# Patient Record
Sex: FEMALE | Race: WHITE | NOT HISPANIC OR LATINO | Employment: OTHER | URBAN - METROPOLITAN AREA
[De-identification: names, ages, dates, MRNs, and addresses within clinical notes are randomized per-mention and may not be internally consistent; named-entity substitution may affect disease eponyms.]

---

## 2017-01-06 ENCOUNTER — DOCUMENTATION ONLY (OUTPATIENT)
Dept: FAMILY MEDICINE | Facility: CLINIC | Age: 67
End: 2017-01-06

## 2017-01-06 NOTE — PROGRESS NOTES
Pre-Visit Chart Review  For Appointment Scheduled on 1/9/17    Health Maintenance Due   Topic Date Due    Eye Exam  03/30/2016    Foot Exam  03/03/2017

## 2017-01-09 ENCOUNTER — TELEPHONE (OUTPATIENT)
Dept: FAMILY MEDICINE | Facility: CLINIC | Age: 67
End: 2017-01-09

## 2017-01-09 ENCOUNTER — LAB VISIT (OUTPATIENT)
Dept: LAB | Facility: HOSPITAL | Age: 67
End: 2017-01-09
Attending: FAMILY MEDICINE
Payer: MEDICARE

## 2017-01-09 ENCOUNTER — OFFICE VISIT (OUTPATIENT)
Dept: FAMILY MEDICINE | Facility: CLINIC | Age: 67
End: 2017-01-09
Payer: MEDICARE

## 2017-01-09 VITALS
TEMPERATURE: 98 F | HEART RATE: 69 BPM | BODY MASS INDEX: 27.17 KG/M2 | HEIGHT: 66 IN | SYSTOLIC BLOOD PRESSURE: 118 MMHG | OXYGEN SATURATION: 99 % | WEIGHT: 169.06 LBS | DIASTOLIC BLOOD PRESSURE: 66 MMHG

## 2017-01-09 DIAGNOSIS — J44.1 COPD EXACERBATION: ICD-10-CM

## 2017-01-09 DIAGNOSIS — E86.0 DEHYDRATION, MILD: ICD-10-CM

## 2017-01-09 DIAGNOSIS — E11.43 DIABETIC GASTROPARESIS: ICD-10-CM

## 2017-01-09 DIAGNOSIS — K31.84 DIABETIC GASTROPARESIS: ICD-10-CM

## 2017-01-09 DIAGNOSIS — R11.0 CHRONIC NAUSEA: ICD-10-CM

## 2017-01-09 DIAGNOSIS — E55.9 VITAMIN D DEFICIENCY: ICD-10-CM

## 2017-01-09 DIAGNOSIS — E87.6 HYPOKALEMIA: ICD-10-CM

## 2017-01-09 DIAGNOSIS — D86.0 SARCOIDOSIS OF LUNG: ICD-10-CM

## 2017-01-09 DIAGNOSIS — E11.8 CONTROLLED TYPE 2 DIABETES MELLITUS WITH COMPLICATION, WITHOUT LONG-TERM CURRENT USE OF INSULIN: ICD-10-CM

## 2017-01-09 DIAGNOSIS — N32.89 BLADDER SPASMS: ICD-10-CM

## 2017-01-09 DIAGNOSIS — R50.9 FEVER, UNSPECIFIED FEVER CAUSE: ICD-10-CM

## 2017-01-09 DIAGNOSIS — Z09 HOSPITAL DISCHARGE FOLLOW-UP: Primary | ICD-10-CM

## 2017-01-09 DIAGNOSIS — G43.109 COMPLICATED MIGRAINE: ICD-10-CM

## 2017-01-09 LAB
ALBUMIN SERPL BCP-MCNC: 3.5 G/DL
ALP SERPL-CCNC: 77 U/L
ALT SERPL W/O P-5'-P-CCNC: 10 U/L
ANION GAP SERPL CALC-SCNC: 10 MMOL/L
AST SERPL-CCNC: 12 U/L
BASOPHILS # BLD AUTO: 0.02 K/UL
BASOPHILS NFR BLD: 0.3 %
BILIRUB SERPL-MCNC: 0.3 MG/DL
BUN SERPL-MCNC: 11 MG/DL
CALCIUM SERPL-MCNC: 9.7 MG/DL
CHLORIDE SERPL-SCNC: 102 MMOL/L
CO2 SERPL-SCNC: 26 MMOL/L
CREAT SERPL-MCNC: 0.8 MG/DL
DIFFERENTIAL METHOD: ABNORMAL
EOSINOPHIL # BLD AUTO: 0.2 K/UL
EOSINOPHIL NFR BLD: 2.6 %
ERYTHROCYTE [DISTWIDTH] IN BLOOD BY AUTOMATED COUNT: 14.9 %
EST. GFR  (AFRICAN AMERICAN): >60 ML/MIN/1.73 M^2
EST. GFR  (NON AFRICAN AMERICAN): >60 ML/MIN/1.73 M^2
GLUCOSE SERPL-MCNC: 128 MG/DL
HCT VFR BLD AUTO: 38.3 %
HGB BLD-MCNC: 12.3 G/DL
LYMPHOCYTES # BLD AUTO: 1.6 K/UL
LYMPHOCYTES NFR BLD: 21.2 %
MCH RBC QN AUTO: 28 PG
MCHC RBC AUTO-ENTMCNC: 32.1 %
MCV RBC AUTO: 87 FL
MONOCYTES # BLD AUTO: 0.8 K/UL
MONOCYTES NFR BLD: 10.6 %
NEUTROPHILS # BLD AUTO: 4.8 K/UL
NEUTROPHILS NFR BLD: 65 %
PLATELET # BLD AUTO: 438 K/UL
PMV BLD AUTO: 9.6 FL
POTASSIUM SERPL-SCNC: 3.8 MMOL/L
PROT SERPL-MCNC: 7.5 G/DL
RBC # BLD AUTO: 4.39 M/UL
SODIUM SERPL-SCNC: 138 MMOL/L
WBC # BLD AUTO: 7.44 K/UL

## 2017-01-09 PROCEDURE — 99999 PR PBB SHADOW E&M-EST. PATIENT-LVL III: CPT | Mod: PBBFAC,,, | Performed by: PHYSICIAN ASSISTANT

## 2017-01-09 PROCEDURE — 85025 COMPLETE CBC W/AUTO DIFF WBC: CPT

## 2017-01-09 PROCEDURE — 36415 COLL VENOUS BLD VENIPUNCTURE: CPT | Mod: PO

## 2017-01-09 PROCEDURE — 80053 COMPREHEN METABOLIC PANEL: CPT

## 2017-01-09 PROCEDURE — 99499 UNLISTED E&M SERVICE: CPT | Mod: S$GLB,,, | Performed by: PHYSICIAN ASSISTANT

## 2017-01-09 RX ORDER — ERGOCALCIFEROL 1.25 MG/1
50000 CAPSULE ORAL
Qty: 8 CAPSULE | Refills: 0 | Status: SHIPPED | OUTPATIENT
Start: 2017-01-09 | End: 2017-05-22 | Stop reason: SDUPTHER

## 2017-01-09 RX ORDER — OXYBUTYNIN CHLORIDE 5 MG/1
5 TABLET ORAL 2 TIMES DAILY
Qty: 180 TABLET | Refills: 1 | Status: SHIPPED | OUTPATIENT
Start: 2017-01-09 | End: 2017-02-07

## 2017-01-09 RX ORDER — POTASSIUM CHLORIDE 750 MG/1
10 CAPSULE, EXTENDED RELEASE ORAL 3 TIMES DAILY
Qty: 270 CAPSULE | Refills: 1 | Status: SHIPPED | OUTPATIENT
Start: 2017-01-09 | End: 2017-07-06 | Stop reason: SDUPTHER

## 2017-01-09 RX ORDER — CLOPIDOGREL BISULFATE 75 MG/1
75 TABLET ORAL DAILY
Qty: 90 TABLET | Refills: 1 | Status: SHIPPED | OUTPATIENT
Start: 2017-01-09 | End: 2017-05-30 | Stop reason: SDUPTHER

## 2017-01-09 RX ORDER — CLARITHROMYCIN 500 MG/1
500 TABLET, FILM COATED ORAL 2 TIMES DAILY
Qty: 20 TABLET | Refills: 0 | Status: SHIPPED | OUTPATIENT
Start: 2017-01-09 | End: 2017-01-10 | Stop reason: SDUPTHER

## 2017-01-09 NOTE — PROGRESS NOTES
"Subjective:       Patient ID: Nathalie Santiago is a 66 y.o. female.    Chief Complaint: Hospital Follow Up (dehydration, vomiting, cough, left ear pain, head and chest congestion)    HPI   Patient is a 66 year old  female presenting to the clinic for hospital follow-up 12/19/16-12/23/16 for n/v, dehydration. Patient reports history of diabetic gastroparesis. She was admitted for IV fluids & antimetitcs. She underwent upper GI with Dr. Aly which showed gastritis. CT abdomen negative except "Prior cholecystectomy, prior hysterectomy.  Diffuse fatty infiltration of the liver.  No acute findings." She did have low potassium noted in the hospital for which she was discarged on potassium. She was on IV steroids in the hosptial for sarcoidosis. Her n/v has improved since the hospital, but has not completely resolved. She feels as though eating smaller portions & reglan has helped. She does complain of chest congestion & cough with green purulent mucous production with wheezing, shortness of breath, fever with Tmax 100.1. She also is having right ear pain, drainage,sinus pain. She has been doing duo-neb treatments 3x daily & has been taking her QVar as directed.     Transitional Care Note  Admit date: 12/19/16  Discharge date: 12/23/16  Date of interactive contact (2 business days post D/C): 12/27/16  Hospitalized at: [facility]  Discharge diagnoses: n/v dehydration    Family and/or Caretaker present at visit?  No.  Diagnostic tests reviewed/disposition: I have reviewed all completed as well as pending diagnostic tests at the time of discharge.  Disease/illness education:   Medication changes: started on vitamin D 50,000 units weekly  Home health/community services discussion/referrals: Patient does not have home health established from hospital visit.  They do not need home health.  If needed, we will set up home health for the patient.   Establishment or re-establishment of referral orders for " community resources: No other necessary community resources.   Discussion with other health care providers: No discussion with other health care providers necessary.         tReview of Systems   Constitutional: Positive for fatigue and fever. Negative for activity change, appetite change, chills and diaphoresis.   HENT: Positive for congestion. Negative for nosebleeds, postnasal drip, rhinorrhea, sore throat and trouble swallowing.    Respiratory: Positive for cough, shortness of breath and wheezing. Negative for stridor.    Cardiovascular: Negative.  Negative for chest pain and palpitations.   Gastrointestinal: Positive for nausea and vomiting. Negative for abdominal pain, blood in stool, constipation and diarrhea.   Genitourinary: Negative for dysuria, frequency, hematuria and urgency.   Musculoskeletal: Negative.    Skin: Negative.  Negative for color change and rash.   Neurological: Negative for dizziness and syncope.   Psychiatric/Behavioral: Negative for agitation, behavioral problems and confusion.       Objective:      Physical Exam   Constitutional: Vital signs are normal. She appears well-developed and well-nourished. No distress.   HENT:   Head: Normocephalic and atraumatic.   Right Ear: Hearing, external ear and ear canal normal. Tympanic membrane is erythematous and bulging. Tympanic membrane is not retracted. Tympanic membrane mobility is abnormal.   Left Ear: Hearing, tympanic membrane, external ear and ear canal normal. Tympanic membrane is not erythematous, not retracted and not bulging. Tympanic membrane mobility is normal.   Nose: Rhinorrhea present. Right sinus exhibits maxillary sinus tenderness and frontal sinus tenderness. Left sinus exhibits maxillary sinus tenderness and frontal sinus tenderness.   Mouth/Throat: Uvula is midline and oropharynx is clear and moist.   Cardiovascular: Normal rate, regular rhythm, S1 normal, S2 normal and normal heart sounds.  Exam reveals no gallop.    No murmur  heard.  Pulses:       Radial pulses are 2+ on the right side, and 2+ on the left side.   <2sec cap refill fingers bilat     Pulmonary/Chest: Effort normal. No respiratory distress. She has no decreased breath sounds. She has wheezes in the right upper field and the left upper field. She has rhonchi in the right upper field and the left upper field.   Skin: Skin is warm and dry. She is not diaphoretic.   Appropriate skin turgor   Psychiatric: She has a normal mood and affect. Her speech is normal and behavior is normal. Judgment and thought content normal. Cognition and memory are normal.       Assessment:       1. Hospital discharge follow-up    2. Chronic nausea    3. Dehydration, mild    4. Hypokalemia    5. Fever, unspecified fever cause    6. Diabetic gastroparesis    7. Sarcoidosis of lung    8. Complicated migraine    9. Bladder spasms    10. Vitamin D deficiency    11. Controlled type 2 diabetes mellitus with complication, without long-term current use of insulin    12. COPD exacerbation        Plan:   Nathalie was seen today for hospital follow up.    Diagnoses and all orders for this visit:    Hospital discharge follow-up    Chronic nausea  -     CBC auto differential; Future  -     Comprehensive metabolic panel; Future    Dehydration, mild  -     CBC auto differential; Future  -     Comprehensive metabolic panel; Future    Hypokalemia  -     potassium chloride (MICRO-K) 10 MEQ CpSR; Take 1 capsule (10 mEq total) by mouth 3 (three) times daily.    Fever, unspecified fever cause  -     POCT Influenza A/B  -     X-Ray Chest PA And Lateral; Future    Diabetic gastroparesis  -     CBC auto differential; Future  -     Comprehensive metabolic panel; Future    Sarcoidosis of lung  -     X-Ray Chest PA And Lateral; Future    Complicated migraine  -     clopidogrel (PLAVIX) 75 mg tablet; Take 1 tablet (75 mg total) by mouth once daily.    Bladder spasms  -     oxybutynin (DITROPAN) 5 MG Tab; Take 1 tablet (5 mg total)  by mouth 2 (two) times daily.    Vitamin D deficiency  -     Vitamin D; Future  -     ergocalciferol (ERGOCALCIFEROL) 50,000 unit Cap; Take 1 capsule (50,000 Units total) by mouth every 7 days.    Controlled type 2 diabetes mellitus with complication, without long-term current use of insulin    COPD exacerbation  -     clarithromycin (BIAXIN) 500 MG tablet; Take 1 tablet (500 mg total) by mouth 2 (two) times daily.  Recommended starting otc mucinex BID with increased water intake.  Take antibiotics with food.  Increase fluid intake.  Call the clinic if symptoms worsen, new symptoms develop or if you are not any better after completion of your antibiotics.        Other orders      Patient readiness: acceptance and barriers:none    During the course of the visit the patient was educated and counseled about the following:     Diabetes:  Discussed general issues about diabetes pathophysiology and management.  Hypertension:   Medication: no change.    Goals: Diabetes: Maintain Hemoglobin A1C below 7 and Hypertension: Reduce Blood Pressure    Did patient meet goals/outcomes: No    The following self management tools provided: declined    Patient Instructions (the written plan) was given to the patient/family.     Time spent with patient: 30 minutes

## 2017-01-09 NOTE — PATIENT INSTRUCTIONS
Eating Out When You Have Diabetes  Eating right is an important part of keeping your blood sugar in your target range. You just need to make healthy choices.    Tips for restaurant meals  When you eat away from home try these tips:  · Try to schedule your dining-out meal at your normal meal time. Make a reservation if possible, so you don't have to wait to eat. If you can't make a reservation, try to arrive at the restaurant at a less-busy time to cut down your wait time. Eat a small fruit or starch snack at your regular mealtime if your restaurant meal is going to be later than usual.   · Call ahead to see if the restaurant can meet your dietary needs if you've never been there before. Or you can go online to see the menu ahead of time.  · Carry some crackers with you in case the restaurant needs you to wait until you can be served.  · Ask how foods are prepared before you order.  · Instead of fried, sautéed, or breaded foods, choose ones that are broiled, steamed, grilled, or baked.  · Ask for sauces, gravies, and dressings on the side.  · Only eat an amount that fits your meal plan. Remember: You can take home the leftovers.  · Save dessert for special occasions. Then choose a small dessert or share one with a friend or family member.  Make healthy choices  Fast food  · Garden salad with light dressing on the side  · Baked potato with vegetables or herbs  · Broiled, roasted, or grilled chicken sandwich  · Sliced turkey or lean roast beef sandwich  Mexican  · Chicken enchilada, without cheese or sour cream   · Small burrito with whole beans and chicken  · Whole beans (not refried) and rice  · Chicken or fish fajitas  Steakhouse  · Grilled or broiled lean cuts of beef  · Baked potato with vegetables or herbs  · Broiled or baked chicken. Dont eat the skin.  · Steamed vegetables  Asian  · Steamed dumplings or potstickers  · Broiled, boiled, or steamed meats or fish  · Sushi or sashimi  · Steamed rice or boiled  noodles. One serving is equal to 1/3 cup.  © 1099-8017 [employer]. [address] [place] [address], [place] [address]. All rights reserved. This information is not intended as a substitute for professional medical care. Always follow your healthcare professional's instructions.        Established High Blood Pressure    High blood pressure (hypertension) is a chronic disease. Often health care providers dont know what causes it. But it can be caused by certain health conditions and medicines.  If you have high blood pressure, you may not have any symptoms. If you do have symptoms, they may include headache, dizziness, changes in your vision, chest pain, and shortness of breath. But even without symptoms, high blood pressure thats not treated raises your risk for heart attack and stroke. High blood pressure is a serious health risk and shouldnt be ignored.  A blood pressure reading is made up of two numbers: a higher number over a lower number. The top number is the systolic pressure. The bottom number is the diastolic pressure. A normal blood pressure is less than 120 over less than 80.  High blood pressure is when either the top number is 140 or higher, or the bottom number is 90 or higher. This must be the result when taking your blood pressure a number of times. The blood pressures between normal and high are called prehypertension.  Home care  If you have high blood pressure, you should do what is listed below to lower your blood pressure. If you are taking medicines for high blood pressure, these methods may reduce or end your need for medicines in the future.  · Begin a weight-loss program if you are overweight.  · Cut back on how much salt you get in your diet. Heres how to do this:  ¨ Dont eat foods that have a lot of salt. These include olives, pickles, smoked meats, and salted potato chips.  ¨ Dont add salt to your food at the table.  ¨ Use only small amounts of salt when cooking.  · Begin an  exercise program. Talk with your health care provider about the type of exercise program that would be best for you. It doesn't have to be hard. Even brisk walking for 20 minutes 3 times a week is a good form of exercise.  · Dont take medicines that have heart stimulants. This includes many cold and sinus decongestant pills and sprays, as well as diet pills. Check the warnings about hypertension on the label. Stimulants such as amphetamine or cocaine could be lethal for someone with high blood pressure. Never take these.  · Limit how much caffeine you get in your diet. Switch to caffeine-free products.  · Stop smoking. If you are a long-time smoker, this can be hard. Enroll in a stop-smoking program to make it more likely that you will quit for good.  · Learn how to handle stress. This is an important part of any program to lower blood pressure. Learn about relaxation methods like meditation, yoga, or biofeedback.  · If your provider prescribed medicines, take them exactly as directed. Missing doses may cause your blood pressure get out of control.  · Consider buying an automatic blood pressure machine. You can get one of these at most pharmacies. Use this to watch your blood pressure at home. Give the results to your provider.  Follow-up care  You will need to make regular visits to your health care provider. This is to check your blood pressure and to make changes to your medicines. Make a follow-up appointment as directed.  When to seek medical advice  Call your health care provider right away if any of these occur:  · Chest pain or shortness of breath  · Severe headache  · Throbbing or rushing sound in the ears  · Nosebleed  · Sudden severe pain in your belly (abdomen)  · Extreme drowsiness, confusion, or fainting  · Dizziness or dizziness with a spinning sensation (vertigo)  · Weakness of an arm or leg or one side of the face  · You have problems speaking or seeing   © 5410-5674 [employer] [address]  [place] [address]. All rights reserved. This information is not intended as a substitute for professional medical care. Always follow your healthcare professional's instructions.

## 2017-01-09 NOTE — MR AVS SNAPSHOT
[facility]  [address]  [place] [address]  Phone: [phone]  Fax: [phone]                  Nathalie Santiago   2017 10:40 AM   Office Visit    Description:  Female : 1950   Provider:  NATAN Alvarez   Department:  [facility]           Reason for Visit     Hospital Follow Up           Diagnoses this Visit        Comments    Hospital discharge follow-up    -  Primary     Chronic nausea         Dehydration, mild         Hypokalemia         Fever, unspecified fever cause         Diabetic gastroparesis         Sarcoidosis of lung         Complicated migraine         Bladder spasms                To Do List           Future Appointments        Provider Department Dept Phone    2017 2:00 PM LAB, GENNARO SAT [facility] [phone]    2017 12:00 PM Sanya Aly MD [facility] Gastroenterology [phone]    2017 1:00 PM Elizabeth Smith MD [facility] - Ophthalmology [phone]    2/15/2017 11:00 AM Maylin Doran PA-C [facility] [phone]    2017 11:00 AM Jessica Plunkett MD [facility] [phone]      Goals (5 Years of Data)     None       These Medications        Disp Refills Start End    clopidogrel (PLAVIX) 75 mg tablet 90 tablet 1 2017     Take 1 tablet (75 mg total) by mouth once daily. - Oral    [facility] [address] [facility] Ph #: [phone]       oxybutynin (DITROPAN) 5 MG Tab 180 tablet 1 2017     Take 1 tablet (5 mg total) by mouth 2 (two) times daily. - Oral    [facility] - [facility] [address] [facility] Ph #: [phone]       potassium chloride (MICRO-K) 10 MEQ CpSR 270 capsule 1 2017     Take 1 capsule (10 mEq total) by mouth 3 (three) times daily. - Oral    [facility]: [facility] - [place] [id number] [facility] Ph #: [phone]          [facility]     [facility] On [facility] Nurse Care Line - 24/7 Assistance  Registered nurses in the [facility] provide clinical advisement, health education, appointment booking, and other advisory services.  Call for this free service at [phone].             Medications           Message regarding Medications     Verify the changes and/or additions to your medication regime listed below are the same as discussed with your clinician today.  If any of these changes or additions are incorrect, please notify your healthcare provider.             Verify that the below list of medications is an accurate representation of the medications you are currently taking.  If none reported, the list may be blank. If incorrect, please contact your healthcare provider. Carry this list with you in case of emergency.           Current Medications     albuterol-ipratropium 2.5mg-0.5mg/3mL (DUO-NEB) 0.5 mg-3 mg(2.5 mg base)/3 mL nebulizer solution INHALE THE CONTENTS OF 1 VIAL VIA NEBULIZER EVERY 6 HOURS AS NEEDED FOR SHORTNESS OF BREATH  OR  WHEEZING    alprazolam (XANAX) 0.5 MG tablet Take 1 tablet (0.5 mg total) by mouth 2 (two) times daily as needed for Anxiety.    amlodipine (NORVASC) 10 MG tablet TAKE 1 TABLET ONE TIME DAILY    atorvastatin (LIPITOR) 40 MG tablet TAKE 1 TABLET EVERY DAY    beclomethasone (QVAR) 80 mcg/actuation Aero Inhale 2 puffs into the lungs 2 (two) times daily.    blood glucose control, normal (ACCU-CHEK SMARTVIEW CONTRL SOL) Soln As directed    blood sugar diagnostic Strp Check blood glucose 3x/day.    clopidogrel (PLAVIX) 75 mg tablet Take 1 tablet (75 mg total) by mouth once daily.    fenofibrate 160 MG Tab Take 1 tablet (160 mg total) by mouth once daily.    fluticasone (FLONASE) 50 mcg/actuation nasal spray 2 sprays by Each Nare route once daily.    GLUC.METER,DIS.P-LOADED STRIPS (GLUCOSE METER, DISP & STRIPS MISC)     hydrochlorothiazide (HYDRODIURIL) 25 MG tablet TAKE 1 TABLET EVERY  "DAY    insulin aspart (NOVOLOG FLEXPEN) 100 unit/mL InPn pen Use per sliding scale    insulin NPH (NOVOLIN N) 100 unit/mL injection Inject 15 Units into the skin 2 (two) times daily before meals.    lancets Misc 1 lancet by Misc.(Non-Drug; Combo Route) route 3 (three) times daily.    levothyroxine (SYNTHROID) 25 MCG tablet TAKE 1 TABLET BEFORE BREAKFAST    metformin (GLUCOPHAGE) 1000 MG tablet Take 1 tablet (1,000 mg total) by mouth 2 (two) times daily with meals.    multivitamin capsule Take 1 capsule by mouth once daily.    ondansetron (ZOFRAN-ODT) 8 MG TbDL DISSOLVE 1 TABLET ON THE TONGUE EVERY 8 HOURS AS NEEDED    oxybutynin (DITROPAN) 5 MG Tab Take 1 tablet (5 mg total) by mouth 2 (two) times daily.    oxycodone-acetaminophen (PERCOCET)  mg per tablet Take 1 tablet by mouth every 8 (eight) hours as needed for Pain.    potassium chloride (MICRO-K) 10 MEQ CpSR Take 1 capsule (10 mEq total) by mouth 3 (three) times daily.    propranolol (INDERAL) 20 MG tablet Take 1 tablet (20 mg total) by mouth 4 (four) times daily.    sertraline (ZOLOFT) 100 MG tablet Take one tablet PO daily    sumatriptan (IMITREX) 50 MG tablet Take 50 mg by mouth daily as needed for Migraine.    VOLTAREN 1 % Gel            Clinical Reference Information           Vital Signs - Last Recorded  Most recent update: 1/9/2017 10:44 AM by Perla Mcintosh    BP Pulse Temp Ht Wt LMP    118/66 (BP Location: Right arm, Patient Position: Sitting, BP Method: Automatic) 69 97.9 °F (36.6 °C) (Oral) 5' 6" (1.676 m) 76.7 kg (169 lb 1.5 oz) (LMP Unknown)    SpO2 BMI             99% 27.29 kg/m2         Blood Pressure          Most Recent Value    BP  118/66      Allergies as of 1/9/2017     Keflex [Cephalexin]    Penicillins    Vasotec [Enalapril Maleate]    Wellbutrin [Bupropion Hcl]    Zithromax [Azithromycin]    Codeine      Immunizations Administered on Date of Encounter - 1/9/2017     None      Orders Placed During Today's Visit      Normal Orders This " Visit    POCT Influenza A/B     Future Labs/Procedures Expected by Expires    CBC auto differential  1/9/2017 3/10/2018    Comprehensive metabolic panel  1/9/2017 3/10/2018    X-Ray Chest PA And Lateral  1/9/2017 1/9/2018      Instructions      Eating Out When You Have Diabetes  Eating right is an important part of keeping your blood sugar in your target range. You just need to make healthy choices.    Tips for restaurant meals  When you eat away from home try these tips:  · Try to schedule your dining-out meal at your normal meal time. Make a reservation if possible, so you don't have to wait to eat. If you can't make a reservation, try to arrive at the restaurant at a less-busy time to cut down your wait time. Eat a small fruit or starch snack at your regular mealtime if your restaurant meal is going to be later than usual.   · Call ahead to see if the restaurant can meet your dietary needs if you've never been there before. Or you can go online to see the menu ahead of time.  · Carry some crackers with you in case the restaurant needs you to wait until you can be served.  · Ask how foods are prepared before you order.  · Instead of fried, sautéed, or breaded foods, choose ones that are broiled, steamed, grilled, or baked.  · Ask for sauces, gravies, and dressings on the side.  · Only eat an amount that fits your meal plan. Remember: You can take home the leftovers.  · Save dessert for special occasions. Then choose a small dessert or share one with a friend or family member.  Make healthy choices  Fast food  · Garden salad with light dressing on the side  · Baked potato with vegetables or herbs  · Broiled, roasted, or grilled chicken sandwich  · Sliced turkey or lean roast beef sandwich  Mexican  · Chicken enchilada, without cheese or sour cream   · Small burrito with whole beans and chicken  · Whole beans (not refried) and rice  · Chicken or fish fajitas  Steakhouse  · Grilled or broiled lean cuts of  beef  · Baked potato with vegetables or herbs  · Broiled or baked chicken. Dont eat the skin.  · Steamed vegetables  Asian  · Steamed dumplings or potstickers  · Broiled, boiled, or steamed meats or fish  · Sushi or sashimi  · Steamed rice or boiled noodles. One serving is equal to 1/3 cup.  © 8155-0986 [employer]. [address] [place] [address], [place] [address]. All rights reserved. This information is not intended as a substitute for professional medical care. Always follow your healthcare professional's instructions.        Established High Blood Pressure    High blood pressure (hypertension) is a chronic disease. Often health care providers dont know what causes it. But it can be caused by certain health conditions and medicines.  If you have high blood pressure, you may not have any symptoms. If you do have symptoms, they may include headache, dizziness, changes in your vision, chest pain, and shortness of breath. But even without symptoms, high blood pressure thats not treated raises your risk for heart attack and stroke. High blood pressure is a serious health risk and shouldnt be ignored.  A blood pressure reading is made up of two numbers: a higher number over a lower number. The top number is the systolic pressure. The bottom number is the diastolic pressure. A normal blood pressure is less than 120 over less than 80.  High blood pressure is when either the top number is 140 or higher, or the bottom number is 90 or higher. This must be the result when taking your blood pressure a number of times. The blood pressures between normal and high are called prehypertension.  Home care  If you have high blood pressure, you should do what is listed below to lower your blood pressure. If you are taking medicines for high blood pressure, these methods may reduce or end your need for medicines in the future.  · Begin a weight-loss program if you are overweight.  · Cut back on how much salt you get in your  diet. Heres how to do this:  ¨ Dont eat foods that have a lot of salt. These include olives, pickles, smoked meats, and salted potato chips.  ¨ Dont add salt to your food at the table.  ¨ Use only small amounts of salt when cooking.  · Begin an exercise program. Talk with your health care provider about the type of exercise program that would be best for you. It doesn't have to be hard. Even brisk walking for 20 minutes 3 times a week is a good form of exercise.  · Dont take medicines that have heart stimulants. This includes many cold and sinus decongestant pills and sprays, as well as diet pills. Check the warnings about hypertension on the label. Stimulants such as amphetamine or cocaine could be lethal for someone with high blood pressure. Never take these.  · Limit how much caffeine you get in your diet. Switch to caffeine-free products.  · Stop smoking. If you are a long-time smoker, this can be hard. Enroll in a stop-smoking program to make it more likely that you will quit for good.  · Learn how to handle stress. This is an important part of any program to lower blood pressure. Learn about relaxation methods like meditation, yoga, or biofeedback.  · If your provider prescribed medicines, take them exactly as directed. Missing doses may cause your blood pressure get out of control.  · Consider buying an automatic blood pressure machine. You can get one of these at most pharmacies. Use this to watch your blood pressure at home. Give the results to your provider.  Follow-up care  You will need to make regular visits to your health care provider. This is to check your blood pressure and to make changes to your medicines. Make a follow-up appointment as directed.  When to seek medical advice  Call your health care provider right away if any of these occur:  · Chest pain or shortness of breath  · Severe headache  · Throbbing or rushing sound in the ears  · Nosebleed  · Sudden severe pain in your belly  (abdomen)  · Extreme drowsiness, confusion, or fainting  · Dizziness or dizziness with a spinning sensation (vertigo)  · Weakness of an arm or leg or one side of the face  · You have problems speaking or seeing   © 5788-2269 [employer]. [address] [place] [address]. All rights reserved. This information is not intended as a substitute for professional medical care. Always follow your healthcare professional's instructions.

## 2017-01-09 NOTE — TELEPHONE ENCOUNTER
Please call and tell patient to HOLD her cholesterol medication- atorvastatin while taking clarithromycin

## 2017-01-10 ENCOUNTER — TELEPHONE (OUTPATIENT)
Dept: FAMILY MEDICINE | Facility: CLINIC | Age: 67
End: 2017-01-10

## 2017-01-10 RX ORDER — CLARITHROMYCIN 500 MG/1
500 TABLET, FILM COATED ORAL 2 TIMES DAILY
Qty: 20 TABLET | Refills: 0 | Status: SHIPPED | OUTPATIENT
Start: 2017-01-10 | End: 2017-01-20

## 2017-01-10 RX ORDER — BENZONATATE 200 MG/1
200 CAPSULE ORAL 3 TIMES DAILY PRN
Qty: 60 CAPSULE | Refills: 0 | Status: SHIPPED | OUTPATIENT
Start: 2017-01-10 | End: 2017-01-20

## 2017-01-10 NOTE — TELEPHONE ENCOUNTER
----- Message from Maryann Brown sent at 1/10/2017 11:48 AM CST -----  Contact: Nathalie  Patient calling to ask for Rx Pulmicort to be sent to     [facility] - [place], [place] - [address] [facility]  [address] [facility] [address]  Phone: [phone] Fax: [phone]     along with other Rx. Also Rx antibiotic has not been received at    [facility] - [place] - [address]  [address] [facility] [place] [address]  Phone: [phone] Fax: [phone]    Please call [phone]. Thanks!

## 2017-01-10 NOTE — TELEPHONE ENCOUNTER
----- Message from Maryann Brown sent at 1/10/2017 11:48 AM CST -----  Contact: Nathalie  Patient calling to ask for Rx Pulmicort to be sent to     [facility] - [place], [place] - [address] [facility]  [address] [facility] [address]  Phone: [phone] Fax: [phone]     along with other Rx. Also Rx antibiotic has not been received at    [facility] - [place] - [address]  [address] [facility] [place] [address]  Phone: [phone] Fax: [phone]    Please call [phone]. Thanks!

## 2017-01-10 NOTE — TELEPHONE ENCOUNTER
----- Message from Wilian Stewart sent at 1/10/2017  9:18 AM CST -----  Contact: self   [phone]  Patient called stating [facility] in [place] did not receive new rx. Patient is also  asking for a new rx for tessalon pearls.Thanks!

## 2017-01-10 NOTE — TELEPHONE ENCOUNTER
Please cancel at mail order. They should both be at [facility] now. For some reason, it defaulted back before it was sent.

## 2017-01-12 RX ORDER — IPRATROPIUM BROMIDE AND ALBUTEROL SULFATE 2.5; .5 MG/3ML; MG/3ML
SOLUTION RESPIRATORY (INHALATION)
Qty: 270 ML | Refills: 1 | Status: SHIPPED | OUTPATIENT
Start: 2017-01-12 | End: 2018-10-18 | Stop reason: SDUPTHER

## 2017-01-12 NOTE — TELEPHONE ENCOUNTER
Notified patient of results and recommendations and states understanding patient is requesting nebulizer treatment sent to [facility]

## 2017-01-21 PROCEDURE — 99495 TRANSJ CARE MGMT MOD F2F 14D: CPT | Mod: S$GLB,,, | Performed by: PHYSICIAN ASSISTANT

## 2017-01-23 ENCOUNTER — OFFICE VISIT (OUTPATIENT)
Dept: OPHTHALMOLOGY | Facility: CLINIC | Age: 67
End: 2017-01-23
Payer: MEDICARE

## 2017-01-23 DIAGNOSIS — H25.13 NUCLEAR SCLEROSIS, BILATERAL: ICD-10-CM

## 2017-01-23 DIAGNOSIS — H35.371 ERM OD (EPIRETINAL MEMBRANE, RIGHT EYE): ICD-10-CM

## 2017-01-23 DIAGNOSIS — H52.7 REFRACTIVE ERROR: ICD-10-CM

## 2017-01-23 DIAGNOSIS — H26.9 CORTICAL CATARACT OF BOTH EYES: ICD-10-CM

## 2017-01-23 DIAGNOSIS — D86.9 SARCOIDOSIS: Primary | ICD-10-CM

## 2017-01-23 DIAGNOSIS — H21.1X2: ICD-10-CM

## 2017-01-23 DIAGNOSIS — H40.039 ANATOMICAL NARROW ANGLE: ICD-10-CM

## 2017-01-23 PROCEDURE — 92015 DETERMINE REFRACTIVE STATE: CPT | Mod: S$GLB,,, | Performed by: OPHTHALMOLOGY

## 2017-01-23 PROCEDURE — 99499 UNLISTED E&M SERVICE: CPT | Mod: S$GLB,,, | Performed by: OPHTHALMOLOGY

## 2017-01-23 PROCEDURE — 92020 GONIOSCOPY: CPT | Mod: S$GLB,,, | Performed by: OPHTHALMOLOGY

## 2017-01-23 PROCEDURE — 92014 COMPRE OPH EXAM EST PT 1/>: CPT | Mod: S$GLB,,, | Performed by: OPHTHALMOLOGY

## 2017-01-23 PROCEDURE — 92134 CPTRZ OPH DX IMG PST SGM RTA: CPT | Mod: RT,S$GLB,, | Performed by: OPHTHALMOLOGY

## 2017-01-23 PROCEDURE — 99999 PR PBB SHADOW E&M-EST. PATIENT-LVL II: CPT | Mod: PBBFAC,,, | Performed by: OPHTHALMOLOGY

## 2017-01-23 NOTE — LETTER
January 23, 2017      Ladan Almodovar MD  [address]  [facility] [address]           [address]  [facility] [address]  Phone: [phone]          Patient: Nathalie [place]   MR Number: [id number]   YOB: 1950   Date of Visit: 1/23/2017       Dear Dr. Ladan Almodovar:    Thank you for referring Nathalie Santiago to me for evaluation. Attached you will find relevant portions of my assessment and plan of care.    If you have questions, please do not hesitate to call me. I look forward to following Nathalie Santiago along with you.    Sincerely,    Elizabethanna Smith MD    Enclosure  CC:  No Recipients    If you would like to receive this communication electronically, please contact [email or web address] or [phone] to request more information on [facility] [facility] Link access.    For providers and/or their staff who would like to refer a patient to [facility], please contact us through our one-stop-shop provider referral line, [facility], at [phone].    If you feel you have received this communication in error or would no longer like to receive these types of communications, please e-mail [email or web address]

## 2017-01-23 NOTE — PROGRESS NOTES
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 Also has balance issues due to stroke, falls to right. No pain   in eyes but feels discomfort OD > OS. No tear, itch or burn but eyes get   dry feeling after reading. Use AT's PRN OU. Does not dirve at night due to   poor night vision and glare from headlights.     LBSL: 117 this am  Hemoglobin A1C       Date                     Value               Ref Range             Status                11/17/2016               6.9 (H)             4.5 - 6.2 %           Final              Comment:    According to ADA guidelines, hemoglobin A1C <7.0% represents  optimal   control in non-pregnant diabetic patients.  Different  metrics may apply   to specific populations.   Standards of Medical Care in Diabetes -   2016.  For the purpose of screening for the presence of diabetes:  <5.7%       Consistent with the absence of diabetes  5.7-6.4%  Consistent with   increasing risk for diabetes   (prediabetes)  >or=6.5%  Consistent with   diabetes  Currently no consensus exists for use of hemoglobin A1C  for   diagnosis of diabetes for children.         09/16/2016               7.5 (H)             4.5 - 6.2 %           Final              Comment:    According to ADA guidelines, hemoglobin A1C <7.0% represents  optimal   control in non-pregnant diabetic patients.  Different  metrics may apply   to specific populations.   Standards of Medical Care in Diabetes -   2016.  For the purpose of screening for the presence of diabetes:  <5.7%       Consistent with the absence of diabetes  5.7-6.4%  Consistent with   increasing risk for diabetes   (prediabetes)  >or=6.5%  Consistent with   diabetes  Currently no consensus exists for use of hemoglobin A1C  for   diagnosis of diabetes for children.         05/26/2016               7.8 (H)             4.5  - 6.2 %           Final                 08/27/2013               7.0 (H)             4.8 - 5.6 %           Final              Comment:             Increased risk for diabetes: 5.7 - 6.4           Diabetes:   >6.4           Glycemic control for adults with diabetes: <7.0  **In order   to standardize %HbA1c results worldwide, as of October 11, 2010,  the   %HbA1c is being calculated using the master equation recommended in   the  consensus statement adopted by the ADA (American Diabetes Assoc),   EASD  ( Assoc for the Study of Diabetes), IFCC (International   Federation  of Clinical Chemistry and Laboratory Medicine) and IDF   (International  Diabetes Federation). Result units: %HgbA1c   (DCCT/NGSP).  In common with other methods, Hb A1C values may not   accurately reflect mean  blood glucose in patients with hemoglobin   variants (HgbF, HgbS and HgbC).  Any cause of shortened erythrocyte   survival will reduce exposure of  erythrocytes to glucose with a   consequent decrease in HbA1c (%) values, even  though the time-averaged   blood glucose level may be elevated. Causes of  shortened erythrocyte   lifetime might be hemolytic anemia or other hemolytic  diseases,   homozygous sickle cell trait, pregnancy, recent significant or  chronic   blood loss, etc. Caution should be used when interpreting the   HbA1c  results from patients with these conditions.  ----------  Agree with above. Feels vision has gotten worse. Feels she can't see as   well without her glasses, wears 2 pair to read. Falls to the right a lot.          Last edited by Elizabeth Smith MD on 1/23/2017  1:48 PM.   (History)        ROS     Positive for: Constitutional (sardoidosis - on long-term steroids;   hospitalized twice), Endocrine (DM diagnosed 2009 after steroids for   sarcoid; hypothyroid), Cardiovascular (HTN - controlled with meds per pt),   Respiratory (pulmonary sarcoid), Heme/Lymph (ASA)    Negative for: Eyes (denies surgery  or trauma)    Other comments for: Neurological (denies neuropathy), Genitourinary   (denies nephropathy)    Last edited by Elizabeth Smith MD on 1/23/2017  1:48 PM.   (History)        Assessment /Plan     For exam results, see Encounter Report.    Sarcoidosis    Anatomical narrow angle    New vessels in iris, left    ERM OD (epiretinal membrane, right eye)  -     Posterior Segment OCT Retina-Both eyes    Nuclear sclerosis, bilateral    Cortical cataract of both eyes    Refractive error            1. Diabetes mellitus type II, controlled  No CSME, no NPDR   2. PVD OU RD precautions.   3. ERM OD Nasal to fovea. First time noted on clinical exam. Observe. Baseline OCT done last visit on old machine, was not scanned to system, so will repeat today.   4. Nuclear sclerosis, bilateral  Avoid nighttime driving for now. Pt on long-term steroids for sarcoidosis   5. Cortical cataract   Becoming symptomatic. Approaching visual significance OU. Re-check 1 year, sooner if MRx does not help with ADL's.   6. Sarcoidosis  quiet   7. Hyperopia with presbyopia  MRx given due to difference between 2 eyes.   Abnormal iris vessel - left - not documented on prior exams. Carotids WNL, no NVA. I think it is just a normal variant - observe.

## 2017-01-23 NOTE — MR AVS SNAPSHOT
[facility] - Ophthalmology  [address] [place] [address] Drive [address]  [place] [address]  Phone: [phone]                  Nathalie Santiago   2017 1:00 PM   Office Visit    Description:  Female : 1950   Provider:  Elizabeth Smith MD   Department:  [facility] [facility] - Ophthalmology           Reason for Visit     Diabetic Eye Exam           Diagnoses this Visit        Comments    Sarcoidosis    -  Primary     Anatomical narrow angle         New vessels in iris, left         ERM OD (epiretinal membrane, right eye)         Nuclear sclerosis, bilateral         Cortical cataract of both eyes         Refractive error                To Do List           Future Appointments        Provider Department Dept Phone    2017 2:00 PM [facility] [phone]    2/15/2017 11:00 AM JOCELYNE Gonzalesll - Pain Management [phone]    2017 11:00 AM MD Lillian Willamsll - Rheumatology [phone]      Goals (5 Years of Data)     None      Follow-Up and Disposition     Return in about 1 year (around 2018) for diabetic eye exam, sarcoidosis, NSC/CS, ERM.      [facility] Call     [facility] On Call Nurse Care Line -  Assistance  Registered nurses in the [facility] provide clinical advisement, health education, appointment booking, and other advisory services.  Call for this free service at [phone].             Medications           Message regarding Medications     Verify the changes and/or additions to your medication regime listed below are the same as discussed with your clinician today.  If any of these changes or additions are incorrect, please notify your healthcare provider.             Verify that the below list of medications is an accurate representation of the medications you are currently taking.  If none reported, the list may be blank. If incorrect, please contact your healthcare provider. Carry this list  with you in case of emergency.           Current Medications     albuterol-ipratropium 2.5mg-0.5mg/3mL (DUO-NEB) 0.5 mg-3 mg(2.5 mg base)/3 mL nebulizer solution INHALE THE CONTENTS OF 1 VIAL VIA NEBULIZER EVERY 6 HOURS AS NEEDED FOR SHORTNESS OF BREATH  OR  WHEEZING    alprazolam (XANAX) 0.5 MG tablet Take 1 tablet (0.5 mg total) by mouth 2 (two) times daily as needed for Anxiety.    amlodipine (NORVASC) 10 MG tablet TAKE 1 TABLET ONE TIME DAILY    atorvastatin (LIPITOR) 40 MG tablet TAKE 1 TABLET EVERY DAY    beclomethasone (QVAR) 80 mcg/actuation Aero Inhale 2 puffs into the lungs 2 (two) times daily.    blood glucose control, normal (ACCU-CHEK SMARTVIEW CONTRL SOL) Soln As directed    blood sugar diagnostic Strp Check blood glucose 3x/day.    clopidogrel (PLAVIX) 75 mg tablet Take 1 tablet (75 mg total) by mouth once daily.    DEXTRAN 70/HYPROMELLOSE (ARTIFICIAL TEARS, PF, OPHT) Apply 1 drop to eye as needed.    ergocalciferol (ERGOCALCIFEROL) 50,000 unit Cap Take 1 capsule (50,000 Units total) by mouth every 7 days.    fenofibrate 160 MG Tab Take 1 tablet (160 mg total) by mouth once daily.    fluticasone (FLONASE) 50 mcg/actuation nasal spray 2 sprays by Each Nare route once daily.    GLUC.METER,DIS.P-LOADED STRIPS (GLUCOSE METER, DISP & STRIPS MISC)     hydrochlorothiazide (HYDRODIURIL) 25 MG tablet TAKE 1 TABLET EVERY DAY    insulin aspart (NOVOLOG FLEXPEN) 100 unit/mL InPn pen Use per sliding scale    insulin NPH (NOVOLIN N) 100 unit/mL injection Inject 15 Units into the skin 2 (two) times daily before meals.    lancets Misc 1 lancet by Misc.(Non-Drug; Combo Route) route 3 (three) times daily.    levothyroxine (SYNTHROID) 25 MCG tablet TAKE 1 TABLET BEFORE BREAKFAST    metformin (GLUCOPHAGE) 1000 MG tablet Take 1 tablet (1,000 mg total) by mouth 2 (two) times daily with meals.    multivitamin capsule Take 1 capsule by mouth once daily.    ondansetron (ZOFRAN-ODT) 8 MG TbDL DISSOLVE 1 TABLET ON THE TONGUE  EVERY 8 HOURS AS NEEDED    oxybutynin (DITROPAN) 5 MG Tab Take 1 tablet (5 mg total) by mouth 2 (two) times daily.    potassium chloride (MICRO-K) 10 MEQ CpSR Take 1 capsule (10 mEq total) by mouth 3 (three) times daily.    propranolol (INDERAL) 20 MG tablet Take 1 tablet (20 mg total) by mouth 4 (four) times daily.    sertraline (ZOLOFT) 100 MG tablet Take one tablet PO daily    sumatriptan (IMITREX) 50 MG tablet Take 50 mg by mouth daily as needed for Migraine.    VOLTAREN 1 % Gel            Clinical Reference Information           Vital Signs - Last Recorded     LMP                   (LMP Unknown)           Allergies as of 1/23/2017     Keflex [Cephalexin]    Penicillins    Vasotec [Enalapril Maleate]    Wellbutrin [Bupropion Hcl]    Zithromax [Azithromycin]    Codeine      Immunizations Administered on Date of Encounter - 1/23/2017     None      Orders Placed During Today's Visit      Normal Orders This Visit    Posterior Segment OCT Retina-Both eyes

## 2017-01-31 ENCOUNTER — TELEPHONE (OUTPATIENT)
Dept: PSYCHIATRY | Facility: CLINIC | Age: 67
End: 2017-01-31

## 2017-01-31 NOTE — TELEPHONE ENCOUNTER
PATIENT CALLED AT 9:39 AM AN CANCELLED APPOINTMENT DUE TO FEELING VERY BAD WITH A COLD RESCHEDULED FOR Friday 02/03/17

## 2017-02-02 ENCOUNTER — OFFICE VISIT (OUTPATIENT)
Dept: GASTROENTEROLOGY | Facility: CLINIC | Age: 67
End: 2017-02-02
Payer: MEDICARE

## 2017-02-02 ENCOUNTER — HOSPITAL ENCOUNTER (EMERGENCY)
Facility: HOSPITAL | Age: 67
Discharge: HOME | End: 2017-02-02
Attending: EMERGENCY MEDICINE
Payer: MEDICARE

## 2017-02-02 VITALS
BODY MASS INDEX: 28.28 KG/M2 | SYSTOLIC BLOOD PRESSURE: 131 MMHG | WEIGHT: 176 LBS | HEIGHT: 66 IN | TEMPERATURE: 99 F | RESPIRATION RATE: 12 BRPM | HEART RATE: 83 BPM | OXYGEN SATURATION: 97 % | DIASTOLIC BLOOD PRESSURE: 67 MMHG

## 2017-02-02 VITALS
HEART RATE: 86 BPM | WEIGHT: 164.44 LBS | RESPIRATION RATE: 20 BRPM | DIASTOLIC BLOOD PRESSURE: 71 MMHG | BODY MASS INDEX: 26.43 KG/M2 | HEIGHT: 66 IN | SYSTOLIC BLOOD PRESSURE: 117 MMHG

## 2017-02-02 DIAGNOSIS — R53.1 WEAKNESS: ICD-10-CM

## 2017-02-02 DIAGNOSIS — R73.9 HYPERGLYCEMIA: Primary | ICD-10-CM

## 2017-02-02 DIAGNOSIS — K31.84 GASTROPARESIS: Primary | ICD-10-CM

## 2017-02-02 LAB
ALBUMIN SERPL BCP-MCNC: 2.7 G/DL
ALP SERPL-CCNC: 146 U/L
ALT SERPL W/O P-5'-P-CCNC: 50 U/L
ANION GAP SERPL CALC-SCNC: 14 MMOL/L
AST SERPL-CCNC: 63 U/L
BACTERIA #/AREA URNS HPF: ABNORMAL /HPF
BASOPHILS # BLD AUTO: 0 K/UL
BASOPHILS NFR BLD: 0.3 %
BILIRUB SERPL-MCNC: 0.6 MG/DL
BILIRUB UR QL STRIP: ABNORMAL
BUN SERPL-MCNC: 16 MG/DL
CALCIUM SERPL-MCNC: 9.9 MG/DL
CHLORIDE SERPL-SCNC: 94 MMOL/L
CK SERPL-CCNC: 127 U/L
CLARITY UR: ABNORMAL
CO2 SERPL-SCNC: 25 MMOL/L
COLOR UR: YELLOW
CREAT SERPL-MCNC: 0.8 MG/DL
DIFFERENTIAL METHOD: ABNORMAL
EOSINOPHIL # BLD AUTO: 0 K/UL
EOSINOPHIL NFR BLD: 0.5 %
ERYTHROCYTE [DISTWIDTH] IN BLOOD BY AUTOMATED COUNT: 14.6 %
EST. GFR  (AFRICAN AMERICAN): >60 ML/MIN/1.73 M^2
EST. GFR  (NON AFRICAN AMERICAN): >60 ML/MIN/1.73 M^2
GLUCOSE SERPL-MCNC: 238 MG/DL
GLUCOSE UR QL STRIP: ABNORMAL
HCT VFR BLD AUTO: 33.6 %
HGB BLD-MCNC: 11 G/DL
HGB UR QL STRIP: ABNORMAL
HYALINE CASTS #/AREA URNS LPF: 0 /LPF
KETONES UR QL STRIP: NEGATIVE
LEUKOCYTE ESTERASE UR QL STRIP: ABNORMAL
LIPASE SERPL-CCNC: 13 U/L
LYMPHOCYTES # BLD AUTO: 0.8 K/UL
LYMPHOCYTES NFR BLD: 9.8 %
MAGNESIUM SERPL-MCNC: 1.5 MG/DL
MCH RBC QN AUTO: 27.3 PG
MCHC RBC AUTO-ENTMCNC: 32.8 %
MCV RBC AUTO: 83 FL
MICROSCOPIC COMMENT: ABNORMAL
MONOCYTES # BLD AUTO: 0.9 K/UL
MONOCYTES NFR BLD: 12 %
NEUTROPHILS # BLD AUTO: 6.1 K/UL
NEUTROPHILS NFR BLD: 77.4 %
NITRITE UR QL STRIP: POSITIVE
PH UR STRIP: 6 [PH] (ref 5–8)
PHOSPHATE SERPL-MCNC: 2 MG/DL
PLATELET # BLD AUTO: 238 K/UL
PMV BLD AUTO: 7.7 FL
POCT GLUCOSE: 244 MG/DL (ref 70–110)
POTASSIUM SERPL-SCNC: 3.5 MMOL/L
PROT SERPL-MCNC: 7.1 G/DL
PROT UR QL STRIP: ABNORMAL
RBC # BLD AUTO: 4.03 M/UL
RBC #/AREA URNS HPF: 3 /HPF (ref 0–4)
SODIUM SERPL-SCNC: 133 MMOL/L
SP GR UR STRIP: 1.02 (ref 1–1.03)
SQUAMOUS #/AREA URNS HPF: 5 /HPF
T4 FREE SERPL-MCNC: 1.26 NG/DL
TROPONIN I SERPL DL<=0.01 NG/ML-MCNC: 0.01 NG/ML
TSH SERPL DL<=0.005 MIU/L-ACNC: 4.05 UIU/ML
URN SPEC COLLECT METH UR: ABNORMAL
UROBILINOGEN UR STRIP-ACNC: >=8 EU/DL
WBC # BLD AUTO: 7.9 K/UL
WBC #/AREA URNS HPF: 5 /HPF (ref 0–5)

## 2017-02-02 PROCEDURE — 80053 COMPREHEN METABOLIC PANEL: CPT

## 2017-02-02 PROCEDURE — 1160F RVW MEDS BY RX/DR IN RCRD: CPT | Mod: S$GLB,,, | Performed by: INTERNAL MEDICINE

## 2017-02-02 PROCEDURE — 84439 ASSAY OF FREE THYROXINE: CPT

## 2017-02-02 PROCEDURE — 82550 ASSAY OF CK (CPK): CPT

## 2017-02-02 PROCEDURE — 96360 HYDRATION IV INFUSION INIT: CPT

## 2017-02-02 PROCEDURE — 96361 HYDRATE IV INFUSION ADD-ON: CPT

## 2017-02-02 PROCEDURE — 99999 PR PBB SHADOW E&M-EST. PATIENT-LVL III: CPT | Mod: PBBFAC,,, | Performed by: INTERNAL MEDICINE

## 2017-02-02 PROCEDURE — 3074F SYST BP LT 130 MM HG: CPT | Mod: S$GLB,,, | Performed by: INTERNAL MEDICINE

## 2017-02-02 PROCEDURE — 36415 COLL VENOUS BLD VENIPUNCTURE: CPT

## 2017-02-02 PROCEDURE — 85025 COMPLETE CBC W/AUTO DIFF WBC: CPT

## 2017-02-02 PROCEDURE — 99214 OFFICE O/P EST MOD 30 MIN: CPT | Mod: S$GLB,,, | Performed by: INTERNAL MEDICINE

## 2017-02-02 PROCEDURE — 81000 URINALYSIS NONAUTO W/SCOPE: CPT

## 2017-02-02 PROCEDURE — 93005 ELECTROCARDIOGRAM TRACING: CPT

## 2017-02-02 PROCEDURE — 82962 GLUCOSE BLOOD TEST: CPT

## 2017-02-02 PROCEDURE — 83735 ASSAY OF MAGNESIUM: CPT

## 2017-02-02 PROCEDURE — 84484 ASSAY OF TROPONIN QUANT: CPT

## 2017-02-02 PROCEDURE — 83690 ASSAY OF LIPASE: CPT

## 2017-02-02 PROCEDURE — 84100 ASSAY OF PHOSPHORUS: CPT

## 2017-02-02 PROCEDURE — 1125F AMNT PAIN NOTED PAIN PRSNT: CPT | Mod: S$GLB,,, | Performed by: INTERNAL MEDICINE

## 2017-02-02 PROCEDURE — 25000003 PHARM REV CODE 250: Performed by: EMERGENCY MEDICINE

## 2017-02-02 PROCEDURE — 1157F ADVNC CARE PLAN IN RCRD: CPT | Mod: S$GLB,,, | Performed by: INTERNAL MEDICINE

## 2017-02-02 PROCEDURE — 99284 EMERGENCY DEPT VISIT MOD MDM: CPT | Mod: 25

## 2017-02-02 PROCEDURE — 3078F DIAST BP <80 MM HG: CPT | Mod: S$GLB,,, | Performed by: INTERNAL MEDICINE

## 2017-02-02 PROCEDURE — 84443 ASSAY THYROID STIM HORMONE: CPT

## 2017-02-02 PROCEDURE — 99499 UNLISTED E&M SERVICE: CPT | Mod: S$PBB,,, | Performed by: INTERNAL MEDICINE

## 2017-02-02 PROCEDURE — 1159F MED LIST DOCD IN RCRD: CPT | Mod: S$GLB,,, | Performed by: INTERNAL MEDICINE

## 2017-02-02 RX ORDER — PROMETHAZINE HYDROCHLORIDE 25 MG/1
25 TABLET ORAL EVERY 6 HOURS PRN
Qty: 30 TABLET | Refills: 0 | Status: SHIPPED | OUTPATIENT
Start: 2017-02-02 | End: 2017-02-02 | Stop reason: HOSPADM

## 2017-02-02 RX ORDER — METOCLOPRAMIDE 10 MG/1
10 TABLET ORAL EVERY 8 HOURS
Qty: 90 TABLET | Refills: 1 | Status: SHIPPED | OUTPATIENT
Start: 2017-02-02 | End: 2017-03-13 | Stop reason: SDUPTHER

## 2017-02-02 RX ORDER — OXYCODONE AND ACETAMINOPHEN 5; 325 MG/1; MG/1
1 TABLET ORAL
Status: COMPLETED | OUTPATIENT
Start: 2017-02-02 | End: 2017-02-02

## 2017-02-02 RX ORDER — LANOLIN ALCOHOL/MO/W.PET/CERES
400 CREAM (GRAM) TOPICAL DAILY
Refills: 0 | COMMUNITY
Start: 2017-02-02 | End: 2017-02-22

## 2017-02-02 RX ORDER — AMLODIPINE BESYLATE 5 MG/1
5 TABLET ORAL DAILY
Status: ON HOLD | COMMUNITY
End: 2017-03-29

## 2017-02-02 RX ADMIN — SODIUM CHLORIDE 1000 ML: 0.9 INJECTION, SOLUTION INTRAVENOUS at 03:02

## 2017-02-02 RX ADMIN — OXYCODONE AND ACETAMINOPHEN 1 TABLET: 5; 325 TABLET ORAL at 05:02

## 2017-02-02 NOTE — ED PROVIDER NOTES
Encounter Date: 2/2/2017    SCRIBE #1 NOTE: I, Saravanan Sparks, am scribing for, and in the presence of,  Dr. Covington. I have scribed the entire note.       History     Chief Complaint   Patient presents with    Headache     since Friday and nausea x 30 minutes.     Review of patient's allergies indicates:   Allergen Reactions    Keflex [cephalexin] Anaphylaxis    Penicillins Anaphylaxis    Vasotec [enalapril maleate] Other (See Comments)     Causes her to pass out    Wellbutrin [bupropion hcl] Other (See Comments)     Loss of memory      Zithromax [azithromycin]      Patient states medication does not work on her-not effective    Codeine Itching and Nausea Only     HPI Comments: 02/02/2017  3:08 PM     Chief Complaint: generalized malaise       The patient is a 66 y.o. female who is presenting with acute onset of gerenalized malaise, worsening back pain, and urinary incontinence x7 days. Pt states this all began when she drank some Gatorade and spiked her blood sugar level. She is a known diabetic but has never had these type of sx's before. Pt reports she has urinary incontinence and back pain at baseline, however both of those sx's have become progressively worse over the past few days. She reports she is too weak to get out of bed, needs assistance walking, and does not have an appetite. She does not report any alleviating factors. There are no other associated sx's at this time. She has a past medical history of Abnormal Pap smear (1972); Anxiety; Arthritis; Asthma; Ataxia; Breast cancer; Cancer; Cataract; Cervical back pain with evidence of disc disease; Cervical cancer; Chronic bilateral low back pain without sciatica (9/6/2016); Chronic bronchitis; Cortical cataract (2/18/2013); CVA (cerebral vascular accident) (9/6/2016); CVA (cerebral vascular accident)- Confirmed by neurology (9/6/2016); Degenerative joint disease of cervical and lumbar spine (8/1/2014); Depression; Diabetes mellitus; Diabetes  mellitus, type II; History of malignant phylloides tumor of breast (2/15/2013); Hyperlipidemia; Hyperopia with presbyopia (2/18/2013); Hypertension; Hypertension; Hypothyroidism; Immunosuppressed status (9/3/2015); Migraine; Mitral valve regurgitation; Nuclear sclerosis (2/18/2013); Obesity; JOSE LUIS; Ovarian cancer; Pneumonia; Polyneuropathy; PVD (posterior vitreous detachment) (2/18/2013); Restless leg syndrome; Ruptured disk; 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  has a past surgical history that includes Cystocele repair; rectocele; Cholecystectomy; Pleura biopsy; Rectal surgery; Oophorectomy; Mastectomy (Bilateral); Fracture surgery (Right); Tonsillectomy; Appendectomy; Hysterectomy; and Breast surgery.      Past Medical History   Diagnosis Date    Abnormal Pap smear 1972     cervical cancer    Anxiety     Arthritis     Asthma     Ataxia     Breast cancer     Cancer      bilateral mastectomy, uterine cancer, ovarian cancer    Cataract     Cervical back pain with evidence of disc disease     Cervical cancer     Chronic bilateral low back pain without sciatica 9/6/2016    Chronic bronchitis     Cortical cataract 2/18/2013    CVA (cerebral vascular accident) 9/6/2016     brain stem stroke    CVA (cerebral vascular accident)- Confirmed by neurology 9/6/2016    Degenerative joint disease of cervical and lumbar spine 8/1/2014    Depression     Diabetes mellitus     Diabetes mellitus, type II     History of malignant phylloides tumor of breast 2/15/2013    Hyperlipidemia     Hyperopia with presbyopia 2/18/2013    Hypertension     Hypertension     Hypothyroidism     Immunosuppressed status 9/3/2015    Migraine     Mitral valve regurgitation     Nuclear sclerosis 2/18/2013    Obesity     JOSE LUIS (obstructive sleep apnea)     Ovarian cancer     Pneumonia     Polyneuropathy      PVD (posterior vitreous detachment) 2/18/2013    Restless leg syndrome     Ruptured disk     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    Sarcoidosis 2006    Sarcoidosis of lung     Squamous cell carcinoma     Trouble in sleeping     Uterine cancer     Venous insufficiency      Past Medical History Pertinent Negatives   Diagnosis Date Noted    Acute leukemia 9/3/2015    Bladder cancer 9/3/2015    Bone cancer 9/3/2015    Brain cancer 9/3/2015    Cancer of lymphatic and hematopoietic tissue 9/3/2015    Colon cancer 9/3/2015    Endometrial cancer 9/3/2015    Esophageal cancer 9/3/2015    Fallopian tube cancer, carcinoma 9/3/2015    Leukemia 9/3/2015    Liver cancer 9/3/2015    Lung cancer 9/3/2015    Melanoma 9/3/2015    Pancreatic cancer 9/3/2015    Rectal cancer 9/3/2015    Renal cancer 9/3/2015    Small intestine cancer 9/3/2015    Stomach cancer 9/3/2015    Testicular cancer 9/3/2015    Thyroid cancer 9/3/2015    Type II or unspecified type diabetes mellitus with neurological manifestations, not stated as uncontrolled 9/3/2015    Type II or unspecified type diabetes mellitus with renal manifestations, not stated as uncontrolled 9/3/2015    Vaginal cancer 9/3/2015    Vulvar cancer 9/3/2015     Past Surgical History   Procedure Laterality Date    Cystocele repair      Rectocele      Cholecystectomy      Pleura biopsy      Rectal surgery      Oophorectomy      Mastectomy Bilateral      b/l mastectomy    Fracture surgery Right      foot    Tonsillectomy      Appendectomy      Hysterectomy       SAADIA/BSO    Breast surgery       Family History   Problem Relation Age of Onset    Heart disease Mother     Stroke Mother     Hyperlipidemia Mother     Cancer Father      small cell lung cancer    Hypertension Sister     Cataracts Sister     Thyroid disease Sister     Hyperlipidemia Sister     Heart disease Sister      CAD, stents in place    Hypertension Brother     Cataracts Brother      Hyperlipidemia Brother     Heart disease Brother      CAD    Hypertension Daughter     Hyperlipidemia Daughter     Anuerysm Daughter      splenic    Hyperlipidemia Sister     Hypertension Sister     Diabetes Sister     Cancer Sister      thyroid cancer    Hypertension Brother     Hyperlipidemia Brother     Hypertension Brother     Hyperlipidemia Brother     Amblyopia Neg Hx     Blindness Neg Hx     Glaucoma Neg Hx     Macular degeneration Neg Hx     Retinal detachment Neg Hx     Strabismus Neg Hx     Breast cancer Neg Hx     Colon cancer Neg Hx     Ovarian cancer Neg Hx      Social History   Substance Use Topics    Smoking status: Never Smoker    Smokeless tobacco: Never Used    Alcohol use No     Review of Systems   Constitutional: Negative for fever.   HENT: Negative for sore throat.    Eyes: Negative for visual disturbance.   Respiratory: Negative for shortness of breath.    Cardiovascular: Negative for chest pain.   Gastrointestinal: Negative for nausea.   Genitourinary: Negative for dysuria.        Urinary incontinence.    Musculoskeletal: Positive for back pain.   Skin: Negative for rash.   Neurological: Positive for weakness.   Hematological: Does not bruise/bleed easily.   Psychiatric/Behavioral: Negative for confusion.       Physical Exam   Initial Vitals   BP Pulse Resp Temp SpO2   02/02/17 1439 02/02/17 1439 02/02/17 1439 02/02/17 1439 02/02/17 1439   126/57 90 12 99 °F (37.2 °C) 95 %     Physical Exam    Nursing note and vitals reviewed.  Constitutional: She appears well-developed and well-nourished. No distress.   HENT:   Head: Normocephalic and atraumatic.   Eyes: Conjunctivae and EOM are normal. Pupils are equal, round, and reactive to light.   Neck: Normal range of motion.   Cardiovascular: Normal rate, regular rhythm, normal heart sounds and intact distal pulses. Exam reveals no gallop and no friction rub.    No murmur heard.  Pulmonary/Chest: Breath sounds normal. No  respiratory distress. She has no wheezes. She has no rhonchi. She has no rales. She exhibits no tenderness.   Abdominal: Soft. Bowel sounds are normal. She exhibits no distension and no mass. There is no tenderness. There is no rebound and no guarding.   Musculoskeletal: Normal range of motion. She exhibits no tenderness.   Neurological: She is alert and oriented to person, place, and time. She has normal strength. No sensory deficit.   Skin: Skin is warm.   Psychiatric: She has a normal mood and affect.         ED Course   Procedures  Labs Reviewed   CBC W/ AUTO DIFFERENTIAL - Abnormal; Notable for the following:        Result Value    Hemoglobin 11.0 (*)     Hematocrit 33.6 (*)     RDW 14.6 (*)     MPV 7.7 (*)     Lymph # 0.8 (*)     Gran% 77.4 (*)     Lymph% 9.8 (*)     All other components within normal limits   COMPREHENSIVE METABOLIC PANEL - Abnormal; Notable for the following:     Sodium 133 (*)     Chloride 94 (*)     Glucose 238 (*)     Albumin 2.7 (*)     Alkaline Phosphatase 146 (*)     AST 63 (*)     ALT 50 (*)     All other components within normal limits   URINALYSIS - Abnormal; Notable for the following:     Appearance, UA Cloudy (*)     Protein, UA 3+ (*)     Glucose, UA 1+ (*)     Bilirubin (UA) 1+ (*)     Occult Blood UA 1+ (*)     Nitrite, UA Positive (*)     Urobilinogen, UA >=8.0 (*)     Leukocytes, UA 2+ (*)     All other components within normal limits   TSH - Abnormal; Notable for the following:     TSH 4.054 (*)     All other components within normal limits   MAGNESIUM - Abnormal; Notable for the following:     Magnesium 1.5 (*)     All other components within normal limits   PHOSPHORUS - Abnormal; Notable for the following:     Phosphorus 2.0 (*)     All other components within normal limits   URINALYSIS MICROSCOPIC - Abnormal; Notable for the following:     Bacteria, UA Many (*)     All other components within normal limits   POCT GLUCOSE - Abnormal; Notable for the following:     POCT  Glucose 244 (*)     All other components within normal limits   LIPASE   TROPONIN I   T4, FREE   CK     EKG Readings: (Independently Interpreted)   Initial Reading: No STEMI. Rhythm: Normal Sinus Rhythm. Heart Rate: 85.   No t wave inversion          Medical Decision Making:   History:   Old Medical Records: I decided to obtain old medical records.  Initial Assessment:   Pt with a constellation of sx's of unclear etiology. Will check labs and look for infectious etiology. If all negative may admit for further management of weakness. My suspicion for cauda equina or epidural abscess is low but not zero.   Differential Diagnosis:   DKA, hyperglycemia, UTI, electrolyte abnormality, cauda equina syndrome  Independently Interpreted Test(s):   I have ordered and independently interpreted X-rays - see summary below.       <> Summary of X-Ray Reading(s): Chest x-ray: No acute process  Clinical Tests:   Lab Tests: Ordered and Reviewed  Radiological Study: Ordered  Medical Tests: Ordered  ED Management:  Patient's laboratories are relatively unremarkable.  Her glucose is slightly elevated and her magnesium and phosphorus are slightly decreased.  Patient continues to state that she feels too weak to go home.  Discussed with internal medicine and they are well aware of the patient and evaluated the patient in the emergency department.  They state that all her symptoms are chronic in nature including her urinary incontinence and lower extremity weakness.  They do not feel that she warrants admission at this time.  The patient was able to ambulate out of the emergency department without difficulties.  Other:   I have discussed this case with another health care provider.       <> Summary of the Discussion: Internal medicine            Scribe Attestation:   Scribe #1: I performed the above scribed service and the documentation accurately describes the services I performed. I attest to the accuracy of the note.    Attending  Attestation:           Physician Attestation for Scribe:  Physician Attestation Statement for Scribe #1: I, Dr. Covington, reviewed documentation, as scribed by Saravanan Sparks in my presence, and it is both accurate and complete.                 ED Course     Clinical Impression:   The primary encounter diagnosis was Hyperglycemia. A diagnosis of Weakness was also pertinent to this visit.          Joseph Covington III, MD  02/02/17 [id number]

## 2017-02-02 NOTE — ED NOTES
Presents to ED with friend. Pt has been ill since Friday. Has not taken any of her meds since Friday nor has she checked her glucose since Friday. C/O not eating, weak, unable to get to bathroom in time and urinating everywhere, headache, fatigue. Lives alone. Friend states patient has spent most of her time in bed for about a month now. C/O chronic back pain. AAO x3. Skin warm and dry.

## 2017-02-02 NOTE — H&P
[facility]    CC: nausea    HPI 66 y.o. female is here for hospital follow up of moderate persistent nausea associated with very poor oral intake and gastroparesis.  She ran out of reglan which she was taking after discharge for gastroparesis - which did improve her overall symptoms.  She denies any hematemesis.  She has alternating constipation and diarrhea.  Her last colonoscopy was 1 year ago in [place] which she reports as normal.      The patient reports that she has been having episodes of urinary incontinence over the last 24 hours and also is having balance problems at this time.  She admits to falling x 1 last night while ambulating to the bathroom.      Past Medical History   Diagnosis Date    Abnormal Pap smear 1972     cervical cancer    Anxiety     Arthritis     Asthma     Ataxia     Breast cancer     Cancer      bilateral mastectomy, uterine cancer, ovarian cancer    Cataract     Cervical back pain with evidence of disc disease     Cervical cancer     Chronic bilateral low back pain without sciatica 9/6/2016    Chronic bronchitis     Cortical cataract 2/18/2013    CVA (cerebral vascular accident) 9/6/2016     brain stem stroke    CVA (cerebral vascular accident)- Confirmed by neurology 9/6/2016    Degenerative joint disease of cervical and lumbar spine 8/1/2014    Depression     Diabetes mellitus     Diabetes mellitus, type II     History of malignant phylloides tumor of breast 2/15/2013    Hyperlipidemia     Hyperopia with presbyopia 2/18/2013    Hypertension     Hypertension     Hypothyroidism     Immunosuppressed status 9/3/2015    Migraine     Mitral valve regurgitation     Nuclear sclerosis 2/18/2013    Obesity     JOSE LUIS (obstructive sleep apnea)     Ovarian cancer     Pneumonia     Polyneuropathy     PVD (posterior vitreous detachment) 2/18/2013    Restless leg syndrome     Ruptured disk     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    Sarcoidosis 2006  "   Sarcoidosis of lung     Squamous cell carcinoma     Trouble in sleeping     Uterine cancer     Venous insufficiency          Review of Systems  General ROS: negative for - chills, fever or weight loss, +fatigue, +balance issues  Cardiovascular ROS: no chest pain or dyspnea on exertion  Gastrointestinal ROS: +nausea, abdominal pain, diarrhea    Physical Examination  Visit Vitals    /71 (BP Location: Right arm, Patient Position: Sitting, BP Method: Automatic)    Pulse 86    Resp 20    Ht 5' 6" (1.676 m)    Wt 74.6 kg (164 lb 7.4 oz)    LMP  (LMP Unknown)    BMI 26.55 kg/m2     General appearance: alert, mild distress, sitting in chair slumped forward.  HENT: Normocephalic, atraumatic, neck symmetrical, no nasal discharge  Abdomen: soft, NT ND BS present no organomegaly    Labs:  H/H 12/38    Assessment:   67 yo female with diabetic gastroparesis that responded well to reglan.  Her symptoms have recurred since running out of medication.      She is not doing well today - c/o urinary incontinence, balance problems, and a fall overnight.  Will send to ED for further evaluation of multiple medical complaints.     Plan:  Send to ED for further evaluation.  Reglan 10 mg TID   Explained risks of tardive dyskinesia with patient in depth - instructed to watch for symptoms and to instruct friends and family to also watch for symptoms.  Alternatives to reglan such as lifestyle modification alone were also discussed with the patient who elects to continue reglan.    RTC in 6 weeks - if reglan is working I will consider transitioning her to Domperidone as the next step.    Sanya Aly MD  [facility]  [address], [address]  [place] [address]  Office: [phone]  Fax: [phone]    "

## 2017-02-02 NOTE — DISCHARGE INSTRUCTIONS
High Blood Sugar (Hyperglycemia)  Too much glucose (sugar) in your blood is called hyperglycemia or high blood sugar. High blood sugar can lead to a dangerous condition called ketoacidosis. In severe cases, it can lead to dehydration and coma.    Possible causes of hyperglycemia  · Inadequate treatment plan for diabetes   · Being sick  · Being under stress  · Taking certain medicines, such as steroids  · Eating too much food, especially carbohydrates  · Being less active than usual  · Not taking enough diabetes medicine  Symptoms of hyperglycemia  Hyperglycemia may not cause symptoms. If you do have symptoms, they may include:  · Thirst  · Frequent need to urinate  · Feeling tired  · Nausea and vomiting  · Itchy, dry skin  · Blurry vision  · Fast breathing and breath that smells fruity   · Weakness  · Dizziness  · Wounds or skin infections that dont heal  · Unexplained weight loss if hyperglycemia lasts for more than a few days   What you should do  Make sure you do the following:  · Check your blood sugar.  · Drink plenty of sugar-free, caffeine-free liquids such as water. Dont drink fruit juice.  · Check your blood sugar again every 4 hours. If you take insulin or diabetes medicines, follow your sick-day plan for taking medicine. Call your healthcare provider if you are not able to eat.  · Check your blood or urine for ketones as directed.  · Call your healthcare provider if your blood sugar and ketones do not return to your target range.  Preventing high blood sugar  To help keep your blood sugar from getting too high:  · Control stress.  · When you're ill, follow your sick-day plan.   · Follow your meal plan. Eat only the amount of food on your meal plan  · Follow your exercise plan.  · Take your insulin or diabetes medicines as directed by your healthcare team. Also test your blood sugar as directed. If the plan is not working for you, discuss it with your healthcare provider.  Other things to do  · Carry a  medical ID card, a compact USB drive, or wear a medical alert bracelet or necklace. It should say that you have diabetes. It should also say what to do in case you pass out or go into a coma.  · Make sure family, friends, and coworkers know the signs of high blood sugar. Tell them what to do if your blood sugar gets very high and you cant help yourself.  · Talk to your healthcare team about other things you can do to prevent high blood sugar.   Special note: Drink plenty of sugar-free and caffeine-free liquids when you feel symptoms of hyperglycemia. Call your healthcare provider if you keep having episodes of hyperglycemia.   © 5415-5463 [employer]. [address] [place] [address], [place] [address]. All rights reserved. This information is not intended as a substitute for professional medical care. Always follow your healthcare professional's instructions.          Weakness (Uncertain Cause)  Based on your exam today, the exact cause of your weakness is not certain. However, your weakness does not seem to be a sign of a serious illness at this time. Keep an eye on your symptoms and get medical advice as instructed below.  Home care  · Rest at home today. Do not over-exert yourself.  · Take any medicine as prescribed.  · For the next few days, drink extra fluids (unless your healthcare provider wants you to restrict fluids for other reasons). Do not skip meals.  Follow-up care  Follow up with your healthcare provider or as advised.  When to seek medical advice  Call your healthcare provider for any of the following  · Worsening of your symptoms  · Symptoms don't start getting better within 2 days  · Fever of 100.4º F (38º C) or higher, or as directed by your healthcare provider·    Call 911  Get emergency medical care for any of these:  · Chest, arm, neck, jaw or upper back pain  · Trouble breathing  · Numbness or weakness of the face, one arm or one leg  · Slurred speech, confusion, trouble speaking,  walking or seeing  · Blood in vomit or stool (black or red color)  · Loss of consciousness  © 8061-2752 [employer]. [address] [place] [address], [place] [address]. All rights reserved. This information is not intended as a substitute for professional medical care. Always follow your healthcare professional's instructions.

## 2017-02-02 NOTE — PROGRESS NOTES
Pt being discharged from ED.  Received request to assist with setting up Home Health for patient.  According to notes from previous admission, patient was seen by [facility].  Verified with patient that she would like to resume service with that agency.  Spoke with Ofelia, the on-call nurse for [facility].  Faxed copy of referral, ED note, and face sheet to [phone] per Ofelia's request.  Electronic confirmation received.  Per Ofelia, [facility] staff will contact patient on Friday or Saturday.    Advised patient of same.  Verbalized understanding.  Pt needs follow-up appointment with Dr. Camacho.

## 2017-02-02 NOTE — ED AVS SNAPSHOT
[facility] [address]  [place] [address]               Nathalie Santiago   2017  2:41 PM   ED    Description:  Female : 1950   Department:  [facility]           Your Care was Coordinated By:     Provider Role From To    Joseph Covington III, MD Attending Provider 17 1504 --      Reason for Visit     Headache           Diagnoses this Visit        Comments    Hyperglycemia    -  Primary     Weakness           ED Disposition     ED Disposition Condition Comment    Observation             To Do List           Follow-up Information     Follow up with Ladan Almodovar MD In 1 week.    Specialty:  Family Medicine    Why:  hospital follow up    Contact information:    [address] [place] [address]  [phone]        Referral Details     Referred By    Referred To    Joanna Morales NP   [address] [place] [address] [address] [place] [address]   Phone:  [phone]   Fax:  [phone]    Diagnoses:  Weakness   Order:  Referral To Home Health   Reason:  Specialty Services Required    [facility] [address]   [place] [address]   Phone:  [phone]   Fax:  [phone]       Comment:  General Outpatient  [facility]            HOME HEALTH ORDERS    FACE TO FACE ENCOUNTER        Patient Name: Nathalie Santiago    YOB: 1950        PCP: Ladan Almodovar MD     PCP Address: [address] / [place] [address]    PCP Phone Number: [phone]    PCP Fax: [phone]        Encounter Date: 17        Admit to Home Health        Diagnoses:     Weakness     SNOMED CT(R): ASTHENIA            Allergies:Review of patient's allergies indicates:     -- Keflex [cephalexin] -- Anaphylaxis     -- Penicillins -- Anaphylaxis     -- Vasotec [enalapril maleate] -- Other (See Comments)      --  Causes her to pass out     -- Wellbutrin [bupropion hcl] -- Other (See Comments)      --  Loss of  memory     -- Zithromax [azithromycin]       --  Patient states medication does not work on her-not               effective     -- Codeine -- Itching and Nausea Only        Medications: Review discharge medications with patient and family and provide education.          No current facility-administered medications for this encounter.     Current Outpatient Prescriptions:    albuterol-ipratropium 2.5mg-0.5mg/3mL (DUO-NEB) 0.5 mg-3 mg(2.5 mg base)/3 mL nebulizer solution, INHALE THE CONTENTS OF 1 VIAL VIA NEBULIZER EVERY 6 HOURS AS NEEDED FOR SHORTNESS OF BREATH  OR  WHEEZING, Disp: 270 mL, Rfl: 1    alprazolam (XANAX) 0.5 MG tablet, Take 1 tablet (0.5 mg total) by mouth 2 (two) times daily as needed for Anxiety., Disp: 180 tablet, Rfl: 0    amlodipine (NORVASC) 5 MG tablet, Take 5 mg by mouth once daily., Disp: , Rfl:     atorvastatin (LIPITOR) 40 MG tablet, TAKE 1 TABLET EVERY DAY, Disp: 90 tablet, Rfl: 0    beclomethasone (QVAR) 80 mcg/actuation Aero, Inhale 2 puffs into the lungs 2 (two) times daily., Disp: 3 each, Rfl: 2    clopidogrel (PLAVIX) 75 mg tablet, Take 1 tablet (75 mg total) by mouth once daily., Disp: 90 tablet, Rfl: 1    DEXTRAN 70/HYPROMELLOSE (ARTIFICIAL TEARS, PF, OPHT), Apply 1 drop to eye as needed., Disp: , Rfl:     ergocalciferol (ERGOCALCIFEROL) 50,000 unit Cap, Take 1 capsule (50,000 Units total) by mouth every 7 days. (Patient taking differently: Take 50,000 Units by mouth every 7 days. Sunday), Disp: 8 capsule, Rfl: 0    fenofibrate 160 MG Tab, Take 1 tablet (160 mg total) by mouth once daily., Disp: 90 tablet, Rfl: 3    fluticasone (FLONASE) 50 mcg/actuation nasal spray, 2 sprays by Each Nare route once daily. (Patient taking differently: 2 sprays by Each Nare route daily as needed. ), Disp: 1 Bottle, Rfl: 0    hydrochlorothiazide (HYDRODIURIL) 25 MG tablet, TAKE 1 TABLET EVERY DAY, Disp: 90 tablet, Rfl: 0    insulin aspart (NOVOLOG FLEXPEN) 100 unit/mL InPn pen, Use per sliding scale,  Disp: 1 Box, Rfl: 0    insulin NPH (NOVOLIN N) 100 unit/mL injection, Inject 15 Units into the skin 2 (two) times daily before meals., Disp: 3 vial, Rfl: 1    levothyroxine (SYNTHROID) 25 MCG tablet, TAKE 1 TABLET BEFORE BREAKFAST, Disp: 90 tablet, Rfl: 3    metformin (GLUCOPHAGE) 1000 MG tablet, Take 1 tablet (1,000 mg total) by mouth 2 (two) times daily with meals., Disp: , Rfl:     metoclopramide HCl (REGLAN) 10 MG tablet, Take 1 tablet (10 mg total) by mouth every 8 (eight) hours., Disp: 90 tablet, Rfl: 1    multivitamin capsule, Take 1 capsule by mouth once daily., Disp: , Rfl:     ondansetron (ZOFRAN-ODT) 8 MG TbDL, DISSOLVE 1 TABLET ON THE TONGUE EVERY 8 HOURS AS NEEDED, Disp: 90 tablet, Rfl: 0    oxybutynin (DITROPAN) 5 MG Tab, Take 1 tablet (5 mg total) by mouth 2 (two) times daily., Disp: 180 tablet, Rfl: 1    potassium chloride (MICRO-K) 10 MEQ CpSR, Take 1 capsule (10 mEq total) by mouth 3 (three) times daily., Disp: 270 capsule, Rfl: 1    sertraline (ZOLOFT) 100 MG tablet, Take one tablet PO daily, Disp: 90 tablet, Rfl: 0    sumatriptan (IMITREX) 50 MG tablet, Take 50 mg by mouth daily as needed for Migraine., Disp: , Rfl:     VOLTAREN 1 % Gel, , Disp: , Rfl:             Current Discharge Medication List        CONTINUE these medications which have NOT CHANGED        albuterol-ipratropium 2.5mg-0.5mg/3mL (DUO-NEB) 0.5 mg-3 mg(2.5 mg base)/3 mL nebulizer solution    INHALE THE CONTENTS OF 1 VIAL VIA NEBULIZER EVERY 6 HOURS AS NEEDED FOR SHORTNESS OF BREATH  OR  WHEEZING    Qty: 270 mL Refills: 1        alprazolam (XANAX) 0.5 MG tablet    Take 1 tablet (0.5 mg total) by mouth 2 (two) times daily as needed for Anxiety.    Qty: 180 tablet Refills: 0        amlodipine (NORVASC) 5 MG tablet    Take 5 mg by mouth once daily.        atorvastatin (LIPITOR) 40 MG tablet    TAKE 1 TABLET EVERY DAY    Qty: 90 tablet Refills: 0        beclomethasone (QVAR) 80 mcg/actuation Aero    Inhale 2 puffs into the lungs 2  (two) times daily.    Qty: 3 each Refills: 2        clopidogrel (PLAVIX) 75 mg tablet    Take 1 tablet (75 mg total) by mouth once daily.    Qty: 90 tablet Refills: 1    Associated Diagnoses:Complicated migraine        DEXTRAN 70/HYPROMELLOSE (ARTIFICIAL TEARS, PF, OPHT)    Apply 1 drop to eye as needed.        ergocalciferol (ERGOCALCIFEROL) 50,000 unit Cap    Take 1 capsule (50,000 Units total) by mouth every 7 days.    Qty: 8 capsule Refills: 0    Associated Diagnoses:Vitamin D deficiency        fenofibrate 160 MG Tab    Take 1 tablet (160 mg total) by mouth once daily.    Qty: 90 tablet Refills: 3        fluticasone (FLONASE) 50 mcg/actuation nasal spray    2 sprays by Each Nare route once daily.    Qty: 1 Bottle Refills: 0    Associated Diagnoses:Rhinitis        hydrochlorothiazide (HYDRODIURIL) 25 MG tablet    TAKE 1 TABLET EVERY DAY    Qty: 90 tablet Refills: 0        insulin aspart (NOVOLOG FLEXPEN) 100 unit/mL InPn pen    Use per sliding scale    Qty: 1 Box Refills: 0        insulin NPH (NOVOLIN N) 100 unit/mL injection    Inject 15 Units into the skin 2 (two) times daily before meals.    Qty: 3 vial Refills: 1    Associated Diagnoses:Diabetes type 2, uncontrolled        levothyroxine (SYNTHROID) 25 MCG tablet    TAKE 1 TABLET BEFORE BREAKFAST    Qty: 90 tablet Refills: 3        metformin (GLUCOPHAGE) 1000 MG tablet    Take 1 tablet (1,000 mg total) by mouth 2 (two) times daily with meals.        metoclopramide HCl (REGLAN) 10 MG tablet    Take 1 tablet (10 mg total) by mouth every 8 (eight) hours.    Qty: 90 tablet Refills: 1    Associated Diagnoses:Gastroparesis        multivitamin capsule    Take 1 capsule by mouth once daily.        ondansetron (ZOFRAN-ODT) 8 MG TbDL    DISSOLVE 1 TABLET ON THE TONGUE EVERY 8 HOURS AS NEEDED    Qty: 90 tablet Refills: 0    Associated Diagnoses:Nausea        oxybutynin (DITROPAN) 5 MG Tab    Take 1 tablet (5 mg total) by mouth 2 (two) times daily.    Qty: 180 tablet  Refills: 1    Associated Diagnoses:Bladder spasms        potassium chloride (MICRO-K) 10 MEQ CpSR    Take 1 capsule (10 mEq total) by mouth 3 (three) times daily.    Qty: 270 capsule Refills: 1    Associated Diagnoses:Hypokalemia        sertraline (ZOLOFT) 100 MG tablet    Take one tablet PO daily    Qty: 90 tablet Refills: 0        sumatriptan (IMITREX) 50 MG tablet    Take 50 mg by mouth daily as needed for Migraine.        VOLTAREN 1 % Gel                            Follow Up Appointments:    2/15/2017  11:00 AM   Maylin Doran PA-C   2C PAINMGT   [place]     2/20/2017  11:00 AM   Jessica Plunkett MD          [facility]            I have seen and examined this patient within the last 30 days. My clinical findings that support the need for the home health skilled services and home bound status are the following:    Weakness/numbness causing balance and gait disturbance due to Weakness/Debility making it taxing to leave home.         Nursing:     SN to complete comprehensive assessment including routine vital signs. Instruct on disease process and s/s of complications to report to MD. Review/verify medication list sent home with the patient at time of discharge  and instruct patient/caregiver as needed. Frequency may be adjusted depending on start of care date. Notify MD if SBP > 160 or < 90; DBP > 90 or < 50; HR > 120 or < 50; Temp > 101; O2 < 88%; Other:           N/A        Diet:     diabetic diet 1800 calorie        Activities:     activity as tolerated        Labs:    SN to perform labs:  NA        Referrals/ Consults    Physical Therapy to evaluate and treat. Evaluate for home safety and equipment needs; Establish/upgrade home exercise program. Perform / instruct on therapeutic exercises, gait training, transfer training, and Range of Motion.    Aide to provide assistance with personal care, ADLs, and vital signs.        Home Health Aide:    Nursing Three times weekly and Physical  Therapy Three times weekly        I certify that this patient is confined to her home and needs intermittent skilled nursing care and physical therapy.      PURCHASE these Medications (No prescription required)        Start End    magnesium oxide (MAG-OX) 400 mg tablet 2/2/2017     Sig - Route: Take 1 tablet (400 mg total) by mouth once daily. - Oral    Class: OTC      [facility]     [facility] On Call Nurse Care Line - 24/7 Assistance  Registered nurses in the [facility] On Call Center provide clinical advisement, health education, appointment booking, and other advisory services.  Call for this free service at [phone].             Medications           Message regarding Medications     Verify the changes and/or additions to your medication regime listed below are the same as discussed with your clinician today.  If any of these changes or additions are incorrect, please notify your healthcare provider.        START taking these NEW medications        Refills    magnesium oxide (MAG-OX) 400 mg tablet 0    Sig: Take 1 tablet (400 mg total) by mouth once daily.    Class: OTC    Route: Oral      These medications were administered today        Dose Freq    sodium chloride 0.9% bolus 1,000 mL 1,000 mL ED 1 Time    Sig: Inject 1,000 mLs into the vein ED 1 Time.    Class: Normal    Route: Intravenous    oxycodone-acetaminophen 5-325 mg per tablet 1 tablet 1 tablet ED 1 Time    Sig: Take 1 tablet by mouth ED 1 Time.    Class: Normal    Route: Oral      STOP taking these medications     GLUC.METER,DIS.P-LOADED STRIPS (GLUCOSE METER, DISP & STRIPS MISC)     lancets Misc 1 lancet by Misc.(Non-Drug; Combo Route) route 3 (three) times daily.    blood glucose control, normal (ACCU-CHEK SMARTVIEW CONTRL SOL) Soln As directed    blood sugar diagnostic Strp Check blood glucose 3x/day.    propranolol (INDERAL) 20 MG tablet Take 1 tablet (20 mg total) by mouth 4 (four) times daily.           Verify that the below list of  medications is an accurate representation of the medications you are currently taking.  If none reported, the list may be blank. If incorrect, please contact your healthcare provider. Carry this list with you in case of emergency.           Current Medications     albuterol-ipratropium 2.5mg-0.5mg/3mL (DUO-NEB) 0.5 mg-3 mg(2.5 mg base)/3 mL nebulizer solution INHALE THE CONTENTS OF 1 VIAL VIA NEBULIZER EVERY 6 HOURS AS NEEDED FOR SHORTNESS OF BREATH  OR  WHEEZING    alprazolam (XANAX) 0.5 MG tablet Take 1 tablet (0.5 mg total) by mouth 2 (two) times daily as needed for Anxiety.    amlodipine (NORVASC) 5 MG tablet Take 5 mg by mouth once daily.    atorvastatin (LIPITOR) 40 MG tablet TAKE 1 TABLET EVERY DAY    beclomethasone (QVAR) 80 mcg/actuation Aero Inhale 2 puffs into the lungs 2 (two) times daily.    clopidogrel (PLAVIX) 75 mg tablet Take 1 tablet (75 mg total) by mouth once daily.    DEXTRAN 70/HYPROMELLOSE (ARTIFICIAL TEARS, PF, OPHT) Apply 1 drop to eye as needed.    ergocalciferol (ERGOCALCIFEROL) 50,000 unit Cap Take 1 capsule (50,000 Units total) by mouth every 7 days.    fenofibrate 160 MG Tab Take 1 tablet (160 mg total) by mouth once daily.    fluticasone (FLONASE) 50 mcg/actuation nasal spray 2 sprays by Each Nare route once daily.    hydrochlorothiazide (HYDRODIURIL) 25 MG tablet TAKE 1 TABLET EVERY DAY    insulin aspart (NOVOLOG FLEXPEN) 100 unit/mL InPn pen Use per sliding scale    insulin NPH (NOVOLIN N) 100 unit/mL injection Inject 15 Units into the skin 2 (two) times daily before meals.    levothyroxine (SYNTHROID) 25 MCG tablet TAKE 1 TABLET BEFORE BREAKFAST    metformin (GLUCOPHAGE) 1000 MG tablet Take 1 tablet (1,000 mg total) by mouth 2 (two) times daily with meals.    metoclopramide HCl (REGLAN) 10 MG tablet Take 1 tablet (10 mg total) by mouth every 8 (eight) hours.    multivitamin capsule Take 1 capsule by mouth once daily.    ondansetron (ZOFRAN-ODT) 8 MG TbDL DISSOLVE 1 TABLET ON THE  "TONGUE EVERY 8 HOURS AS NEEDED    oxybutynin (DITROPAN) 5 MG Tab Take 1 tablet (5 mg total) by mouth 2 (two) times daily.    potassium chloride (MICRO-K) 10 MEQ CpSR Take 1 capsule (10 mEq total) by mouth 3 (three) times daily.    sertraline (ZOLOFT) 100 MG tablet Take one tablet PO daily    sumatriptan (IMITREX) 50 MG tablet Take 50 mg by mouth daily as needed for Migraine.    VOLTAREN 1 % Gel     magnesium oxide (MAG-OX) 400 mg tablet Take 1 tablet (400 mg total) by mouth once daily.    oxycodone-acetaminophen 5-325 mg per tablet 1 tablet Take 1 tablet by mouth ED 1 Time.           Clinical Reference Information           Your Vitals Were     BP Pulse Temp Resp Height Weight    131/67 83 99 °F (37.2 °C) (Oral) 12 5' 6" (1.676 m) 79.8 kg (176 lb)    Last Period SpO2 BMI          (LMP Unknown) 97% 28.41 kg/m2        Allergies as of 2/2/2017        Reactions    Keflex [Cephalexin] Anaphylaxis    Penicillins Anaphylaxis    Vasotec [Enalapril Maleate] Other (See Comments)    Causes her to pass out    Wellbutrin [Bupropion Hcl] Other (See Comments)    Loss of memory    Zithromax [Azithromycin]     Patient states medication does not work on her-not effective    Codeine Itching, Nausea Only      Immunizations Administered on Date of Encounter - 2/2/2017     None      ED Micro, Lab, POCT     Start Ordered       Status Ordering Provider    02/02/17 1641 02/02/17 1640  CK  Add-on      Acknowledged     02/02/17 1519 02/02/17 1518  TSH  STAT      Final result     02/02/17 1519 02/02/17 1518  Magnesium  STAT      Final result     02/02/17 1519 02/02/17 1518  Phosphorus  STAT      Final result     02/02/17 1518 02/02/17 1518  Lipase  STAT      Final result     02/02/17 1518 02/02/17 1518  Troponin I  STAT      Final result     02/02/17 1518 02/02/17 1518  Urinalysis  Once      Final result     02/02/17 1518 02/02/17 1518  Urinalysis Microscopic  Once      Final result     02/02/17 1518 02/02/17 1518  T4, free  Once      Final " result     02/02/17 1517 02/02/17 1518  Complete Blood Count (CBC)  STAT      Final result     02/02/17 1517 02/02/17 1518  Comprehensive Metabolic Panel (CMP)  STAT      Final result     02/02/17 1454 02/02/17 1454  POCT glucose  Once      Final result       ED Imaging Orders     Start Ordered       Status Ordering Provider    02/02/17 1518 02/02/17 1518  X-Ray Chest PA And Lateral  1 time imaging      Final result         Discharge Instructions         High Blood Sugar (Hyperglycemia)  Too much glucose (sugar) in your blood is called hyperglycemia or high blood sugar. High blood sugar can lead to a dangerous condition called ketoacidosis. In severe cases, it can lead to dehydration and coma.    Possible causes of hyperglycemia  · Inadequate treatment plan for diabetes   · Being sick  · Being under stress  · Taking certain medicines, such as steroids  · Eating too much food, especially carbohydrates  · Being less active than usual  · Not taking enough diabetes medicine  Symptoms of hyperglycemia  Hyperglycemia may not cause symptoms. If you do have symptoms, they may include:  · Thirst  · Frequent need to urinate  · Feeling tired  · Nausea and vomiting  · Itchy, dry skin  · Blurry vision  · Fast breathing and breath that smells fruity   · Weakness  · Dizziness  · Wounds or skin infections that dont heal  · Unexplained weight loss if hyperglycemia lasts for more than a few days   What you should do  Make sure you do the following:  · Check your blood sugar.  · Drink plenty of sugar-free, caffeine-free liquids such as water. Dont drink fruit juice.  · Check your blood sugar again every 4 hours. If you take insulin or diabetes medicines, follow your sick-day plan for taking medicine. Call your healthcare provider if you are not able to eat.  · Check your blood or urine for ketones as directed.  · Call your healthcare provider if your blood sugar and ketones do not return to your target range.  Preventing high blood  sugar  To help keep your blood sugar from getting too high:  · Control stress.  · When you're ill, follow your sick-day plan.   · Follow your meal plan. Eat only the amount of food on your meal plan  · Follow your exercise plan.  · Take your insulin or diabetes medicines as directed by your healthcare team. Also test your blood sugar as directed. If the plan is not working for you, discuss it with your healthcare provider.  Other things to do  · Carry a medical ID card, a compact USB drive, or wear a medical alert bracelet or necklace. It should say that you have diabetes. It should also say what to do in case you pass out or go into a coma.  · Make sure family, friends, and coworkers know the signs of high blood sugar. Tell them what to do if your blood sugar gets very high and you cant help yourself.  · Talk to your healthcare team about other things you can do to prevent high blood sugar.   Special note: Drink plenty of sugar-free and caffeine-free liquids when you feel symptoms of hyperglycemia. Call your healthcare provider if you keep having episodes of hyperglycemia.   © 3302-8975 [employer]. [address] [place] [address]. All rights reserved. This information is not intended as a substitute for professional medical care. Always follow your healthcare professional's instructions.          Weakness (Uncertain Cause)  Based on your exam today, the exact cause of your weakness is not certain. However, your weakness does not seem to be a sign of a serious illness at this time. Keep an eye on your symptoms and get medical advice as instructed below.  Home care  · Rest at home today. Do not over-exert yourself.  · Take any medicine as prescribed.  · For the next few days, drink extra fluids (unless your healthcare provider wants you to restrict fluids for other reasons). Do not skip meals.  Follow-up care  Follow up with your healthcare provider or as advised.  When to seek medical  advice  Call your healthcare provider for any of the following  · Worsening of your symptoms  · Symptoms don't start getting better within 2 days  · Fever of 100.4º F (38º C) or higher, or as directed by your healthcare provider·    Call 911  Get emergency medical care for any of these:  · Chest, arm, neck, jaw or upper back pain  · Trouble breathing  · Numbness or weakness of the face, one arm or one leg  · Slurred speech, confusion, trouble speaking, walking or seeing  · Blood in vomit or stool (black or red color)  · Loss of consciousness  © 8839-7966 [employer]. [address] [place] [address]. All rights reserved. This information is not intended as a substitute for professional medical care. Always follow your healthcare professional's instructions.          Your Scheduled Appointments     Feb 15, 2017 11:00 AM CST   Established Patient Visit with JOCELYNE Gonzales - Pain Management ([facility] [address]  [facility] [address]   [phone]            Feb 20, 2017 11:00 AM CST   Consult with MD Vasile Willams - Rheumatology ([facility])    [address]  [facility] [address]   [phone]               [facility] complies with applicable Federal civil rights laws and does not discriminate on the basis of race, color, national origin, age, disability, or sex.        Language Assistance Services     ATTENTION: Language assistance services are available, free of charge. Please call [phone].      ATENCIÓN: Si habla español, tiene a floyd disposición servicios gratuitos de asistencia lingüística. Llame al [phone].     CHÚ Ý: N?u b?n nói Ti?ng Vi?t, có các d?ch v? h? tr? ngôn ng? mi?n phí dành cho b?n. G?i s? [phone].

## 2017-02-03 ENCOUNTER — TELEPHONE (OUTPATIENT)
Dept: PSYCHIATRY | Facility: CLINIC | Age: 67
End: 2017-02-03

## 2017-02-03 ENCOUNTER — TELEPHONE (OUTPATIENT)
Dept: FAMILY MEDICINE | Facility: CLINIC | Age: 67
End: 2017-02-03

## 2017-02-03 DIAGNOSIS — R53.1 WEAKNESS: Primary | ICD-10-CM

## 2017-02-03 RX ORDER — BLOOD SUGAR DIAGNOSTIC
STRIP MISCELLANEOUS
Qty: 300 STRIP | Refills: 3 | Status: SHIPPED | OUTPATIENT
Start: 2017-02-03 | End: 2017-03-27

## 2017-02-03 RX ORDER — SERTRALINE HYDROCHLORIDE 100 MG/1
TABLET, FILM COATED ORAL
Qty: 90 TABLET | Refills: 0 | Status: SHIPPED | OUTPATIENT
Start: 2017-02-03 | End: 2017-02-22 | Stop reason: SDUPTHER

## 2017-02-03 NOTE — TELEPHONE ENCOUNTER
----- Message from Joanna Corona sent at 2/3/2017  8:51 AM CST -----  Contact: self  Please make arrangements to see if patient can be evaluated for ALS.  Please call back at .

## 2017-02-03 NOTE — TELEPHONE ENCOUNTER
Weakness,stiffness,incont urine, decrease mobility, muscle spasms in her leg and arms. Ok with seeing neurologist

## 2017-02-03 NOTE — TELEPHONE ENCOUNTER
Patient called an left message on voice mail this am at 08:45 am that she needed a call back because she was having some physical distress.    Called patient and she states that she went to the ER last night at [facility] because she has been having a lot of muscle an joint pains and been having a lot of urinary incontinence and not able to speak well. Patient states that she has been urination on herself a lot an for awhile her brothers house smells of urine form this. She states the ER sent her home an told her to see several doctors as follow up. Patient does not sound like she is forming sentences an speaking clearly. I explained need to go back to ER patient stated she needed to talk to Dr Pat because she was not going back to ER. Patient is not taking any of her medications because just not feeling well enough to do so. She states she went to the bathroom from her bed which is about 40 ft an she urinated the whole time on herself all over her an the floors the whole way to the bathroom an can not control it. Not sure what she wants Dr Pat to do other than call her I again stated she needed to go to ER if her symptoms were that bad and she stated she will await a call from DR Pat

## 2017-02-03 NOTE — TELEPHONE ENCOUNTER
Pt has multiple complaints - seen in ER yesterday for incontinence, generalized weakness, falls,  Changes in speech (slowed).  No focal neurological deficits per patient.  Pt reports acute onset of symptoms in past week.   Per ER note:     Her glucose is slightly elevated and her magnesium and phosphorus are slightly decreased. Patient continues to state that she feels too weak to go home. Discussed with internal medicine and they are well aware of the patient and evaluated the patient in the emergency department. They state that all her symptoms are chronic in nature including her urinary incontinence and lower extremity weakness. They do not feel that she warrants admission at this time      Pt called PCP today and asking for referral to Neuro (she expressed concern that she has ALS b/c of muscle spasms also).     Pt has not taken Xanax x 2-3 weeks - I advised her to continue to AVOID, medication not refilled at this time.   She is taking Sertraline 100 mg daily - this was refilled.     Pt cancelled 2 appts this week with me due to feeling unwell.  She has neighbor who is staying with her and was seen by home health today at her home.      Pt advised to return to ER with any changes or worsening of symptoms.  I will cc her PCP in this message as well.

## 2017-02-06 NOTE — TELEPHONE ENCOUNTER
Spoke with patient. She is a patient of Dr. Harris. Follow up appointment scheduled. Pt verbalized an understanding.

## 2017-02-07 ENCOUNTER — DOCUMENTATION ONLY (OUTPATIENT)
Dept: FAMILY MEDICINE | Facility: CLINIC | Age: 67
End: 2017-02-07

## 2017-02-15 ENCOUNTER — TELEPHONE (OUTPATIENT)
Dept: GASTROENTEROLOGY | Facility: CLINIC | Age: 67
End: 2017-02-15

## 2017-02-15 ENCOUNTER — OFFICE VISIT (OUTPATIENT)
Dept: PAIN MEDICINE | Facility: CLINIC | Age: 67
End: 2017-02-15
Payer: MEDICARE

## 2017-02-15 VITALS
SYSTOLIC BLOOD PRESSURE: 129 MMHG | WEIGHT: 175.94 LBS | HEIGHT: 66 IN | BODY MASS INDEX: 28.27 KG/M2 | HEART RATE: 71 BPM | DIASTOLIC BLOOD PRESSURE: 67 MMHG

## 2017-02-15 DIAGNOSIS — M43.17 SPONDYLOLISTHESIS OF LUMBOSACRAL REGION: ICD-10-CM

## 2017-02-15 DIAGNOSIS — G89.29 CHRONIC PAIN OF RIGHT KNEE: ICD-10-CM

## 2017-02-15 DIAGNOSIS — M70.60 GREATER TROCHANTERIC BURSITIS, UNSPECIFIED LATERALITY: Primary | ICD-10-CM

## 2017-02-15 DIAGNOSIS — M25.561 CHRONIC PAIN OF RIGHT KNEE: ICD-10-CM

## 2017-02-15 PROCEDURE — 3074F SYST BP LT 130 MM HG: CPT | Mod: S$GLB,,, | Performed by: PHYSICIAN ASSISTANT

## 2017-02-15 PROCEDURE — 99999 PR PBB SHADOW E&M-EST. PATIENT-LVL IV: CPT | Mod: PBBFAC,,, | Performed by: PHYSICIAN ASSISTANT

## 2017-02-15 PROCEDURE — 3078F DIAST BP <80 MM HG: CPT | Mod: S$GLB,,, | Performed by: PHYSICIAN ASSISTANT

## 2017-02-15 PROCEDURE — 1160F RVW MEDS BY RX/DR IN RCRD: CPT | Mod: S$GLB,,, | Performed by: PHYSICIAN ASSISTANT

## 2017-02-15 PROCEDURE — 1125F AMNT PAIN NOTED PAIN PRSNT: CPT | Mod: S$GLB,,, | Performed by: PHYSICIAN ASSISTANT

## 2017-02-15 PROCEDURE — 1159F MED LIST DOCD IN RCRD: CPT | Mod: S$GLB,,, | Performed by: PHYSICIAN ASSISTANT

## 2017-02-15 PROCEDURE — 20611 DRAIN/INJ JOINT/BURSA W/US: CPT | Mod: S$GLB,,, | Performed by: ANESTHESIOLOGY

## 2017-02-15 PROCEDURE — 99214 OFFICE O/P EST MOD 30 MIN: CPT | Mod: S$GLB,,, | Performed by: PHYSICIAN ASSISTANT

## 2017-02-15 PROCEDURE — 1157F ADVNC CARE PLAN IN RCRD: CPT | Mod: S$GLB,,, | Performed by: PHYSICIAN ASSISTANT

## 2017-02-15 RX ORDER — CHLORZOXAZONE 500 MG/1
500 TABLET ORAL 3 TIMES DAILY PRN
Qty: 270 TABLET | Refills: 1 | Status: SHIPPED | OUTPATIENT
Start: 2017-02-15 | End: 2017-05-19 | Stop reason: SDUPTHER

## 2017-02-15 RX ORDER — OXYCODONE AND ACETAMINOPHEN 10; 325 MG/1; MG/1
1 TABLET ORAL EVERY 8 HOURS PRN
Qty: 90 TABLET | Refills: 0 | Status: SHIPPED | OUTPATIENT
Start: 2017-03-22 | End: 2017-02-22 | Stop reason: SDUPTHER

## 2017-02-15 RX ORDER — OXYCODONE AND ACETAMINOPHEN 10; 325 MG/1; MG/1
1 TABLET ORAL EVERY 8 HOURS PRN
Refills: 0 | COMMUNITY
Start: 2017-01-23 | End: 2017-02-15 | Stop reason: SDUPTHER

## 2017-02-15 RX ORDER — PROMETHAZINE HYDROCHLORIDE 25 MG/1
TABLET ORAL
COMMUNITY
Start: 2017-02-03 | End: 2017-02-22

## 2017-02-15 RX ORDER — METHYLPREDNISOLONE ACETATE 40 MG/ML
40 INJECTION, SUSPENSION INTRA-ARTICULAR; INTRALESIONAL; INTRAMUSCULAR; SOFT TISSUE
Status: DISCONTINUED | OUTPATIENT
Start: 2017-02-15 | End: 2017-02-15 | Stop reason: HOSPADM

## 2017-02-15 RX ORDER — OXYCODONE AND ACETAMINOPHEN 10; 325 MG/1; MG/1
1 TABLET ORAL EVERY 8 HOURS PRN
Qty: 90 TABLET | Refills: 0 | Status: SHIPPED | OUTPATIENT
Start: 2017-04-20 | End: 2017-02-22 | Stop reason: SDUPTHER

## 2017-02-15 RX ORDER — OXYCODONE AND ACETAMINOPHEN 10; 325 MG/1; MG/1
1 TABLET ORAL EVERY 8 HOURS PRN
Qty: 90 TABLET | Refills: 0 | Status: SHIPPED | OUTPATIENT
Start: 2017-02-21 | End: 2017-03-22

## 2017-02-15 RX ADMIN — METHYLPREDNISOLONE ACETATE 40 MG: 40 INJECTION, SUSPENSION INTRA-ARTICULAR; INTRALESIONAL; INTRAMUSCULAR; SOFT TISSUE at 12:02

## 2017-02-15 NOTE — TELEPHONE ENCOUNTER
Talked with pt.and will let doctor know about the medications and will get back with her if any changes.

## 2017-02-15 NOTE — MR AVS SNAPSHOT
[facility]  [address] [place] [address]  [address]  [facility] [place] [address]  Phone: [phone]                  Nathalie Santiago   2/15/2017 11:00 AM   Office Visit    Description:  Female : 1950   Provider:  Maylin Doran PA-C   Department:  [facility]           Reason for Visit     Follow-up                To Do List           Future Appointments        Provider Department Dept Phone    2017 2:15 PM MD [facility] - Urology [phone]    2017 11:00 AM MD [facility] - Rheumatology [phone]    3/14/2017 11:00 AM [facility]UC[facility] [phone]    3/28/2017 11:00 AM Jimmie Harris Jr., MD [place] - Neurology [phone]    3/28/2017 1:00 PM [facility] - Family Medicine [phone]      Goals (5 Years of Data)     None       These Medications        Disp Refills Start End    oxycodone-acetaminophen (PERCOCET)  mg per tablet 90 tablet 0 2017 3/22/2017    Take 1 tablet by mouth every 8 (eight) hours as needed. - Oral    Pharmacy: [facility] - [place] [id number] Ph #: [phone]       oxycodone-acetaminophen (PERCOCET)  mg per tablet 90 tablet 0 3/22/2017 2017    Take 1 tablet by mouth every 8 (eight) hours as needed. - Oral    Pharmacy: [facility] - [facility] [address] [facility] Ph #: [phone]       oxycodone-acetaminophen (PERCOCET)  mg per tablet 90 tablet 0 2017    Take 1 tablet by mouth every 8 (eight) hours as needed. - Oral    [facility] - [facility] [address] [facility] Ph #: [phone]       chlorzoxazone (PARAFON FORTE DSC) 500 mg Tab 270 tablet 1 2/15/2017 2017    Take 1 tablet (500 mg total) by mouth 3 (three) times daily as needed. - Oral    Pharmacy: [facility] - [place], [place]  - [address] [facility] #: [phone]         [facility] On Call     [facility] On Call Nurse Care Line - 24/7 Assistance  Registered nurses in the [facility] On Call Center provide clinical advisement, health education, appointment booking, and other advisory services.  Call for this free service at [phone].             Medications           Message regarding Medications     Verify the changes and/or additions to your medication regime listed below are the same as discussed with your clinician today.  If any of these changes or additions are incorrect, please notify your healthcare provider.        START taking these NEW medications        Refills    oxycodone-acetaminophen (PERCOCET)  mg per tablet 0    Starting on: 2/21/2017    Sig: Take 1 tablet by mouth every 8 (eight) hours as needed.    Class: Print    Route: Oral    oxycodone-acetaminophen (PERCOCET)  mg per tablet 0    Starting on: 3/22/2017    Sig: Take 1 tablet by mouth every 8 (eight) hours as needed.    Class: Print    Route: Oral    oxycodone-acetaminophen (PERCOCET)  mg per tablet 0    Starting on: 4/20/2017    Sig: Take 1 tablet by mouth every 8 (eight) hours as needed.    Class: Print    Route: Oral    chlorzoxazone (PARAFON FORTE DSC) 500 mg Tab 1    Sig: Take 1 tablet (500 mg total) by mouth 3 (three) times daily as needed.    Class: Normal    Route: Oral           Verify that the below list of medications is an accurate representation of the medications you are currently taking.  If none reported, the list may be blank. If incorrect, please contact your healthcare provider. Carry this list with you in case of emergency.           Current Medications     ACCU-CHEK SMARTVIEW TEST STRIP Strp CHECK BLOOD GLUCOSE THREE TIMES DAILY    albuterol-ipratropium 2.5mg-0.5mg/3mL (DUO-NEB) 0.5 mg-3 mg(2.5 mg base)/3 mL nebulizer solution INHALE THE CONTENTS OF 1 VIAL VIA NEBULIZER EVERY 6 HOURS AS NEEDED FOR SHORTNESS OF BREATH  OR  WHEEZING     amlodipine (NORVASC) 5 MG tablet Take 5 mg by mouth once daily.    atorvastatin (LIPITOR) 40 MG tablet TAKE 1 TABLET EVERY DAY    beclomethasone (QVAR) 80 mcg/actuation Aero Inhale 2 puffs into the lungs 2 (two) times daily.    clopidogrel (PLAVIX) 75 mg tablet Take 1 tablet (75 mg total) by mouth once daily.    DEXTRAN 70/HYPROMELLOSE (ARTIFICIAL TEARS, PF, OPHT) Apply 1 drop to eye as needed.    ergocalciferol (ERGOCALCIFEROL) 50,000 unit Cap Take 1 capsule (50,000 Units total) by mouth every 7 days.    fenofibrate 160 MG Tab Take 1 tablet (160 mg total) by mouth once daily.    fluticasone (FLONASE) 50 mcg/actuation nasal spray 2 sprays by Each Nare route once daily.    hydrochlorothiazide (HYDRODIURIL) 25 MG tablet TAKE 1 TABLET EVERY DAY    insulin aspart (NOVOLOG FLEXPEN) 100 unit/mL InPn pen Use per sliding scale    insulin NPH (NOVOLIN N) 100 unit/mL injection Inject 15 Units into the skin 2 (two) times daily before meals.    levothyroxine (SYNTHROID) 25 MCG tablet TAKE 1 TABLET BEFORE BREAKFAST    magnesium oxide (MAG-OX) 400 mg tablet Take 1 tablet (400 mg total) by mouth once daily.    metformin (GLUCOPHAGE) 1000 MG tablet Take 1 tablet (1,000 mg total) by mouth 2 (two) times daily with meals.    metoclopramide HCl (REGLAN) 10 MG tablet Take 1 tablet (10 mg total) by mouth every 8 (eight) hours.    multivitamin capsule Take 1 capsule by mouth once daily.    ondansetron (ZOFRAN-ODT) 8 MG TbDL DISSOLVE 1 TABLET ON THE TONGUE EVERY 8 HOURS AS NEEDED    oxycodone-acetaminophen (PERCOCET)  mg per tablet Starting on Feb 21, 2017. Take 1 tablet by mouth every 8 (eight) hours as needed.    oxycodone-acetaminophen (PERCOCET)  mg per tablet Starting on Mar 22, 2017. Take 1 tablet by mouth every 8 (eight) hours as needed.    oxycodone-acetaminophen (PERCOCET)  mg per tablet Starting on Apr 20, 2017. Take 1 tablet by mouth every 8 (eight) hours as needed.    potassium chloride (MICRO-K) 10 MEQ CpSR  "Take 1 capsule (10 mEq total) by mouth 3 (three) times daily.    promethazine (PHENERGAN) 25 MG tablet     sertraline (ZOLOFT) 100 MG tablet Take one tablet PO daily    sumatriptan (IMITREX) 50 MG tablet Take 50 mg by mouth daily as needed for Migraine.    VOLTAREN 1 % Gel     chlorzoxazone (PARAFON FORTE DSC) 500 mg Tab Take 1 tablet (500 mg total) by mouth 3 (three) times daily as needed.           Clinical Reference Information           Your Vitals Were     BP Pulse Height Weight Last Period BMI    129/67 (BP Location: Left arm, Patient Position: Sitting, BP Method: Automatic) 71 5' 6" (1.676 m) 79.8 kg (175 lb 14.8 oz) (LMP Unknown) 28.4 kg/m2      Blood Pressure          Most Recent Value    BP  129/67      Allergies as of 2/15/2017     Keflex [Cephalexin]    Penicillins    Vasotec [Enalapril Maleate]    Wellbutrin [Bupropion Hcl]    Zithromax [Azithromycin]    Codeine      Immunizations Administered on Date of Encounter - 2/15/2017     None      Language Assistance Services     ATTENTION: Language assistance services are available, free of charge. Please call [phone].      ATENCIÓN: Si habla marly, tiene a floyd disposición servicios gratuitos de asistencia lingüística. Llame al [phone].     CHÚ Ý: N?u b?n nói Ti?ng Vi?t, có các d?ch v? h? tr? ngôn ng? mi?n phí dành cho b?n. G?i s? [phone].         [facility] complies with applicable Federal civil rights laws and does not discriminate on the basis of race, color, national origin, age, disability, or sex.        "

## 2017-02-15 NOTE — PROGRESS NOTES
CC: bilateral hip pain, back pain    Interval History: Ms. Santiago is a 66 y.o. female with chronic low back and hip pain who returns for her follow up appointment.  CC today is left lateral hip. She has benefited from GTB injections in the past and requests a repeat injection.  She continues takes percocet 10mg q8hrs as needed for pain with moderate benefits.  No side effects reported.  Able to perform daily activities with the medications.  She also continues to takes prednisone 5mg daily.   Pain today is rated 7/10.     Prior HPI: The patient is a 64-year-old woman with a history of sarcoidosis, breast CA, diabetes, anxiety and depression who presents in referral from Dr. Nguyen to Dr. Perez for multiple pain complaints including back pain, bilateral hip pain and right shoulder pain.  She is following up with me since I am much closer to her.   She presents today for multiple pain complaints.  She recently was hospitalized for pneumonia, UTI and sarcoidosis exacerbation per patient.  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  She has had past GTB injections with moderate benefit and had an exacerbation of her pain recently.  She was able to receive left GTB with Dr. Perez on 10/3/2014 that provided >50% relief of her left hip and leg pain.     She continues to take Prednisone 5mg daily. She continues Percoet 7.5mg about 3x/day as needed with moderate benefit.  She presents today with worsening right knee pain.  She states having history of right knee osteoarthritis, but has not had any treatment for her knee pain.  Continues to have improvement in her knee pain following completion of visco supplementation injection in her right knee.  Her back and hip pain remain tolerable with her medications.  She continues to take Percoct 7.5mg q8hrs as needed with moderate benefit.  No reported side effects.       Pain intervention history: She takes hydrocodone 7-325  "2-4 times a day.  Also has undergone epidural steroid injection at L1/2 without relief.  She has been doing cervical traction with good relief of her neck pain.  Previous trochanteric bursa injections with good relief.    ROS:She reports fatigability, itching, headaches, swollen glands, chest pain and shortness of breath, nausea vomiting, easy bruising, back pain, joint stiffness, dizziness, difficulty sleeping, anxiety, depression and loss of balance.  Balance of review of systems is negative.    Medical, surgical, family and social history reviewed elsewhere in record.    Medications/Allergies: See med card    Vitals:    02/15/17 1118   BP: 129/67   Pulse: 71   Weight: 79.8 kg (175 lb 14.8 oz)   Height: 5' 6" (1.676 m)   PainSc:   7       Physical exam:  Gen: A and O x3, pleasant, well-groomed  Skin: No rashes or obvious lesions  HEENT: PERRLA, no obvious deformities on ears or in canals. No thyroid masses, trachea midline, no palpable lymph nodes in neck, axilla.  CVS: RRR  Resp: non labored breathing  Abdomen: Soft, NT/ND, normal bowel sounds present.    Neuro:  Lower extremities:   +right knee crepitus. LE erythema and swelling  Reflexes: Patellar 2+, Achilles 2+ bilaterally.  Sensory:  Intact   Lumbar spine:  Lumbar spine: ROM is moderately reduced with flexion extension and oblique extension with increased pain in all directions.    Orlando's test causes pain on both sides.    Supine straight leg raise is negative bilaterally.    Internal and external rotation of the hip causes increased pain in left groin.  Myofascial exam: Tenderness to palpation over both GTB.      Imaging:  Cervical spine MRI report 10/5/12: There is dextroscoliosis in the lower cervical/upper thoracic region.  There is very mild foraminal stenosis on the left at C6/7.    MRI thoracic spine 11/21/2008: No significant degenerative disc disease or central stenosis.    MRI lumbar spine 8/14/08: L1/2 small broad-based central protrusion with " mild effacement of ventral thecal sac but not distorting exiting roots.  L2/3 and L3/4 unremarkable.  At L4/5 there is mild central stenosis with bulging annulus anteriorly and facet hypertrophic degenerative change posteriorly.  No focal disc herniation and neural foramina are patent.  At L5/S1 there is minimal bulging of the annulus with no thecal sac compression.  The small annular fissure within the posterior annulus.  Foramina are patent bilaterally, no stenosis mild, degenerative changes in the facets.    Xray Right Knee 10/13/14  Mild tricompartmental degenerative changes present. No joint effusion. The soft tissues are unremarkable.      Assessment:  Ms. Santiago is a 65-year-old woman with a history of sarcoidosis, breast CA, diabetes, anxiety and depression who presents in referral from Dr. Nguyen for multiple pain complaints including back pain, bilateral hip pain and knee pain   1. Greater trochanteric bursitis, unspecified laterality    2. Spondylolisthesis of lumbosacral region    3. Chronic pain of right knee          PLAN:  1. I have stressed the importance of physical activity and exercise to improve overall health.    2.  Percocet 10mg q8hrs as needed. Script given for three months.  reviewed.    3.  Left GTB injection performed today by Dr. Temple  4.  Parafon Forte 500 mg q 8 h prn 90 day supply  5. Follow up in three months   All medication management was performed by Dr. iLncoln Temple

## 2017-02-15 NOTE — PROCEDURES
Large Joint Aspiration/Injection  Date/Time: 2/15/2017 12:38 PM  Performed by: QUANG LOPES  Authorized by: QUANG LOPES     Consent Done?:  Yes (Written)  Indications:  Pain  Procedure site marked: Yes    Timeout: Prior to procedure the correct patient, procedure, and site was verified      Location:  Hip  Site:  L greater trochanteric bursa  Prep: Patient was prepped and draped in usual sterile fashion    Ultrasonic Guidance for needle placement: Yes  Images are saved and documented.  Needle size:  27 G  Approach:  Lateral  Medications:  40 mg methylPREDNISolone acetate 40 mg/mL  Patient tolerance:  Patient tolerated the procedure well with no immediate complications

## 2017-02-15 NOTE — TELEPHONE ENCOUNTER
----- Message from Jaydon Hoover sent at 2/13/2017 12:02 PM CST -----  Contact: carlyn calero/ [facility]  2 of the medications that pt is on has a possible drug interaction  Call Back#[phone]  Thanks

## 2017-02-16 ENCOUNTER — TELEPHONE (OUTPATIENT)
Dept: GASTROENTEROLOGY | Facility: CLINIC | Age: 67
End: 2017-02-16

## 2017-02-16 NOTE — TELEPHONE ENCOUNTER
----- Message from Sanya Aly MD sent at 2/13/2017  3:26 PM CST -----  Contact: carlyn calero/ [facility]  I called just now no answer; please call this number back and find out what the actual question is and I will try again tomorrow.      ----- Message -----     From: Mya Bear LPN     Sent: 2/13/2017   3:17 PM       To: Sanya Aly MD        ----- Message -----     From: Jaydon Hoover     Sent: 2/13/2017  12:02 PM       To: [facility] CONTRERAS Staff    2 of the medications that pt is on has a possible drug interaction  Call Back#[phone]  Thanks

## 2017-02-20 ENCOUNTER — HOSPITAL ENCOUNTER (OUTPATIENT)
Dept: RADIOLOGY | Facility: HOSPITAL | Age: 67
Discharge: HOME | End: 2017-02-20
Attending: INTERNAL MEDICINE
Payer: MEDICARE

## 2017-02-20 ENCOUNTER — INITIAL CONSULT (OUTPATIENT)
Dept: RHEUMATOLOGY | Facility: CLINIC | Age: 67
End: 2017-02-20
Payer: MEDICARE

## 2017-02-20 VITALS
BODY MASS INDEX: 27.17 KG/M2 | WEIGHT: 169.06 LBS | HEIGHT: 66 IN | SYSTOLIC BLOOD PRESSURE: 130 MMHG | DIASTOLIC BLOOD PRESSURE: 76 MMHG | HEART RATE: 69 BPM

## 2017-02-20 DIAGNOSIS — D86.87 SARCOID MYOPATHY: ICD-10-CM

## 2017-02-20 DIAGNOSIS — M15.9 PRIMARY OSTEOARTHRITIS INVOLVING MULTIPLE JOINTS: ICD-10-CM

## 2017-02-20 DIAGNOSIS — R53.1 WEAKNESS: ICD-10-CM

## 2017-02-20 DIAGNOSIS — R53.83 FATIGUE, UNSPECIFIED TYPE: ICD-10-CM

## 2017-02-20 DIAGNOSIS — M25.50 POLYARTHRALGIA: Primary | ICD-10-CM

## 2017-02-20 DIAGNOSIS — M50.30 DDD (DEGENERATIVE DISC DISEASE), CERVICAL: ICD-10-CM

## 2017-02-20 DIAGNOSIS — M25.50 POLYARTHRALGIA: ICD-10-CM

## 2017-02-20 PROCEDURE — 1157F ADVNC CARE PLAN IN RCRD: CPT | Mod: S$GLB,,, | Performed by: INTERNAL MEDICINE

## 2017-02-20 PROCEDURE — 1160F RVW MEDS BY RX/DR IN RCRD: CPT | Mod: S$GLB,,, | Performed by: INTERNAL MEDICINE

## 2017-02-20 PROCEDURE — 77077 JOINT SURVEY SINGLE VIEW: CPT | Mod: 26,,, | Performed by: RADIOLOGY

## 2017-02-20 PROCEDURE — 1159F MED LIST DOCD IN RCRD: CPT | Mod: S$GLB,,, | Performed by: INTERNAL MEDICINE

## 2017-02-20 PROCEDURE — 99499 UNLISTED E&M SERVICE: CPT | Mod: S$PBB,,, | Performed by: INTERNAL MEDICINE

## 2017-02-20 PROCEDURE — 99999 PR PBB SHADOW E&M-EST. PATIENT-LVL III: CPT | Mod: PBBFAC,,, | Performed by: INTERNAL MEDICINE

## 2017-02-20 PROCEDURE — 3078F DIAST BP <80 MM HG: CPT | Mod: S$GLB,,, | Performed by: INTERNAL MEDICINE

## 2017-02-20 PROCEDURE — 99205 OFFICE O/P NEW HI 60 MIN: CPT | Mod: S$GLB,,, | Performed by: INTERNAL MEDICINE

## 2017-02-20 PROCEDURE — 3075F SYST BP GE 130 - 139MM HG: CPT | Mod: S$GLB,,, | Performed by: INTERNAL MEDICINE

## 2017-02-20 PROCEDURE — 77077 JOINT SURVEY SINGLE VIEW: CPT | Mod: TC

## 2017-02-20 RX ORDER — GABAPENTIN 100 MG/1
300 CAPSULE ORAL NIGHTLY
Qty: 270 CAPSULE | Refills: 0 | Status: SHIPPED | OUTPATIENT
Start: 2017-02-20 | End: 2017-04-19 | Stop reason: SDUPTHER

## 2017-02-20 NOTE — PROGRESS NOTES
"Subjective:       Patient ID: Nathalie Santiago is a 66 y.o. female.    Chief Complaint:  Sarcoidosis  HPIDiagnosed with Pulmonary Sarcoidosis in 2006 in [place]-by lung and LN biopsy. Following with Dr Calle since 2013. Currently on prednisone 5mg a day.  She has chronic leg weakness. Concern of sarcoid myopathy in 2013.  At that time, she was evaluated by neurology.  I reviewed notes from Dr. Harris from 2014 per his note  "suspect this is multifactorial in nature with her deconditioning, sarcoidosis, JOSE LUIS, and so forth contributing. Given the normal CK, I do not think that she had any degree of myopathy/myositis"   Patient reports that muscle weakness is stable with prednisone 5 mg a day. No h/o  parotid swelling, uveitis,  cutaneous involvement.   She does c/o polyarthralgia and myalgia for the last 6 years. Pain is aching type, constant, worse in the evening, worse with activity, better with rest. No joint effusion.    She follows with Dr Temple for DDD. Percocet helps the pain.   She denies fever, weight loss, dry eyes or mouth, photosensitivity, rash, ulcer, raynaud's phenomenon, alopecia, dysphagia, diarrhea or blood in the stools    Review of Systems   Constitutional: Positive for fatigue. Negative for fever.   HENT: Negative for ear discharge and ear pain.    Eyes: Negative for pain and redness.   Respiratory: Negative for cough and shortness of breath.    Cardiovascular: Negative for chest pain and palpitations.   Gastrointestinal: Negative for abdominal distention and abdominal pain.   Genitourinary: Negative for genital sores and hematuria.   Musculoskeletal: Positive for myalgias.   Neurological: Negative for tremors and seizures.   Psychiatric/Behavioral: Negative for agitation and hallucinations.         Objective:     Visit Vitals    /76 (BP Location: Right arm, Patient Position: Sitting, BP Method: Automatic)    Pulse 69    Ht 5' 6" (1.676 m)    Wt 76.7 kg (169 lb 1.5 oz)    LMP  (LMP Unknown)    " BMI 27.29 kg/m2        Physical Exam   Constitutional: She is oriented to person, place, and time and well-developed, well-nourished, and in no distress.   HENT:   Head: Normocephalic and atraumatic.   No parotid swelling  Normal salivary pooling   Eyes: Conjunctivae and EOM are normal. Pupils are equal, round, and reactive to light.   Neck: Neck supple. No tracheal deviation present. No thyromegaly present.   Cardiovascular: Normal rate and regular rhythm.    Pulmonary/Chest: Effort normal and breath sounds normal.   Abdominal: Soft. Bowel sounds are normal. There is no hepatomegaly.       Right Side Rheumatological Exam     Muscle Strength (0-5 scale):  Neck Flexion:  5  Neck Extension: 5  Deltoid:  5  Biceps: 5/5   Triceps:  4.8  : 5/5   Iliopsoas: 4  Quadriceps:  4   Distal Lower Extremity: 4.8    Left Side Rheumatological Exam     Muscle Strength (0-5 scale):  Neck Flexion:  5  Neck Extension: 5  Deltoid:  5  Biceps: 5/5   Triceps:  4.8  :  5/5   Iliopsoas: 4  Quadriceps:  4   Distal Lower Extremity: 4.8      Neurological: She is alert and oriented to person, place, and time. She has normal sensation.   Skin: Skin is warm and dry. No rash noted.     Psychiatric: Memory and affect normal.   Musculoskeletal: She exhibits no edema.   Neck with decreased range of motion in all directions   Bilateral shoulders with intact ROM   Elbows without any swelling or tenderness  Bilateral first CMC tenderness  Diffused Josiah's and Heberden's nodes  Bilateral hips with external rotation 25° and  internal rotation 15°   Bilateral knee crepitus  Both ankles and feet nontender, without synovitis             Lab Results   Component Value Date    WBC 7.90 02/02/2017    HGB 11.0 (L) 02/02/2017    HCT 33.6 (L) 02/02/2017    MCV 83 02/02/2017     02/02/2017     CMP  Sodium   Date Value Ref Range Status   02/02/2017 133 (L) 136 - 145 mmol/L Final     Potassium   Date Value Ref Range Status   02/02/2017 3.5 3.5 - 5.1  mmol/L Final     Chloride   Date Value Ref Range Status   02/02/2017 94 (L) 95 - 110 mmol/L Final     CO2   Date Value Ref Range Status   02/02/2017 25 23 - 29 mmol/L Final     Glucose   Date Value Ref Range Status   02/02/2017 238 (H) 70 - 110 mg/dL Final     BUN, Bld   Date Value Ref Range Status   02/02/2017 16 8 - 23 mg/dL Final     Creatinine   Date Value Ref Range Status   02/02/2017 0.8 0.5 - 1.4 mg/dL Final   08/29/2013 0.8 0.5 - 1.4 mg/dL Final     Calcium   Date Value Ref Range Status   02/02/2017 9.9 8.7 - 10.5 mg/dL Final   08/29/2013 9.8 8.7 - 10.5 mg/dL Final     Total Protein   Date Value Ref Range Status   02/02/2017 7.1 6.0 - 8.4 g/dL Final     Albumin   Date Value Ref Range Status   02/02/2017 2.7 (L) 3.5 - 5.2 g/dL Final     ALBUMIN   Date Value Ref Range Status   08/27/2013 3.5 3.5 - 5.2 g/dL Final     Total Bilirubin   Date Value Ref Range Status   02/02/2017 0.6 0.1 - 1.0 mg/dL Final     Comment:     For infants and newborns, interpretation of results should be based  on gestational age, weight and in agreement with clinical  observations.  Premature Infant recommended reference ranges:  Up to 24 hours.............<8.0 mg/dL  Up to 48 hours............<12.0 mg/dL  3-5 days..................<15.0 mg/dL  6-29 days.................<15.0 mg/dL       Alkaline Phosphatase   Date Value Ref Range Status   02/02/2017 146 (H) 55 - 135 U/L Final   08/27/2013 77 55 - 135 U/L Final     AST   Date Value Ref Range Status   02/02/2017 63 (H) 10 - 40 U/L Final   08/27/2013 32 10 - 40 U/L Final     ALT   Date Value Ref Range Status   02/02/2017 50 (H) 10 - 44 U/L Final     Anion Gap   Date Value Ref Range Status   02/02/2017 14 8 - 16 mmol/L Final   08/29/2013 12 5 - 15 meq/L Final     eGFR if    Date Value Ref Range Status   02/02/2017 >60 >60 mL/min/1.73 m^2 Final     eGFR if non    Date Value Ref Range Status   02/02/2017 >60 >60 mL/min/1.73 m^2 Final     Comment:      Calculation used to obtain the estimated glomerular filtration  rate (eGFR) is the CKD-EPI equation. Since race is unknown   in our information system, the eGFR values for   -American and Non--American patients are given   for each creatinine result.       Lab Results   Component Value Date    SEDRATE 42 (H) 12/20/2016     Lab Results   Component Value Date    .1 (H) 12/20/2016   No results found for: URICACID    Results for TRAMAINE WALLAEC (MRN [id number]) as of 2/22/2017 08:49   Ref. Range 9/5/2013 18:34 7/31/2014 18:24 2/20/2017 11:28   JENNIFER Latest Ref Range: Negative <1:160  Negative <1:160     Anti-SSA Antibody Latest Ref Range: 0.00 - 19.99 EU   4.86   Anti-SSA Interpretation Latest Ref Range: Negative    Negative   Acetylchol Modul Ab Latest Units: %  0    AChR Binding Ab, Serum Latest Ref Range: <=0.02 nmol/L  0.00    CCP Antibodies Latest Ref Range: <5.0 U/mL   <0.5   Rheumatoid Factor Latest Ref Range: 0.0 - 15.0 IU/mL   <10.0   Striated Muscle Ab Latest Ref Range: <1:120 titer  Positive 1:120 (H)    Results for TRAMAINE WALLACE (MRN [id number]) as of 2/22/2017 08:49   Ref. Range 9/5/2013 20:20 7/31/2014 18:25 5/28/2016 11:56 12/20/2016 13:02 2/2/2017 15:24 2/20/2017 11:28   CPK Latest Ref Range: 20 - 180 U/L 49 24 191 (H) 29 127 32     Radiology: I personally reviewed imaging  X-ray foot in 2015: Periarticular erosive changes adjacent to the medial aspect of the left first metatarsal head suggesting possible gouty arthritis.  Assessment:   Chronic weakness-evaluated by neurology in 2013/2014.  Sarcoid myopathy was ruled out.  We will recheck CPK and aldolase, check EMG  Pulmonary sarcoid  Polyarthralgia- Rule out inflammatory arthritis  Fatigue-check JENNIFER  DDD of C-spine  Generalized osteoarthritis       Plan:   Check JENNIFER, SSA, RF, CCP, ESR, CRP, Arthritis survey  Check CPK, aldolase  Check EMG   Continue prednisone 5 mg a day for now  Continue physical therapy  Start gabapentin 100 mg  by mouth daily at bedtime  Slowly escalate to dose to 300 mg by mouth daily at bedtime as tolerated  Discussed side effects of gabapentin  Continue Percocet for chronic pain    Return to clinic in 3 months

## 2017-02-20 NOTE — MR AVS SNAPSHOT
[facility]  [address]  [place] [address]  Phone: [phone]  Fax: [phone]                  Nathalie Santiago   2017 11:00 AM   Initial consult    Description:  Female : 1950   Provider:  Jessica Plunkett MD   Department:  [facility]           Reason for Visit     Consult           Diagnoses this Visit        Comments    Polyarthralgia    -  Primary            To Do List           Future Appointments        Provider Department Dept Phone    2017 12:15 PM [id number] [phone]    2017 12:30 PM [facility] [phone]    3/7/2017 11:30 AM MD [place] [facility] [phone]    3/14/2017 11:00 AM [place], ENDOCRINE EDUCATOR [place] [facility] [phone]    3/27/2017 11:30 AM [place] [facility] - Podiatry [phone]      Goals (5 Years of Data)     None       These Medications        Disp Refills Start End    gabapentin (NEURONTIN) 100 MG capsule 270 capsule 0 2017    Take 3 capsules (300 mg total) by mouth every evening. - Oral    Pharmacy: [facility] - [address] Ph #: [phone]         [facility] On Call     [facility] On Call Nurse Care Line -  Assistance  Registered nurses in the [facility] Center provide clinical advisement, health education, appointment booking, and other advisory services.  Call for this free service at [phone].             Medications           Message regarding Medications     Verify the changes and/or additions to your medication regime listed below are the same as discussed with your clinician today.  If any of these changes or additions are incorrect, please notify your healthcare provider.        START taking these NEW medications        Refills    gabapentin (NEURONTIN) 100 MG capsule 0    Sig: Take 3 capsules (300  mg total) by mouth every evening.    Class: Normal    Route: Oral           Verify that the below list of medications is an accurate representation of the medications you are currently taking.  If none reported, the list may be blank. If incorrect, please contact your healthcare provider. Carry this list with you in case of emergency.           Current Medications     ACCU-CHEK SMARTVIEW TEST STRIP Strp CHECK BLOOD GLUCOSE THREE TIMES DAILY    albuterol-ipratropium 2.5mg-0.5mg/3mL (DUO-NEB) 0.5 mg-3 mg(2.5 mg base)/3 mL nebulizer solution INHALE THE CONTENTS OF 1 VIAL VIA NEBULIZER EVERY 6 HOURS AS NEEDED FOR SHORTNESS OF BREATH  OR  WHEEZING    amlodipine (NORVASC) 5 MG tablet Take 5 mg by mouth once daily.    atorvastatin (LIPITOR) 40 MG tablet TAKE 1 TABLET EVERY DAY    beclomethasone (QVAR) 80 mcg/actuation Aero Inhale 2 puffs into the lungs 2 (two) times daily.    chlorzoxazone (PARAFON FORTE DSC) 500 mg Tab Take 1 tablet (500 mg total) by mouth 3 (three) times daily as needed.    clopidogrel (PLAVIX) 75 mg tablet Take 1 tablet (75 mg total) by mouth once daily.    DEXTRAN 70/HYPROMELLOSE (ARTIFICIAL TEARS, PF, OPHT) Apply 1 drop to eye as needed.    ergocalciferol (ERGOCALCIFEROL) 50,000 unit Cap Take 1 capsule (50,000 Units total) by mouth every 7 days.    fenofibrate 160 MG Tab Take 1 tablet (160 mg total) by mouth once daily.    fluticasone (FLONASE) 50 mcg/actuation nasal spray 2 sprays by Each Nare route once daily.    hydrochlorothiazide (HYDRODIURIL) 25 MG tablet TAKE 1 TABLET EVERY DAY    insulin aspart (NOVOLOG FLEXPEN) 100 unit/mL InPn pen Use per sliding scale    insulin NPH (NOVOLIN N) 100 unit/mL injection Inject 15 Units into the skin 2 (two) times daily before meals.    levothyroxine (SYNTHROID) 25 MCG tablet TAKE 1 TABLET BEFORE BREAKFAST    metformin (GLUCOPHAGE) 1000 MG tablet Take 1 tablet (1,000 mg total) by mouth 2 (two) times daily with meals.    metoclopramide HCl (REGLAN) 10 MG tablet  "Take 1 tablet (10 mg total) by mouth every 8 (eight) hours.    multivitamin capsule Take 1 capsule by mouth once daily.    ondansetron (ZOFRAN-ODT) 8 MG TbDL DISSOLVE 1 TABLET ON THE TONGUE EVERY 8 HOURS AS NEEDED    oxycodone-acetaminophen (PERCOCET)  mg per tablet Starting on Feb 21, 2017. Take 1 tablet by mouth every 8 (eight) hours as needed.    oxycodone-acetaminophen (PERCOCET)  mg per tablet Starting on Mar 22, 2017. Take 1 tablet by mouth every 8 (eight) hours as needed.    oxycodone-acetaminophen (PERCOCET)  mg per tablet Starting on Apr 20, 2017. Take 1 tablet by mouth every 8 (eight) hours as needed.    potassium chloride (MICRO-K) 10 MEQ CpSR Take 1 capsule (10 mEq total) by mouth 3 (three) times daily.    promethazine (PHENERGAN) 25 MG tablet     sertraline (ZOLOFT) 100 MG tablet Take one tablet PO daily    sumatriptan (IMITREX) 50 MG tablet Take 50 mg by mouth daily as needed for Migraine.    VOLTAREN 1 % Gel     gabapentin (NEURONTIN) 100 MG capsule Take 3 capsules (300 mg total) by mouth every evening.    magnesium oxide (MAG-OX) 400 mg tablet Take 1 tablet (400 mg total) by mouth once daily.           Clinical Reference Information           Your Vitals Were     BP Pulse Height Weight Last Period BMI    130/76 (BP Location: Right arm, Patient Position: Sitting, BP Method: Automatic) 69 5' 6" (1.676 m) 76.7 kg (169 lb 1.5 oz) (LMP Unknown) 27.29 kg/m2      Blood Pressure          Most Recent Value    BP  130/76      Allergies as of 2/20/2017     Keflex [Cephalexin]    Penicillins    Vasotec [Enalapril Maleate]    Wellbutrin [Bupropion Hcl]    Zithromax [Azithromycin]    Codeine      Immunizations Administered on Date of Encounter - 2/20/2017     None      Orders Placed During Today's Visit     Future Labs/Procedures Expected by Expires    Aldolase  2/20/2017 4/21/2018    JENNIFER  2/20/2017 4/21/2018    CK  2/20/2017 4/21/2018    Cyclic citrul peptide antibody, IgG  2/20/2017 4/21/2018 "    Rheumatoid factor  2/20/2017 4/21/2018    Sjogrens syndrome-A extractable nuclear antibody  2/20/2017 4/21/2018    XR Arthritis Survey  2/20/2017 2/20/2018      Language Assistance Services     ATTENTION: Language assistance services are available, free of charge. Please call [phone].      ATENCIÓN: Si habla marly, tiene a floyd disposición servicios gratuitos de asistencia lingüística. Llame al [phone].     CHÚ Ý: N?u b?n nói Ti?ng Vi?t, có các d?ch v? h? tr? ngôn ng? mi?n phí dành cho b?n. G?i s? [phone].         [facility] complies with applicable Federal civil rights laws and does not discriminate on the basis of race, color, national origin, age, disability, or sex.

## 2017-02-22 ENCOUNTER — TELEPHONE (OUTPATIENT)
Dept: RHEUMATOLOGY | Facility: CLINIC | Age: 67
End: 2017-02-22

## 2017-02-22 ENCOUNTER — LAB VISIT (OUTPATIENT)
Dept: LAB | Facility: HOSPITAL | Age: 67
End: 2017-02-22
Attending: FAMILY MEDICINE
Payer: MEDICARE

## 2017-02-22 ENCOUNTER — TELEPHONE (OUTPATIENT)
Dept: FAMILY MEDICINE | Facility: CLINIC | Age: 67
End: 2017-02-22

## 2017-02-22 ENCOUNTER — OFFICE VISIT (OUTPATIENT)
Dept: PSYCHIATRY | Facility: CLINIC | Age: 67
End: 2017-02-22
Payer: MEDICARE

## 2017-02-22 VITALS
WEIGHT: 165.13 LBS | SYSTOLIC BLOOD PRESSURE: 126 MMHG | HEIGHT: 66 IN | DIASTOLIC BLOOD PRESSURE: 69 MMHG | BODY MASS INDEX: 26.54 KG/M2 | HEART RATE: 88 BPM

## 2017-02-22 DIAGNOSIS — R30.0 DYSURIA: Primary | ICD-10-CM

## 2017-02-22 DIAGNOSIS — G47.00 INSOMNIA, UNSPECIFIED TYPE: ICD-10-CM

## 2017-02-22 DIAGNOSIS — F41.1 GAD (GENERALIZED ANXIETY DISORDER): ICD-10-CM

## 2017-02-22 DIAGNOSIS — R30.0 DYSURIA: ICD-10-CM

## 2017-02-22 DIAGNOSIS — F33.1 MODERATE EPISODE OF RECURRENT MAJOR DEPRESSIVE DISORDER: ICD-10-CM

## 2017-02-22 LAB
BACTERIA #/AREA URNS AUTO: ABNORMAL /HPF
BILIRUB UR QL STRIP: NEGATIVE
CLARITY UR REFRACT.AUTO: ABNORMAL
COLOR UR AUTO: YELLOW
GLUCOSE UR QL STRIP: NEGATIVE
HGB UR QL STRIP: NEGATIVE
KETONES UR QL STRIP: NEGATIVE
LEUKOCYTE ESTERASE UR QL STRIP: ABNORMAL
MICROSCOPIC COMMENT: ABNORMAL
NITRITE UR QL STRIP: NEGATIVE
NON-SQ EPI CELLS #/AREA URNS AUTO: <1 /HPF
PH UR STRIP: 6 [PH] (ref 5–8)
PROT UR QL STRIP: NEGATIVE
RBC #/AREA URNS AUTO: 1 /HPF (ref 0–4)
SP GR UR STRIP: 1.01 (ref 1–1.03)
SQUAMOUS #/AREA URNS AUTO: 1 /HPF
URN SPEC COLLECT METH UR: ABNORMAL
UROBILINOGEN UR STRIP-ACNC: NEGATIVE EU/DL
WBC #/AREA URNS AUTO: 47 /HPF (ref 0–5)
WBC CLUMPS UR QL AUTO: ABNORMAL

## 2017-02-22 PROCEDURE — 99499 UNLISTED E&M SERVICE: CPT | Mod: S$PBB,,, | Performed by: PSYCHIATRY & NEUROLOGY

## 2017-02-22 PROCEDURE — 3074F SYST BP LT 130 MM HG: CPT | Mod: S$GLB,,, | Performed by: PSYCHIATRY & NEUROLOGY

## 2017-02-22 PROCEDURE — 3078F DIAST BP <80 MM HG: CPT | Mod: S$GLB,,, | Performed by: PSYCHIATRY & NEUROLOGY

## 2017-02-22 PROCEDURE — 87186 SC STD MICRODIL/AGAR DIL: CPT

## 2017-02-22 PROCEDURE — 99999 PR PBB SHADOW E&M-EST. PATIENT-LVL II: CPT | Mod: PBBFAC,,, | Performed by: PSYCHIATRY & NEUROLOGY

## 2017-02-22 PROCEDURE — 1160F RVW MEDS BY RX/DR IN RCRD: CPT | Mod: S$GLB,,, | Performed by: PSYCHIATRY & NEUROLOGY

## 2017-02-22 PROCEDURE — 87086 URINE CULTURE/COLONY COUNT: CPT

## 2017-02-22 PROCEDURE — 1159F MED LIST DOCD IN RCRD: CPT | Mod: S$GLB,,, | Performed by: PSYCHIATRY & NEUROLOGY

## 2017-02-22 PROCEDURE — 87077 CULTURE AEROBIC IDENTIFY: CPT

## 2017-02-22 PROCEDURE — 81001 URINALYSIS AUTO W/SCOPE: CPT

## 2017-02-22 PROCEDURE — 99213 OFFICE O/P EST LOW 20 MIN: CPT | Mod: S$GLB,,, | Performed by: PSYCHIATRY & NEUROLOGY

## 2017-02-22 PROCEDURE — 1157F ADVNC CARE PLAN IN RCRD: CPT | Mod: S$GLB,,, | Performed by: PSYCHIATRY & NEUROLOGY

## 2017-02-22 PROCEDURE — 87088 URINE BACTERIA CULTURE: CPT

## 2017-02-22 RX ORDER — SERTRALINE HYDROCHLORIDE 100 MG/1
TABLET, FILM COATED ORAL
Qty: 180 TABLET | Refills: 0 | Status: SHIPPED | OUTPATIENT
Start: 2017-02-22 | End: 2017-04-20 | Stop reason: SDUPTHER

## 2017-02-22 RX ORDER — DOXEPIN HYDROCHLORIDE 10 MG/1
10 CAPSULE ORAL NIGHTLY PRN
Qty: 90 CAPSULE | Refills: 0 | Status: ON HOLD | OUTPATIENT
Start: 2017-02-22 | End: 2017-03-28 | Stop reason: CLARIF

## 2017-02-22 NOTE — TELEPHONE ENCOUNTER
Pt seeing Dr. Pat today-asking for orders for a UA and Culture. Pt telling Dr. Pat pt having UTI symptoms. Please advise. Thanks, Alvina

## 2017-02-22 NOTE — PROGRESS NOTES
Outpatient Psychiatry Follow-Up Visit (MD/NP)    2/22/2017    Clinical Status of Patient:  Outpatient (Ambulatory)    Chief Complaint:  Nathalie Santiago is a 66 y.o. female who presents today for follow-up of depression, anxiety, adjustment.  Met with patient.      Interval History and Content of Current Session:   Interim Events/Subjective Report/Content of Current Session:  follow up appointment.   Last seen 10/16    Pt reports multiple medical issues in interim.  She was hospitalized in Dec 2016 for nausea, vomiting, weakness, abdominal pain;  Dx with gastritis.  She was hospitalized November 2016 for vomiting and right middle ear infection.   She presented to ER early February for weakness, but was not admitted to the hospital. Per ER note: Discussed with internal medicine and they are well aware of the patient and evaluated the patient in the emergency department. They state that all her symptoms are chronic in nature including her urinary incontinence and lower extremity weakness. They do not feel that she warrants admission at this time. The patient was able to ambulate out of the emergency department without difficulties.    Pt recently seen by Rheumatology - labs ordered and referred for EMG.    Pt angry about this - she feels she has a UTI which is not being addressed.    Her urine is rust colored, she has had 2 falls,she is incontinent of urine - ruined 5K mattress because she was too weak to get out of bed x 2 days - brother had to lift her and assist her; currently she reports that her urine smells awful, is cloudy, with continued incontinence, no dysuria - wearing undergarment currently.     Phone call with patient 2/3/17:   Pt has multiple complaints - seen in ER yesterday for incontinence, generalized weakness, falls, Changes in speech (slowed). No focal neurological deficits per patient. Pt reports acute onset of symptoms in past week. Per ER note:   Pt called PCP today and asking for referral to  "Neuro (she expressed concern that she has ALS b/c of muscle spasms also).   Pt has not taken Xanax x 2-3 weeks - I advised her to continue to AVOID, medication not refilled at this time.   She is taking Sertraline 100 mg daily - this was refilled.   Pt cancelled 2 appts this week with me due to feeling unwell. She has neighbor who is staying with her and was seen by home health today at her home.   Pt advised to return to ER with any changes or worsening of symptoms. I will cc her PCP in this message as well.     Psych ROS:     No appetite, trying to lose weight, trying to not eat after dark - insulin needs have decreased; semi diabetic diet   She has hx of gastroparesis     She is living with brother Nj, his significant other, and his daughter - Nj is supportive of her - she feels the other two don't care about her;  Sister Denise has mental illness and "back stabs" her    Pt is tearful.  Everything is getting so old.   She caught herself financially short because she helped her grandson -grandson showed no appreciation - no verbal recognition - this hurt her     Daughter wants to "control her" to move into a 20x20 room - this is a strained relationship   "She would make sure I eat three times a day - she would make me wait on pain medication, or If I need something for sleep."    Daughter upset that she cut back on her own insulin (her morning glucose readings are 80s-90s and 160s at night)     She is depressed most days, cries easily, avoding emotional sitations   Hopeful still   Poor sleep onset  No energy, no motivation, + trouble reading, poor focus   Watches her brother's granddaughter several days a week still   She snapped verbally at her granddaughter- she is more irritable than normal     Denies suicidal/homicidal ideations.  No self harm, no violence     Combination of anxiety, depression - wants to wait until people are gone until she comes out of her room - isolating   She feels it is hard to " "breathe. + SOB out of nowhere - woke up this am had to make self breathe before she could get going - not using CPAP    On edge, but not worrying, there is nothing she can control     She has had a couple of highs - wants to make [employer] - bought stuff when she could not afford to - "I didn't give a care."   No clear arnel.   No psychosis     Denies alcohol/drug use.  She denies problematic gambling   She takes percocet 2-4 times a day (not problematic)    no hx suicide attempts, no self harm.  Guns in home are locked up.    Sleep issues have been chronic.  She used to take Dalmane and Temazepam.      Prior meds: Trazodone (no benefit), mirtazapine, citalopram, duloxetine, temazepam, dalmane, melatonin (3 mg ineffective, higher dose HA), ativan, wellbutrin XL, clonazepam, amitriptyline     Review of Systems   PSYCHIATRIC: see above  CONSTITUTIONAL: weight loss  MUSCULOSKELETAL:  Back pain 5-6/10   RESPIRATORY: occasional exertional SOB   : rust colored, foul smelling urine, incontinence     Past Medical, Family and Social History: The patient's past medical, family and social history have been reviewed and updated as appropriate within the electronic medical record - see encounter notes.    Medications:  Sertraline 100 mg daily     Percoet, Imitrex, Plavix    Compliance: yes     Side effects:  headaches when Ambien taken too often; dry mouth on amitriptyline, elevated BP on Effexor XR     Risk Parameters:  Patient reports no suicidal ideation  Patient reports no homicidal ideation  Patient reports no self-injurious behavior  Patient reports no violent behavior    Exam (detailed: at least 9 elements; comprehensive: all 15 elements)   Constitutional  Vitals:  Most recent vital signs, dated more than 90 days prior to this appointment, were reviewed.         General:  age appropriate, casually dressed, thinner, calm        Musculoskeletal  Muscle Strength/Tone:  no tremor   Gait & Station:  No ataxia  " "    Psychiatric  Speech:  no latency; no press, spontaneous   Mood & Affect:  "not feeling well"   Dysphoric    Thought Process:  Linear    Associations:  intact   Thought Content:  normal, no suicidality, no homicidality, delusions, or paranoia, hallucinations: (auditory: no, visual: no)   Insight:  has awareness of illness   Judgement: behavior is adequate to circumstances   Orientation:  grossly intact; alert and oriented x 3    Memory: intact for content of interview   Language: grossly intact no fluency issues    Attention Span & Concentration:  able to focus grossly intact   Fund of Knowledge:  intact and appropriate to age and level of education     Assessment and Diagnosis   Status/Progress: Based on the examination today, the patient's problem(s) is/are under inadequate control.   New problems have not been presented today.   Comorbidities are complicating management of the primary condition.  (multiple medical problems)     Impression: 65 yo female with multiple medical problems and hx of depression, insomnia, and anxiety presents for follow up. Significant family stressors and health issues. Pt has required multiple sleep aides in past. She has required chronic benzodiazepine for stabilization, has failed multiple sleep aides;  Sedative-hyponotics were discontinued in interim due to health issues, reports of falls.  She requested to restart today, but given health issues and symptoms, I declined in favor of safer options     MDD, single episode, moderate   GARFIELD  Insomnia disorder   R/O Conversion Disorder   sarcoidosis, hx phyloides tumor of breast, .DM2, RLS, arthritis, chronic neck, hip, and back pain, hx avulsion fracture, JOSE LUIS, hx CVA     GAF: 54     Strengths and Liabilities: Strength: Patient accepts guidance/feedback, Strength: Patient is expressive/articulate., Strength: Patient has reasonable judgment.   Treatment Goals: Specify outcomes written in observable, behavioral terms:   Anxiety: acquiring " relapse prevention skills and reducing physical symptoms of anxiety   Depression: acquiring relapse prevention skills, increasing energy, increasing motivation, reducing excessive guilt and reducing negative automatic thoughts     Treatment Plan/Recommendations:   Medication Management: The risks and benefits of medication were discussed with the patient.    1. titrate Sertraline to 150 mg daily x 2 weeks, then increase to 200 mg daily.  Typical ADEs reviewed with patient.  Advised to avoid Imitrex due to risks of serotonin syndrome on multiple serotonergic meds.  Stressed importance of compliance.     2. Continue to hold sedative hypnotics      3. Psychotherapy is encouraged. Pt has declined due to transportation issues.     4. Call to report any worsening of symptoms or problems with the medication     5. Start Doxepin 10 mg nightly PRN insomnia; typical TAN reviewed; addition of gabapentin may also help with insomnia     6. I coordinated with Dr Almodovar today to have orders placed for UA, urine culture     Return to Clinic:  2 months

## 2017-02-22 NOTE — TELEPHONE ENCOUNTER
Pt having rust colored urine with a foul smell. Also incontinence and no control of stream. States has had for a month.

## 2017-02-22 NOTE — TELEPHONE ENCOUNTER
----- Message from Jessica Plunkett MD sent at 2/22/2017  8:55 AM CST -----  Ordered EMG.  Please schedule  Thanks SS

## 2017-02-22 NOTE — MR AVS SNAPSHOT
[facility]  [address]  [place] [address]  Phone: [phone]                  Nathalie Santiago   2017 10:30 AM   Office Visit    Description:  Female : 1950   Provider:  Fauzia Pat MD   Department:  [place] [facility]           Diagnoses this Visit        Comments    GARFIELD (generalized anxiety disorder)         Insomnia, unspecified type         Moderate episode of recurrent major depressive disorder                To Do List           Future Appointments        Provider Department Dept Phone    2017 1:15 PM [place] [facility] [phone]    3/7/2017 11:30 AM [place] [facility] [place] [phone]    3/14/2017 11:00 AM [place] ENDOCRINE EDUCATOR [place] [facility] [phone]    3/27/2017 11:30 AM [facility] - Podiatry [phone]    3/28/2017 11:00 AM Jimmie Harris Jr., MD [place] - Neurology [phone]      Goals (5 Years of Data)     None      Follow-Up and Disposition     Return for 2 months .       These Medications        Disp Refills Start End    sertraline (ZOLOFT) 100 MG tablet 180 tablet 0 2017     Take one and one-half tablets PO daily x 2 weeks, then increase to two tablets PO daily    Pharmacy: [facility] - [facility] [address] [facility] Ph #: [phone]       doxepin (SINEQUAN) 10 MG capsule 90 capsule 0 2017    Take 1 capsule (10 mg total) by mouth nightly as needed (insomnia). - Oral    Pharmacy: [facility] - [facility] [address] [facility] Ph #: [phone]         [facility] On Call     [facility] On Call Nurse Care Line -  Assistance  Registered nurses in the [facility] On Call Center provide clinical advisement, health education, appointment booking, and other advisory services.  Call for this free service at [phone].             Medications           Message regarding Medications      Verify the changes and/or additions to your medication regime listed below are the same as discussed with your clinician today.  If any of these changes or additions are incorrect, please notify your healthcare provider.        START taking these NEW medications        Refills    doxepin (SINEQUAN) 10 MG capsule 0    Sig: Take 1 capsule (10 mg total) by mouth nightly as needed (insomnia).    Class: Normal    Route: Oral      CHANGE how you are taking these medications     Start Taking Instead of    sertraline (ZOLOFT) 100 MG tablet sertraline (ZOLOFT) 100 MG tablet    Dosage:  Take one and one-half tablets PO daily x 2 weeks, then increase to two tablets PO daily Dosage:  Take one tablet PO daily    Reason for Change:  Reorder       STOP taking these medications     magnesium oxide (MAG-OX) 400 mg tablet Take 1 tablet (400 mg total) by mouth once daily.    promethazine (PHENERGAN) 25 MG tablet            Verify that the below list of medications is an accurate representation of the medications you are currently taking.  If none reported, the list may be blank. If incorrect, please contact your healthcare provider. Carry this list with you in case of emergency.           Current Medications     ACCU-CHEK SMARTVIEW TEST STRIP Strp CHECK BLOOD GLUCOSE THREE TIMES DAILY    albuterol-ipratropium 2.5mg-0.5mg/3mL (DUO-NEB) 0.5 mg-3 mg(2.5 mg base)/3 mL nebulizer solution INHALE THE CONTENTS OF 1 VIAL VIA NEBULIZER EVERY 6 HOURS AS NEEDED FOR SHORTNESS OF BREATH  OR  WHEEZING    amlodipine (NORVASC) 5 MG tablet Take 5 mg by mouth once daily.    atorvastatin (LIPITOR) 40 MG tablet TAKE 1 TABLET EVERY DAY    beclomethasone (QVAR) 80 mcg/actuation Aero Inhale 2 puffs into the lungs 2 (two) times daily.    chlorzoxazone (PARAFON FORTE DSC) 500 mg Tab Take 1 tablet (500 mg total) by mouth 3 (three) times daily as needed.    clopidogrel (PLAVIX) 75 mg tablet Take 1 tablet (75 mg total) by mouth once daily.    DEXTRAN  "70/HYPROMELLOSE (ARTIFICIAL TEARS, PF, OPHT) Apply 1 drop to eye as needed.    ergocalciferol (ERGOCALCIFEROL) 50,000 unit Cap Take 1 capsule (50,000 Units total) by mouth every 7 days.    fenofibrate 160 MG Tab Take 1 tablet (160 mg total) by mouth once daily.    fluticasone (FLONASE) 50 mcg/actuation nasal spray 2 sprays by Each Nare route once daily.    hydrochlorothiazide (HYDRODIURIL) 25 MG tablet TAKE 1 TABLET EVERY DAY    insulin aspart (NOVOLOG FLEXPEN) 100 unit/mL InPn pen Use per sliding scale    insulin NPH (NOVOLIN N) 100 unit/mL injection Inject 15 Units into the skin 2 (two) times daily before meals.    levothyroxine (SYNTHROID) 25 MCG tablet TAKE 1 TABLET BEFORE BREAKFAST    metformin (GLUCOPHAGE) 1000 MG tablet Take 1 tablet (1,000 mg total) by mouth 2 (two) times daily with meals.    metoclopramide HCl (REGLAN) 10 MG tablet Take 1 tablet (10 mg total) by mouth every 8 (eight) hours.    multivitamin capsule Take 1 capsule by mouth once daily.    ondansetron (ZOFRAN-ODT) 8 MG TbDL DISSOLVE 1 TABLET ON THE TONGUE EVERY 8 HOURS AS NEEDED    oxycodone-acetaminophen (PERCOCET)  mg per tablet Take 1 tablet by mouth every 8 (eight) hours as needed.    potassium chloride (MICRO-K) 10 MEQ CpSR Take 1 capsule (10 mEq total) by mouth 3 (three) times daily.    sertraline (ZOLOFT) 100 MG tablet Take one and one-half tablets PO daily x 2 weeks, then increase to two tablets PO daily    sumatriptan (IMITREX) 50 MG tablet Take 50 mg by mouth daily as needed for Migraine.    VOLTAREN 1 % Gel     doxepin (SINEQUAN) 10 MG capsule Take 1 capsule (10 mg total) by mouth nightly as needed (insomnia).    gabapentin (NEURONTIN) 100 MG capsule Take 3 capsules (300 mg total) by mouth every evening.           Clinical Reference Information           Your Vitals Were     BP Pulse Height Weight Last Period BMI    126/69 88 5' 6" (1.676 m) 74.9 kg (165 lb 2 oz) (LMP Unknown) 26.65 kg/m2      Blood Pressure          Most " Recent Value    BP  126/69      Allergies as of 2/22/2017     Keflex [Cephalexin]    Penicillins    Vasotec [Enalapril Maleate]    Wellbutrin [Bupropion Hcl]    Zithromax [Azithromycin]    Codeine      Immunizations Administered on Date of Encounter - 2/22/2017     None      Language Assistance Services     ATTENTION: Language assistance services are available, free of charge. Please call [phone].      ATENCIÓN: Si habla español, tiene a floyd disposición servicios gratuitos de asistencia lingüística. Llame al [phone].     CHÚ Ý: N?u b?n nói Ti?ng Vi?t, có các d?ch v? h? tr? ngôn ng? mi?n phí dành cho b?n. G?i s? [phone].         [facility] complies with applicable Federal civil rights laws and does not discriminate on the basis of race, color, national origin, age, disability, or sex.

## 2017-02-23 ENCOUNTER — TELEPHONE (OUTPATIENT)
Dept: FAMILY MEDICINE | Facility: CLINIC | Age: 67
End: 2017-02-23

## 2017-02-23 RX ORDER — CIPROFLOXACIN 250 MG/1
250 TABLET, FILM COATED ORAL EVERY 12 HOURS
Qty: 6 TABLET | Refills: 0 | Status: SHIPPED | OUTPATIENT
Start: 2017-02-23 | End: 2017-02-26

## 2017-02-23 NOTE — TELEPHONE ENCOUNTER
Urinalysis concerning for infection, culture is pending. Sent prescription for cipro to pharmacy. Needs to start taking it now and will notify her of the culture results once available

## 2017-02-23 NOTE — TELEPHONE ENCOUNTER
Patient has been called and scheduled for an EMG. Appointment letter has been placed in the mail to the patient.

## 2017-02-25 LAB — BACTERIA UR CULT: NORMAL

## 2017-02-27 ENCOUNTER — TELEPHONE (OUTPATIENT)
Dept: FAMILY MEDICINE | Facility: CLINIC | Age: 67
End: 2017-02-27

## 2017-03-02 ENCOUNTER — TELEPHONE (OUTPATIENT)
Dept: PHYSICAL MEDICINE AND REHAB | Facility: CLINIC | Age: 67
End: 2017-03-02

## 2017-03-02 NOTE — TELEPHONE ENCOUNTER
----- Message from Charu Nelson sent at 3/1/2017  4:37 PM CST -----  Contact:  call  //[phone]    Calling to  Cancel  Emg// please call

## 2017-03-03 ENCOUNTER — TELEPHONE (OUTPATIENT)
Dept: FAMILY MEDICINE | Facility: CLINIC | Age: 67
End: 2017-03-03

## 2017-03-03 RX ORDER — NITROFURANTOIN 25; 75 MG/1; MG/1
100 CAPSULE ORAL EVERY 12 HOURS
Qty: 14 CAPSULE | Refills: 0 | Status: SHIPPED | OUTPATIENT
Start: 2017-03-03 | End: 2017-03-10

## 2017-03-03 NOTE — TELEPHONE ENCOUNTER
----- Message from Nildagabi Baum sent at 3/3/2017 11:47 AM CST -----  Symptoms of UTI returning and left ear pain.  Any orders send to [facility].  Please call patient with any questions/[phone].

## 2017-03-03 NOTE — TELEPHONE ENCOUNTER
Spoke to patient. She states that she was treated for a UTI a week ago. She took all of the Cipro that was prescribed.  Today she is having low back pain that is different than the normal pain she has and he foul smell is returning to her urine. Patient denies urgency, frequency, burning, abd pain and fever.   Patient states also that she had an ear ache this AM but it is better this afternoon. Advise that ear pain will need to be addressed in the office if it returns. Patient verbalizes understanding.  Phoned order for urinalysis and urine culture orders phoned to [facility]. Spoke to Shelbie.  Do you want to start patient on any medication now or do you want to wait for results?

## 2017-03-03 NOTE — TELEPHONE ENCOUNTER
Needs to start macrobid while awaiting UA and culture results as I will not be in the clinic over the weekend to review them.     Needs to go to urgent care clinic or ER with any worsening symptoms.

## 2017-03-06 ENCOUNTER — TELEPHONE (OUTPATIENT)
Dept: PSYCHIATRY | Facility: CLINIC | Age: 67
End: 2017-03-06

## 2017-03-06 ENCOUNTER — TELEPHONE (OUTPATIENT)
Dept: FAMILY MEDICINE | Facility: CLINIC | Age: 67
End: 2017-03-06

## 2017-03-06 RX ORDER — CLONAZEPAM 0.5 MG/1
TABLET ORAL
Qty: 45 TABLET | Refills: 1 | Status: SHIPPED | OUTPATIENT
Start: 2017-03-06 | End: 2017-04-20 | Stop reason: SDUPTHER

## 2017-03-06 NOTE — TELEPHONE ENCOUNTER
Patient called an left message on voice mail wants a call back she cannot deal with things lately an needs to see if can get her Klonopin increased.  Please call patient sending message to Ciara BERMUDEZ

## 2017-03-06 NOTE — TELEPHONE ENCOUNTER
Spoke with MsRola Chavez who wanted me to let the doctor know  That she is on a 'self destructive path'. She stated shes been crying, not suicidal or inflicting pain just mccann-antonette crying. She stated she has been eating and eating and thinks 'I just dont care.' Has not been taking medications, checking blood sugars, and making poor food choices. Yesterday she ate so much that she was sick all evening. She is requesting klonopin to help relax her. She stated that there is a lot going on and needs it. Advised her I would notify the doctor. Please advise and she also stated to use [facility] in [place] for pharmacy.    Next apt is 4/20/2017

## 2017-03-06 NOTE — TELEPHONE ENCOUNTER
Received urine culture results which demonstrate evidence of e. Coli UTI which is sensitive to macrobid. Needs to complete entire course.

## 2017-03-07 NOTE — TELEPHONE ENCOUNTER
Spoke to the patient on the phone - she denies suicidal ideations or homicidal ideations;  She is feeling stressed, overwhelmed.  Getting over UTI that she has had x 1 month. Not following diabetic diet, eating to discomfort/emesis last night.  She has not been checking her blood sugar like she should.  She takes morning medications later in the day (She denies skipping medications).  She is titrating Sertraline now at 150 mg daily - will titrate to 200 mg daily 3/11/17.  Doxepin helping with sleep, but last night had issues with RLS (has had in past too).    She takes percocet only once daily for pain - reduced due to opioid induced constipation.     Pt articulate on phone, tearful, feels she should be taking better care of herself; pt requesting to restart Clonazepam.       Agreed to restart low dose clonazepam 0.25-0.5 mg BID prn anxiety - # 45 tabs, 30 day supply.  Follow up appt 4/20/17     Encouraged pt to restart medications on daily basis as prescribed - non compliance may be contributing to her poor energy, motivation.      - Call to report any worsening of symptoms or problems with the medication      Discussed risk of interaction with Percocet - risk of respiratory depression - clonazepam should not be taken with this medication.

## 2017-03-13 DIAGNOSIS — K31.84 GASTROPARESIS: ICD-10-CM

## 2017-03-14 ENCOUNTER — TELEPHONE (OUTPATIENT)
Dept: FAMILY MEDICINE | Facility: CLINIC | Age: 67
End: 2017-03-14

## 2017-03-14 RX ORDER — METOCLOPRAMIDE 10 MG/1
TABLET ORAL
Qty: 180 TABLET | Refills: 1 | Status: SHIPPED | OUTPATIENT
Start: 2017-03-14 | End: 2017-05-15 | Stop reason: SDUPTHER

## 2017-03-14 NOTE — TELEPHONE ENCOUNTER
----- Message from Ciara Leo sent at 3/14/2017 12:50 PM CDT -----  Contact: self  Patient wants to speak with a nurse regarding home health giving her a IV of fluids. Please call back at [phone] (home)

## 2017-03-14 NOTE — TELEPHONE ENCOUNTER
Notified patient that Dr Almodovar will not order IV fluids and she needs to go to Er for nausea and vomiting and if she feels dehydrated

## 2017-03-14 NOTE — TELEPHONE ENCOUNTER
Informed pt.that doctor was on vacation and that she needed to call PC or go to ER if she thought she needed an infusion.

## 2017-03-14 NOTE — TELEPHONE ENCOUNTER
----- Message from RT Sweetie sent at 3/14/2017  1:34 PM CDT -----  Contact: pt    Called pod, pt , requesting to inform Kyleigh that Dr. Aly is out for the rest of the week, therefore, she would like to know if Dr. Almodovar will order IV fluids for home care, thanks.

## 2017-03-21 ENCOUNTER — DOCUMENTATION ONLY (OUTPATIENT)
Dept: FAMILY MEDICINE | Facility: CLINIC | Age: 67
End: 2017-03-21

## 2017-03-21 ENCOUNTER — OFFICE VISIT (OUTPATIENT)
Dept: FAMILY MEDICINE | Facility: CLINIC | Age: 67
End: 2017-03-21
Payer: MEDICARE

## 2017-03-21 ENCOUNTER — TELEPHONE (OUTPATIENT)
Dept: FAMILY MEDICINE | Facility: CLINIC | Age: 67
End: 2017-03-21

## 2017-03-21 VITALS
DIASTOLIC BLOOD PRESSURE: 71 MMHG | TEMPERATURE: 98 F | SYSTOLIC BLOOD PRESSURE: 128 MMHG | HEIGHT: 66 IN | HEART RATE: 74 BPM | WEIGHT: 165.38 LBS | BODY MASS INDEX: 26.58 KG/M2

## 2017-03-21 DIAGNOSIS — R30.0 DYSURIA: Primary | ICD-10-CM

## 2017-03-21 DIAGNOSIS — N39.0 RECURRENT UTI: ICD-10-CM

## 2017-03-21 PROCEDURE — 87088 URINE BACTERIA CULTURE: CPT

## 2017-03-21 PROCEDURE — 1157F ADVNC CARE PLAN IN RCRD: CPT | Mod: S$GLB,,, | Performed by: PHYSICIAN ASSISTANT

## 2017-03-21 PROCEDURE — 87077 CULTURE AEROBIC IDENTIFY: CPT

## 2017-03-21 PROCEDURE — 99999 PR PBB SHADOW E&M-EST. PATIENT-LVL III: CPT | Mod: PBBFAC,,, | Performed by: PHYSICIAN ASSISTANT

## 2017-03-21 PROCEDURE — 1160F RVW MEDS BY RX/DR IN RCRD: CPT | Mod: S$GLB,,, | Performed by: PHYSICIAN ASSISTANT

## 2017-03-21 PROCEDURE — 87186 SC STD MICRODIL/AGAR DIL: CPT

## 2017-03-21 PROCEDURE — 87086 URINE CULTURE/COLONY COUNT: CPT

## 2017-03-21 PROCEDURE — 3074F SYST BP LT 130 MM HG: CPT | Mod: S$GLB,,, | Performed by: PHYSICIAN ASSISTANT

## 2017-03-21 PROCEDURE — 99213 OFFICE O/P EST LOW 20 MIN: CPT | Mod: S$GLB,,, | Performed by: PHYSICIAN ASSISTANT

## 2017-03-21 PROCEDURE — 1125F AMNT PAIN NOTED PAIN PRSNT: CPT | Mod: S$GLB,,, | Performed by: PHYSICIAN ASSISTANT

## 2017-03-21 PROCEDURE — 3078F DIAST BP <80 MM HG: CPT | Mod: S$GLB,,, | Performed by: PHYSICIAN ASSISTANT

## 2017-03-21 PROCEDURE — 1159F MED LIST DOCD IN RCRD: CPT | Mod: S$GLB,,, | Performed by: PHYSICIAN ASSISTANT

## 2017-03-21 RX ORDER — NITROFURANTOIN 25; 75 MG/1; MG/1
100 CAPSULE ORAL 2 TIMES DAILY
Qty: 28 CAPSULE | Refills: 0 | Status: SHIPPED | OUTPATIENT
Start: 2017-03-21 | End: 2017-04-04

## 2017-03-21 NOTE — TELEPHONE ENCOUNTER
----- Message from Mya Garcia sent at 3/21/2017 12:59 PM CDT -----  Contact: Hay[facility]  [facility] called regarding the patient wanting an urinalysis and wanted to verfiy with the doctor if ok to do. Please contact Hayde [phone]

## 2017-03-21 NOTE — PROGRESS NOTES
Subjective:       Patient ID: Nathalie Santiago is a 66 y.o. female.    Chief Complaint: Dysuria    Dysuria    This is a recurrent problem. The current episode started more than 1 month ago. The problem occurs every urination. The problem has been waxing and waning. The quality of the pain is described as aching and burning. The pain is at a severity of 5/10. There has been no fever. There is no history of pyelonephritis. Associated symptoms include frequency, hematuria, hesitancy, urgency and withholding. Pertinent negatives include no behavior changes, chills, discharge, flank pain, nausea, possible pregnancy, vomiting, bubble bath use or constipation. She has tried increased fluids (cipro and macrobid) for the symptoms. The treatment provided no relief. Her past medical history is significant for recurrent UTIs.     Review of Systems   Constitutional: Negative for activity change, appetite change, chills, fatigue and fever.   HENT: Negative for congestion, ear discharge, ear pain, hearing loss, nosebleeds, postnasal drip, rhinorrhea, sore throat, trouble swallowing and voice change.    Eyes: Negative for discharge, redness and visual disturbance.   Respiratory: Positive for cough. Negative for chest tightness, shortness of breath and wheezing.    Cardiovascular: Negative for chest pain and leg swelling.   Gastrointestinal: Negative.  Negative for constipation, nausea and vomiting.   Genitourinary: Positive for dysuria, frequency, hematuria, hesitancy and urgency. Negative for decreased urine volume, difficulty urinating, dyspareunia, flank pain, pelvic pain, vaginal bleeding, vaginal discharge and vaginal pain.   Musculoskeletal: Negative for neck pain.       Objective:      Physical Exam   Constitutional: She appears well-developed and well-nourished. No distress.   HENT:   Head: Normocephalic and atraumatic.   Right Ear: External ear normal.   Left Ear: External ear normal.   Eyes: Conjunctivae and EOM are  normal. Pupils are equal, round, and reactive to light.   Cardiovascular: Normal rate and regular rhythm.  Exam reveals no gallop and no friction rub.    No murmur heard.  Pulmonary/Chest: Effort normal and breath sounds normal.   Abdominal: Soft. Bowel sounds are normal. She exhibits no distension and no mass. There is no tenderness. There is no rebound and no guarding.   Musculoskeletal:   No CVA tenderness   Skin: She is not diaphoretic.       Assessment:       1. Dysuria    2. Recurrent UTI        Plan:       Nathalie was seen today for dysuria.    Diagnoses and all orders for this visit:    Dysuria  -     POCT URINE DIPSTICK WITHOUT MICROSCOPE  -     Urine culture; Future  -     Urine culture    Recurrent UTI  -     nitrofurantoin, macrocrystal-monohydrate, (MACROBID) 100 MG capsule; Take 1 capsule (100 mg total) by mouth 2 (two) times daily.  -     Urine culture; Future  -     Urine culture    Patient wished to defer urology referral currently as she does not have the money for a copay

## 2017-03-21 NOTE — TELEPHONE ENCOUNTER
Patient has complaints of burning when urinating,foul smell to urine,low back pain and low grade fever. Appointment scheduled for same day appointment

## 2017-03-21 NOTE — PROGRESS NOTES
Pre-Visit Chart Review  For Appointment Scheduled on 03/21/2017      Health Maintenance Due   Topic Date Due    Hemoglobin A1c  02/17/2017    Foot Exam  03/03/2017

## 2017-03-24 LAB — BACTERIA UR CULT: NORMAL

## 2017-03-27 ENCOUNTER — HOSPITAL ENCOUNTER (INPATIENT)
Facility: HOSPITAL | Age: 67
LOS: 2 days | Discharge: HOME HEALTH | DRG: 641 | End: 2017-03-29
Attending: EMERGENCY MEDICINE | Admitting: HOSPITALIST
Payer: MEDICARE

## 2017-03-27 ENCOUNTER — TELEPHONE (OUTPATIENT)
Dept: FAMILY MEDICINE | Facility: CLINIC | Age: 67
End: 2017-03-27

## 2017-03-27 DIAGNOSIS — I95.9 HYPOTENSION, UNSPECIFIED HYPOTENSION TYPE: Primary | ICD-10-CM

## 2017-03-27 DIAGNOSIS — G89.4 CHRONIC PAIN SYNDROME: ICD-10-CM

## 2017-03-27 DIAGNOSIS — R19.7 VOMITING AND DIARRHEA: ICD-10-CM

## 2017-03-27 DIAGNOSIS — R11.10 VOMITING AND DIARRHEA: ICD-10-CM

## 2017-03-27 LAB
ALBUMIN SERPL BCP-MCNC: 4.6 G/DL
ALP SERPL-CCNC: 68 U/L
ALT SERPL W/O P-5'-P-CCNC: 21 U/L
ANION GAP SERPL CALC-SCNC: 18 MMOL/L
AST SERPL-CCNC: 15 U/L
BASOPHILS NFR BLD: 0 %
BILIRUB SERPL-MCNC: 0.5 MG/DL
BUN SERPL-MCNC: 18 MG/DL
C DIFF GDH STL QL: NEGATIVE
C DIFF TOX A+B STL QL IA: NEGATIVE
CALCIUM SERPL-MCNC: 10.4 MG/DL
CHLORIDE SERPL-SCNC: 103 MMOL/L
CO2 SERPL-SCNC: 20 MMOL/L
CREAT SERPL-MCNC: 1.3 MG/DL
DIFFERENTIAL METHOD: ABNORMAL
EOSINOPHIL NFR BLD: 1 %
ERYTHROCYTE [DISTWIDTH] IN BLOOD BY AUTOMATED COUNT: 15.1 %
EST. GFR  (AFRICAN AMERICAN): 49 ML/MIN/1.73 M^2
EST. GFR  (NON AFRICAN AMERICAN): 43 ML/MIN/1.73 M^2
GLUCOSE SERPL-MCNC: 154 MG/DL
HCT VFR BLD AUTO: 45.3 %
HGB BLD-MCNC: 15 G/DL
LYMPHOCYTES NFR BLD: 7 %
MAGNESIUM SERPL-MCNC: 1.6 MG/DL
MCH RBC QN AUTO: 27.9 PG
MCHC RBC AUTO-ENTMCNC: 33.1 %
MCV RBC AUTO: 84 FL
MONOCYTES NFR BLD: 1 %
NEUTROPHILS NFR BLD: 81 %
NEUTS BAND NFR BLD MANUAL: 10 %
PLATELET # BLD AUTO: 295 K/UL
PLATELET BLD QL SMEAR: ABNORMAL
PMV BLD AUTO: 7 FL
POCT GLUCOSE: 104 MG/DL (ref 70–110)
POTASSIUM SERPL-SCNC: 2.9 MMOL/L
PROT SERPL-MCNC: 8.8 G/DL
RBC # BLD AUTO: 5.36 M/UL
SODIUM SERPL-SCNC: 141 MMOL/L
WBC # BLD AUTO: 27.5 K/UL

## 2017-03-27 PROCEDURE — 87045 FECES CULTURE AEROBIC BACT: CPT

## 2017-03-27 PROCEDURE — 85007 BL SMEAR W/DIFF WBC COUNT: CPT

## 2017-03-27 PROCEDURE — 12000002 HC ACUTE/MED SURGE SEMI-PRIVATE ROOM

## 2017-03-27 PROCEDURE — 25000003 PHARM REV CODE 250: Performed by: EMERGENCY MEDICINE

## 2017-03-27 PROCEDURE — 36415 COLL VENOUS BLD VENIPUNCTURE: CPT

## 2017-03-27 PROCEDURE — 96374 THER/PROPH/DIAG INJ IV PUSH: CPT

## 2017-03-27 PROCEDURE — 96361 HYDRATE IV INFUSION ADD-ON: CPT

## 2017-03-27 PROCEDURE — 87046 STOOL CULTR AEROBIC BACT EA: CPT | Mod: 59

## 2017-03-27 PROCEDURE — 85027 COMPLETE CBC AUTOMATED: CPT

## 2017-03-27 PROCEDURE — 83735 ASSAY OF MAGNESIUM: CPT

## 2017-03-27 PROCEDURE — 80053 COMPREHEN METABOLIC PANEL: CPT

## 2017-03-27 PROCEDURE — 63600175 PHARM REV CODE 636 W HCPCS: Performed by: EMERGENCY MEDICINE

## 2017-03-27 PROCEDURE — 87427 SHIGA-LIKE TOXIN AG IA: CPT | Mod: 59

## 2017-03-27 PROCEDURE — 87449 NOS EACH ORGANISM AG IA: CPT

## 2017-03-27 PROCEDURE — 99284 EMERGENCY DEPT VISIT MOD MDM: CPT | Mod: 25

## 2017-03-27 RX ORDER — IBUPROFEN 200 MG
16 TABLET ORAL
Status: DISCONTINUED | OUTPATIENT
Start: 2017-03-27 | End: 2017-03-29 | Stop reason: HOSPADM

## 2017-03-27 RX ORDER — ATORVASTATIN CALCIUM 40 MG/1
40 TABLET, FILM COATED ORAL DAILY
Status: DISCONTINUED | OUTPATIENT
Start: 2017-03-28 | End: 2017-03-29 | Stop reason: HOSPADM

## 2017-03-27 RX ORDER — DOXEPIN HYDROCHLORIDE 10 MG/1
10 CAPSULE ORAL NIGHTLY PRN
Status: DISCONTINUED | OUTPATIENT
Start: 2017-03-27 | End: 2017-03-29 | Stop reason: HOSPADM

## 2017-03-27 RX ORDER — GLUCAGON 1 MG
1 KIT INJECTION
Status: DISCONTINUED | OUTPATIENT
Start: 2017-03-27 | End: 2017-03-29 | Stop reason: HOSPADM

## 2017-03-27 RX ORDER — NITROFURANTOIN 25; 75 MG/1; MG/1
100 CAPSULE ORAL 2 TIMES DAILY
Status: DISCONTINUED | OUTPATIENT
Start: 2017-03-27 | End: 2017-03-29 | Stop reason: HOSPADM

## 2017-03-27 RX ORDER — FENOFIBRATE 160 MG/1
160 TABLET ORAL DAILY
Status: DISCONTINUED | OUTPATIENT
Start: 2017-03-28 | End: 2017-03-29 | Stop reason: HOSPADM

## 2017-03-27 RX ORDER — IPRATROPIUM BROMIDE AND ALBUTEROL SULFATE 2.5; .5 MG/3ML; MG/3ML
3 SOLUTION RESPIRATORY (INHALATION) EVERY 6 HOURS PRN
Status: DISCONTINUED | OUTPATIENT
Start: 2017-03-27 | End: 2017-03-29 | Stop reason: HOSPADM

## 2017-03-27 RX ORDER — METFORMIN HYDROCHLORIDE 500 MG/1
1000 TABLET ORAL 2 TIMES DAILY WITH MEALS
Status: DISCONTINUED | OUTPATIENT
Start: 2017-03-28 | End: 2017-03-29 | Stop reason: HOSPADM

## 2017-03-27 RX ORDER — HALOPERIDOL 5 MG/ML
5 INJECTION INTRAMUSCULAR
Status: COMPLETED | OUTPATIENT
Start: 2017-03-27 | End: 2017-03-27

## 2017-03-27 RX ORDER — DIPHENOXYLATE HYDROCHLORIDE AND ATROPINE SULFATE 2.5; .025 MG/1; MG/1
2 TABLET ORAL
Status: DISPENSED | OUTPATIENT
Start: 2017-03-27 | End: 2017-03-28

## 2017-03-27 RX ORDER — SODIUM CHLORIDE 9 MG/ML
INJECTION, SOLUTION INTRAVENOUS CONTINUOUS
Status: DISCONTINUED | OUTPATIENT
Start: 2017-03-27 | End: 2017-03-29 | Stop reason: HOSPADM

## 2017-03-27 RX ORDER — ACETAMINOPHEN 325 MG/1
650 TABLET ORAL EVERY 6 HOURS PRN
Status: DISCONTINUED | OUTPATIENT
Start: 2017-03-27 | End: 2017-03-29 | Stop reason: HOSPADM

## 2017-03-27 RX ORDER — HYDROCHLOROTHIAZIDE 25 MG/1
25 TABLET ORAL DAILY
Status: DISCONTINUED | OUTPATIENT
Start: 2017-03-28 | End: 2017-03-28

## 2017-03-27 RX ORDER — LEVOTHYROXINE SODIUM 25 UG/1
25 TABLET ORAL
Status: DISCONTINUED | OUTPATIENT
Start: 2017-03-28 | End: 2017-03-29 | Stop reason: HOSPADM

## 2017-03-27 RX ORDER — POTASSIUM CHLORIDE 20 MEQ/1
40 TABLET, EXTENDED RELEASE ORAL
Status: COMPLETED | OUTPATIENT
Start: 2017-03-27 | End: 2017-03-27

## 2017-03-27 RX ORDER — INSULIN ASPART 100 [IU]/ML
0-5 INJECTION, SOLUTION INTRAVENOUS; SUBCUTANEOUS
Status: DISCONTINUED | OUTPATIENT
Start: 2017-03-27 | End: 2017-03-29 | Stop reason: HOSPADM

## 2017-03-27 RX ORDER — IBUPROFEN 200 MG
24 TABLET ORAL
Status: DISCONTINUED | OUTPATIENT
Start: 2017-03-27 | End: 2017-03-29 | Stop reason: HOSPADM

## 2017-03-27 RX ORDER — METOCLOPRAMIDE HYDROCHLORIDE 5 MG/ML
5 INJECTION INTRAMUSCULAR; INTRAVENOUS EVERY 6 HOURS PRN
Status: DISCONTINUED | OUTPATIENT
Start: 2017-03-27 | End: 2017-03-29 | Stop reason: HOSPADM

## 2017-03-27 RX ORDER — CLONAZEPAM 0.5 MG/1
0.5 TABLET ORAL 2 TIMES DAILY PRN
Status: DISCONTINUED | OUTPATIENT
Start: 2017-03-27 | End: 2017-03-29 | Stop reason: HOSPADM

## 2017-03-27 RX ORDER — CLOPIDOGREL BISULFATE 75 MG/1
75 TABLET ORAL DAILY
Status: DISCONTINUED | OUTPATIENT
Start: 2017-03-28 | End: 2017-03-29 | Stop reason: HOSPADM

## 2017-03-27 RX ORDER — GABAPENTIN 300 MG/1
300 CAPSULE ORAL NIGHTLY
Status: DISCONTINUED | OUTPATIENT
Start: 2017-03-27 | End: 2017-03-29 | Stop reason: HOSPADM

## 2017-03-27 RX ORDER — SERTRALINE HYDROCHLORIDE 50 MG/1
100 TABLET, FILM COATED ORAL NIGHTLY
Status: DISCONTINUED | OUTPATIENT
Start: 2017-03-27 | End: 2017-03-29 | Stop reason: HOSPADM

## 2017-03-27 RX ORDER — SODIUM CHLORIDE 9 MG/ML
1000 INJECTION, SOLUTION INTRAVENOUS
Status: COMPLETED | OUTPATIENT
Start: 2017-03-27 | End: 2017-03-27

## 2017-03-27 RX ADMIN — SODIUM CHLORIDE 1000 ML: 0.9 INJECTION, SOLUTION INTRAVENOUS at 06:03

## 2017-03-27 RX ADMIN — POTASSIUM CHLORIDE 40 MEQ: 20 TABLET, EXTENDED RELEASE ORAL at 09:03

## 2017-03-27 RX ADMIN — HALOPERIDOL LACTATE 5 MG: 5 INJECTION, SOLUTION INTRAMUSCULAR at 06:03

## 2017-03-27 RX ADMIN — SODIUM CHLORIDE: 0.9 INJECTION, SOLUTION INTRAVENOUS at 10:03

## 2017-03-27 RX ADMIN — NITROFURANTOIN (MONOHYDRATE/MACROCRYSTALS) 100 MG: 75; 25 CAPSULE ORAL at 10:03

## 2017-03-27 RX ADMIN — SERTRALINE HYDROCHLORIDE 100 MG: 50 TABLET ORAL at 10:03

## 2017-03-27 RX ADMIN — GABAPENTIN 300 MG: 300 CAPSULE ORAL at 10:03

## 2017-03-27 NOTE — TELEPHONE ENCOUNTER
Spoke with patient she started her antibiotic macrobid on Friday and she states that she is not feeling better still having dysuria and back pain, temp of 100

## 2017-03-27 NOTE — TELEPHONE ENCOUNTER
----- Message from Maryann Earl sent at 3/27/2017 12:56 PM CDT -----  Contact: self  Patient [phone] is requesting a call from the Nurse concerning patient's urinary tract infection that she saw Ms Gallagher for last week/please call

## 2017-03-27 NOTE — TELEPHONE ENCOUNTER
Spoke with patient's sister and she states that she was unable to come to phone due to she was laying on the floor of the bathroom, she has been vomiting and having diarrhea.  Advised that she needs to go tot he ER she did not have these symptoms when I talked to her earlier.  Advised that we can't treat her UTI outpatient since she is allergic to a lot of antibiotics.  Sister verbalized understanding.

## 2017-03-27 NOTE — ED PROVIDER NOTES
Encounter Date: 3/27/2017    SCRIBE #1 NOTE: I, Hailee Leo, am scribing for, and in the presence of, Dr. Amin.       History     Chief Complaint   Patient presents with    Vomiting    Diarrhea    Hypotension     Review of patient's allergies indicates:   Allergen Reactions    Keflex [cephalexin] Anaphylaxis    Penicillins Anaphylaxis    Vasotec [enalapril maleate] Other (See Comments)     Causes her to pass out    Ciprofloxacin (bulk)      Bones ache, myalgia    Wellbutrin [bupropion hcl] Other (See Comments)     Loss of memory      Zithromax [azithromycin]      Patient states medication does not work on her-not effective    Codeine Itching and Nausea Only     HPI Comments:   03/27/2017 6:29 PM     Chief Complaint: Vomiting      The patient is a 66 y.o. female who presents to the ED via EMS found on the bathroom floor with an onset of persistent vomiting with associated sore throat, SOB, nausea, lower back pain, and HA. The symptoms began yesterday. She was seen 6 days ago for dysuria with a positive urine culture to Nitrofurantoin. The patient's last episode was 3 days ago. Per EMS, she was given Zofran en route and had a systolic pressure of 70. The patient has a PMHx of DM2, HTN, HLD, sarcoidosis, asthma, chronic SOB and bronchitis, CVA, and breast, uterine, ovarian, and cervical cancer. She denies falling, history of CHF, chest pain, or any other symptoms at this time. No pertinent SHx noted.    The history is provided by the patient.     Past Medical History:   Diagnosis Date    Abnormal Pap smear 1972    cervical cancer    Anxiety     Arthritis     Asthma     Ataxia     Breast cancer     Cancer     bilateral mastectomy, uterine cancer, ovarian cancer    Cataract     Cervical back pain with evidence of disc disease     Cervical cancer     Chronic bilateral low back pain without sciatica 9/6/2016    Chronic bronchitis     Cortical cataract 2/18/2013    CVA (cerebral vascular  accident) 9/6/2016    brain stem stroke    CVA (cerebral vascular accident)- Confirmed by neurology 9/6/2016    Degenerative joint disease of cervical and lumbar spine 8/1/2014    Depression     Diabetes mellitus     Diabetes mellitus, type II     History of malignant phylloides tumor of breast 2/15/2013    Hyperlipidemia     Hyperopia with presbyopia 2/18/2013    Hypertension     Hypertension     Hypothyroidism     Immunosuppressed status 9/3/2015    Migraine     Mitral valve regurgitation     Nuclear sclerosis 2/18/2013    Obesity     JOSE LUIS (obstructive sleep apnea)     Ovarian cancer     Pneumonia     Polyneuropathy     PVD (posterior vitreous detachment) 2/18/2013    Restless leg syndrome     Ruptured disk     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    Sarcoidosis 2006    Sarcoidosis of lung     Squamous cell carcinoma     Trouble in sleeping     Uterine cancer     Venous insufficiency      Past Surgical History:   Procedure Laterality Date    APPENDECTOMY      BREAST SURGERY      CHOLECYSTECTOMY      CYSTOCELE REPAIR      FRACTURE SURGERY Right     foot    HYSTERECTOMY      SAADIA/BSO    MASTECTOMY Bilateral     b/l mastectomy    OOPHORECTOMY      PLEURA BIOPSY      RECTAL SURGERY      rectocele      TONSILLECTOMY       Family History   Problem Relation Age of Onset    Heart disease Mother     Stroke Mother     Hyperlipidemia Mother     Cancer Father      small cell lung cancer    Hypertension Sister     Cataracts Sister     Thyroid disease Sister     Hyperlipidemia Sister     Heart disease Sister      CAD, stents in place    Hypertension Brother     Cataracts Brother     Hyperlipidemia Brother     Heart disease Brother      CAD    Hypertension Daughter     Hyperlipidemia Daughter     Anuerysm Daughter      splenic    Hyperlipidemia Sister     Hypertension Sister     Diabetes Sister     Cancer Sister      thyroid cancer    Hypertension Brother     Hyperlipidemia  Brother     Hypertension Brother     Hyperlipidemia Brother     Amblyopia Neg Hx     Blindness Neg Hx     Glaucoma Neg Hx     Macular degeneration Neg Hx     Retinal detachment Neg Hx     Strabismus Neg Hx     Breast cancer Neg Hx     Colon cancer Neg Hx     Ovarian cancer Neg Hx      Social History   Substance Use Topics    Smoking status: Never Smoker    Smokeless tobacco: Never Used    Alcohol use No     Review of Systems   Constitutional: Negative for chills and fever.   HENT: Positive for sore throat. Negative for congestion, rhinorrhea and sneezing.    Eyes: Negative for visual disturbance.   Respiratory: Positive for shortness of breath. Negative for cough.    Cardiovascular: Negative for chest pain and palpitations.   Gastrointestinal: Positive for nausea and vomiting. Negative for abdominal pain and diarrhea.   Genitourinary: Negative for dysuria and hematuria.   Musculoskeletal: Positive for back pain (lower). Negative for neck pain.   Skin: Negative for rash.   Neurological: Positive for headaches. Negative for seizures and syncope.     Physical Exam   Initial Vitals   BP Pulse Resp Temp SpO2   03/27/17 1823 03/27/17 1823 03/27/17 1823 03/27/17 1823 03/27/17 1823   127/60 91 16 96.6 °F (35.9 °C) 97 %     Physical Exam    Nursing note and vitals reviewed.  Constitutional: She appears well-developed and well-nourished. She is not diaphoretic. No distress.   HENT:   Head: Normocephalic and atraumatic.   Mouth/Throat: Oropharynx is clear and moist.   Eyes: Conjunctivae are normal.   Neck: Neck supple.   Cardiovascular: Normal rate, regular rhythm and intact distal pulses. Exam reveals no gallop and no friction rub.    Murmur heard.   Systolic murmur is present with a grade of 1/6   Pulmonary/Chest: Breath sounds normal. She has no wheezes. She has no rhonchi. She has no rales.   Abdominal: Soft. She exhibits no distension. There is no tenderness. There is no rebound and no guarding.    Musculoskeletal: Normal range of motion.   Neurological: She is alert and oriented to person, place, and time.   Skin: No rash noted. No erythema.   Psychiatric: She has a normal mood and affect. Her behavior is normal. Judgment and thought content normal.       ED Course   Procedures  Labs Reviewed   CULTURE, STOOL   CLOSTRIDIUM DIFFICILE   ENTEROHEMORRHAGIC E.COLI   URINALYSIS   COMPREHENSIVE METABOLIC PANEL   CBC W/ AUTO DIFFERENTIAL           Medical Decision Making:   History:   Old Medical Records: I decided to obtain old medical records.  Old Records Summarized: records from previous admission(s).  Clinical Tests:   Lab Tests: Ordered and Reviewed  ED Management:  Nathalie Santiago is a 66 y.o. female who presents with  a one-day history of nausea, vomiting and diarrhea with diffuse crampy abdominal pain.  She was reportedly found on the floor with a systolic pressure of 70 and responded to fluid resuscitation with 250 mL bolus.  She has no fever but has leukocytosis with WBC 27,000 concerning for bacterial enteritis.  She also has a potassium of 2.9.  She will be admitted for anti-medics, antidiarrheals, fluid resuscitation and potassium repletion.  Other:   I have discussed this case with another health care provider.       <> Summary of the Discussion: Discussed with Roselia who will admit on behalf of Dr. Flores            Scribyu Attestation:   Scribe #1: I performed the above scribed service and the documentation accurately describes the services I performed. I attest to the accuracy of the note.    Attending Attestation:           Physician Attestation for Scribe:  Physician Attestation Statement for Scribe #1: I, Dr. Amin, reviewed documentation, as scribed by Hailee Leo in my presence, and it is both accurate and complete.                 ED Course     Clinical Impression:     1. Hypotension, unspecified hypotension type    2. Vomiting and diarrhea          Disposition:   Disposition:  Admitted       Hernando Amin III, MD  03/27/17 [id number]

## 2017-03-27 NOTE — TELEPHONE ENCOUNTER
According to culture the UTI is sensitive to macrobid so should be getting better, if she is having fever it may be going to kidneys and she needs to go to the ER

## 2017-03-27 NOTE — IP AVS SNAPSHOT
[address] [place] [address]  Phone: [phone]           Patient Discharge Instructions   Our goal is to set you up for success. This packet includes information on your condition, medications, and your home care.  It will help you care for yourself to prevent having to return to the hospital.     Please ask your nurse if you have any questions.      There are many details to remember when preparing to leave the hospital. Here is what you will need to do:    1. Take your medicine. If you are prescribed medications, review your Medication List on the following pages. You may have new medications to  at the pharmacy and others that you'll need to stop taking. Review the instructions for how and when to take your medications. Talk with your doctor or nurses if you are unsure of what to do.     2. Go to your follow-up appointments. Specific follow-up information is listed in the following pages. Your may be contacted by a nurse or clinical provider about future appointments. Be sure we have all of the phone numbers to reach you. Please contact your provider's office if you are unable to make an appointment.     3. Watch for warning signs. Your doctor or nurse will give you detailed warning signs to watch for and when to call for assistance. These instructions may also include educational information about your condition. If you experience any of warning signs to your health, call your doctor.               [facility]  Unless otherwise directed by your provider, please contact [facility], our nurse care line that is available for 24/7 assistance.     [phone] (toll-free)    Registered nurses in the [facility] provide clinical advisement, health education, appointment booking, and other advisory services.                    ** Verify the list of medication(s) below is accurate and up to date. Carry this with you in case of emergency. If  your medications have changed, please notify your healthcare provider.             Medication List      START taking these medications        Additional Info                      acetaminophen 325 MG tablet   Commonly known as:  TYLENOL   Refills:  0   Dose:  650 mg    Instructions:  Take 2 tablets (650 mg total) by mouth every 6 (six) hours as needed for Pain.     Begin Date    AM    Noon    PM    Bedtime       magnesium oxide 400 mg tablet   Commonly known as:  MAG-OX   Refills:  0   Dose:  400 mg    Start Date:  3/30/2017   Instructions:  Take 1 tablet (400 mg total) by mouth 2 (two) times daily.     Begin Date    AM    Noon    PM    Bedtime       nystatin cream   Commonly known as:  MYCOSTATIN   Quantity:  30 g   Refills:  1    Instructions:  Apply to the affected area TWICE DAILY     Begin Date    AM    Noon    PM    Bedtime         CHANGE how you take these medications        Additional Info                      ergocalciferol 50,000 unit Cap   Commonly known as:  ERGOCALCIFEROL   Quantity:  8 capsule   Refills:  0   Dose:  20477 Units   What changed:  additional instructions    Instructions:  Take 1 capsule (50,000 Units total) by mouth every 7 days.     Begin Date    AM    Noon    PM    Bedtime       fluticasone 50 mcg/actuation nasal spray   Commonly known as:  FLONASE   Quantity:  1 Bottle   Refills:  0   Dose:  2 spray   What changed:    - when to take this  - reasons to take this    Instructions:  2 sprays by Each Nare route once daily.     Begin Date    AM    Noon    PM    Bedtime         CONTINUE taking these medications        Additional Info                      albuterol-ipratropium 2.5mg-0.5mg/3mL 0.5 mg-3 mg(2.5 mg base)/3 mL nebulizer solution   Commonly known as:  DUO-NEB   Quantity:  270 mL   Refills:  1    Last time this was given:  3 mLs on 3/29/2017  4:11 PM   Instructions:  INHALE THE CONTENTS OF 1 VIAL VIA NEBULIZER EVERY 6 HOURS AS NEEDED FOR SHORTNESS OF BREATH  OR  WHEEZING     Begin  Date    AM    Noon    PM    Bedtime       atorvastatin 40 MG tablet   Commonly known as:  LIPITOR   Quantity:  90 tablet   Refills:  0    Last time this was given:  40 mg on 3/29/2017  9:24 AM   Instructions:  TAKE 1 TABLET EVERY DAY     Begin Date    AM    Noon    PM    Bedtime       beclomethasone 80 mcg/actuation Aero   Commonly known as:  QVAR   Quantity:  3 each   Refills:  2   Dose:  2 puff    Instructions:  Inhale 2 puffs into the lungs 2 (two) times daily.     Begin Date    AM    Noon    PM    Bedtime       chlorzoxazone 500 mg Tab   Commonly known as:  PARAFON FORTE DSC   Quantity:  270 tablet   Refills:  1   Dose:  500 mg    Instructions:  Take 1 tablet (500 mg total) by mouth 3 (three) times daily as needed.     Begin Date    AM    Noon    PM    Bedtime       clonazePAM 0.5 MG tablet   Commonly known as:  KLONOPIN   Quantity:  45 tablet   Refills:  1   Comments:  30 day supply, no early refills    Last time this was given:  0.5 mg on 3/28/2017  9:47 PM   Instructions:  Take one-half to one tablet PO twice daily PRN anxiety     Begin Date    AM    Noon    PM    Bedtime       clopidogrel 75 mg tablet   Commonly known as:  PLAVIX   Quantity:  90 tablet   Refills:  1   Dose:  75 mg    Last time this was given:  75 mg on 3/29/2017  9:25 AM   Instructions:  Take 1 tablet (75 mg total) by mouth once daily.     Begin Date    AM    Noon    PM    Bedtime       fenofibrate 160 MG Tab   Quantity:  90 tablet   Refills:  3   Dose:  160 mg    Last time this was given:  160 mg on 3/29/2017  9:24 AM   Instructions:  Take 1 tablet (160 mg total) by mouth once daily.     Begin Date    AM    Noon    PM    Bedtime       gabapentin 100 MG capsule   Commonly known as:  NEURONTIN   Quantity:  270 capsule   Refills:  0   Dose:  300 mg    Last time this was given:  300 mg on 3/28/2017  9:40 PM   Instructions:  Take 3 capsules (300 mg total) by mouth every evening.     Begin Date    AM    Noon    PM    Bedtime       insulin aspart  100 unit/mL Inpn pen   Commonly known as:  NOVOLOG FLEXPEN   Quantity:  1 Box   Refills:  0    Instructions:  Use per sliding scale     Begin Date    AM    Noon    PM    Bedtime       insulin  unit/mL injection   Commonly known as:  NovoLIN N   Quantity:  3 vial   Refills:  1   Dose:  15 Units    Instructions:  Inject 15 Units into the skin 2 (two) times daily before meals.     Begin Date    AM    Noon    PM    Bedtime       levothyroxine 25 MCG tablet   Commonly known as:  SYNTHROID   Quantity:  90 tablet   Refills:  3    Last time this was given:  25 mcg on 3/29/2017  6:18 AM   Instructions:  TAKE 1 TABLET BEFORE BREAKFAST     Begin Date    AM    Noon    PM    Bedtime       metformin 1000 MG tablet   Commonly known as:  GLUCOPHAGE   Refills:  0   Dose:  1000 mg    Last time this was given:  1,000 mg on 3/29/2017  9:23 AM   Instructions:  Take 1 tablet (1,000 mg total) by mouth 2 (two) times daily with meals.     Begin Date    AM    Noon    PM    Bedtime       metoclopramide HCl 10 MG tablet   Commonly known as:  REGLAN   Quantity:  180 tablet   Refills:  1    Instructions:  TAKE 1 TABLET EVERY 8 HOURS     Begin Date    AM    Noon    PM    Bedtime       nitrofurantoin (macrocrystal-monohydrate) 100 MG capsule   Commonly known as:  MACROBID   Quantity:  28 capsule   Refills:  0   Dose:  100 mg    Last time this was given:  100 mg on 3/29/2017  9:25 AM   Instructions:  Take 1 capsule (100 mg total) by mouth 2 (two) times daily.     Begin Date    AM    Noon    PM    Bedtime       ondansetron 8 MG Tbdl   Commonly known as:  ZOFRAN-ODT   Quantity:  90 tablet   Refills:  0    Instructions:  DISSOLVE 1 TABLET ON THE TONGUE EVERY 8 HOURS AS NEEDED     Begin Date    AM    Noon    PM    Bedtime       oxybutynin 10 MG 24 hr tablet   Commonly known as:  DITROPAN-XL   Refills:  0   Dose:  10 mg   Indications:  Increased Urinary Frequency    Last time this was given:  10 mg on 3/29/2017  9:24 AM   Instructions:  Take 10  mg by mouth 2 (two) times daily.     Begin Date    AM    Noon    PM    Bedtime       potassium chloride 10 MEQ Cpsr   Commonly known as:  MICRO-K   Quantity:  270 capsule   Refills:  1   Dose:  10 mEq    Instructions:  Take 1 capsule (10 mEq total) by mouth 3 (three) times daily.     Begin Date    AM    Noon    PM    Bedtime       ropinirole 5 MG tablet   Commonly known as:  REQUIP   Refills:  0   Dose:  5 mg   Indications:  Restless Legs Syndrome    Last time this was given:  5 mg on 3/28/2017  9:40 PM   Instructions:  Take 5 mg by mouth every evening.     Begin Date    AM    Noon    PM    Bedtime       sertraline 100 MG tablet   Commonly known as:  ZOLOFT   Quantity:  180 tablet   Refills:  0    Last time this was given:  100 mg on 3/28/2017  9:40 PM   Instructions:  Take one and one-half tablets PO daily x 2 weeks, then increase to two tablets PO daily     Begin Date    AM    Noon    PM    Bedtime       sumatriptan 50 MG tablet   Commonly known as:  IMITREX   Refills:  0   Dose:  50 mg    Last time this was given:  50 mg on 3/29/2017 12:13 PM   Instructions:  Take 50 mg by mouth daily as needed for Migraine.     Begin Date    AM    Noon    PM    Bedtime       VOLTAREN 1 % Gel   Refills:  0   Generic drug:  diclofenac sodium      Begin Date    AM    Noon    PM    Bedtime         STOP taking these medications     amlodipine 5 MG tablet   Commonly known as:  NORVASC       hydrochlorothiazide 25 MG tablet   Commonly known as:  HYDRODIURIL            Where to Get Your Medications      These medications were sent to [facility] [address] [facility] [address] [place]  [address] [facility] [place] [address]     Phone:  [phone]     nystatin cream         You can get these medications from any pharmacy     You don't need a prescription for these medications     acetaminophen 325 MG tablet    magnesium oxide 400 mg tablet                  Please bring to all follow up appointments:    1. A copy of your  discharge instructions.  2. All medicines you are currently taking in their original bottles.  3. Identification and insurance card.    Please arrive 15 minutes ahead of scheduled appointment time.    Please call 24 hours in advance if you must reschedule your appointment and/or time.        Your Scheduled Appointments     May 10, 2017 11:00 AM CDT   Established Patient Visit with JOCELYNE Gonzales - Pain Management ([facility])    [address] [place] [address] [address]  [facility] [id number] [phone]              Follow-up Information     Follow up with Ladan Almodovar MD In 2 weeks.    Specialty:  Family Medicine    Why:  Follow up for UTI, dehydation, and hypotension. Howard spoke with Ce and she will send a message to the nurse to schedule appointment.    Contact information:    [address]  [facility] [address]  [phone]          Follow up with [facility] [place].    Specialties:  Physical Therapy, Home Health Services    Contact information:    [address] [place] [address] 6  [place] [address]  [phone]        Referrals     Future Orders    Ambulatory referral to Outpatient Case Management     Questions:    Does the patient have a chronic or uncontrolled disease process?:      Does the patient have a new diagnosis of a catastrophic or life altering illness/treatment?:      Does the patient have any psycho-social issues that may affect their ability to adhere to treatment plan?:      Does patient have any behaviors or circumstances that may impede ability to adhere to treatment plan?:      Is patient at risk for admission/readmission?:      Referral to Home health         Discharge Instructions     Future Orders    Activity as tolerated     Call MD for:  persistent nausea and vomiting or diarrhea     Call MD for:  temperature >100.4     Call MD for:     Comments:    Any decline in condition    Diet general     Comments:    1800 ADA diet    Questions:     Total calories:      Fat restriction, if any:      Protein restriction, if any:      Na restriction, if any:      Fluid restriction:      Additional restrictions:      SUBSEQUENT HOME HEALTH ORDERS     Comments:    Subsequent Home Health Orders    Current Medications:  Current Facility-Administered Medications:  0.9%  NaCl infusion, , Intravenous, Continuous, Hernando Amin III, MD, Last Rate: 125 mL/hr at 03/29/17 1239  acetaminophen tablet 650 mg, 650 mg, Oral, Q6H PRN, Hernando Amin III, MD  albuterol-ipratropium 2.5mg-0.5mg/3mL nebulizer solution 3 mL, 3 mL, Nebulization, Q6H PRN, Hernando Amin III, MD  albuterol-ipratropium 2.5mg-0.5mg/3mL nebulizer solution 3 mL, 3 mL, Nebulization, QID WAKE, Debora Goodwin, FNP, 3 mL at 03/29/17 0739  atorvastatin tablet 40 mg, 40 mg, Oral, Daily, Hernando Amin III, MD, 40 mg at 03/29/17 0924  clonazePAM tablet 0.5 mg, 0.5 mg, Oral, BID PRN, Hernando Amin III, MD, 0.5 mg at 03/28/17 2147  clopidogrel tablet 75 mg, 75 mg, Oral, Daily, Hernando Amin III, MD, 75 mg at 03/29/17 0925  dextrose 50% injection 12.5 g, 12.5 g, Intravenous, PRN, Hernando Amin III, MD  dextrose 50% injection 25 g, 25 g, Intravenous, PRN, Hernando Amin III, MD  doxepin capsule 10 mg, 10 mg, Oral, Nightly PRN, Hernando Amin III, MD, 10 mg at 03/29/17 0038  fenofibrate tablet 160 mg, 160 mg, Oral, Daily, Hernando Amin III, MD, 160 mg at 03/29/17 0924  gabapentin capsule 300 mg, 300 mg, Oral, QHS, Hernando Amin III, MD, 300 mg at 03/28/17 2140  glucagon (human recombinant) injection 1 mg, 1 mg, Intramuscular, PRN, Hernando Amin III, MD  glucose chewable tablet 16 g, 16 g, Oral, PRN, Hernando Amin III, MD  glucose chewable tablet 24 g, 24 g, Oral, PRN, Hernando Amin III, MD  insulin aspart pen 0-5 Units, 0-5 Units, Subcutaneous, QID (AC + HS) PRN, Hernando Amin III, MD  insulin NPH injection 15 Units, 15 Units, Subcutaneous, BID ACHernando III,  MD  levothyroxine tablet 25 mcg, 25 mcg, Oral, Before breakfast, Hernando Amin III, MD, 25 mcg at 03/29/17 0618  [START ON 3/30/2017] magnesium oxide tablet 400 mg, 400 mg, Oral, BID, KAYLIE Reynaga  magnesium sulfate 2g in water 50mL IVPB (premix), 2 g, Intravenous, Once, KAYLIE Reynaga, 2 g at 03/29/17 1212  metformin tablet 1,000 mg, 1,000 mg, Oral, BID WM, Hernando Amin III, MD, 1,000 mg at 03/29/17 0923  metoclopramide HCl injection 5 mg, 5 mg, Intravenous, Q6H PRN, Hernando Amin III, MD, 5 mg at 03/29/17 0926  nitrofurantoin (macrocrystal-monohydrate) 100 MG capsule 100 mg, 100 mg, Oral, BID, Hernando Amin III, MD, 100 mg at 03/29/17 0925  oxybutynin 24 hr tablet 10 mg, 10 mg, Oral, Daily, CHENG ReynagaP, 10 mg at 03/29/17 0924  oxycodone-acetaminophen  mg per tablet 1 tablet, 1 tablet, Oral, Q6H PRN, Charles Flores MD, 1 tablet at 03/29/17 1213  potassium chloride SA CR tablet 20 mEq, 20 mEq, Oral, Daily, CHENG ReynagaP, 20 mEq at 03/29/17 0925  ropinirole tablet 5 mg, 5 mg, Oral, QHS, CHENG ReynagaP, 5 mg at 03/28/17 2140  sertraline tablet 100 mg, 100 mg, Oral, QHS, Hernando Amin III, MD, 100 mg at 03/28/17 2140        Nursing:   Routine Skin for Bedridden Patients: Instruct patient/caregiver to apply moisture barrier cream to all skin folds and wet areas in perineal area daily and after baths and all bowel movements.  Diabetic Care:   SN to perform and educate Diabetic management with blood glucose monitoring: and Fingerstick blood sugar AC and HS    Diet:   diabetic diet 1800 calorie    Activities:   activity as tolerated    Labs:  SN to perform labs:  BMP: Weekly; 2 week(s) and Report Lab results to PCP.    Referrals/ Consults   to evaluate for community resources/long-range planning.  Aide to provide assistance with personal care, ADLs, and vital signs.    Home Health Aide:  Nursing Three times weekly    Questions:    What Home  "Health Agency is the patient currently using?:  Other/External Comment - [facility]        Primary Diagnosis     Your primary diagnosis was:  Decreased Circulation      Admission Information     Date & Time Provider Department CSN    3/27/2017  6:13 PM Charles Flores MD [facility] [id number]      Care Providers     Provider Role Specialty Primary office phone    Charles Flores MD Attending Provider Hospitalist [phone]      Your Vitals Were     BP Pulse Temp Resp Height Weight    137/67 77 98.1 °F (36.7 °C) (Oral) 18 5' 6" (1.676 m) 72.6 kg (160 lb)    Last Period SpO2 BMI          (LMP Unknown) 97% 25.82 kg/m2        Recent Lab Values        10/13/2014 1/26/2015 6/26/2015 12/2/2015 5/26/2016 9/16/2016 11/17/2016 3/28/2017      1:14 PM 11:48 AM  4:53 AM 10:26 AM 12:03 PM  3:00 PM  4:56 PM  5:28 AM    A1C 7.0 (H) 6.9 (H) 7.6 (H) 8.2 (H) 7.8 (H) 7.5 (H) 6.9 (H) 6.7 (H)    Comment for A1C at  3:00 PM on 9/16/2016:  According to ADA guidelines, hemoglobin A1C <7.0% represents  optimal control in non-pregnant diabetic patients.  Different  metrics may apply to specific populations.   Standards of Medical Care in Diabetes - 2016.  For the purpose of screening for the presence of diabetes:  <5.7%     Consistent with the absence of diabetes  5.7-6.4%  Consistent with increasing risk for diabetes   (prediabetes)  >or=6.5%  Consistent with diabetes  Currently no consensus exists for use of hemoglobin A1C  for diagnosis of diabetes for children.      Comment for A1C at  4:56 PM on 11/17/2016:  According to ADA guidelines, hemoglobin A1C <7.0% represents  optimal control in non-pregnant diabetic patients.  Different  metrics may apply to specific populations.   Standards of Medical Care in Diabetes - 2016.  For the purpose of screening for the presence of diabetes:  <5.7%     Consistent with the absence of diabetes  5.7-6.4%  Consistent with increasing risk for diabetes "   (prediabetes)  >or=6.5%  Consistent with diabetes  Currently no consensus exists for use of hemoglobin A1C  for diagnosis of diabetes for children.      Comment for A1C at  5:28 AM on 3/28/2017:  According to ADA guidelines, hemoglobin A1C <7.0% represents  optimal control in non-pregnant diabetic patients.  Different  metrics may apply to specific populations.   Standards of Medical Care in Diabetes - 2016.  For the purpose of screening for the presence of diabetes:  <5.7%     Consistent with the absence of diabetes  5.7-6.4%  Consistent with increasing risk for diabetes   (prediabetes)  >or=6.5%  Consistent with diabetes  Currently no consensus exists for use of hemoglobin A1C  for diagnosis of diabetes for children.        Pending Labs     Order Current Status    Urine culture In process    Stool culture Preliminary result      Allergies as of 3/29/2017        Reactions    Keflex [Cephalexin] Anaphylaxis    Penicillins Anaphylaxis    Vasotec [Enalapril Maleate] Other (See Comments)    Causes her to pass out    Ciprofloxacin (Bulk)     Bones ache, myalgia    Wellbutrin [Bupropion Hcl] Other (See Comments)    Loss of memory    Zithromax [Azithromycin]     Patient states medication does not work on her-not effective    Codeine Itching, Nausea Only      Advance Directives     An advance directive is a document which, in the event you are no longer able to make decisions for yourself, tells your healthcare team what kind of treatment you do or do not want to receive, or who you would like to make those decisions for you.  If you do not currently have an advance directive, [facility] encourages you to create one.  For more information call:  [phone]), [phone] ([phone]),  or log on to [email or web address].        Language Assistance Services     ATTENTION: Language assistance services are available, free of charge. Please call [phone].      ATENCIÓN: dalila Cagle  disposición servicios gratuitos de asistencia lingüística. Alona al [phone].     [facility] Ý: N?u b?n nói Ti?ng Vi?t, có các d?ch v? h? tr? ngôn ng? mi?n phí dành cho b?n. G?i s? [phone].        Stroke Education              Pneumonmia Discharge Instructions                Chronic Kindey Disease Education             Diabetes Discharge Instructions                                    [facility] complies with applicable Federal civil rights laws and does not discriminate on the basis of race, color, national origin, age, disability, or sex.

## 2017-03-28 DIAGNOSIS — N76.2 VULVITIS: ICD-10-CM

## 2017-03-28 PROBLEM — E11.22 TYPE 2 DIABETES MELLITUS WITH DIABETIC CHRONIC KIDNEY DISEASE: Status: ACTIVE | Noted: 2017-03-28

## 2017-03-28 PROBLEM — F33.0 MILD EPISODE OF RECURRENT MAJOR DEPRESSIVE DISORDER: Status: ACTIVE | Noted: 2017-03-28

## 2017-03-28 PROBLEM — N30.00 ACUTE CYSTITIS WITHOUT HEMATURIA: Status: ACTIVE | Noted: 2017-03-28

## 2017-03-28 PROBLEM — E83.42 HYPOMAGNESEMIA: Status: ACTIVE | Noted: 2017-03-28

## 2017-03-28 PROBLEM — E03.9 HYPOTHYROIDISM: Status: ACTIVE | Noted: 2017-03-28

## 2017-03-28 PROBLEM — I73.9 PVD (PERIPHERAL VASCULAR DISEASE): Status: ACTIVE | Noted: 2017-03-28

## 2017-03-28 PROBLEM — Z86.73 HISTORY OF CVA (CEREBROVASCULAR ACCIDENT): Status: ACTIVE | Noted: 2017-03-28

## 2017-03-28 PROBLEM — D64.9 NORMOCHROMIC ANEMIA: Status: ACTIVE | Noted: 2017-03-28

## 2017-03-28 LAB
ANION GAP SERPL CALC-SCNC: 7 MMOL/L
BACTERIA #/AREA URNS HPF: NORMAL /HPF
BASOPHILS # BLD AUTO: 0 K/UL
BASOPHILS NFR BLD: 0.3 %
BILIRUB UR QL STRIP: NEGATIVE
BILIRUB UR QL STRIP: NEGATIVE
BUN SERPL-MCNC: 18 MG/DL
CALCIUM SERPL-MCNC: 8.6 MG/DL
CHLORIDE SERPL-SCNC: 110 MMOL/L
CLARITY UR: CLEAR
CLARITY UR: CLEAR
CO2 SERPL-SCNC: 24 MMOL/L
COLOR UR: YELLOW
COLOR UR: YELLOW
CREAT SERPL-MCNC: 0.8 MG/DL
DIFFERENTIAL METHOD: ABNORMAL
EOSINOPHIL # BLD AUTO: 0.1 K/UL
EOSINOPHIL NFR BLD: 0.9 %
ERYTHROCYTE [DISTWIDTH] IN BLOOD BY AUTOMATED COUNT: 15.2 %
EST. GFR  (AFRICAN AMERICAN): >60 ML/MIN/1.73 M^2
EST. GFR  (NON AFRICAN AMERICAN): >60 ML/MIN/1.73 M^2
GLUCOSE SERPL-MCNC: 123 MG/DL
GLUCOSE UR QL STRIP: NEGATIVE
GLUCOSE UR QL STRIP: NEGATIVE
HCT VFR BLD AUTO: 35.3 %
HGB BLD-MCNC: 11.6 G/DL
HGB UR QL STRIP: NEGATIVE
HGB UR QL STRIP: NEGATIVE
KETONES UR QL STRIP: NEGATIVE
KETONES UR QL STRIP: NEGATIVE
LEUKOCYTE ESTERASE UR QL STRIP: ABNORMAL
LEUKOCYTE ESTERASE UR QL STRIP: ABNORMAL
LIPASE SERPL-CCNC: 38 U/L
LYMPHOCYTES # BLD AUTO: 2.2 K/UL
LYMPHOCYTES NFR BLD: 17 %
MCH RBC QN AUTO: 27.9 PG
MCHC RBC AUTO-ENTMCNC: 32.9 %
MCV RBC AUTO: 85 FL
MICROSCOPIC COMMENT: NORMAL
MONOCYTES # BLD AUTO: 1 K/UL
MONOCYTES NFR BLD: 7.4 %
NEUTROPHILS # BLD AUTO: 9.8 K/UL
NEUTROPHILS NFR BLD: 74.4 %
NITRITE UR QL STRIP: NEGATIVE
NITRITE UR QL STRIP: NEGATIVE
PH UR STRIP: 5 [PH] (ref 5–8)
PH UR STRIP: 5 [PH] (ref 5–8)
PLATELET # BLD AUTO: 251 K/UL
PMV BLD AUTO: 7.3 FL
POCT GLUCOSE: 109 MG/DL (ref 70–110)
POCT GLUCOSE: 115 MG/DL (ref 70–110)
POCT GLUCOSE: 82 MG/DL (ref 70–110)
POTASSIUM SERPL-SCNC: 3.4 MMOL/L
PROT UR QL STRIP: NEGATIVE
PROT UR QL STRIP: NEGATIVE
RBC # BLD AUTO: 4.16 M/UL
RBC #/AREA URNS HPF: 1 /HPF (ref 0–4)
SODIUM SERPL-SCNC: 141 MMOL/L
SP GR UR STRIP: 1.01 (ref 1–1.03)
SP GR UR STRIP: 1.01 (ref 1–1.03)
SQUAMOUS #/AREA URNS HPF: 1 /HPF
URN SPEC COLLECT METH UR: ABNORMAL
URN SPEC COLLECT METH UR: ABNORMAL
UROBILINOGEN UR STRIP-ACNC: NEGATIVE EU/DL
UROBILINOGEN UR STRIP-ACNC: NEGATIVE EU/DL
WBC # BLD AUTO: 13.1 K/UL
WBC #/AREA URNS HPF: 3 /HPF (ref 0–5)

## 2017-03-28 PROCEDURE — 12000002 HC ACUTE/MED SURGE SEMI-PRIVATE ROOM

## 2017-03-28 PROCEDURE — 63600175 PHARM REV CODE 636 W HCPCS: Performed by: EMERGENCY MEDICINE

## 2017-03-28 PROCEDURE — 25000003 PHARM REV CODE 250: Performed by: HOSPITALIST

## 2017-03-28 PROCEDURE — 81000 URINALYSIS NONAUTO W/SCOPE: CPT

## 2017-03-28 PROCEDURE — 25000003 PHARM REV CODE 250: Performed by: EMERGENCY MEDICINE

## 2017-03-28 PROCEDURE — 63700000 PHARM REV CODE 250 ALT 637 W/O HCPCS: Performed by: EMERGENCY MEDICINE

## 2017-03-28 PROCEDURE — 99900035 HC TECH TIME PER 15 MIN (STAT)

## 2017-03-28 PROCEDURE — 85025 COMPLETE CBC W/AUTO DIFF WBC: CPT

## 2017-03-28 PROCEDURE — 87086 URINE CULTURE/COLONY COUNT: CPT

## 2017-03-28 PROCEDURE — 83690 ASSAY OF LIPASE: CPT

## 2017-03-28 PROCEDURE — 25000242 PHARM REV CODE 250 ALT 637 W/ HCPCS: Performed by: NURSE PRACTITIONER

## 2017-03-28 PROCEDURE — 83036 HEMOGLOBIN GLYCOSYLATED A1C: CPT

## 2017-03-28 PROCEDURE — 36415 COLL VENOUS BLD VENIPUNCTURE: CPT

## 2017-03-28 PROCEDURE — 94761 N-INVAS EAR/PLS OXIMETRY MLT: CPT

## 2017-03-28 PROCEDURE — 63600175 PHARM REV CODE 636 W HCPCS: Performed by: NURSE PRACTITIONER

## 2017-03-28 PROCEDURE — 25000003 PHARM REV CODE 250: Performed by: NURSE PRACTITIONER

## 2017-03-28 PROCEDURE — 94640 AIRWAY INHALATION TREATMENT: CPT

## 2017-03-28 PROCEDURE — 80048 BASIC METABOLIC PNL TOTAL CA: CPT

## 2017-03-28 RX ORDER — NYSTATIN 100000 U/G
CREAM TOPICAL
Qty: 30 G | Refills: 1 | Status: SHIPPED | OUTPATIENT
Start: 2017-03-28 | End: 2017-04-20

## 2017-03-28 RX ORDER — OXYCODONE AND ACETAMINOPHEN 10; 325 MG/1; MG/1
1 TABLET ORAL EVERY 6 HOURS PRN
Status: DISCONTINUED | OUTPATIENT
Start: 2017-03-28 | End: 2017-03-29 | Stop reason: HOSPADM

## 2017-03-28 RX ORDER — HYDROCHLOROTHIAZIDE 25 MG/1
25 TABLET ORAL DAILY
Status: DISCONTINUED | OUTPATIENT
Start: 2017-03-29 | End: 2017-03-28

## 2017-03-28 RX ORDER — LANOLIN ALCOHOL/MO/W.PET/CERES
400 CREAM (GRAM) TOPICAL 2 TIMES DAILY
Status: DISCONTINUED | OUTPATIENT
Start: 2017-03-29 | End: 2017-03-29

## 2017-03-28 RX ORDER — MAGNESIUM SULFATE HEPTAHYDRATE 40 MG/ML
2 INJECTION, SOLUTION INTRAVENOUS ONCE
Status: COMPLETED | OUTPATIENT
Start: 2017-03-28 | End: 2017-03-28

## 2017-03-28 RX ORDER — ROPINIROLE 1 MG/1
5 TABLET, FILM COATED ORAL NIGHTLY
Status: DISCONTINUED | OUTPATIENT
Start: 2017-03-28 | End: 2017-03-29 | Stop reason: HOSPADM

## 2017-03-28 RX ORDER — OXYBUTYNIN CHLORIDE 10 MG/1
10 TABLET, EXTENDED RELEASE ORAL 2 TIMES DAILY
COMMUNITY
End: 2017-05-30 | Stop reason: SDUPTHER

## 2017-03-28 RX ORDER — IPRATROPIUM BROMIDE AND ALBUTEROL SULFATE 2.5; .5 MG/3ML; MG/3ML
3 SOLUTION RESPIRATORY (INHALATION)
Status: DISCONTINUED | OUTPATIENT
Start: 2017-03-28 | End: 2017-03-29 | Stop reason: HOSPADM

## 2017-03-28 RX ORDER — POTASSIUM CHLORIDE 20 MEQ/1
40 TABLET, EXTENDED RELEASE ORAL ONCE
Status: COMPLETED | OUTPATIENT
Start: 2017-03-28 | End: 2017-03-28

## 2017-03-28 RX ORDER — ROPINIROLE 5 MG/1
5 TABLET, FILM COATED ORAL NIGHTLY
COMMUNITY
End: 2017-07-12

## 2017-03-28 RX ORDER — POTASSIUM CHLORIDE 20 MEQ/1
20 TABLET, EXTENDED RELEASE ORAL DAILY
Status: DISCONTINUED | OUTPATIENT
Start: 2017-03-29 | End: 2017-03-29 | Stop reason: HOSPADM

## 2017-03-28 RX ORDER — MAGNESIUM SULFATE/D5W 2 G/50 ML
2 INTRAVENOUS SOLUTION, PIGGYBACK (ML) INTRAVENOUS ONCE
Status: DISCONTINUED | OUTPATIENT
Start: 2017-03-28 | End: 2017-03-28

## 2017-03-28 RX ORDER — OXYBUTYNIN CHLORIDE 5 MG/1
10 TABLET, EXTENDED RELEASE ORAL DAILY
Status: DISCONTINUED | OUTPATIENT
Start: 2017-03-28 | End: 2017-03-29 | Stop reason: HOSPADM

## 2017-03-28 RX ADMIN — LEVOTHYROXINE SODIUM 25 MCG: 25 TABLET ORAL at 05:03

## 2017-03-28 RX ADMIN — CLONAZEPAM 0.5 MG: 0.5 TABLET ORAL at 09:03

## 2017-03-28 RX ADMIN — ATORVASTATIN CALCIUM 40 MG: 40 TABLET, FILM COATED ORAL at 09:03

## 2017-03-28 RX ADMIN — METFORMIN HYDROCHLORIDE 1000 MG: 500 TABLET, FILM COATED ORAL at 09:03

## 2017-03-28 RX ADMIN — FENOFIBRATE 160 MG: 160 TABLET ORAL at 09:03

## 2017-03-28 RX ADMIN — OXYCODONE HYDROCHLORIDE AND ACETAMINOPHEN 1 TABLET: 10; 325 TABLET ORAL at 09:03

## 2017-03-28 RX ADMIN — GABAPENTIN 300 MG: 300 CAPSULE ORAL at 09:03

## 2017-03-28 RX ADMIN — SODIUM CHLORIDE: 0.9 INJECTION, SOLUTION INTRAVENOUS at 03:03

## 2017-03-28 RX ADMIN — OXYCODONE HYDROCHLORIDE AND ACETAMINOPHEN 1 TABLET: 10; 325 TABLET ORAL at 03:03

## 2017-03-28 RX ADMIN — SODIUM CHLORIDE: 0.9 INJECTION, SOLUTION INTRAVENOUS at 07:03

## 2017-03-28 RX ADMIN — MAGNESIUM SULFATE IN WATER 2 G: 40 INJECTION, SOLUTION INTRAVENOUS at 03:03

## 2017-03-28 RX ADMIN — IPRATROPIUM BROMIDE AND ALBUTEROL SULFATE 3 ML: .5; 3 SOLUTION RESPIRATORY (INHALATION) at 08:03

## 2017-03-28 RX ADMIN — ROPINIROLE HYDROCHLORIDE 5 MG: 1 TABLET, FILM COATED ORAL at 09:03

## 2017-03-28 RX ADMIN — CLOPIDOGREL BISULFATE 75 MG: 75 TABLET ORAL at 09:03

## 2017-03-28 RX ADMIN — METOCLOPRAMIDE 5 MG: 5 INJECTION, SOLUTION INTRAMUSCULAR; INTRAVENOUS at 10:03

## 2017-03-28 RX ADMIN — NITROFURANTOIN (MONOHYDRATE/MACROCRYSTALS) 100 MG: 75; 25 CAPSULE ORAL at 09:03

## 2017-03-28 RX ADMIN — METFORMIN HYDROCHLORIDE 1000 MG: 500 TABLET, FILM COATED ORAL at 05:03

## 2017-03-28 RX ADMIN — OXYBUTYNIN CHLORIDE 10 MG: 5 TABLET, FILM COATED, EXTENDED RELEASE ORAL at 03:03

## 2017-03-28 RX ADMIN — SERTRALINE HYDROCHLORIDE 100 MG: 50 TABLET ORAL at 09:03

## 2017-03-28 RX ADMIN — HYDROCHLOROTHIAZIDE 25 MG: 25 TABLET ORAL at 09:03

## 2017-03-28 RX ADMIN — POTASSIUM CHLORIDE 40 MEQ: 1500 TABLET, EXTENDED RELEASE ORAL at 05:03

## 2017-03-28 NOTE — PLAN OF CARE
Problem: Patient Care Overview  Goal: Plan of Care Review  Outcome: Ongoing (interventions implemented as appropriate)  Pt AAOx4, ambulates with stand by assist, commode at bedside. Eating well, no c/o n/v. Pain well controlled with percocet 10 mg po. Mag infusing. VSS in NAD, will continue to monitor.

## 2017-03-28 NOTE — ASSESSMENT & PLAN NOTE
Continue IVF for hydration  Urine culture noted from last week- Continue macrobid which is sensitive to pt's culture  Repeat UA and C/s pending  Pt has multiple multiple abx allergies

## 2017-03-28 NOTE — SUBJECTIVE & OBJECTIVE
Past Medical History:   Diagnosis Date    Abnormal Pap smear 1972    cervical cancer    Anxiety     Arthritis     Asthma     Ataxia     Breast cancer     Cancer     bilateral mastectomy, uterine cancer, ovarian cancer    Cataract     Cervical back pain with evidence of disc disease     Cervical cancer     Chronic bilateral low back pain without sciatica 9/6/2016    Chronic bronchitis     Cortical cataract 2/18/2013    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    CVA (cerebral vascular accident)- Confirmed by neurology 9/6/2016    Degenerative joint disease of cervical and lumbar spine 8/1/2014    Depression     Diabetes mellitus     Diabetes mellitus, type II     History of malignant phylloides tumor of breast 2/15/2013    Hyperlipidemia     Hyperopia with presbyopia 2/18/2013    Hypertension     Hypertension     Hypothyroidism     Immunosuppressed status 9/3/2015    Migraine     Mitral valve regurgitation     Nuclear sclerosis 2/18/2013    Obesity     JOSE LUIS (obstructive sleep apnea)     Ovarian cancer     Pneumonia     Polyneuropathy     PVD (posterior vitreous detachment) 2/18/2013    Restless leg syndrome     Ruptured disk     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    Sarcoidosis 2006    Sarcoidosis of lung     Squamous cell carcinoma     Trouble in sleeping     Uterine cancer     Venous insufficiency        Past Surgical History:   Procedure Laterality Date    APPENDECTOMY      BREAST SURGERY      CHOLECYSTECTOMY      CYSTOCELE REPAIR      FRACTURE SURGERY Right     foot    HYSTERECTOMY      SAADIA/BSO    MASTECTOMY Bilateral     b/l mastectomy    OOPHORECTOMY      PLEURA BIOPSY      RECTAL SURGERY      rectocele      TONSILLECTOMY         Review of patient's allergies indicates:   Allergen Reactions    Keflex [cephalexin] Anaphylaxis    Penicillins Anaphylaxis    Vasotec [enalapril maleate] Other (See Comments)     Causes her to pass out     Ciprofloxacin (bulk)      Bones ache, myalgia    Wellbutrin [bupropion hcl] Other (See Comments)     Loss of memory      Zithromax [azithromycin]      Patient states medication does not work on her-not effective    Codeine Itching and Nausea Only       No current facility-administered medications on file prior to encounter.      Current Outpatient Prescriptions on File Prior to Encounter   Medication Sig    albuterol-ipratropium 2.5mg-0.5mg/3mL (DUO-NEB) 0.5 mg-3 mg(2.5 mg base)/3 mL nebulizer solution INHALE THE CONTENTS OF 1 VIAL VIA NEBULIZER EVERY 6 HOURS AS NEEDED FOR SHORTNESS OF BREATH  OR  WHEEZING    amlodipine (NORVASC) 5 MG tablet Take 5 mg by mouth once daily.    atorvastatin (LIPITOR) 40 MG tablet TAKE 1 TABLET EVERY DAY    beclomethasone (QVAR) 80 mcg/actuation Aero Inhale 2 puffs into the lungs 2 (two) times daily.    chlorzoxazone (PARAFON FORTE DSC) 500 mg Tab Take 1 tablet (500 mg total) by mouth 3 (three) times daily as needed.    clonazePAM (KLONOPIN) 0.5 MG tablet Take one-half to one tablet PO twice daily PRN anxiety    clopidogrel (PLAVIX) 75 mg tablet Take 1 tablet (75 mg total) by mouth once daily.    ergocalciferol (ERGOCALCIFEROL) 50,000 unit Cap Take 1 capsule (50,000 Units total) by mouth every 7 days. (Patient taking differently: Take 50,000 Units by mouth every 7 days. Sunday)    fenofibrate 160 MG Tab Take 1 tablet (160 mg total) by mouth once daily.    gabapentin (NEURONTIN) 100 MG capsule Take 3 capsules (300 mg total) by mouth every evening.    hydrochlorothiazide (HYDRODIURIL) 25 MG tablet TAKE 1 TABLET EVERY DAY    insulin aspart (NOVOLOG FLEXPEN) 100 unit/mL InPn pen Use per sliding scale    insulin NPH (NOVOLIN N) 100 unit/mL injection Inject 15 Units into the skin 2 (two) times daily before meals.    levothyroxine (SYNTHROID) 25 MCG tablet TAKE 1 TABLET BEFORE BREAKFAST    metformin (GLUCOPHAGE) 1000 MG tablet Take 1 tablet (1,000 mg total) by mouth 2 (two)  times daily with meals.    metoclopramide HCl (REGLAN) 10 MG tablet TAKE 1 TABLET EVERY 8 HOURS    nitrofurantoin, macrocrystal-monohydrate, (MACROBID) 100 MG capsule Take 1 capsule (100 mg total) by mouth 2 (two) times daily.    ondansetron (ZOFRAN-ODT) 8 MG TbDL DISSOLVE 1 TABLET ON THE TONGUE EVERY 8 HOURS AS NEEDED    potassium chloride (MICRO-K) 10 MEQ CpSR Take 1 capsule (10 mEq total) by mouth 3 (three) times daily.    sertraline (ZOLOFT) 100 MG tablet Take one and one-half tablets PO daily x 2 weeks, then increase to two tablets PO daily    sumatriptan (IMITREX) 50 MG tablet Take 50 mg by mouth daily as needed for Migraine.    VOLTAREN 1 % Gel     [DISCONTINUED] DEXTRAN 70/HYPROMELLOSE (ARTIFICIAL TEARS, PF, OPHT) Apply 1 drop to eye as needed.    [DISCONTINUED] doxepin (SINEQUAN) 10 MG capsule Take 1 capsule (10 mg total) by mouth nightly as needed (insomnia).    [DISCONTINUED] multivitamin capsule Take 1 capsule by mouth once daily.    fluticasone (FLONASE) 50 mcg/actuation nasal spray 2 sprays by Each Nare route once daily. (Patient taking differently: 2 sprays by Each Nare route daily as needed. )     Family History     Problem Relation (Age of Onset)    Anuerysm Daughter    Cancer Father, Sister    Cataracts Sister, Brother    Diabetes Sister    Heart disease Mother, Sister, Brother    Hyperlipidemia Mother, Sister, Brother, Daughter, Sister, Brother, Brother    Hypertension Sister, Brother, Daughter, Sister, Brother, Brother    Stroke Mother    Thyroid disease Sister        Social History Main Topics    Smoking status: Never Smoker    Smokeless tobacco: Never Used    Alcohol use No    Drug use: No      Comment: prescription Oycodone    Sexual activity: No     Review of Systems   Constitutional: Positive for activity change, appetite change, chills, fatigue and fever.   HENT: Negative for ear pain and facial swelling.    Eyes: Negative for pain and redness.   Respiratory: Positive for  shortness of breath. Negative for apnea, cough, choking, chest tightness, wheezing and stridor.         Chronic SOB but no more than baseline   Cardiovascular: Negative for chest pain, palpitations and leg swelling.   Gastrointestinal: Positive for diarrhea, nausea and vomiting. Negative for abdominal distention, abdominal pain, blood in stool and constipation.   Endocrine: Negative for polydipsia and polyphagia.   Genitourinary: Positive for dysuria and flank pain. Negative for hematuria.        Left flank pain and dysuria   Musculoskeletal: Positive for back pain and myalgias. Negative for neck pain and neck stiffness.   Skin: Negative for color change.   Allergic/Immunologic: Negative for food allergies.   Neurological: Positive for weakness, light-headedness and headaches. Negative for seizures and speech difficulty.   Hematological: Bruises/bleeds easily.   Psychiatric/Behavioral: Negative for agitation, behavioral problems, confusion, hallucinations and suicidal ideas.     Objective:     Vital Signs (Most Recent):  Temp: 98.2 °F (36.8 °C) (03/28/17 1200)  Pulse: 67 (03/28/17 1200)  Resp: 14 (03/28/17 1200)  BP: (!) 144/76 (03/28/17 1200)  SpO2: 97 % (03/28/17 1200) Vital Signs (24h Range):  Temp:  [96.6 °F (35.9 °C)-99.4 °F (37.4 °C)] 98.2 °F (36.8 °C)  Pulse:  [67-91] 67  Resp:  [14-18] 14  SpO2:  [95 %-100 %] 97 %  BP: (109-144)/(53-76) 144/76     Weight: 72.6 kg (160 lb)  Body mass index is 25.82 kg/(m^2).    Physical Exam   Constitutional: She is oriented to person, place, and time. She appears well-developed and well-nourished. No distress.   HENT:   Head: Normocephalic and atraumatic.   Eyes: Conjunctivae and EOM are normal. Pupils are equal, round, and reactive to light. Right eye exhibits no discharge. Left eye exhibits no discharge.   Neck: Normal range of motion. Neck supple. No JVD present.   Cardiovascular: Normal rate, regular rhythm and intact distal pulses.    Murmur heard.  Systolic murmur 1/6    Pulmonary/Chest: Effort normal. No stridor. No respiratory distress. She has no wheezes. She has no rales. She exhibits no tenderness.   BBS diminished   Abdominal: Soft. Bowel sounds are normal. She exhibits no distension. There is no tenderness. There is no rebound and no guarding.   Genitourinary:   Genitourinary Comments: Not examined   Musculoskeletal: Normal range of motion. She exhibits no edema or deformity.   Left flank pain  Positive CTA   Neurological: She is alert and oriented to person, place, and time. No cranial nerve deficit.   Skin: Skin is warm and dry. She is not diaphoretic.   Psychiatric: She has a normal mood and affect. Her behavior is normal. Judgment and thought content normal.        Significant Labs: Reviewed

## 2017-03-28 NOTE — PLAN OF CARE
Problem: Patient Care Overview  Goal: Plan of Care Review  Outcome: Ongoing (interventions implemented as appropriate)  Pt remains free from injury or falls. Vital signs stable throughout night on room air. Positions self independently and up to bedside commode with assistance. Complaints of nausea x1. Incontinent at times. Bed alarm set, bed in low locked position and call light within reach. Will continue to monitor.

## 2017-03-28 NOTE — ED NOTES
Presents to the ER with c/o nausea and vomiting for the past day. Patient reports several episodes of vomiting today and one episode of diarrhea before arrival to the ER. EMS reports finding patient on bathroom floor, patient states she was too weak to get up. Associated complaints are sore throat, SOB and lower back pain. Patient was seen in ED approximately 4 days ago and diagnosed with UTI, patient reports taking prescribed meds. Mucous membranes are pink and moist. Skin is warm, dry and intact. Lungs are clear bilaterally, respirations are regular and unlabored. Denies cough, congestion, rhinorrhea or SOB. BS active x4, no tenderness with palpation, abd is soft and not distended. Denies any appetite or activity change. S1S2, capillary refill is < 2 seconds. Denies dysuria, difficulty urinating, frequency, numbness, tingling or weakness. TREV CISNEROS

## 2017-03-28 NOTE — PLAN OF CARE
03/28/17 0725   Patient Assessment/Suction   Level of Consciousness (AVPU) alert   Respiratory Effort Normal;Unlabored   Expansion/Accessory Muscles/Retractions no retractions;no use of accessory muscles   All Lung Fields Breath Sounds clear   PRE-TX-O2-ETCO2   O2 Device (Oxygen Therapy) room air   SpO2 97 %   Pulse Oximetry Type Intermittent   $ Pulse Oximetry - Multiple Charge Pulse Oximetry - Multiple   Pulse 80   Resp 18   Aerosol Therapy   $ Aerosol Therapy Charges PRN treatment not required       Patient resting comfortably in bed with no distress noted. No PRN treatment indicated at this time.

## 2017-03-28 NOTE — H&P
[facility] Medicine  History & Physical    Patient Name: Nathalie Santiago  MRN: [id number]  Admission Date: 3/27/2017  Attending Physician: Charles Flores MD   Primary Care Provider: Ladan Almodovar MD         Patient information was obtained from patient and ER records.     Subjective:     Principal Problem:PVD (peripheral vascular disease)    Chief Complaint:   Chief Complaint   Patient presents with    Vomiting    Diarrhea    Hypotension        HPI: This is a 66 y.o. female who presents to the ED via EMS after being found on the bathroom floor with an onset of persistent vomiting with associated sore throat, SOB, nausea, lower back/ left flank pain, and HA. The symptoms began yesterday. She was seen 6 days ago for dysuria and found to have a positive urine culture sensitive  to Nitrofurantoin. Per EMS, she was given Zofran en route and had a systolic pressure of 70 in the EMS. The patient has a PMHx of DM2, HTN, HLD, sarcoidosis, asthma, chronic SOB and bronchitis, CVA, and breast, uterine, ovarian, and cervical cancer. She denies falling, history of CHF, chest pain, or any other symptoms at this time. No pertinent SHx noted. Pt admitted for hypotension secondary to dehydration and for further evaluation and treatment.    Past Medical History:   Diagnosis Date    Abnormal Pap smear 1972    cervical cancer    Anxiety     Arthritis     Asthma     Ataxia     Breast cancer     Cancer     bilateral mastectomy, uterine cancer, ovarian cancer    Cataract     Cervical back pain with evidence of disc disease     Cervical cancer     Chronic bilateral low back pain without sciatica 9/6/2016    Chronic bronchitis     Cortical cataract 2/18/2013    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    CVA (cerebral vascular accident)- Confirmed by neurology 9/6/2016    Degenerative joint disease of cervical and lumbar spine 8/1/2014    Depression     Diabetes  mellitus     Diabetes mellitus, type II     History of malignant phylloides tumor of breast 2/15/2013    Hyperlipidemia     Hyperopia with presbyopia 2/18/2013    Hypertension     Hypertension     Hypothyroidism     Immunosuppressed status 9/3/2015    Migraine     Mitral valve regurgitation     Nuclear sclerosis 2/18/2013    Obesity     JOSE LUIS (obstructive sleep apnea)     Ovarian cancer     Pneumonia     Polyneuropathy     PVD (posterior vitreous detachment) 2/18/2013    Restless leg syndrome     Ruptured disk     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    Sarcoidosis 2006    Sarcoidosis of lung     Squamous cell carcinoma     Trouble in sleeping     Uterine cancer     Venous insufficiency        Past Surgical History:   Procedure Laterality Date    APPENDECTOMY      BREAST SURGERY      CHOLECYSTECTOMY      CYSTOCELE REPAIR      FRACTURE SURGERY Right     foot    HYSTERECTOMY      SAADIA/BSO    MASTECTOMY Bilateral     b/l mastectomy    OOPHORECTOMY      PLEURA BIOPSY      RECTAL SURGERY      rectocele      TONSILLECTOMY         Review of patient's allergies indicates:   Allergen Reactions    Keflex [cephalexin] Anaphylaxis    Penicillins Anaphylaxis    Vasotec [enalapril maleate] Other (See Comments)     Causes her to pass out    Ciprofloxacin (bulk)      Bones ache, myalgia    Wellbutrin [bupropion hcl] Other (See Comments)     Loss of memory      Zithromax [azithromycin]      Patient states medication does not work on her-not effective    Codeine Itching and Nausea Only       No current facility-administered medications on file prior to encounter.      Current Outpatient Prescriptions on File Prior to Encounter   Medication Sig    albuterol-ipratropium 2.5mg-0.5mg/3mL (DUO-NEB) 0.5 mg-3 mg(2.5 mg base)/3 mL nebulizer solution INHALE THE CONTENTS OF 1 VIAL VIA NEBULIZER EVERY 6 HOURS AS NEEDED FOR SHORTNESS OF BREATH  OR  WHEEZING    amlodipine (NORVASC) 5 MG tablet Take 5 mg by mouth  once daily.    atorvastatin (LIPITOR) 40 MG tablet TAKE 1 TABLET EVERY DAY    beclomethasone (QVAR) 80 mcg/actuation Aero Inhale 2 puffs into the lungs 2 (two) times daily.    chlorzoxazone (PARAFON FORTE DSC) 500 mg Tab Take 1 tablet (500 mg total) by mouth 3 (three) times daily as needed.    clonazePAM (KLONOPIN) 0.5 MG tablet Take one-half to one tablet PO twice daily PRN anxiety    clopidogrel (PLAVIX) 75 mg tablet Take 1 tablet (75 mg total) by mouth once daily.    ergocalciferol (ERGOCALCIFEROL) 50,000 unit Cap Take 1 capsule (50,000 Units total) by mouth every 7 days. (Patient taking differently: Take 50,000 Units by mouth every 7 days. Sunday)    fenofibrate 160 MG Tab Take 1 tablet (160 mg total) by mouth once daily.    gabapentin (NEURONTIN) 100 MG capsule Take 3 capsules (300 mg total) by mouth every evening.    hydrochlorothiazide (HYDRODIURIL) 25 MG tablet TAKE 1 TABLET EVERY DAY    insulin aspart (NOVOLOG FLEXPEN) 100 unit/mL InPn pen Use per sliding scale    insulin NPH (NOVOLIN N) 100 unit/mL injection Inject 15 Units into the skin 2 (two) times daily before meals.    levothyroxine (SYNTHROID) 25 MCG tablet TAKE 1 TABLET BEFORE BREAKFAST    metformin (GLUCOPHAGE) 1000 MG tablet Take 1 tablet (1,000 mg total) by mouth 2 (two) times daily with meals.    metoclopramide HCl (REGLAN) 10 MG tablet TAKE 1 TABLET EVERY 8 HOURS    nitrofurantoin, macrocrystal-monohydrate, (MACROBID) 100 MG capsule Take 1 capsule (100 mg total) by mouth 2 (two) times daily.    ondansetron (ZOFRAN-ODT) 8 MG TbDL DISSOLVE 1 TABLET ON THE TONGUE EVERY 8 HOURS AS NEEDED    potassium chloride (MICRO-K) 10 MEQ CpSR Take 1 capsule (10 mEq total) by mouth 3 (three) times daily.    sertraline (ZOLOFT) 100 MG tablet Take one and one-half tablets PO daily x 2 weeks, then increase to two tablets PO daily    sumatriptan (IMITREX) 50 MG tablet Take 50 mg by mouth daily as needed for Migraine.    VOLTAREN 1 % Gel      [DISCONTINUED] DEXTRAN 70/HYPROMELLOSE (ARTIFICIAL TEARS, PF, OPHT) Apply 1 drop to eye as needed.    [DISCONTINUED] doxepin (SINEQUAN) 10 MG capsule Take 1 capsule (10 mg total) by mouth nightly as needed (insomnia).    [DISCONTINUED] multivitamin capsule Take 1 capsule by mouth once daily.    fluticasone (FLONASE) 50 mcg/actuation nasal spray 2 sprays by Each Nare route once daily. (Patient taking differently: 2 sprays by Each Nare route daily as needed. )     Family History     Problem Relation (Age of Onset)    Anuerysm Daughter    Cancer Father, Sister    Cataracts Sister, Brother    Diabetes Sister    Heart disease Mother, Sister, Brother    Hyperlipidemia Mother, Sister, Brother, Daughter, Sister, Brother, Brother    Hypertension Sister, Brother, Daughter, Sister, Brother, Brother    Stroke Mother    Thyroid disease Sister        Social History Main Topics    Smoking status: Never Smoker    Smokeless tobacco: Never Used    Alcohol use No    Drug use: No      Comment: prescription Oycodone    Sexual activity: No     Review of Systems   Constitutional: Positive for activity change, appetite change, chills, fatigue and fever.   HENT: Negative for ear pain and facial swelling.    Eyes: Negative for pain and redness.   Respiratory: Positive for shortness of breath. Negative for apnea, cough, choking, chest tightness, wheezing and stridor.         Chronic SOB but no more than baseline   Cardiovascular: Negative for chest pain, palpitations and leg swelling.   Gastrointestinal: Positive for diarrhea, nausea and vomiting. Negative for abdominal distention, abdominal pain, blood in stool and constipation.   Endocrine: Negative for polydipsia and polyphagia.   Genitourinary: Positive for dysuria and flank pain. Negative for hematuria.        Left flank pain and dysuria   Musculoskeletal: Positive for back pain and myalgias. Negative for neck pain and neck stiffness.   Skin: Negative for color change.    Allergic/Immunologic: Negative for food allergies.   Neurological: Positive for weakness, light-headedness and headaches. Negative for seizures and speech difficulty.   Hematological: Bruises/bleeds easily.   Psychiatric/Behavioral: Negative for agitation, behavioral problems, confusion, hallucinations and suicidal ideas.     Objective:     Vital Signs (Most Recent):  Temp: 98.2 °F (36.8 °C) (03/28/17 1200)  Pulse: 67 (03/28/17 1200)  Resp: 14 (03/28/17 1200)  BP: (!) 144/76 (03/28/17 1200)  SpO2: 97 % (03/28/17 1200) Vital Signs (24h Range):  Temp:  [96.6 °F (35.9 °C)-99.4 °F (37.4 °C)] 98.2 °F (36.8 °C)  Pulse:  [67-91] 67  Resp:  [14-18] 14  SpO2:  [95 %-100 %] 97 %  BP: (109-144)/(53-76) 144/76     Weight: 72.6 kg (160 lb)  Body mass index is 25.82 kg/(m^2).    Physical Exam   Constitutional: She is oriented to person, place, and time. She appears well-developed and well-nourished. No distress.   HENT:   Head: Normocephalic and atraumatic.   Eyes: Conjunctivae and EOM are normal. Pupils are equal, round, and reactive to light. Right eye exhibits no discharge. Left eye exhibits no discharge.   Neck: Normal range of motion. Neck supple. No JVD present.   Cardiovascular: Normal rate, regular rhythm and intact distal pulses.    Murmur heard.  Systolic murmur 1/6   Pulmonary/Chest: Effort normal. No stridor. No respiratory distress. She has no wheezes. She has no rales. She exhibits no tenderness.   BBS diminished   Abdominal: Soft. Bowel sounds are normal. She exhibits no distension. There is no tenderness. There is no rebound and no guarding.   Genitourinary:   Genitourinary Comments: Not examined   Musculoskeletal: Normal range of motion. She exhibits no edema or deformity.   Left flank pain  Positive CTA   Neurological: She is alert and oriented to person, place, and time. No cranial nerve deficit.   Skin: Skin is warm and dry. She is not diaphoretic.   Psychiatric: She has a normal mood and affect. Her  behavior is normal. Judgment and thought content normal.        Significant Labs: Reviewed         Assessment/Plan:     Chronic pain  Continue home gabapentin      Asthma  Monitor O2 sats  Duonebs qid      Hypertension associated with diabetes  Recent hypotension-  Hold home antihypertensives and diuretics      CKD stage 3 due to type 2 diabetes mellitus  Monitor      Bilateral carotid artery disease  Hx CVA/ PVD/ Hx hyperlipidemia-  Continue home statin, fenofibrate,  and plavix      Dehydration  Hypotension-  Continue IVF for hydration  Hold home diuretic      Hypokalemia  Monitor  Supplement as needed  Add KDur      Hypothyroidism  Continue home levothyroxine      Normochromic anemia  Monitor      Hypomagnesemia  Monitor  Supplement as needed  Add mag oxide and mag sulfate IV      Mild episode of recurrent major depressive disorder  Continue home sertraline      Acute cystitis without hematuria  Continue IVF for hydration  Urine culture noted from last week- Continue macrobid which is sensitive to pt's culture  Repeat UA and C/s pending  Pt has multiple multiple abx allergies       Type 2 diabetes mellitus with diabetic chronic kidney disease  DM diet  Accuchecks with correctional SSI  Continue NPH and metformin      VTE Risk Mitigation         Ordered     Medium Risk of VTE  Once      03/27/17 3360        KAYLIE Tong  Department of [facility]   [facility]    Time spent seeing patient( greater than 1/2 spent in direct contact) : 74 minutes

## 2017-03-29 ENCOUNTER — TELEPHONE (OUTPATIENT)
Dept: FAMILY MEDICINE | Facility: CLINIC | Age: 67
End: 2017-03-29

## 2017-03-29 VITALS
HEART RATE: 77 BPM | DIASTOLIC BLOOD PRESSURE: 67 MMHG | OXYGEN SATURATION: 97 % | RESPIRATION RATE: 18 BRPM | HEIGHT: 66 IN | SYSTOLIC BLOOD PRESSURE: 137 MMHG | WEIGHT: 160 LBS | TEMPERATURE: 98 F | BODY MASS INDEX: 25.71 KG/M2

## 2017-03-29 PROBLEM — I95.9 HYPOTENSION: Status: RESOLVED | Noted: 2017-03-27 | Resolved: 2017-03-29

## 2017-03-29 PROBLEM — E83.42 HYPOMAGNESEMIA: Status: RESOLVED | Noted: 2017-03-28 | Resolved: 2017-03-29

## 2017-03-29 PROBLEM — N17.9 AKI (ACUTE KIDNEY INJURY): Status: ACTIVE | Noted: 2017-03-29

## 2017-03-29 LAB
ANION GAP SERPL CALC-SCNC: 9 MMOL/L
BACTERIA UR CULT: NO GROWTH
BASOPHILS # BLD AUTO: 0 K/UL
BASOPHILS NFR BLD: 0.5 %
BUN SERPL-MCNC: 15 MG/DL
CALCIUM SERPL-MCNC: 9.1 MG/DL
CHLORIDE SERPL-SCNC: 110 MMOL/L
CO2 SERPL-SCNC: 23 MMOL/L
CREAT SERPL-MCNC: 0.7 MG/DL
DIFFERENTIAL METHOD: ABNORMAL
E COLI SXT1 STL QL IA: NEGATIVE
E COLI SXT2 STL QL IA: NEGATIVE
EOSINOPHIL # BLD AUTO: 0.3 K/UL
EOSINOPHIL NFR BLD: 3.3 %
ERYTHROCYTE [DISTWIDTH] IN BLOOD BY AUTOMATED COUNT: 15.1 %
EST. GFR  (AFRICAN AMERICAN): >60 ML/MIN/1.73 M^2
EST. GFR  (NON AFRICAN AMERICAN): >60 ML/MIN/1.73 M^2
ESTIMATED AVG GLUCOSE: 146 MG/DL
GLUCOSE SERPL-MCNC: 91 MG/DL
HBA1C MFR BLD HPLC: 6.7 %
HCT VFR BLD AUTO: 34.5 %
HGB BLD-MCNC: 11.7 G/DL
LYMPHOCYTES # BLD AUTO: 2.5 K/UL
LYMPHOCYTES NFR BLD: 30.8 %
MAGNESIUM SERPL-MCNC: 1.7 MG/DL
MCH RBC QN AUTO: 28.3 PG
MCHC RBC AUTO-ENTMCNC: 33.7 %
MCV RBC AUTO: 84 FL
MONOCYTES # BLD AUTO: 0.7 K/UL
MONOCYTES NFR BLD: 8.3 %
NEUTROPHILS # BLD AUTO: 4.6 K/UL
NEUTROPHILS NFR BLD: 57.1 %
PLATELET # BLD AUTO: 242 K/UL
PMV BLD AUTO: 7 FL
POCT GLUCOSE: 83 MG/DL (ref 70–110)
POTASSIUM SERPL-SCNC: 3.7 MMOL/L
RBC # BLD AUTO: 4.12 M/UL
SODIUM SERPL-SCNC: 142 MMOL/L
WBC # BLD AUTO: 8 K/UL

## 2017-03-29 PROCEDURE — 63600175 PHARM REV CODE 636 W HCPCS: Performed by: EMERGENCY MEDICINE

## 2017-03-29 PROCEDURE — 83735 ASSAY OF MAGNESIUM: CPT

## 2017-03-29 PROCEDURE — 63700000 PHARM REV CODE 250 ALT 637 W/O HCPCS: Performed by: EMERGENCY MEDICINE

## 2017-03-29 PROCEDURE — 94761 N-INVAS EAR/PLS OXIMETRY MLT: CPT

## 2017-03-29 PROCEDURE — 25000242 PHARM REV CODE 250 ALT 637 W/ HCPCS: Performed by: NURSE PRACTITIONER

## 2017-03-29 PROCEDURE — 25000003 PHARM REV CODE 250: Performed by: NURSE PRACTITIONER

## 2017-03-29 PROCEDURE — 25000003 PHARM REV CODE 250: Performed by: HOSPITALIST

## 2017-03-29 PROCEDURE — 80048 BASIC METABOLIC PNL TOTAL CA: CPT

## 2017-03-29 PROCEDURE — 36415 COLL VENOUS BLD VENIPUNCTURE: CPT

## 2017-03-29 PROCEDURE — 85025 COMPLETE CBC W/AUTO DIFF WBC: CPT

## 2017-03-29 PROCEDURE — 94640 AIRWAY INHALATION TREATMENT: CPT

## 2017-03-29 PROCEDURE — 63600175 PHARM REV CODE 636 W HCPCS: Performed by: NURSE PRACTITIONER

## 2017-03-29 PROCEDURE — 99900035 HC TECH TIME PER 15 MIN (STAT)

## 2017-03-29 PROCEDURE — 97163 PT EVAL HIGH COMPLEX 45 MIN: CPT

## 2017-03-29 PROCEDURE — 25000003 PHARM REV CODE 250: Performed by: EMERGENCY MEDICINE

## 2017-03-29 RX ORDER — POTASSIUM CHLORIDE 20 MEQ/1
40 TABLET, EXTENDED RELEASE ORAL ONCE
Status: COMPLETED | OUTPATIENT
Start: 2017-03-29 | End: 2017-03-29

## 2017-03-29 RX ORDER — LANOLIN ALCOHOL/MO/W.PET/CERES
400 CREAM (GRAM) TOPICAL 2 TIMES DAILY
Refills: 0 | COMMUNITY
Start: 2017-03-30 | End: 2022-04-12

## 2017-03-29 RX ORDER — MAGNESIUM SULFATE/D5W 2 G/50 ML
2 INTRAVENOUS SOLUTION, PIGGYBACK (ML) INTRAVENOUS ONCE
Status: DISCONTINUED | OUTPATIENT
Start: 2017-03-29 | End: 2017-03-29

## 2017-03-29 RX ORDER — LANOLIN ALCOHOL/MO/W.PET/CERES
400 CREAM (GRAM) TOPICAL 2 TIMES DAILY
Status: DISCONTINUED | OUTPATIENT
Start: 2017-03-30 | End: 2017-03-29 | Stop reason: HOSPADM

## 2017-03-29 RX ORDER — MAGNESIUM SULFATE HEPTAHYDRATE 40 MG/ML
2 INJECTION, SOLUTION INTRAVENOUS ONCE
Status: COMPLETED | OUTPATIENT
Start: 2017-03-29 | End: 2017-03-29

## 2017-03-29 RX ORDER — CYCLOBENZAPRINE HCL 5 MG
5 TABLET ORAL ONCE
Status: COMPLETED | OUTPATIENT
Start: 2017-03-29 | End: 2017-03-29

## 2017-03-29 RX ORDER — SUMATRIPTAN 50 MG/1
50 TABLET, FILM COATED ORAL ONCE AS NEEDED
Status: COMPLETED | OUTPATIENT
Start: 2017-03-29 | End: 2017-03-29

## 2017-03-29 RX ORDER — AMLODIPINE BESYLATE 5 MG/1
2.5 TABLET ORAL DAILY
Start: 2017-03-29 | End: 2017-03-29 | Stop reason: HOSPADM

## 2017-03-29 RX ORDER — SUMATRIPTAN 50 MG/1
50 TABLET, FILM COATED ORAL ONCE
Status: DISCONTINUED | OUTPATIENT
Start: 2017-03-29 | End: 2017-03-29

## 2017-03-29 RX ORDER — ACETAMINOPHEN 325 MG/1
650 TABLET ORAL EVERY 6 HOURS PRN
Refills: 0 | COMMUNITY
Start: 2017-03-29 | End: 2017-04-20

## 2017-03-29 RX ADMIN — OXYCODONE HYDROCHLORIDE AND ACETAMINOPHEN 1 TABLET: 10; 325 TABLET ORAL at 12:03

## 2017-03-29 RX ADMIN — METFORMIN HYDROCHLORIDE 1000 MG: 500 TABLET, FILM COATED ORAL at 09:03

## 2017-03-29 RX ADMIN — CYCLOBENZAPRINE HYDROCHLORIDE 5 MG: 5 TABLET, FILM COATED ORAL at 12:03

## 2017-03-29 RX ADMIN — FENOFIBRATE 160 MG: 160 TABLET ORAL at 09:03

## 2017-03-29 RX ADMIN — CLOPIDOGREL BISULFATE 75 MG: 75 TABLET ORAL at 09:03

## 2017-03-29 RX ADMIN — IPRATROPIUM BROMIDE AND ALBUTEROL SULFATE 3 ML: .5; 3 SOLUTION RESPIRATORY (INHALATION) at 04:03

## 2017-03-29 RX ADMIN — MAGNESIUM SULFATE IN WATER 2 G: 40 INJECTION, SOLUTION INTRAVENOUS at 12:03

## 2017-03-29 RX ADMIN — LEVOTHYROXINE SODIUM 25 MCG: 25 TABLET ORAL at 06:03

## 2017-03-29 RX ADMIN — METFORMIN HYDROCHLORIDE 1000 MG: 500 TABLET, FILM COATED ORAL at 06:03

## 2017-03-29 RX ADMIN — DOXEPIN HYDROCHLORIDE 10 MG: 10 CAPSULE ORAL at 12:03

## 2017-03-29 RX ADMIN — OXYCODONE HYDROCHLORIDE AND ACETAMINOPHEN 1 TABLET: 10; 325 TABLET ORAL at 06:03

## 2017-03-29 RX ADMIN — POTASSIUM CHLORIDE 20 MEQ: 1500 TABLET, EXTENDED RELEASE ORAL at 09:03

## 2017-03-29 RX ADMIN — IPRATROPIUM BROMIDE AND ALBUTEROL SULFATE 3 ML: .5; 3 SOLUTION RESPIRATORY (INHALATION) at 07:03

## 2017-03-29 RX ADMIN — POTASSIUM CHLORIDE 40 MEQ: 1500 TABLET, EXTENDED RELEASE ORAL at 12:03

## 2017-03-29 RX ADMIN — NITROFURANTOIN (MONOHYDRATE/MACROCRYSTALS) 100 MG: 75; 25 CAPSULE ORAL at 09:03

## 2017-03-29 RX ADMIN — OXYBUTYNIN CHLORIDE 10 MG: 5 TABLET, FILM COATED, EXTENDED RELEASE ORAL at 09:03

## 2017-03-29 RX ADMIN — SODIUM CHLORIDE: 0.9 INJECTION, SOLUTION INTRAVENOUS at 12:03

## 2017-03-29 RX ADMIN — METOCLOPRAMIDE 5 MG: 5 INJECTION, SOLUTION INTRAMUSCULAR; INTRAVENOUS at 09:03

## 2017-03-29 RX ADMIN — SUMATRIPTAN SUCCINATE 50 MG: 50 TABLET ORAL at 12:03

## 2017-03-29 RX ADMIN — ATORVASTATIN CALCIUM 40 MG: 40 TABLET, FILM COATED ORAL at 09:03

## 2017-03-29 NOTE — PLAN OF CARE
Faxed home health orders to [facility] via [facility] care systems, awaiting acceptance. Patient okay to discharge from  standpoint. Patient to resume care w/ [facility]. ANNE Parrish     1621- [facility] bed 307 B admitted at 3/27/2017 6:13 PM has been updated by [email or web address].  Update: Accepted by [facility]. ANNE Parrish

## 2017-03-29 NOTE — PLAN OF CARE
Problem: Patient Care Overview  Goal: Individualization & Mutuality  Outcome: Ongoing (interventions implemented as appropriate)  Pt. Anxious and unable to rest comfortably.  Allowed verbalization. Gave emotional comfort.  Offered snacks -brought peanut butter/crackers.  Discussed pt. Progress.  Pt. Looking forward to going home soon.

## 2017-03-29 NOTE — PROGRESS NOTES
97% sats on room air. ISU Q4 w/a Duoneb aero tx given and tolerated well. Pt refused Flu swab stating that she just had one done at her doctor's office within the last two weeks. I informed her nurse Alice of this at this time.

## 2017-03-29 NOTE — PROGRESS NOTES
03/29/17 1144   Patient Assessment/Suction   Level of Consciousness (AVPU) alert   All Lung Fields Breath Sounds clear   PRE-TX-O2-ETCO2   O2 Device (Oxygen Therapy) room air   SpO2 96 %   Pulse Oximetry Type Intermittent   Pulse 61   Resp 18   Aerosol Therapy   $ Aerosol Therapy Charges Refused   Pt refused tx. States she still has a headache and that her normal daily routine with nebulized tx is twice a day. Notified NATALIIA Slater and try to change order.

## 2017-03-29 NOTE — PROGRESS NOTES
03/29/17 1535   Final Note   Assessment Type Final Discharge Note   Discharge Disposition Home-Health  ([facility])   Discharge planning education complete? Yes

## 2017-03-29 NOTE — TELEPHONE ENCOUNTER
----- Message from Petra Mtz sent at 3/29/2017  3:32 PM CDT -----  Contact: pt [phone]  Patient is being D/C today and she needs a two week hospital f/u for UTI and Dehydration and Hypertension. I did not have any appointments available in the system. Please call the patient back.

## 2017-03-29 NOTE — PROGRESS NOTES
03/29/17 0739   Patient Assessment/Suction   Level of Consciousness (AVPU) alert   Respiratory Effort Normal   All Lung Fields Breath Sounds clear   Cough Type good;nonproductive   PRE-TX-O2-ETCO2   O2 Device (Oxygen Therapy) room air   SpO2 98 %   Pulse Oximetry Type Intermittent   $ Pulse Oximetry - Multiple Charge Pulse Oximetry - Multiple   Pulse 61   Resp 17   Aerosol Therapy   $ Aerosol Therapy Charges Aerosol Treatment   Respiratory Treatment Status given   SVN/Inhaler Treatment Route mask   Position During Treatment HOB at 45 degrees   Patient Tolerance good   Post-Treatment   Post-treatment Heart Rate (beats/min) 65   Post-treatment Resp Rate (breaths/min) 18   All Fields Breath Sounds clear   Room air, duoneb q4wa, tolerated mask tx well.

## 2017-03-29 NOTE — TELEPHONE ENCOUNTER
Returned patient call to schedule a hospital follow up with Mrs Mercado on 4/13. Patient declined stating she has seen Mrs Gallagher more than once and would prefer to schedule an appointment with her. Please call patient with any availabilities. She was advised by me auto searching there would be no appointments until May 1, but requested message be sent to her staff

## 2017-03-29 NOTE — PROGRESS NOTES
Cm completed the assessment with patient.  Pt lives with family members, she needs assistance at home.  She has [facility]- She get's PT/Nurse aide to help with her bath.    Pt has numerous DME (noted on assessment grid).   PCP is Dr. Almodovar.  Disposition:  Pt will benefit with resuming HH-[facility].       03/29/17 0850   Discharge Assessment   Assessment Type Discharge Planning Assessment   Confirmed/corrected address and phone number on facesheet? Yes   Assessment information obtained from? Patient   Type of Healthcare Directive Received (No POA. Next of kin is daughter Effie - [phone])   Prior to hospitilization cognitive status: Alert/Oriented   Prior to hospitalization functional status: Assistive Equipment;Needs Assistance;Partially Dependent   Current cognitive status: Alert/Oriented   Current Functional Status: Assistive Equipment;Needs Assistance;Partially Dependent   Arrived From admitted as an inpatient   Lives With sibling(s)   Able to Return to Prior Arrangements yes   Is patient able to care for self after discharge? Yes  (with minimal assistance)   How many people do you have in your home that can help with your care after discharge? 1   Who are your caregiver(s) and their phone number(s)? Marina [employer]net - [phone] or haleyer Radhika Santiago - [phone]   Patient's perception of discharge disposition home or selfcare;home health   Readmission Within The Last 30 Days no previous admission in last 30 days   Patient currently being followed by outpatient case management? Yes   If yes, name of outpatient case management following: insurance company assigned oupatient case management   Patient currently receives home health services? Yes   Patient previously received home health services and would like to resume services if necessary? Yes   If yes, name of home health provider: [facility]   Does the patient currently use HME? Yes   Patient currently receives private duty nursing?  N/A   Patient currently receives any other outside agency services? No   Equipment Currently Used at Home shower chair;CPAP;bedside commode;cane, quad;wheelchair;walker, rolling;rollator  (Nebulizer and blood pressure,power chair)   Do you have any problems affording any of your prescribed medications? No   Is the patient taking medications as prescribed? yes  ([employer] and mail order from [facility])   Do you have any financial concerns preventing you from receiving the healthcare you need? No  (Insurance verified as [employer])   Does the patient have transportation to healthcare appointments? Yes   Transportation Available family or friend will provide   On Dialysis? No   Does the patient receive services at the Coumadin Clinic? No  (Plavix)   Are there any open cases? No   Discharge Plan A Home with family;Home Health   Discharge Plan B Home with family;Home Health   Patient/Family In Agreement With Plan yes

## 2017-03-29 NOTE — DISCHARGE SUMMARY
[facility] Medicine  Discharge Summary      Patient Name: Nathalie Santiago  MRN: [id number]  Admission Date: 3/27/2017  Hospital Length of Stay: 2 days  Discharge Date and Time:  03/29/2017 12:53 PM  Attending Physician: Charles Flores MD   Discharging Provider: KAYLIE Tong  Primary Care Provider: Ladan Almodovar MD      HPI:   This is a 66 y.o. female who presents to the ED via EMS after being found on the bathroom floor with an onset of persistent vomiting with associated sore throat, SOB, nausea, lower back/ left flank pain, and HA. The symptoms began yesterday. She was seen 6 days ago for dysuria and found to have a positive urine culture sensitive  to Nitrofurantoin. Per EMS, she was given Zofran en route and had a systolic pressure of 70 in the EMS. The patient has a PMHx of DM2, HTN, HLD, sarcoidosis, asthma, chronic SOB and bronchitis, CVA, and breast, uterine, ovarian, and cervical cancer. She denies falling, history of CHF, chest pain, or any other symptoms at this time. No pertinent SHx noted. Pt admitted for hypotension secondary to dehydration and for further evaluation and treatment.    * No surgery found *      Indwelling Lines/Drains at time of discharge:   Lines/Drains/Airways          No matching active lines, drains, or airways        Hospital Course:   Pt was monitored closely during her stay. She received IVF for hydration. Her diuretic and norvasc were discontinued. UA showed trace Wbcs and nitrates were negative. A retroperitoneal ultrasound was done with no acute findings noted. Pt was continued on Macrodantin which was sensitive to her prior urine culture. Pt's overall condition remained stable and she was discharged to home. Her prior [facility] was to be resumed.      Consults:     Significant Diagnostic Studies: Labs:   CMP   Recent Labs  Lab 03/27/17  1922 03/28/17  0528 03/29/17  0552    141 142   K 2.9* 3.4* 3.7    110  110   CO2 20* 24 23   * 123* 91   BUN 18 18 15   CREATININE 1.3 0.8 0.7   CALCIUM 10.4 8.6* 9.1   PROT 8.8*  --   --    ALBUMIN 4.6  --   --    BILITOT 0.5  --   --    ALKPHOS 68  --   --    AST 15  --   --    ALT 21  --   --    ANIONGAP 18* 7* 9   ESTGFRAFRICA 49* >60 >60   EGFRNONAA 43* >60 >60    and CBC   Recent Labs  Lab 03/27/17  1921 03/28/17  0528 03/29/17  0552   WBC 27.50* 13.10* 8.00   HGB 15.0 11.6* 11.7*   HCT 45.3 35.3* 34.5*    251 242       Pending Diagnostic Studies:     None        Final Active Diagnoses:    Diagnosis Date Noted POA    PRINCIPAL PROBLEM:  PVD (peripheral vascular disease) [I73.9] 03/28/2017 Yes    LUIS M (acute kidney injury) [N17.9] 03/29/2017 Yes    Hypothyroidism [E03.9] 03/28/2017 Yes    Normochromic anemia [D64.9] 03/28/2017 Yes    Hypomagnesemia [E83.42] 03/28/2017 Yes    History of CVA (cerebrovascular accident) [Z86.73] 03/28/2017 Not Applicable    Mild episode of recurrent major depressive disorder [F33.0] 03/28/2017 Yes    Acute cystitis without hematuria [N30.00] 03/28/2017 Yes    Type 2 diabetes mellitus with diabetic chronic kidney disease [E11.22] 03/28/2017 Yes    Hypokalemia [E87.6] 12/21/2016 Yes    Bilateral carotid artery disease [I77.9] 09/07/2016 Yes    CKD stage 3 due to type 2 diabetes mellitus [E11.22, N18.3] 09/06/2016 Yes    Hyperlipidemia associated with type 2 diabetes mellitus [E11.69, E78.5] 05/04/2016 Yes    Hypertension associated with diabetes [E11.59, I10] 05/04/2016 Yes    Asthma [J45.909]  Yes    JOSE LUIS (obstructive sleep apnea), CPAP nightly (14) [G47.33]  Yes    Chronic pain [G89.29] 02/15/2013 Yes      Problems Resolved During this Admission:    Diagnosis Date Noted Date Resolved POA    Hypotension [I95.9] 03/27/2017 03/29/2017 Yes    Dehydration [E86.0] 12/19/2016 03/29/2017 Yes      No new Assessment & Plan notes have been filed under this hospital service since the last note was generated.  Service: Hospital  Medicine      Discharged Condition: stable    Disposition: Home-Health Care [facility]    Follow Up:  Follow-up Information     Follow up with Ladan Almodovar MD In 2 weeks.    Specialty:  Family Medicine    Why:  Follow up for UTI, dehydation, and hypotension    Contact information:    [place] [id number]  [phone]          Follow up with [facility] .    Specialties:  Physical Therapy, Home Health Services    Contact information:    [facility] [address]  [address]  [place] [address]  [phone]          Patient Instructions:     Ambulatory referral to Outpatient Case Management   Referral Priority: Routine Referral Type: Consultation   Referral Reason: Specialty Services Required    Number of Visits Requested: 1      Referral to Home health   Referral Priority: Routine Referral Type: Home Health   Referral Reason: Specialty Services Required    Referred to Provider: [facility] [place] Requested Specialty: Home Health Services   Number of Visits Requested: 1      Diet general   Order Comments: 1800 ADA diet     Activity as tolerated     Call MD for:  temperature >100.4     Call MD for:  persistent nausea and vomiting or diarrhea     Call MD for:   Order Comments: Any decline in condition     SUBSEQUENT HOME HEALTH ORDERS   Order Comments: Subsequent Home Health Orders    Current Medications:  Current Facility-Administered Medications:  0.9%  NaCl infusion, , Intravenous, Continuous, Hernando Amin III, MD, Last Rate: 125 mL/hr at 03/29/17 1239  acetaminophen tablet 650 mg, 650 mg, Oral, Q6H PRN, Hernando Amin III, MD  albuterol-ipratropium 2.5mg-0.5mg/3mL nebulizer solution 3 mL, 3 mL, Nebulization, Q6H PRN, Hernando Amin III, MD  albuterol-ipratropium 2.5mg-0.5mg/3mL nebulizer solution 3 mL, 3 mL, Nebulization, QID WAKE, Debora Goodwin, FNP, 3 mL at 03/29/17 0739  atorvastatin tablet 40 mg, 40 mg, Oral, Daily, Hernando Amin III, MD, 40 mg at 03/29/17  0924  clonazePAM tablet 0.5 mg, 0.5 mg, Oral, BID PRN, Hernando Amin III, MD, 0.5 mg at 03/28/17 2147  clopidogrel tablet 75 mg, 75 mg, Oral, Daily, Hernando Amin III, MD, 75 mg at 03/29/17 0925  dextrose 50% injection 12.5 g, 12.5 g, Intravenous, PRN, Hernando Amin III, MD  dextrose 50% injection 25 g, 25 g, Intravenous, PRN, Hernando Amin III, MD  doxepin capsule 10 mg, 10 mg, Oral, Nightly PRN, Hernando Amin III, MD, 10 mg at 03/29/17 0038  fenofibrate tablet 160 mg, 160 mg, Oral, Daily, Hernando Amin III, MD, 160 mg at 03/29/17 0924  gabapentin capsule 300 mg, 300 mg, Oral, QHS, Hernando Amin III, MD, 300 mg at 03/28/17 2140  glucagon (human recombinant) injection 1 mg, 1 mg, Intramuscular, PRN, Hernando Amin III, MD  glucose chewable tablet 16 g, 16 g, Oral, PRN, Hernando Amin III, MD  glucose chewable tablet 24 g, 24 g, Oral, PRN, Hernando Amin III, MD  insulin aspart pen 0-5 Units, 0-5 Units, Subcutaneous, QID (AC + HS) PRN, Hernando Amin III, MD  insulin NPH injection 15 Units, 15 Units, Subcutaneous, BID AC, Hernando Amin III, MD  levothyroxine tablet 25 mcg, 25 mcg, Oral, Before breakfast, Hernando Amin III, MD, 25 mcg at 03/29/17 0618  [START ON 3/30/2017] magnesium oxide tablet 400 mg, 400 mg, Oral, BID, KAYLIE Reynaga  magnesium sulfate 2g in water 50mL IVPB (premix), 2 g, Intravenous, Once, KAYLIE Reynaga, 2 g at 03/29/17 1212  metformin tablet 1,000 mg, 1,000 mg, Oral, BID WM, Hernando Amin III, MD, 1,000 mg at 03/29/17 0923  metoclopramide HCl injection 5 mg, 5 mg, Intravenous, Q6H PRN, Hernando Amin III, MD, 5 mg at 03/29/17 0926  nitrofurantoin (macrocrystal-monohydrate) 100 MG capsule 100 mg, 100 mg, Oral, BID, Hernando Amin III, MD, 100 mg at 03/29/17 0925  oxybutynin 24 hr tablet 10 mg, 10 mg, Oral, Daily, Debora Goodwin, KAYLIE, 10 mg at 03/29/17 0924  oxycodone-acetaminophen  mg per tablet 1 tablet, 1 tablet, Oral, Q6H PRN,  Charles Flores MD, 1 tablet at 03/29/17 1213  potassium chloride SA CR tablet 20 mEq, 20 mEq, Oral, Daily, KAYLIE Reynaga, 20 mEq at 03/29/17 0925  ropinirole tablet 5 mg, 5 mg, Oral, QHS, Debora Goodwin, FNP, 5 mg at 03/28/17 2140  sertraline tablet 100 mg, 100 mg, Oral, QHS, Hernando Amin III, MD, 100 mg at 03/28/17 2140        Nursing:   Routine Skin for Bedridden Patients: Instruct patient/caregiver to apply moisture barrier cream to all skin folds and wet areas in perineal area daily and after baths and all bowel movements.  Diabetic Care:   SN to perform and educate Diabetic management with blood glucose monitoring: and Fingerstick blood sugar AC and HS    Diet:   diabetic diet 1800 calorie    Activities:   activity as tolerated    Labs:  SN to perform labs:  BMP: Weekly; 2 week(s) and Report Lab results to PCP.    Referrals/ Consults   to evaluate for community resources/long-range planning.  Aide to provide assistance with personal care, ADLs, and vital signs.    Home Health Aide:  Nursing Three times weekly   Order Specific Question Answer Comments   What Home Health Agency is the patient currently using? Other/External [facility]       Medications:  Reconciled Home Medications:   Current Discharge Medication List      START taking these medications    Details   acetaminophen (TYLENOL) 325 MG tablet Take 2 tablets (650 mg total) by mouth every 6 (six) hours as needed for Pain.  Refills: 0      magnesium oxide (MAG-OX) 400 mg tablet Take 1 tablet (400 mg total) by mouth 2 (two) times daily.  Refills: 0         CONTINUE these medications which have NOT CHANGED    Details   albuterol-ipratropium 2.5mg-0.5mg/3mL (DUO-NEB) 0.5 mg-3 mg(2.5 mg base)/3 mL nebulizer solution INHALE THE CONTENTS OF 1 VIAL VIA NEBULIZER EVERY 6 HOURS AS NEEDED FOR SHORTNESS OF BREATH  OR  WHEEZING  Qty: 270 mL, Refills: 1      atorvastatin (LIPITOR) 40 MG tablet TAKE 1 TABLET EVERY DAY  Qty:  90 tablet, Refills: 0      beclomethasone (QVAR) 80 mcg/actuation Aero Inhale 2 puffs into the lungs 2 (two) times daily.  Qty: 3 each, Refills: 2      chlorzoxazone (PARAFON FORTE DSC) 500 mg Tab Take 1 tablet (500 mg total) by mouth 3 (three) times daily as needed.  Qty: 270 tablet, Refills: 1      clonazePAM (KLONOPIN) 0.5 MG tablet Take one-half to one tablet PO twice daily PRN anxiety  Qty: 45 tablet, Refills: 1    Comments: 30 day supply, no early refills      clopidogrel (PLAVIX) 75 mg tablet Take 1 tablet (75 mg total) by mouth once daily.  Qty: 90 tablet, Refills: 1    Associated Diagnoses: Complicated migraine      ergocalciferol (ERGOCALCIFEROL) 50,000 unit Cap Take 1 capsule (50,000 Units total) by mouth every 7 days.  Qty: 8 capsule, Refills: 0    Associated Diagnoses: Vitamin D deficiency      fenofibrate 160 MG Tab Take 1 tablet (160 mg total) by mouth once daily.  Qty: 90 tablet, Refills: 3      gabapentin (NEURONTIN) 100 MG capsule Take 3 capsules (300 mg total) by mouth every evening.  Qty: 270 capsule, Refills: 0    Associated Diagnoses: Polyarthralgia      insulin aspart (NOVOLOG FLEXPEN) 100 unit/mL InPn pen Use per sliding scale  Qty: 1 Box, Refills: 0      insulin NPH (NOVOLIN N) 100 unit/mL injection Inject 15 Units into the skin 2 (two) times daily before meals.  Qty: 3 vial, Refills: 1    Associated Diagnoses: Diabetes type 2, uncontrolled      levothyroxine (SYNTHROID) 25 MCG tablet TAKE 1 TABLET BEFORE BREAKFAST  Qty: 90 tablet, Refills: 3      metformin (GLUCOPHAGE) 1000 MG tablet Take 1 tablet (1,000 mg total) by mouth 2 (two) times daily with meals.      metoclopramide HCl (REGLAN) 10 MG tablet TAKE 1 TABLET EVERY 8 HOURS  Qty: 180 tablet, Refills: 1    Associated Diagnoses: Gastroparesis      nitrofurantoin, macrocrystal-monohydrate, (MACROBID) 100 MG capsule Take 1 capsule (100 mg total) by mouth 2 (two) times daily.  Qty: 28 capsule, Refills: 0    Associated Diagnoses: Recurrent UTI       ondansetron (ZOFRAN-ODT) 8 MG TbDL DISSOLVE 1 TABLET ON THE TONGUE EVERY 8 HOURS AS NEEDED  Qty: 90 tablet, Refills: 0    Associated Diagnoses: Nausea      oxybutynin (DITROPAN-XL) 10 MG 24 hr tablet Take 10 mg by mouth 2 (two) times daily.      potassium chloride (MICRO-K) 10 MEQ CpSR Take 1 capsule (10 mEq total) by mouth 3 (three) times daily.  Qty: 270 capsule, Refills: 1    Associated Diagnoses: Hypokalemia      ropinirole (REQUIP) 5 MG tablet Take 5 mg by mouth every evening.      sertraline (ZOLOFT) 100 MG tablet Take one and one-half tablets PO daily x 2 weeks, then increase to two tablets PO daily  Qty: 180 tablet, Refills: 0      sumatriptan (IMITREX) 50 MG tablet Take 50 mg by mouth daily as needed for Migraine.      VOLTAREN 1 % Gel       fluticasone (FLONASE) 50 mcg/actuation nasal spray 2 sprays by Each Nare route once daily.  Qty: 1 Bottle, Refills: 0    Associated Diagnoses: Rhinitis      nystatin (MYCOSTATIN) cream Apply to the affected area TWICE DAILY  Qty: 30 g, Refills: 1    Associated Diagnoses: Vulvitis         STOP taking these medications       amlodipine (NORVASC) 5 MG tablet Comments:   Reason for Stopping:         doxepin (SINEQUAN) 10 MG capsule Comments:   Reason for Stopping:         hydrochlorothiazide (HYDRODIURIL) 25 MG tablet Comments:   Reason for Stopping:             Time spent on the discharge of patient: 48 minutes    KAYLIE Tong  Department of Hospital Medicine  [facility]

## 2017-03-29 NOTE — PT/OT/SLP EVAL
Physical Therapy  Evaluation    Nathalie Santiago   MRN: [id number]   Admitting Diagnosis: PVD (peripheral vascular disease)    PT Received On: 03/29/17  PT Start Time: 1002     PT Stop Time: 1022    PT Total Time (min): 20 min       Billable Minutes:  Evaluation 20    Diagnosis: PVD (peripheral vascular disease)      Past Medical History:   Diagnosis Date    Abnormal Pap smear 1972    cervical cancer    Anxiety     Arthritis     Asthma     Ataxia     Breast cancer     Cancer     bilateral mastectomy, uterine cancer, ovarian cancer    Cataract     Cervical back pain with evidence of disc disease     Cervical cancer     Chronic bilateral low back pain without sciatica 9/6/2016    Chronic bronchitis     Cortical cataract 2/18/2013    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    CVA (cerebral vascular accident)- Confirmed by neurology 9/6/2016    Degenerative joint disease of cervical and lumbar spine 8/1/2014    Depression     Diabetes mellitus     Diabetes mellitus, type II     History of malignant phylloides tumor of breast 2/15/2013    Hyperlipidemia     Hyperopia with presbyopia 2/18/2013    Hypertension     Hypertension     Hypothyroidism     Immunosuppressed status 9/3/2015    Migraine     Mitral valve regurgitation     Nuclear sclerosis 2/18/2013    Obesity     JOSE LUIS (obstructive sleep apnea)     Ovarian cancer     Pneumonia     Polyneuropathy     PVD (posterior vitreous detachment) 2/18/2013    Restless leg syndrome     Ruptured disk     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    Sarcoidosis 2006    Sarcoidosis of lung     Squamous cell carcinoma     Trouble in sleeping     Uterine cancer     Venous insufficiency       Past Surgical History:   Procedure Laterality Date    APPENDECTOMY      BREAST SURGERY      CHOLECYSTECTOMY      CYSTOCELE REPAIR      FRACTURE SURGERY Right     foot    HYSTERECTOMY      SAADIA/BSO    MASTECTOMY Bilateral     b/l mastectomy     OOPHORECTOMY      PLEURA BIOPSY      RECTAL SURGERY      rectocele      TONSILLECTOMY         Referring physician: Mark  Date referred to PT: 2017    General Precautions: Standard, fall  Orthopedic Precautions: N/A   Braces: N/A            Patient History:  Lives With: sibling(s)  Living Arrangements: house  Home Accessibility: stairs to enter home  Number of Stairs to Enter Home: 3  Transportation Available: car, family or friend will provide  Equipment Currently Used at Home: cane, quad- uses prn  DME owned (not currently used): rolling walker    Previous Level of Function:  Ambulation Skills: independent  Transfer Skills: independent  ADL Skills: independent    Subjective:  Communicated with nurse Celis prior to session.  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    Chief Complaint: headache  Patient goals:     Pain Ratin/10         Location: head  Pain Addressed: Nurse notified, Distraction       Objective:   Patient found with: peripheral IV     Cognitive Exam:  Oriented to: Person, Place, Time and Situation    Follows Commands/attention: Follows multistep  commands  Communication: clear/fluent  Safety awareness/insight to disability: intact    Physical Exam:  Postural examination/scapula alignment: Rounded shoulder and Head forward    Skin integrity: Visible skin intact and Dry  Edema: None noted     Sensation:   Intact    Upper Extremity Range of Motion:  Right Upper Extremity: WFL  Left Upper Extremity: WFL    Upper Extremity Strength:  Right Upper Extremity: WFL  Left Upper Extremity: WFL    Lower Extremity Range of Motion:  Right Lower Extremity: WFL  Left Lower Extremity: WFL    Lower Extremity Strength:  Right Lower Extremity: 4-/5  Left Lower Extremity: 4/5     Fine motor coordination:      Gross motor coordination:     Functional Mobility:  Bed Mobility:  Rolling/Turning to Left:  Stand by assistance  Scooting/Bridging: Stand by Assistance  Supine to Sit: Contact Guard Assistance  Sit to Supine: Minimum Assistance, Contact Guard Assistance    Transfers:  Sit <> Stand Assistance: Minimum Assistance  Sit <> Stand Assistive Device: Rolling Walker    Gait:   Gait Distance: 75 ft  Assistance 1: Minimum assistance  Gait Assistive Device: Rolling walker  Gait Deviation(s): decreased amber, decreased step length    Stairs:      Balance:   Static Sit: GOOD: Takes MODERATE challenges from all directions  Dynamic Sit: GOOD: Maintains balance through MODERATE excursions of active trunk movement  Static Stand: GOOD: Takes MODERATE challenges from all directions  Dynamic stand: FAIR: Needs CONTACT GUARD during gait    Therapeutic Activities and Exercises:  Pt sat EOB with no complaints of dizziness or worsening of HA symptoms. Pt sensitive to light so squinted eyes during session. Pt ambulated to hallways with RW. Pt returned to bed with all needs met. Pt reported waiting on nurse to bring HA medication.      AM-PAC 6 CLICK MOBILITY  How much help from another person does this patient currently need?   1 = Unable, Total/Dependent Assistance  2 = A lot, Maximum/Moderate Assistance  3 = A little, Minimum/Contact Guard/Supervision  4 = None, Modified [place]/Independent          AM-PAC Raw Score CMS G-Code Modifier Level of Impairment Assistance   6 % Total / Unable   7 - 9 CM 80 - 100% Maximal Assist   10 - 14 CL 60 - 80% Moderate Assist   15 - 19 CK 40 - 60% Moderate Assist   20 - 22 CJ 20 - 40% Minimal Assist   23 CI 1-20% SBA / CGA   24 CH 0% Independent/ Mod I     Patient left supine with all lines intact and call button in reach.    Assessment:   Nathalie Santiago is a 66 y.o. female with a medical diagnosis of PVD (peripheral vascular disease) and presents with LE weakness and gait instability. PTA pt reports being independent and still driving. Pt stated brother works 16 hrs a day so  she will mostly take care of herself upon discharge. Pt's participation today limited by HA pain. Pt would benefit from continued therapy to address functional deficits.    Rehab identified problem list/impairments: Rehab identified problem list/impairments: weakness, impaired functional mobilty, gait instability    Rehab potential is fair.    Activity tolerance: Fair    Discharge recommendations: Discharge Facility/Level Of Care Needs: home     Barriers to discharge:      Equipment recommendations:       GOALS:   Physical Therapy Goals        Problem: Physical Therapy Goal    Goal Priority Disciplines Outcome Goal Variances Interventions   Physical Therapy Goal    High PT/OT, PT      Description:  Goals to be met by: 2017     Patient will increase functional independence with mobility by performin. Sit to stand transfer with Contact Guard Assistance  2. Bed to chair transfer with Contact Guard Assistance using Rolling Walker  3. Gait  x 240 feet with Contact Guard Assistance using Rolling Walker.   4. Lower extremity exercise program x20 reps per handout, with independence                PLAN:    Patient to be seen 6 x/week to address the above listed problems via gait training, therapeutic activities, therapeutic exercises  Plan of Care expires: 17  Plan of Care reviewed with: patient      Minnie Ng, SPT  2017      I certify that I was present in the room directing the student in service delivery and guiding them using my skilled judgment. As the co-signing therapist I have reviewed the students documentation and am responsible for the treatment, assessment, and plan.     Samira Cole, PT  2017

## 2017-03-30 ENCOUNTER — OUTPATIENT CASE MANAGEMENT (OUTPATIENT)
Dept: ADMINISTRATIVE | Facility: OTHER | Age: 67
End: 2017-03-30

## 2017-03-30 ENCOUNTER — PATIENT OUTREACH (OUTPATIENT)
Dept: ADMINISTRATIVE | Facility: CLINIC | Age: 67
End: 2017-03-30
Payer: MEDICARE

## 2017-03-30 NOTE — NURSING
IV removed from right thumb with cathelon intact.  Medication list and new prescriptions given to patient.  Pt. Is a retired nurse and very knowledgeable about medications.  Instructed pt. On new prescription of  Nystatin.  Pt. Denies need for further instruction.  DC'd to home, taken to sister car per wheelchair.

## 2017-03-30 NOTE — PATIENT INSTRUCTIONS
Discharge Instructions for Peripheral Vascular Disease  You have been diagnosed with peripheral vascular disease. Peripheral blood vessels deliver oxygen-rich blood to your legs and feet. Over time, your blood vessel walls may thicken as they build up with plaque (a fatlike substance). As plaque builds up in an artery, blood flow can be reduced or even blocked, causing peripheral vascular disease. This can lead to pain while walking (claudication), pain at rest, and even ulcers or gangrene.  Home Care  Maintain a healthy weight. Get help to lose any extra pounds.  Cut back on salt.  Limit canned, dried, packaged, and fast foods.  Dont add salt to your food at the table.  Season foods with herbs instead of salt when you cook.  Begin an exercise program. Ask your doctor how to get started. You can benefit from simple activities such as walking or gardening.  Make an effort to break your smoking habit. Enroll in a stop-smoking program to improve your chances of success.  Take your medications exactly as directed. Dont skip doses.  If you have diabetes, manage your blood sugar as directed by your health care provider.  Follow-Up  Make a follow-up appointment as directed by our staff. Talk to your health care provider about treatment options. These may include an exercise program, medications, angioplasty, or surgery.    When to Call Your Doctor  Call your doctor immediately if you have any of the following:  Pain in your legs or a sensation that your legs are giving out  Persistent tingling, numbness, weakness, or coldness in your feet  Change in the color of your toes  Open sores that wont heal on your toes, feet, or legs  Chest pain  Shortness of breath  Trouble speaking or understanding   © 5080-6764 [facility], [address] [place] [address], [place] [address]. All rights reserved. This information is not intended as a substitute for professional medical care. Always follow your healthcare professional's  instructions.

## 2017-03-30 NOTE — PROGRESS NOTES
Please note the following patient has been assigned to Noemi Michaels RN  with [facility] for screening.    Please contact [facility] at ext [phone] with questions.    Thank you    Celina Hardy, SSC

## 2017-03-31 LAB — BACTERIA STL CULT: NORMAL

## 2017-04-07 ENCOUNTER — OUTPATIENT CASE MANAGEMENT (OUTPATIENT)
Dept: ADMINISTRATIVE | Facility: OTHER | Age: 67
End: 2017-04-07

## 2017-04-07 NOTE — PROGRESS NOTES
04/07/17-Called and left message for patient on her voice mail. Called and spoke to patient's sister Marina and left voice mail for her to call me back. 1'st attempt to screen patient.

## 2017-04-10 ENCOUNTER — DOCUMENTATION ONLY (OUTPATIENT)
Dept: FAMILY MEDICINE | Facility: CLINIC | Age: 67
End: 2017-04-10

## 2017-04-10 NOTE — PROGRESS NOTES
Pre-Visit Chart Review  For Appointment Scheduled on 04/11/2017      Health Maintenance Due   Topic Date Due    Foot Exam  03/03/2017

## 2017-04-11 ENCOUNTER — TELEPHONE (OUTPATIENT)
Dept: FAMILY MEDICINE | Facility: CLINIC | Age: 67
End: 2017-04-11

## 2017-04-11 DIAGNOSIS — E87.6 HYPOKALEMIA: Primary | ICD-10-CM

## 2017-04-11 NOTE — TELEPHONE ENCOUNTER
----- Message from Lolis Greco sent at 4/11/2017 10:32 AM CDT -----  Call [phone]  / pt cancelled hospital follow up appt with Miss Gallagher .. Asking to discuss rescheduling an appointment with the nurse

## 2017-04-11 NOTE — TELEPHONE ENCOUNTER
Please advise.. Patient requesting for another hospital follow up with the same provider. Stated she had to cancel because she has been having diarrhea. Can you please call patient if you have something sooner than May 1st that I offered her with a different provider

## 2017-04-11 NOTE — TELEPHONE ENCOUNTER
Received BMP results from [facility] with my name, I do not remember ordering this.  Anyway her potassium is low at 3.2 needs to eat potassium rich foods, have repeat BMP in 2 weeks

## 2017-04-13 ENCOUNTER — TELEPHONE (OUTPATIENT)
Dept: FAMILY MEDICINE | Facility: CLINIC | Age: 67
End: 2017-04-13

## 2017-04-13 NOTE — TELEPHONE ENCOUNTER
Can someone please call this patient. On 4/11/17 I received labs from [place] and potassium was low.  Maria Guadalupe see my phone message from 4/11/17.  She needed to eat potassium rich foods or drink gatorade continue with potassium supplementation and have repeat BMP in 2 weeks.  Today I get another BMP, 1 day later, and of course potassium is still low.     How much potassium is she taking, 1 tab TID and why did the lab work get done in one day?

## 2017-04-13 NOTE — TELEPHONE ENCOUNTER
Left message for patient to return call,attempted to call patient  unable to leave message mailbox full.    Left message for patient to check patient portal if she does not call back by today.

## 2017-04-17 ENCOUNTER — TELEPHONE (OUTPATIENT)
Dept: FAMILY MEDICINE | Facility: CLINIC | Age: 67
End: 2017-04-17

## 2017-04-17 ENCOUNTER — OUTPATIENT CASE MANAGEMENT (OUTPATIENT)
Dept: ADMINISTRATIVE | Facility: OTHER | Age: 67
End: 2017-04-17

## 2017-04-17 NOTE — TELEPHONE ENCOUNTER
Please call patient. Stated she believes she has an upcoming appointment with Dr Pat, but i did not see anything scheduled

## 2017-04-17 NOTE — TELEPHONE ENCOUNTER
Returned call to patient. The soonest hospital follow up in this clinic is 5/1. Patient declined because of the time of day. Scheduled 5/2/. Patient was made aware that it would be out of the time frame as of today for the 7-14 day hospital follow up. She stated that is fine. i asked her how is she feeling, stated she is still feeling a little poorly. Offered her a sooner appointment at our [facility] location, but patient declined.

## 2017-04-17 NOTE — PROGRESS NOTES
04/17/17-Called and spoke to Nathalie Santiago, 66 year old female.Patient is a retired nurse.  Patient is paying $400.00 to $500.00 dollars every month for her medications. States that she just puts her medications on a credit card. Per patient her most expensive meds are duo nebs, QVAR, plavix, potassium chloride, voltaren gel. Will send message to pharmacy assistance. Patient pays $50.00 every time she sees a specialist. Will send patient [facility] financial assistance after talking to them that they may be able to provide assistance. They look at the person's income not the income of everyone in the household. Patient is not taking her medications, states that she procrastinates and then forgets to take her medications. Will send patient a pill box organizer. Patient takes her thyroid pill with all her other pills when she gets up at 10:00 every morning. Patient has gastroparesis and sees Dr. Aly. Patient does not check her blood glucose or eat properly. Will encourage her to see the dietician. Will mail to patient-Diabetes Nutrition Placemat, Pill Box Organizer, [facility] Financial Assistance, Diabetic Logs, Using a Blood Sugar Log, hypoglycemia and hyperglycemia, gastroparesis, and preventing falls:Making changes in your living space. Will follow up with patient in one week.

## 2017-04-19 ENCOUNTER — TELEPHONE (OUTPATIENT)
Dept: FAMILY MEDICINE | Facility: CLINIC | Age: 67
End: 2017-04-19

## 2017-04-19 DIAGNOSIS — M25.50 POLYARTHRALGIA: ICD-10-CM

## 2017-04-19 RX ORDER — GABAPENTIN 100 MG/1
CAPSULE ORAL
Qty: 270 CAPSULE | Refills: 0 | Status: SHIPPED | OUTPATIENT
Start: 2017-04-19 | End: 2017-06-21 | Stop reason: SDUPTHER

## 2017-04-19 RX ORDER — DOXEPIN HYDROCHLORIDE 10 MG/1
CAPSULE ORAL
Qty: 90 CAPSULE | Refills: 0 | OUTPATIENT
Start: 2017-04-19

## 2017-04-19 RX ORDER — DIAZEPAM 10 MG/1
10 TABLET ORAL NIGHTLY
COMMUNITY
End: 2017-04-20

## 2017-04-19 RX ORDER — HYDROCHLOROTHIAZIDE 25 MG/1
25 TABLET ORAL DAILY
COMMUNITY
End: 2019-06-13 | Stop reason: SDUPTHER

## 2017-04-19 RX ORDER — OXYCODONE AND ACETAMINOPHEN 10; 325 MG/1; MG/1
1 TABLET ORAL
COMMUNITY
End: 2017-05-19 | Stop reason: SDUPTHER

## 2017-04-19 RX ORDER — AMLODIPINE BESYLATE 5 MG/1
5 TABLET ORAL DAILY
COMMUNITY
End: 2018-08-22 | Stop reason: SDUPTHER

## 2017-04-19 NOTE — TELEPHONE ENCOUNTER
----- Message from Noemi Michaels sent at 4/18/2017 12:32 PM CDT -----  Contact: Noemi Michaels RN [facility] EXT. [id number]  Med Rec done with patient. These medications are not on her list that she  also takes.  Diazepam 10 mg every night  Norvasc 5 mg daily  Hydrochlorothiazide 25 mg daily  Percocet 10/325  TID a day PRN     Please update Med Rec.   Thank you for your assistance,   [facility]els [facility]

## 2017-04-20 ENCOUNTER — TELEPHONE (OUTPATIENT)
Dept: FAMILY MEDICINE | Facility: CLINIC | Age: 67
End: 2017-04-20

## 2017-04-20 ENCOUNTER — OFFICE VISIT (OUTPATIENT)
Dept: PSYCHIATRY | Facility: CLINIC | Age: 67
End: 2017-04-20
Payer: MEDICARE

## 2017-04-20 VITALS
HEIGHT: 66 IN | SYSTOLIC BLOOD PRESSURE: 148 MMHG | DIASTOLIC BLOOD PRESSURE: 81 MMHG | BODY MASS INDEX: 26.01 KG/M2 | WEIGHT: 161.81 LBS | HEART RATE: 63 BPM

## 2017-04-20 DIAGNOSIS — F41.1 GAD (GENERALIZED ANXIETY DISORDER): ICD-10-CM

## 2017-04-20 DIAGNOSIS — F33.41 MDD (MAJOR DEPRESSIVE DISORDER), RECURRENT, IN PARTIAL REMISSION: ICD-10-CM

## 2017-04-20 DIAGNOSIS — G47.00 INSOMNIA, UNSPECIFIED TYPE: ICD-10-CM

## 2017-04-20 PROCEDURE — 3077F SYST BP >= 140 MM HG: CPT | Mod: S$GLB,,, | Performed by: PSYCHIATRY & NEUROLOGY

## 2017-04-20 PROCEDURE — 99499 UNLISTED E&M SERVICE: CPT | Mod: S$GLB,,, | Performed by: PSYCHIATRY & NEUROLOGY

## 2017-04-20 PROCEDURE — 1157F ADVNC CARE PLAN IN RCRD: CPT | Mod: S$GLB,,, | Performed by: PSYCHIATRY & NEUROLOGY

## 2017-04-20 PROCEDURE — 1160F RVW MEDS BY RX/DR IN RCRD: CPT | Mod: S$GLB,,, | Performed by: PSYCHIATRY & NEUROLOGY

## 2017-04-20 PROCEDURE — 99999 PR PBB SHADOW E&M-EST. PATIENT-LVL II: CPT | Mod: PBBFAC,,, | Performed by: PSYCHIATRY & NEUROLOGY

## 2017-04-20 PROCEDURE — 1159F MED LIST DOCD IN RCRD: CPT | Mod: S$GLB,,, | Performed by: PSYCHIATRY & NEUROLOGY

## 2017-04-20 PROCEDURE — 99213 OFFICE O/P EST LOW 20 MIN: CPT | Mod: S$GLB,,, | Performed by: PSYCHIATRY & NEUROLOGY

## 2017-04-20 PROCEDURE — 3079F DIAST BP 80-89 MM HG: CPT | Mod: S$GLB,,, | Performed by: PSYCHIATRY & NEUROLOGY

## 2017-04-20 RX ORDER — PREDNISONE 5 MG/1
5 TABLET ORAL DAILY
COMMUNITY
End: 2018-08-22 | Stop reason: SDUPTHER

## 2017-04-20 RX ORDER — DOXEPIN HYDROCHLORIDE 10 MG/1
10 CAPSULE ORAL NIGHTLY
COMMUNITY
End: 2017-04-20 | Stop reason: SDUPTHER

## 2017-04-20 RX ORDER — DOXEPIN HYDROCHLORIDE 10 MG/1
CAPSULE ORAL
Qty: 180 CAPSULE | Refills: 1 | Status: SHIPPED | OUTPATIENT
Start: 2017-04-20 | End: 2017-07-19 | Stop reason: SDUPTHER

## 2017-04-20 RX ORDER — CLONAZEPAM 0.5 MG/1
TABLET ORAL
Qty: 60 TABLET | Refills: 2 | Status: SHIPPED | OUTPATIENT
Start: 2017-04-20 | End: 2017-07-19 | Stop reason: ALTCHOICE

## 2017-04-20 RX ORDER — DULOXETIN HYDROCHLORIDE 30 MG/1
30 CAPSULE, DELAYED RELEASE ORAL DAILY
Qty: 90 CAPSULE | Refills: 0 | Status: SHIPPED | OUTPATIENT
Start: 2017-04-20 | End: 2017-06-21 | Stop reason: SDUPTHER

## 2017-04-20 RX ORDER — SERTRALINE HYDROCHLORIDE 100 MG/1
TABLET, FILM COATED ORAL
Qty: 180 TABLET | Refills: 1 | Status: SHIPPED | OUTPATIENT
Start: 2017-04-20 | End: 2017-07-19 | Stop reason: SDUPTHER

## 2017-04-20 NOTE — TELEPHONE ENCOUNTER
----- Message from Wilian Stewart sent at 4/20/2017  9:01 AM CDT -----  Contact: [facility]-   Codqrit-[phone]  Patient asking to be discharged from home health,patient will discharged next week. Thanks!

## 2017-04-20 NOTE — MR AVS SNAPSHOT
[place] [facility]  [address]  [place] [address]  Phone: [phone]                  Nathalie Santiago   2017 11:00 AM   Office Visit    Description:  Female : 1950   Provider:  Fauzia Pat MD   Department:  [place] [facility]           Diagnoses this Visit        Comments    GARFIELD (generalized anxiety disorder)         Insomnia, unspecified type         MDD (major depressive disorder), recurrent, in partial remission                To Do List           Future Appointments        Provider Department Dept Phone    2017 1:00 PM NATAN Alvarez [place] [facility] [phone]    5/10/2017 11:00 AM Maylin Doran PA-C [place] [facility] [phone]      Goals (5 Years of Data)     Patient/caregiver will have an action plan in place to manage and prevent complications of  not taking her medications  prior to discharge from [facility].     Notes - Note edited  2017  9:04 AM by Noemi Michaels    Overall Time to Completion  2 months from 2017    Short Term Goals  Patient/caregiver will discuss health care needs with Physician and care team during visits or using Patient Portal.  Interventions   · Collaborate with Physician as appropriate to meet patient needs.  · Empower patient/caregiver to discuss treatment plan with Physician/care team.  · In basket message sent to Physician regarding medication discrepancies.  · Provide contact information for [facility] information.  · Provide contact information for Outpatient Case Management contact information.  · Provide contact information for Physician office phone number.   Status  · Partially met    Patient/caregiver will have contact information for identified community resources IE:[facility] , Pharmacy assistance program, and financial services  for follow-up within 1 month.  Interventions   · [facility]  · Refer to Outpatient Case Management  .   · Provide contact information for [facility]    Status  · Partially met    Patient/caregiver will notify RN CCM if patient [facility] not hear [facility]  and Pharmacy Assistance within 2 weeks.  Interventions   · Refer to [facility]acy Assistance [facility].  · Refer to Outpatient Case Management Social Worker.   Status  · Partially met    Patient/caregiver will utilize a pill box organizer to dispense medications daily within 1 month.  Interventions   · Complete medication reconciliation.  · Encourage Medication Compliance.   · Provide pill box organizer to patient    Status  · Partially met    Patient/caregiver will verbalize dosage/route/frequency/indication of all medications  within Now.  Interventions   · Complete medication reconciliation    Status  · Met          Patient/caregiver will have an action plan in place to manage and prevent complications of diabetes prior to discharge from [facility].     Notes - Note edited  4/18/2017 10:02 AM by Noemi Michaels    Overall Time to Completion  2 months from 04/18/2017    Short Term Goals  Patient/caregiver will identify 2 deficits resulting from Diabetes and list 2 ways to overcome those deficits within 2 months.  Interventions   · Recognize and provide educational material (RONIT).  · Encourage Dietary Compliance.  · Review eating/nutrition habits.  · Encourage Medication Compliance.   Status  · Partially met      Patient/caregiver will measure and record the capillary blood glucose 1 times per day for 2 months.  Interventions   · Recognize and provide educational material (RONIT).   · Mail blood glucose logs for home use.    Status  · Partially met    Patient/caregiver will verbalize food required to create a well-balanced food plate within 2 months.  Interventions   Recognize and provide educational material (RONIT).  Encourage Dietary Compliance  Review eating/nutrition habits.   Status  · Partially met        Clinical  Reference Documents Added to Patient Instructions       Document    BLOOD SUGAR LOG, USING A (ENGLISH)    BLOOD SUGAR, LOW; HYPOGLYCEMIA (ENGLISH)    Diabetes Nutrition Placemat     GASTROPARESIS (ENGLISH)    HYPERGLYCEMIA (HIGH BLOOD SUGAR) (ENGLISH)                Patient/caregiver will have an action plan in place to manage and prevent complications of falls  prior to discharge from [facility].     Notes - Note edited  4/18/2017  9:42 AM by Noemi Michaels    Overall Time to Completion  2 months from 04/18/2017    Short Term Goals  Patient/caregiver will put in place 1 keeping room free of clutter measures to decrease the risk of patient falls within 2 months.  Interventions   · Recognize and provide educational material (RONIT).   Status  · Partially met        Clinical Reference Documents Added to Patient Instructions       Document            FALLS, PREVENTING, MAKING CHANGES IN YOUR LIVING SPACE (ENGLISH)                          Follow-Up and Disposition     Return in about 3 months (around 7/20/2017).       These Medications        Disp Refills Start End    duloxetine (CYMBALTA) 30 MG capsule 90 capsule 0 4/20/2017 5/20/2017    Take 1 capsule (30 mg total) by mouth once daily. - Oral    Pharmacy: [facility] - [address] Ph #: [phone]       sertraline (ZOLOFT) 100 MG tablet 180 tablet 1 4/20/2017     Take two tablets PO daily    Pharmacy: [facility] - [address] Ph #: [phone]       doxepin (SINEQUAN) 10 MG capsule 180 capsule 1 4/20/2017     Take one to two capsules PO nightly PRN insomnia    Pharmacy: [facility] - [address] Ph #: [phone]       clonazePAM (KLONOPIN) 0.5 MG tablet 60 tablet 2 4/20/2017     Take one-half to one tablet PO twice daily PRN anxiety    Pharmacy: [id number] - [place] - [address] Ph #: [phone]       Notes to  Pharmacy: 30 day supply, no early refills      [facility]     [facility] On Call Nurse Care Line - 24/7 Assistance  Unless otherwise directed by your provider, please contact [facility], our nurse care line that is available for 24/7 assistance.     Registered nurses in the [facility] Center provide: appointment scheduling, clinical advisement, health education, and other advisory services.  Call: [phone] (toll free)               Medications           Message regarding Medications     Verify the changes and/or additions to your medication regime listed below are the same as discussed with your clinician today.  If any of these changes or additions are incorrect, please notify your healthcare provider.        START taking these NEW medications        Refills    duloxetine (CYMBALTA) 30 MG capsule 0    Sig: Take 1 capsule (30 mg total) by mouth once daily.    Class: Normal    Route: Oral    doxepin (SINEQUAN) 10 MG capsule 1    Sig: Take one to two capsules PO nightly PRN insomnia    Class: Normal      CHANGE how you are taking these medications     Start Taking Instead of    sertraline (ZOLOFT) 100 MG tablet sertraline (ZOLOFT) 100 MG tablet    Dosage:  Take two tablets PO daily Dosage:  Take one and one-half tablets PO daily x 2 weeks, then increase to two tablets PO daily    Reason for Change:  Reorder       STOP taking these medications     acetaminophen (TYLENOL) 325 MG tablet Take 2 tablets (650 mg total) by mouth every 6 (six) hours as needed for Pain.    diazePAM (VALIUM) 10 MG Tab Take 10 mg by mouth every evening.    nystatin (MYCOSTATIN) cream Apply to the affected area TWICE DAILY           Verify that the below list of medications is an accurate representation of the medications you are currently taking.  If none reported, the list may be blank. If incorrect, please contact your healthcare provider. Carry this list with you in case of emergency.           Current Medications      albuterol-ipratropium 2.5mg-0.5mg/3mL (DUO-NEB) 0.5 mg-3 mg(2.5 mg base)/3 mL nebulizer solution INHALE THE CONTENTS OF 1 VIAL VIA NEBULIZER EVERY 6 HOURS AS NEEDED FOR SHORTNESS OF BREATH  OR  WHEEZING    amlodipine (NORVASC) 5 MG tablet Take 5 mg by mouth once daily.    atorvastatin (LIPITOR) 40 MG tablet TAKE 1 TABLET EVERY DAY    beclomethasone (QVAR) 80 mcg/actuation Aero Inhale 2 puffs into the lungs 2 (two) times daily.    chlorzoxazone (PARAFON FORTE DSC) 500 mg Tab Take 1 tablet (500 mg total) by mouth 3 (three) times daily as needed.    clonazePAM (KLONOPIN) 0.5 MG tablet Take one-half to one tablet PO twice daily PRN anxiety    clopidogrel (PLAVIX) 75 mg tablet Take 1 tablet (75 mg total) by mouth once daily.    doxepin (SINEQUAN) 10 MG capsule Take one to two capsules PO nightly PRN insomnia    ergocalciferol (ERGOCALCIFEROL) 50,000 unit Cap Take 1 capsule (50,000 Units total) by mouth every 7 days.    fenofibrate 160 MG Tab Take 1 tablet (160 mg total) by mouth once daily.    fluticasone (FLONASE) 50 mcg/actuation nasal spray 2 sprays by Each Nare route once daily.    gabapentin (NEURONTIN) 100 MG capsule TAKE 3 CAPSULES EVERY EVENING    hydrochlorothiazide (HYDRODIURIL) 25 MG tablet Take 25 mg by mouth once daily.    insulin aspart (NOVOLOG FLEXPEN) 100 unit/mL InPn pen Use per sliding scale    insulin NPH (NOVOLIN N) 100 unit/mL injection Inject 15 Units into the skin 2 (two) times daily before meals.    levothyroxine (SYNTHROID) 25 MCG tablet TAKE 1 TABLET BEFORE BREAKFAST    magnesium oxide (MAG-OX) 400 mg tablet Take 1 tablet (400 mg total) by mouth 2 (two) times daily.    metformin (GLUCOPHAGE) 1000 MG tablet Take 1 tablet (1,000 mg total) by mouth 2 (two) times daily with meals.    metoclopramide HCl (REGLAN) 10 MG tablet TAKE 1 TABLET EVERY 8 HOURS    ondansetron (ZOFRAN-ODT) 8 MG TbDL DISSOLVE 1 TABLET ON THE TONGUE EVERY 8 HOURS AS NEEDED    oxybutynin (DITROPAN-XL) 10 MG 24 hr tablet Take 10  "mg by mouth 2 (two) times daily.    oxycodone-acetaminophen (PERCOCET)  mg per tablet Take 1 tablet by mouth every 3 (three) hours as needed for Pain.    potassium chloride (MICRO-K) 10 MEQ CpSR Take 1 capsule (10 mEq total) by mouth 3 (three) times daily.    predniSONE (DELTASONE) 5 MG tablet Take 5 mg by mouth once daily.    ropinirole (REQUIP) 5 MG tablet Take 5 mg by mouth every evening.    sertraline (ZOLOFT) 100 MG tablet Take two tablets PO daily    sumatriptan (IMITREX) 50 MG tablet Take 50 mg by mouth daily as needed for Migraine.    VOLTAREN 1 % Gel     duloxetine (CYMBALTA) 30 MG capsule Take 1 capsule (30 mg total) by mouth once daily.           Clinical Reference Information           Your Vitals Were     BP Pulse Height Weight Last Period BMI    148/81 63 5' 6" (1.676 m) 73.4 kg (161 lb 13.1 oz) (LMP Unknown) 26.12 kg/m2      Blood Pressure          Most Recent Value    BP  (!)  148/81      Allergies as of 4/20/2017     Keflex [Cephalexin]    Penicillins    Vasotec [Enalapril Maleate]    Ciprofloxacin (Bulk)    Wellbutrin [Bupropion Hcl]    Zithromax [Azithromycin]    Codeine      Immunizations Administered on Date of Encounter - 4/20/2017     None      Language Assistance Services     ATTENTION: Language assistance services are available, free of charge. Please call [phone].      ATENCIÓN: Si habla marly, tiene a floyd disposición servicios gratuitos de asistencia lingüística. Llame al [phone].     [facility] Ý: N?u b?n nói Ti?ng Vi?t, có các d?ch v? h? tr? ngôn ng? mi?n phí dành cho b?n. G?i s? [phone].         [place] [facility] complies with applicable Federal civil rights laws and does not discriminate on the basis of race, color, national origin, age, disability, or sex.        "

## 2017-04-20 NOTE — TELEPHONE ENCOUNTER
"Returned call. The phone rung once then a message picked up stating, "enter the meeting number followed by the pound key." There was only a phone number left to return call, not a meeting number to enter   "

## 2017-04-20 NOTE — PROGRESS NOTES
Outpatient Psychiatry Follow-Up Visit (MD/NP)    4/20/2017    Clinical Status of Patient:  Outpatient (Ambulatory)    Chief Complaint:  Nathalie Santiago is a 66 y.o. female who presents today for follow-up of depression, anxiety, adjustment.  Met with patient.      Interval History and Content of Current Session:   Interim Events/Subjective Report/Content of Current Session:     Pt presents for follow up of major depression, GARFIELD, insomnia.   Pt reports depression and anxiety began in context of significant psychosocial stressors. Her mother passed away in 2000, her father in 2004. She was diagnosed with breast cancer in 2005, had bilateral mastectomies and then a failed reconstruction. She also has sarcoidosis of the lungs. She sold her home in [place] and moved to [place] because she could no longer care for herself due to medical issues. She has become increasingly dependent on others. She moved back here several years ago and is currently living with her brother and his significant other. The relationship with the latter is strained; her niece who has battled addiction is currently serving time in [employer].   Pt has had significant health issues with worsening depression/anxiety in the last several months.     Pt called in interim 3/17 : Spoke to the patient on the phone - she denies suicidal ideations or homicidal ideations; She is feeling stressed, overwhelmed. Getting over UTI that she has had x 1 month. Not following diabetic diet, eating to discomfort/emesis last night. She has not been checking her blood sugar like she should. She takes morning medications later in the day (She denies skipping medications). She is titrating Sertraline now at 150 mg daily - will titrate to 200 mg daily 3/11/17. Doxepin helping with sleep, but last night had issues with RLS (has had in past too).   She takes percocet only once daily for pain - reduced due to opioid induced constipation.      Pt articulate on phone, tearful, feels she  "should be taking better care of herself; pt requesting to restart Clonazepam.      Agreed to restart low dose clonazepam 0.25-0.5 mg BID prn anxiety - # 45 tabs, 30 day supply. Follow up appt 4/20/17      Encouraged pt to restart medications on daily basis as prescribed - non compliance may be contributing to her poor energy, motivation.      - Call to report any worsening of symptoms or problems with the medication     Discussed risk of interaction with Percocet - risk of respiratory depression - clonazepam should not be taken with this medication.     Pt had severe gastroenteritis and was hospitalized in interim.  BP 60/40s  UTI finally treated per patient - symptoms resolved.      Tolerating PO well now.  Intentional wt loss;  She eats twice daily,   Walks in yard.   Will be d/c from home health next week.      She does not recall the month of December at all.    Still not motivated to do projects.     Tired.   Her endurance is practically zilch.  + muscle fatigue.  Trying to eat more protein.  She may go to [place] this wekend.    Her niece is in skilled nursing;  Brother's SO flips out easily.   She steers clear of her sister,  Brother's SO.        She is taking clonazepam BID (night and during the day - one tablet).   Doxepin helpful.  She is falling asleep.  She feels she could use a higher dose   She does take Percocet most days pain level 3-4/10 (lower back pain);  watching great niece Fozia still which she enjoys      Chart and  reviewed - pt denies taking Diazepam as lasted by , not seen either on     She is restless/agitated.  No panic attacks.  No excessive worry.   No signfiiacnt irritability     Depression is improving.  She feels her sister in law would argue against this   Not tearful, waking up more rested  Denies suicidal/homicidal ideations.  Hopeful - she has new great grandchild   Insecure about these "medical crashes" she has had - family hypervigilant of her   She took no meds all " "weekend due to procrastination   "I will get to it later."     Pt is wearing make up, jewelry today.   Self care is good;  She gets up 10-11 am usually, goes to be at 2-3 am typical pattern for her by preference    Sleep onset within 20 mins     Denies symptoms of arnel/psychosis.     Denies alcohol/drug use.  Caffeine: mild, 1 cup coffee and one soft drink     no hx suicide attempts, no self harm.  Guns in home are locked up.    Sleep issues have been chronic.  She used to take Dalmane and Temazepam.      Prior meds: Trazodone (no benefit), mirtazapine, citalopram, duloxetine, temazepam, dalmane, melatonin (3 mg ineffective, higher dose HA), ativan, wellbutrin XL, clonazepam, amitriptyline     Review of Systems   PSYCHIATRIC: see above  CONSTITUTIONAL: weight loss  MUSCULOSKELETAL:  Back pain  RESPIRATORY: occasional exertional SOB   : symptoms resolved     Past Medical, Family and Social History: The patient's past medical, family and social history have been reviewed and updated as appropriate within the electronic medical record - see encounter notes.    Medications:  Sertraline 100 mg daily   Clonazepam 0.25-0.5 mg BID prn anxiety   Doxepin 10 mg nightly prn insomnia   Percoet, Imitrex, Plavix    Compliance: skipped meds last weekend    Side effects:  headaches when Ambien taken too often; dry mouth on amitriptyline, elevated BP on Effexor XR     Risk Parameters:  Patient reports no suicidal ideation  Patient reports no homicidal ideation  Patient reports no self-injurious behavior  Patient reports no violent behavior    Exam (detailed: at least 9 elements; comprehensive: all 15 elements)   Constitutional  Vitals:  Most recent vital signs, dated less than 90 days prior to this appointment, were reviewed.         General:  age appropriate, casually dressed, thinner, calm, wearing make up, jewelry         Musculoskeletal  Muscle Strength/Tone:  no tremor   Gait & Station:  No ataxia      Psychiatric  Speech:  " "no latency; no press, spontaneous   Mood & Affect:  "getting better"  Restricted    Thought Process:  Linear    Associations:  intact   Thought Content:  normal, no suicidality, no homicidality, delusions, or paranoia, hallucinations: (auditory: no, visual: no)   Insight:  has awareness of illness   Judgement: behavior is adequate to circumstances   Orientation:  grossly intact; alert and oriented x 3    Memory: intact for content of interview   Language: grossly intact no fluency issues    Attention Span & Concentration:  able to focus grossly intact   Fund of Knowledge:  intact and appropriate to age and level of education     Assessment and Diagnosis   Status/Progress: Based on the examination today, the patient's problem(s) is/are under inadequate control, but improving.   New problems have not been presented today.   Comorbidities are complicating management of the primary condition.  (multiple medical problems)     Impression: 65 yo female with multiple medical problems and hx of depression, insomnia, and anxiety presents for follow up. Significant family stressors and health issues. Pt has required multiple sleep aides in past. She has required chronic benzodiazepine for stabilization, has failed multiple sleep aides;  Sedative-hyponotics were discontinued in interim due to health issues, reports of falls.  She contacted me in interim with worsening depression/anxiety - low dose clonazepam restarted, but is showing signs of stabilization both physically and mentally.     MDD, single episode, partial remission  GARFIELD  Insomnia disorder   R/O Conversion Disorder   sarcoidosis, hx phyloides tumor of breast, .DM2, RLS, arthritis, chronic neck, hip, and back pain, hx avulsion fracture, JOSE LUIS, hx CVA, recent UTI    GAF: 57     Strengths and Liabilities: Strength: Patient accepts guidance/feedback, Strength: Patient is expressive/articulate., Strength: Patient has reasonable judgment.   Treatment Goals: Specify outcomes " written in observable, behavioral terms:   Anxiety: acquiring relapse prevention skills and reducing physical symptoms of anxiety   Depression: acquiring relapse prevention skills, increasing energy, increasing motivation, reducing excessive guilt and reducing negative automatic thoughts     Treatment Plan/Recommendations:   Medication Management: The risks and benefits of medication were discussed with the patient.    1. Continue Sertraline 200 mg daily.  Typical ADEs reviewed with patient.  Advised to avoid Imitrex due to risks of serotonin syndrome on multiple serotonergic meds.  Stressed importance of compliance.     2. Continue Clonazepam 0.25-0.5 mg BID prn anxiety; [place]  reviewed - no suspicious activity noted. Discussed risk of decreased RT, sedation, addictive potential, and not to mix with alcohol.  Encouraged to use least effective dose, to use PRN not scheduled; discussed risk of respiratory depression if used with pain medications - this combo should not be taken together      3. Psychotherapy is encouraged. Pt has declined due to transportation issues.     4. Call to report any worsening of symptoms or problems with the medication     5. Titrate Doxepin to 10-20 mg nightly PRN insomnia; typical TAN reviewed    6. Trial of Duloxetine 30 mg daily.  Previously, this medication was not affordable several years ago; will try again hoping it is lower tier with insurance; she recalls doing well, may benefit mood, anxiety, pain. Typical TAN reviewed including hepatic impairment, serotonin syndrome.     7. Discussed potential for serotonin syndrome if tramadol and antidepressants are combined - this combo should be avoided     Return to Clinic:  2-3 months

## 2017-04-21 ENCOUNTER — OUTPATIENT CASE MANAGEMENT (OUTPATIENT)
Dept: ADMINISTRATIVE | Facility: OTHER | Age: 67
End: 2017-04-21

## 2017-04-21 NOTE — LETTER
April 21, 2017    Nathalie Santiago  [address]  [place] [address]             Outpatient Case Management  [address]  [facility] [address] Dear Nathalie Santiago:    We understand that receiving many services from different doctors and healthcare providers is overwhelming. There are appointments to make, transportation to arrange, dietary instructions to understand, and new medications to obtain.    This is where [facility] can help.     You are eligible to receive Outpatient Case Management services when you have healthcare needs that require the coordination of many providers, treatments, and services. You also qualify if you need assistance with a new treatment plan.     There is no charge for this support. You may have been referred to this program from your doctor(s), hospital staff member(s), or insurance company but you always have a choice to participate or not participate. To participate, you must give us your permission to be enrolled.     When you are enrolled in the [facility] program, the  who is assigned to you is    Noemi Michaels RN  [phone]    Depending on your needs and wishes, your  may speak with you by phone, visit you at your place of living (for example your home, skilled nursing facility, or rehabilitation facility), or meet you at your doctors office.     Your  will tell you why you have been selected to participate in the program and will complete an assessment of your needs. Then a personalized plan of care will be developed with you and or your caregiver.             Here are examples of the services your [facility]  provides.     Coordinate communication among multiple providers.   Arrange for transportation, doctors visits, durable medical equipment, home care services, and special clinics.    Provide coaching on how to manage your health condition.     Answer questions about your health condition.   Help you understand your doctors treatment plan.    Provide additional instruction about your health condition, treatments, and medications.    Help you obtain information about your insurance coverage.    Advocate for your individual needs.    Request a Licensed Clinical  (LCSW) to visit you if you need their services. LCSWs help with long term planning (discussing placement options, advanced planning directives), financial planning, and assistance (for example rent, utilities, medication funding).     Your  will coordinate their activities with other outpatient services you are receiving. All services provided by [facility]  are coordinated with and communicated to your primary care physician.    Our goal is to help you manage your health condition(s) safely within your living environment, whether that is your home or a medical facility. We want to help you function at the healthiest and highest level possible.     Sincerely,      Rock Rose MD  Medical Director    Enclosures:    Frequently Asked Questions  Patient Rights and Responsibilities   Reporting a Grievance or Complaint  Consent/Release of Information  Stamped Addressed Envelope                  Frequently Asked Questions about [facility]    What is [facility]?  Outpatient Case management is not Home healthcare services. [facility]  do not provide hands-on care. [facility]  will work with your doctor to arrange for home health services, if needed. Home health services have a limited duration and there are some restrictions as to who can get these services. There is no prescribed limit to the amount of time you receive [facility] services. [facility]  are not agents of your insurance company. However, [facility]  can help you obtain information from your insurance company.     Who are the [facility] ?  [facility]  are Registered Nurses and Social Workers. It is important to remember that you and your  are a team that works together with your primary care physician to create your individualized plan of care. The ultimate goal of your care plan is to help you implement your doctors treatment plan and to help you function at the highest level of health possible.     What are my rights as a patient?  It is important for you to know and understand your rights and responsibilities while receiving services from the [facility] program. We have enclosed a complete description of your rights and responsibilities. You can help to make your care more effective when you understand your right and responsibilities.     What is needed to be enrolled in the program?  You are only enrolled in the [facility] Program when you give us your consent to participate. You will find enclosed a consent form. You are receiving this letter because you or your caregivers have given us a verbal consent to enroll you in [facility] Program. We ask that you sign and return the enclosed written consent in the stamped self-addressed envelope.                           Patient Rights and Responsibilities    We consider you a partner in your care. When you are well informed, participate in treatment decisions and communicate openly with your doctor and other healthcare professionals, you help make your care more effective.     While you are in the Outpatient Case Management Program, your rights include the following:     You have a right to be provided services in a non-discriminatory manner in accordance with the provisions of Title VI of the Civil Rights Act of 1964, Section 504 of the Rehabilitation Act of  1973, the Age Discrimination Act of 1975, the Americans with Disabilities Act as well as any other applicable Federal and State laws and regulations.     You have the right to a reasonable, timely response to your request or need for care, as well as the right to considerate and respectful care including an environment that preserves dignity and contributes to a positive self-image. You are responsible for being considerate and respectful of our staff.     You have a right to information regarding patient rights, advocacy services and complaint mechanisms, and the right to prompt resolution of any complaint. You or a designee has the right to participate in the resolution of ethical issues surrounding your care. You have a right to file a complaint if you feel that your rights have been infringed, without fear or penalty from [facility] or the federal government. You may file a complaint with the Director of Outpatient Case Management by calling [phone]. At any time, you may lodge a grievance with the [facility] and [facility] by calling [phone], or the Joint Commission on Accreditation of Healthcare Organizations at [phone].     You, or someone acting on your behalf, have the right to understandable information on your health status, treatment and progress in order to make decisions. You have the right to know the nature, risks and alternatives to treatment. You have the right to be informed, when appropriate, regarding the outcome of the care that has been provided. You have the right to refuse treatment to the extent permitted by law, and the right to be informed of the alternatives and consequences of refusing treatment.     You, in collaboration with your physician, have the right to make decisions regarding care and the right to participate in the development and implementation of the plan of care and effective pain management. You have the right to know the name and  professional status of those responsible for the delivery of your care and treatment.       You have a right, within legal guidelines, to have a guardian, next-of-kin or legal designee exercises your patient rights when you are unable to do so. You have the right for your wishes regarding end-of-life decisions to be addressed by the healthcare team through advance directives. You have the right to personal privacy and confidentiality and to expect confidentiality of all records and communications pertaining to your care.      You have the right to receive communications about your health information confidentially. You have the right to request restrictions on the uses and disclosures of your health information. You have the right to inspect, copy, request amendments and receive an accounting of to whom we have disclosed your health information.     You have the right to be provided with interpretation services if you do not speak English; to alternative communication techniques if you are hearing or vision impaired; and to have any other resources needed on your behalf to ensure effective communication. These services are provided free of charge.     You have a right to personal safety (free from mental, physical, sexual and verbal abuse, neglect and exploitation). You have the right to access protective and advocacy services.     Advance Directives  A Patient Advocate is available to meet with patients to answer questions regarding advance directives.    Living Will  A document that outlines what medical treatment the patient does or does not want in the event the patient becomes unable to make those decisions at the appropriate time.    Durable Medical Power of   A document by which the patient designates an individual to be responsible for making medical decisions in the event the patient becomes unable to do so.    HIPAA Notice of Privacy Practices  Your medical information is governed by federal  privacy laws. HIPAA protects private medical information and how that information is disclosed. If you have a question regarding the HIPAA Notice of Privacy Practices, or if you believe your privacy rights have been violated, you may call our designated hotline at [phone].            Quality Improvement  Because we consistently strive to improve the care and service provided to our patients, we welcome your feedback. Your comments are an important part of our quality improvement process, as we like to know what we are doing right and which areas are in need of improvement. Our policy is to listen, be responsive and provide you with an appropriate and timely follow-up to your questions or concerns. Our goal is active patient and family involvement in all aspects of the care process.                                                                                  Reporting a Grievance or Complaint    During your time with the [facility] team you may have a grievance or complaint with our services. Your Patients Bill of Rights gives patients, families, and caregivers the right to express concerns and grievances and the right to expect a reasonable and timely response.     Your presentation of your concerns is not viewed negatively. It is an opportunity for us to improve the quality of our care and services we provide to you.     You may report your concerns directly to your , or you can phone in a complaint to:     Director of Outpatient Case Management  [phone]    You may also send a complaint letter to:    Director of Outpatient Case Management Services  [address] [place] [id number]    Tell us the details of your complaint and provide us with a contact phone number so we can contact you to obtain additional information. We will return a call to you within two business days of our receipt of your complaint, and to request additional information as  needed. If you choose to mail a letter, your complaint may take a few days longer to reach us.     All grievances will be addressed as quickly as possible. A grievance or complaint that involves situations or practices which place patients in immediate danger will be addressed as an urgent matter. We will work to resolve all other complaints within seven days of receipt. By that time, you will receive a phone call with either the resolution of your complaint, or a plan for corrective action. A formal written response will be sent to you within 30 days of receipt of your grievance.     If a resolution cannot be completed within 30 days, a letter will be sent to you or your family member with an estimated time for the final response.    Additionally, all patients have the right to file complaints with external agencies, without exception. Complaints/grievances can be addressed to the following agencies:            Patient Safety or Quality of Care Concerns  Office of Quality Monitoring   [facility] [place] [address]  [phone] Toll Free    HIPPA Privacy/Security Concerns  Office for Civil Rights Region IV  [place] Department of Health & Human Services  [address] [place] [address], [address]  [place] [address] [phone] Phone  [phone] TDD  [phone] Toll Free    Medicare/Medicaid Billing Concerns  [place] for Medicare & Medicaid Services  Region [address]  [address] [place] [address], [address]  [place] [address] [phone] Phone  [phone] Toll Free    General Concerns  [place] [facility] ([facility])  [phone] Toll Free Complaint Hotline                                                              Consent Form/Release of Information    By signing--     (1) I agree I have read the Outpatient Case Management information provided to me;     (2) I agree to voluntarily participate in the Outpatient Case Management program;     (3) I understand I  must consent to participation in the Outpatient Case Management program during my first interview with my ;    (4) I consent to the discussion and release of my personal health information to my healthcare team (including my personal physician, my medical home care team, any specialty physician(s), and my [facility] team);     (5) I agree my consent is valid for the length of time I am receiving Outpatient Case Management;    (6) I agree to referrals to community resources which my Case Management team recommends for me. I agree to the release of my personal information and personal health information as necessary to referral sources.    ___________________________________________________________________  Patients Printed Name     ___________________________________________________________________  Patients Signature       Date    If patient is in being cared for, please complete this section:     ___________________________________________________________________  Printed Name of Person Caring For Patient   Relationship To Patient    ___________________________________________________________________   Signature of Person Caring For Patient     Date    PLEASE SIGN AND RETURN IN THE ENCLOSED PRE-ADDRESSED ENVELOPE.

## 2017-04-21 NOTE — PROGRESS NOTES
Please note that a welcome packet was printed and mailed to the patient on 4/21/2017.    Please contact [facility] [address] [phone] with questions.    Thank you,   Tiffanie Stewart, SSC

## 2017-04-25 ENCOUNTER — OUTPATIENT CASE MANAGEMENT (OUTPATIENT)
Dept: ADMINISTRATIVE | Facility: OTHER | Age: 67
End: 2017-04-25

## 2017-04-25 ENCOUNTER — TELEPHONE (OUTPATIENT)
Dept: PHARMACY | Facility: CLINIC | Age: 67
End: 2017-04-25

## 2017-05-01 ENCOUNTER — DOCUMENTATION ONLY (OUTPATIENT)
Dept: FAMILY MEDICINE | Facility: CLINIC | Age: 67
End: 2017-05-01

## 2017-05-01 ENCOUNTER — OUTPATIENT CASE MANAGEMENT (OUTPATIENT)
Dept: ADMINISTRATIVE | Facility: OTHER | Age: 67
End: 2017-05-01

## 2017-05-01 NOTE — PROGRESS NOTES
Pre-Visit Chart Review  For Appointment Scheduled on 5/2/17    Health Maintenance Due   Topic Date Due    Foot Exam  03/03/2017

## 2017-05-01 NOTE — PROGRESS NOTES
05/01/17-Called and spoke to patient. She just came home from her daughter's house and has not read any of the educational material sent in the mail. Patient is using her pill box organizer and only missed taking medications one day when she was sick. Patient has the phone number and is going to call pharmacy assistance. Will mail to patient-Diabetes:Sick-day plan, diabetes grocery list, understanding carbohydrates, fats and protein, and understanding carbohydrates. Will follow up with patient in two weeks.

## 2017-05-15 ENCOUNTER — OUTPATIENT CASE MANAGEMENT (OUTPATIENT)
Dept: ADMINISTRATIVE | Facility: OTHER | Age: 67
End: 2017-05-15

## 2017-05-15 DIAGNOSIS — K31.84 GASTROPARESIS: ICD-10-CM

## 2017-05-15 RX ORDER — METOCLOPRAMIDE 10 MG/1
TABLET ORAL
Qty: 270 TABLET | Refills: 1 | Status: SHIPPED | OUTPATIENT
Start: 2017-05-15 | End: 2019-06-13 | Stop reason: SDUPTHER

## 2017-05-15 NOTE — PROGRESS NOTES
05/15/17-Called and spoke to patient. Gave patient the phone number for [place] in pharmacy assistance again. Patient is checking her blood glucose. Patient is suppose to be eating 6 small meals a day for her diabetes and gastroparesis. Patient has not started using the pill box yet but will start soon. Patient has hired an organizer for May 31, 2017 to help her with the clutter at home. Patient would like to see the dietician, will send a message to our dietician. Went over diabetes grocery list, pill box organizer, gastroparesis and preventing falls. Will follow up with patient in one week and go over more educational material.

## 2017-05-16 ENCOUNTER — PATIENT OUTREACH (OUTPATIENT)
Dept: DIABETES | Facility: CLINIC | Age: 67
End: 2017-05-16
Payer: MEDICARE

## 2017-05-16 NOTE — PATIENT INSTRUCTIONS
Called to set up appt for education.  Patient is very knowledgeable on diet and DM management but reports she has been very busy with family activities, weddings, graduations and birthdays that she has not taken care of herself.  At this time she does not want to meet but will call if she is still having difficulty with nutrition after family activities are over.  Contact number provided to patient for future.

## 2017-05-17 ENCOUNTER — DOCUMENTATION ONLY (OUTPATIENT)
Dept: FAMILY MEDICINE | Facility: CLINIC | Age: 67
End: 2017-05-17

## 2017-05-17 NOTE — PROGRESS NOTES
Pre-Visit Chart Review  For Appointment Scheduled on 05/19/2017    Health Maintenance Due   Topic Date Due    Foot Exam  03/03/2017

## 2017-05-19 ENCOUNTER — DOCUMENTATION ONLY (OUTPATIENT)
Dept: FAMILY MEDICINE | Facility: CLINIC | Age: 67
End: 2017-05-19

## 2017-05-19 ENCOUNTER — LAB VISIT (OUTPATIENT)
Dept: LAB | Facility: HOSPITAL | Age: 67
End: 2017-05-19
Attending: FAMILY MEDICINE
Payer: MEDICARE

## 2017-05-19 ENCOUNTER — OFFICE VISIT (OUTPATIENT)
Dept: PAIN MEDICINE | Facility: CLINIC | Age: 67
End: 2017-05-19
Payer: MEDICARE

## 2017-05-19 ENCOUNTER — OFFICE VISIT (OUTPATIENT)
Dept: FAMILY MEDICINE | Facility: CLINIC | Age: 67
End: 2017-05-19
Payer: MEDICARE

## 2017-05-19 VITALS
BODY MASS INDEX: 26.79 KG/M2 | DIASTOLIC BLOOD PRESSURE: 77 MMHG | SYSTOLIC BLOOD PRESSURE: 134 MMHG | HEIGHT: 66 IN | WEIGHT: 166.69 LBS | HEART RATE: 70 BPM

## 2017-05-19 VITALS
SYSTOLIC BLOOD PRESSURE: 143 MMHG | DIASTOLIC BLOOD PRESSURE: 82 MMHG | TEMPERATURE: 98 F | HEIGHT: 66 IN | BODY MASS INDEX: 26.79 KG/M2 | WEIGHT: 166.69 LBS | HEART RATE: 60 BPM

## 2017-05-19 DIAGNOSIS — R79.89 LOW VITAMIN D LEVEL: ICD-10-CM

## 2017-05-19 DIAGNOSIS — G89.29 CHRONIC PAIN OF RIGHT KNEE: ICD-10-CM

## 2017-05-19 DIAGNOSIS — M70.60 GREATER TROCHANTERIC BURSITIS, UNSPECIFIED LATERALITY: Primary | ICD-10-CM

## 2017-05-19 DIAGNOSIS — I15.2 HYPERTENSION ASSOCIATED WITH DIABETES: ICD-10-CM

## 2017-05-19 DIAGNOSIS — E83.42 HYPOMAGNESEMIA: ICD-10-CM

## 2017-05-19 DIAGNOSIS — E11.22 TYPE 2 DIABETES MELLITUS WITH STAGE 3 CHRONIC KIDNEY DISEASE, WITH LONG-TERM CURRENT USE OF INSULIN: ICD-10-CM

## 2017-05-19 DIAGNOSIS — N10 PYELONEPHRITIS, ACUTE: ICD-10-CM

## 2017-05-19 DIAGNOSIS — M25.561 CHRONIC PAIN OF RIGHT KNEE: ICD-10-CM

## 2017-05-19 DIAGNOSIS — R10.11 RUQ PAIN: ICD-10-CM

## 2017-05-19 DIAGNOSIS — N18.30 TYPE 2 DIABETES MELLITUS WITH STAGE 3 CHRONIC KIDNEY DISEASE, WITH LONG-TERM CURRENT USE OF INSULIN: ICD-10-CM

## 2017-05-19 DIAGNOSIS — R10.9 BILATERAL FLANK PAIN: Primary | ICD-10-CM

## 2017-05-19 DIAGNOSIS — E11.59 HYPERTENSION ASSOCIATED WITH DIABETES: ICD-10-CM

## 2017-05-19 DIAGNOSIS — R10.9 BILATERAL FLANK PAIN: ICD-10-CM

## 2017-05-19 DIAGNOSIS — Z79.4 TYPE 2 DIABETES MELLITUS WITH STAGE 3 CHRONIC KIDNEY DISEASE, WITH LONG-TERM CURRENT USE OF INSULIN: ICD-10-CM

## 2017-05-19 DIAGNOSIS — M43.17 SPONDYLOLISTHESIS OF LUMBOSACRAL REGION: ICD-10-CM

## 2017-05-19 LAB
25(OH)D3+25(OH)D2 SERPL-MCNC: 23 NG/ML
ALBUMIN SERPL BCP-MCNC: 3.7 G/DL
ALP SERPL-CCNC: 61 U/L
ALT SERPL W/O P-5'-P-CCNC: 17 U/L
AMYLASE SERPL-CCNC: 56 U/L
ANION GAP SERPL CALC-SCNC: 11 MMOL/L
AST SERPL-CCNC: 19 U/L
BACTERIA #/AREA URNS AUTO: ABNORMAL /HPF
BASOPHILS # BLD AUTO: 0.04 K/UL
BASOPHILS NFR BLD: 0.6 %
BILIRUB SERPL-MCNC: 0.3 MG/DL
BILIRUB UR QL STRIP: NEGATIVE
BUN SERPL-MCNC: 16 MG/DL
CALCIUM SERPL-MCNC: 9.2 MG/DL
CHLORIDE SERPL-SCNC: 107 MMOL/L
CLARITY UR REFRACT.AUTO: CLEAR
CO2 SERPL-SCNC: 26 MMOL/L
COLOR UR AUTO: YELLOW
CREAT SERPL-MCNC: 0.7 MG/DL
DIFFERENTIAL METHOD: ABNORMAL
EOSINOPHIL # BLD AUTO: 0.3 K/UL
EOSINOPHIL NFR BLD: 4 %
ERYTHROCYTE [DISTWIDTH] IN BLOOD BY AUTOMATED COUNT: 14.5 %
EST. GFR  (AFRICAN AMERICAN): >60 ML/MIN/1.73 M^2
EST. GFR  (NON AFRICAN AMERICAN): >60 ML/MIN/1.73 M^2
GLUCOSE SERPL-MCNC: 82 MG/DL
GLUCOSE UR QL STRIP: NEGATIVE
HCT VFR BLD AUTO: 39.8 %
HGB BLD-MCNC: 12.6 G/DL
HGB UR QL STRIP: NEGATIVE
KETONES UR QL STRIP: NEGATIVE
LEUKOCYTE ESTERASE UR QL STRIP: ABNORMAL
LIPASE SERPL-CCNC: 32 U/L
LYMPHOCYTES # BLD AUTO: 2.5 K/UL
LYMPHOCYTES NFR BLD: 36.8 %
MAGNESIUM SERPL-MCNC: 1.6 MG/DL
MCH RBC QN AUTO: 28.2 PG
MCHC RBC AUTO-ENTMCNC: 31.7 %
MCV RBC AUTO: 89 FL
MICROSCOPIC COMMENT: ABNORMAL
MONOCYTES # BLD AUTO: 0.8 K/UL
MONOCYTES NFR BLD: 11.8 %
NEUTROPHILS # BLD AUTO: 3.1 K/UL
NEUTROPHILS NFR BLD: 46.4 %
NITRITE UR QL STRIP: NEGATIVE
PH UR STRIP: 5 [PH] (ref 5–8)
PLATELET # BLD AUTO: 323 K/UL
PMV BLD AUTO: 9.2 FL
POTASSIUM SERPL-SCNC: 3.4 MMOL/L
PROT SERPL-MCNC: 7.1 G/DL
PROT UR QL STRIP: NEGATIVE
RBC # BLD AUTO: 4.47 M/UL
SODIUM SERPL-SCNC: 144 MMOL/L
SP GR UR STRIP: 1 (ref 1–1.03)
SQUAMOUS #/AREA URNS AUTO: 1 /HPF
URN SPEC COLLECT METH UR: ABNORMAL
UROBILINOGEN UR STRIP-ACNC: NEGATIVE EU/DL
WBC # BLD AUTO: 6.76 K/UL
WBC #/AREA URNS AUTO: 5 /HPF (ref 0–5)
YEAST UR QL AUTO: ABNORMAL

## 2017-05-19 PROCEDURE — 1160F RVW MEDS BY RX/DR IN RCRD: CPT | Mod: S$GLB,,, | Performed by: PHYSICIAN ASSISTANT

## 2017-05-19 PROCEDURE — 1126F AMNT PAIN NOTED NONE PRSNT: CPT | Mod: S$GLB,,, | Performed by: PHYSICIAN ASSISTANT

## 2017-05-19 PROCEDURE — 1159F MED LIST DOCD IN RCRD: CPT | Mod: S$GLB,,, | Performed by: PHYSICIAN ASSISTANT

## 2017-05-19 PROCEDURE — 99214 OFFICE O/P EST MOD 30 MIN: CPT | Mod: S$GLB,,, | Performed by: PHYSICIAN ASSISTANT

## 2017-05-19 PROCEDURE — 99999 PR PBB SHADOW E&M-EST. PATIENT-LVL V: CPT | Mod: PBBFAC,,, | Performed by: PHYSICIAN ASSISTANT

## 2017-05-19 PROCEDURE — 3060F POS MICROALBUMINURIA REV: CPT | Mod: 8P,S$GLB,, | Performed by: PHYSICIAN ASSISTANT

## 2017-05-19 PROCEDURE — 3074F SYST BP LT 130 MM HG: CPT | Mod: S$GLB,,, | Performed by: PHYSICIAN ASSISTANT

## 2017-05-19 PROCEDURE — 99999 PR PBB SHADOW E&M-EST. PATIENT-LVL III: CPT | Mod: PBBFAC,,, | Performed by: PHYSICIAN ASSISTANT

## 2017-05-19 PROCEDURE — 3078F DIAST BP <80 MM HG: CPT | Mod: S$GLB,,, | Performed by: PHYSICIAN ASSISTANT

## 2017-05-19 PROCEDURE — 99499 UNLISTED E&M SERVICE: CPT | Mod: S$GLB,,, | Performed by: PHYSICIAN ASSISTANT

## 2017-05-19 PROCEDURE — 3079F DIAST BP 80-89 MM HG: CPT | Mod: S$GLB,,, | Performed by: PHYSICIAN ASSISTANT

## 2017-05-19 PROCEDURE — 3044F HG A1C LEVEL LT 7.0%: CPT | Mod: S$GLB,,, | Performed by: PHYSICIAN ASSISTANT

## 2017-05-19 PROCEDURE — 3075F SYST BP GE 130 - 139MM HG: CPT | Mod: S$GLB,,, | Performed by: PHYSICIAN ASSISTANT

## 2017-05-19 PROCEDURE — 1125F AMNT PAIN NOTED PAIN PRSNT: CPT | Mod: S$GLB,,, | Performed by: PHYSICIAN ASSISTANT

## 2017-05-19 RX ORDER — OXYCODONE AND ACETAMINOPHEN 10; 325 MG/1; MG/1
1 TABLET ORAL EVERY 8 HOURS PRN
Qty: 90 TABLET | Refills: 0 | Status: SHIPPED | OUTPATIENT
Start: 2017-06-17 | End: 2017-07-16

## 2017-05-19 RX ORDER — OXYCODONE AND ACETAMINOPHEN 10; 325 MG/1; MG/1
1 TABLET ORAL EVERY 8 HOURS PRN
Qty: 90 TABLET | Refills: 0 | Status: SHIPPED | OUTPATIENT
Start: 2017-05-19 | End: 2017-06-17

## 2017-05-19 RX ORDER — OXYCODONE AND ACETAMINOPHEN 10; 325 MG/1; MG/1
1 TABLET ORAL EVERY 8 HOURS PRN
Qty: 90 TABLET | Refills: 0 | Status: SHIPPED | OUTPATIENT
Start: 2017-07-16 | End: 2017-07-12 | Stop reason: SDUPTHER

## 2017-05-19 RX ORDER — CHLORZOXAZONE 500 MG/1
500 TABLET ORAL 3 TIMES DAILY PRN
Qty: 270 TABLET | Refills: 1 | Status: SHIPPED | OUTPATIENT
Start: 2017-05-19 | End: 2017-08-17

## 2017-05-19 NOTE — PROGRESS NOTES
CC: bilateral hip pain, back pain    Interval History: Ms. Santiago is a 66 y.o. female with chronic low back and hip pain who presents  for her follow up.  Left lateral hip benefited from GTB injection LCV.  She continues to take percocet 10mg q8hrs as needed for pain with moderate benefits.  No side effects reported.  Able to perform daily activities with the medications.  She also continues to takes prednisone 5mg daily.   Pain today is rated 7/10.     Prior HPI: The patient is a 64-year-old woman with a history of sarcoidosis, breast CA, diabetes, anxiety and depression who presents in referral from Dr. Nguyen to Dr. Perez for multiple pain complaints including back pain, bilateral hip pain and right shoulder pain.  She is following up with me since I am much closer to her.   She presents today for multiple pain complaints.  She recently was hospitalized for pneumonia, UTI and sarcoidosis exacerbation per patient.  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  She has had past GTB injections with moderate benefit and had an exacerbation of her pain recently.  She was able to receive left GTB with Dr. Perez on 10/3/2014 that provided >50% relief of her left hip and leg pain.     She continues to take Prednisone 5mg daily. She continues Percoet 7.5mg about 3x/day as needed with moderate benefit.  She presents today with worsening right knee pain.  She states having history of right knee osteoarthritis, but has not had any treatment for her knee pain.  Continues to have improvement in her knee pain following completion of visco supplementation injection in her right knee.  Her back and hip pain remain tolerable with her medications.  She continues to take Percoct 7.5mg q8hrs as needed with moderate benefit.  No reported side effects.       Pain intervention history: She takes hydrocodone 7-325 2-4 times a day.  Also has undergone epidural steroid injection at  "L1/2 without relief.  She has been doing cervical traction with good relief of her neck pain.  Previous trochanteric bursa injections with good relief.    ROS:She reports fatigability, itching, headaches, swollen glands, chest pain and shortness of breath, nausea vomiting, easy bruising, back pain, joint stiffness, dizziness, difficulty sleeping, anxiety, depression and loss of balance.  Balance of review of systems is negative.    Medical, surgical, family and social history reviewed elsewhere in record.    Medications/Allergies: See med card    Vitals:    05/19/17 0948   BP: 134/77   Pulse: 70   Weight: 75.6 kg (166 lb 10.7 oz)   Height: 5' 6" (1.676 m)   PainSc:   7       Physical exam:  Gen: A and O x3, pleasant, well-groomed  Skin: No rashes or obvious lesions  HEENT: PERRLA, no obvious deformities on ears or in canals. No thyroid masses, trachea midline, no palpable lymph nodes in neck, axilla.  CVS: RRR  Resp: non labored breathing  Abdomen: Soft, NT/ND, normal bowel sounds present.    Neuro:  Lower extremities:   +right knee crepitus. LE erythema and swelling  Reflexes: Patellar 2+, Achilles 2+ bilaterally.  Sensory:  Intact   Lumbar spine:  Lumbar spine: ROM is moderately reduced with flexion extension and oblique extension with increased pain in all directions.    Orlando's test causes pain on both sides.    Supine straight leg raise is negative bilaterally.    Internal and external rotation of the hip causes increased pain in left groin.  Myofascial exam: Tenderness to palpation over both GTB.      Imaging:  Cervical spine MRI report 10/5/12: There is dextroscoliosis in the lower cervical/upper thoracic region.  There is very mild foraminal stenosis on the left at C6/7.    MRI thoracic spine 11/21/2008: No significant degenerative disc disease or central stenosis.    MRI lumbar spine 8/14/08: L1/2 small broad-based central protrusion with mild effacement of ventral thecal sac but not distorting exiting " roots.  L2/3 and L3/4 unremarkable.  At L4/5 there is mild central stenosis with bulging annulus anteriorly and facet hypertrophic degenerative change posteriorly.  No focal disc herniation and neural foramina are patent.  At L5/S1 there is minimal bulging of the annulus with no thecal sac compression.  The small annular fissure within the posterior annulus.  Foramina are patent bilaterally, no stenosis mild, degenerative changes in the facets.    Xray Right Knee 10/13/14  Mild tricompartmental degenerative changes present. No joint effusion. The soft tissues are unremarkable.      Assessment:  Ms. Santiago is a 65-year-old woman with a history of sarcoidosis, breast CA, diabetes, anxiety and depression who presents in referral from Dr. Nguyen for multiple pain complaints including back pain, bilateral hip pain and knee pain   1. Greater trochanteric bursitis, unspecified laterality    2. Chronic pain of right knee    3. Spondylolisthesis of lumbosacral region          PLAN:  1. I have stressed the importance of physical activity and exercise to improve overall health.    2.  Percocet 10mg q8hrs as needed. Script given for three months.  reviewed.    3.  Consider repeat Left GTB injection if pain returns   4.  Parafon Forte 500 mg q 8 h prn 90 day supply. 1 refill  5. Follow up in three months   All medication management was performed by Dr. Lincoln Temple

## 2017-05-19 NOTE — MR AVS SNAPSHOT
[facility]  [address] [place] [address] [address]  [place] [address]  Phone: [phone]                  Nathalie Santiago   2017 10:00 AM   Office Visit    Description:  Female : 1950   Provider:  Maylin Doran PA-C   Department:  [facility]           Reason for Visit     Hip Pain     Neck Pain                To Do List           Future Appointments        Provider Department Dept Phone    2017 11:30 AM LAB, [place] [facility] [phone]    2017 10:00 AM SLIC US1 [facility] Ul[facility]ound [phone]    2017 11:00 AM Maylin Doran PA-C [facility] [phone]      Goals (5 Years of Data)              5/15/17    Patient/caregiver will have an action plan in place to manage and prevent complications of  not taking her medications  prior to discharge from [facility].   On track    Notes - Note edited  5/15/2017  2:19 PM by Noemi Michaels    Overall Time to Completion  2 months from 2017    Short Term Goals  Patient/caregiver will discuss health care needs with Physician and care team during visits or using Patient Portal.  Interventions   · Collaborate with Physician as appropriate to meet patient needs.  · Empower patient/caregiver to discuss treatment plan with Physician/care team.  · In basket message sent to Physician regarding medication discrepancies.  · Provide contact information for [facility] contact information.  · Provide contact information for Outpatient Case Management contact information.  · Provide contact information for Physician office phone number.   Status  · Met     Patient/caregiver will have contact information for identified community resources IE:[facility] , Pharmacy assistance program, and financial services  for follow-up within 1 month.  Interventions   · Refer to [facility] Assistance Program.  · Refer to Outpatient Case Management Social Worker.   · Provide  contact information for [facility] Financial Assistance    Status  · Partially met    Patient/caregiver will notify RN CCM if patient does not hear from [facility]  and Pharmacy Assistance within 2 weeks.  Interventions   · Refer to [facility] Pharmacy Assistance Program.  · Refer to Outpatient Case Management Social Worker.   Status  · Met     Patient/caregiver will utilize a pill box organizer to dispense medications daily within 1 month.  Interventions   · Complete medication reconciliation.  · Encourage Medication Compliance.   · Provide pill box organizer to patient    Status  · Met     Patient/caregiver will verbalize dosage/route/frequency/indication of all medications  within Now.  Interventions   · Complete medication reconciliation    Status  · Met          Patient/caregiver will have an action plan in place to manage and prevent complications of diabetes prior to discharge from [facility].   On track    Notes - Note edited  5/15/2017  2:11 PM by Noemi Mcihaels    Overall Time to Completion  2 months from 04/18/2017    Short Term Goals  Patient/caregiver will identify 2 deficits resulting from Diabetes and list 2 ways to overcome those deficits within 2 months.  Interventions   · Recognize and provide educational material (RONIT).  · Encourage Dietary Compliance.  · Review eating/nutrition habits.  · Encourage Medication Compliance.   Status  · Partially met      Patient/caregiver will measure and record the capillary blood glucose 1 times per day for 2 months.  Interventions   · Recognize and provide educational material (KRAMES).   · Mail blood glucose logs for home use.    Status  · Met     Patient/caregiver will verbalize food required to create a well-balanced food plate within 2 months.  Interventions   Recognize and provide educational material (KRAMES).  Encourage Dietary Compliance  Review eating/nutrition habits.   Status  · Partially met        Clinical Reference Documents Added to Patient  Instructions       Document    BLOOD SUGAR LOG, USING A (ENGLISH)    BLOOD SUGAR, LOW; HYPOGLYCEMIA (ENGLISH)    Diabetes Nutrition Placemat     GASTROPARESIS (ENGLISH)    HYPERGLYCEMIA (HIGH BLOOD SUGAR) (ENGLISH)           Clinical Reference Documents Added to Patient Instructions  05/01/2017       Document    DIABETES: SICK-DAY PLAN (ENGLISH)   DIABETES GROCERY LIST  UNDERSTANDING CARBOHYDRATES, FATS, AND PROTEIN  UNDERSTANDING CARBOHYDRATES              Patient/caregiver will have an action plan in place to manage and prevent complications of falls  prior to discharge from [facility].   On track    Notes - Note edited  5/15/2017  2:15 PM by Noemi Michaels    Overall Time to Completion  2 months from 04/18/2017    Short Term Goals  Patient/caregiver will put in place 1 keeping room free of clutter measures to decrease the risk of patient falls within 2 months.  Interventions   · Recognize and provide educational material (RONIT).   Status  · Partially met       Interventions 05/15/17  · Encourage compliance with keeping appointment with organizer on May 31, 2017.  · Partially Met        Clinical Reference Documents Added to Patient Instructions       Document            FALLS, PREVENTING, MAKING CHANGES IN YOUR LIVING SPACE (ENGLISH)                           These Medications        Disp Refills Start End    oxycodone-acetaminophen (PERCOCET)  mg per tablet 90 tablet 0 5/19/2017 6/17/2017    Take 1 tablet by mouth every 8 (eight) hours as needed for Pain. - Oral    Pharmacy: [facility] - [place] [id number] [facility] Ph #: [phone]       oxycodone-acetaminophen (PERCOCET)  mg per tablet 90 tablet 0 6/17/2017 7/16/2017    Take 1 tablet by mouth every 8 (eight) hours as needed for Pain. - Oral    Pharmacy: [facility] - [facility] [address] [facility] Ph #: [phone]       oxycodone-acetaminophen (PERCOCET)  mg per tablet 90 tablet 0  7/16/2017 8/14/2017    Take 1 tablet by mouth every 8 (eight) hours as needed for Pain. - Oral    Pharmacy: [facility] - [place], [place] - [address] Ph #: [phone]         [facility]     [facility] On [facility] Nurse Care Line - 24/7 Assistance  Unless otherwise directed by your provider, please contact [facility], our nurse care line that is available for 24/7 assistance.     Registered nurses in the [facility] On Call Center provide: appointment scheduling, clinical advisement, health education, and other advisory services.  Call: [phone] (toll free)               Medications           Message regarding Medications     Verify the changes and/or additions to your medication regime listed below are the same as discussed with your clinician today.  If any of these changes or additions are incorrect, please notify your healthcare provider.        START taking these NEW medications        Refills    oxycodone-acetaminophen (PERCOCET)  mg per tablet 0    Starting on: 6/17/2017    Sig: Take 1 tablet by mouth every 8 (eight) hours as needed for Pain.    Class: Print    Route: Oral    oxycodone-acetaminophen (PERCOCET)  mg per tablet 0    Starting on: 7/16/2017    Sig: Take 1 tablet by mouth every 8 (eight) hours as needed for Pain.    Class: Print    Route: Oral      CHANGE how you are taking these medications     Start Taking Instead of    oxycodone-acetaminophen (PERCOCET)  mg per tablet oxycodone-acetaminophen (PERCOCET)  mg per tablet    Dosage:  Take 1 tablet by mouth every 8 (eight) hours as needed for Pain. Dosage:  Take 1 tablet by mouth every 3 (three) hours as needed for Pain.    Reason for Change:  Reorder            Verify that the below list of medications is an accurate representation of the medications you are currently taking.  If none reported, the list may be blank. If incorrect, please contact your healthcare provider. Carry this list with you  in case of emergency.           Current Medications     albuterol-ipratropium 2.5mg-0.5mg/3mL (DUO-NEB) 0.5 mg-3 mg(2.5 mg base)/3 mL nebulizer solution INHALE THE CONTENTS OF 1 VIAL VIA NEBULIZER EVERY 6 HOURS AS NEEDED FOR SHORTNESS OF BREATH  OR  WHEEZING    amlodipine (NORVASC) 5 MG tablet Take 5 mg by mouth once daily.    atorvastatin (LIPITOR) 40 MG tablet TAKE 1 TABLET EVERY DAY    beclomethasone (QVAR) 80 mcg/actuation Aero Inhale 2 puffs into the lungs 2 (two) times daily.    clonazePAM (KLONOPIN) 0.5 MG tablet Take one-half to one tablet PO twice daily PRN anxiety    clopidogrel (PLAVIX) 75 mg tablet Take 1 tablet (75 mg total) by mouth once daily.    doxepin (SINEQUAN) 10 MG capsule Take one to two capsules PO nightly PRN insomnia    duloxetine (CYMBALTA) 30 MG capsule Take 1 capsule (30 mg total) by mouth once daily.    fenofibrate 160 MG Tab Take 1 tablet (160 mg total) by mouth once daily.    fluticasone (FLONASE) 50 mcg/actuation nasal spray 2 sprays by Each Nare route once daily.    gabapentin (NEURONTIN) 100 MG capsule TAKE 3 CAPSULES EVERY EVENING    hydrochlorothiazide (HYDRODIURIL) 25 MG tablet Take 25 mg by mouth once daily.    insulin aspart (NOVOLOG FLEXPEN) 100 unit/mL InPn pen Use per sliding scale    insulin NPH (NOVOLIN N) 100 unit/mL injection Inject 15 Units into the skin 2 (two) times daily before meals.    levothyroxine (SYNTHROID) 25 MCG tablet TAKE 1 TABLET BEFORE BREAKFAST    magnesium oxide (MAG-OX) 400 mg tablet Take 1 tablet (400 mg total) by mouth 2 (two) times daily.    metformin (GLUCOPHAGE) 1000 MG tablet Take 1 tablet (1,000 mg total) by mouth 2 (two) times daily with meals.    metoclopramide HCl (REGLAN) 10 MG tablet TAKE 1 TABLET EVERY 8 HOURS    ondansetron (ZOFRAN-ODT) 8 MG TbDL DISSOLVE 1 TABLET ON THE TONGUE EVERY 8 HOURS AS NEEDED    oxybutynin (DITROPAN-XL) 10 MG 24 hr tablet Take 10 mg by mouth 2 (two) times daily.    potassium chloride (MICRO-K) 10 MEQ CpSR Take 1  "capsule (10 mEq total) by mouth 3 (three) times daily.    predniSONE (DELTASONE) 5 MG tablet Take 5 mg by mouth once daily.    ropinirole (REQUIP) 5 MG tablet Take 5 mg by mouth every evening.    sertraline (ZOLOFT) 100 MG tablet Take two tablets PO daily    sumatriptan (IMITREX) 50 MG tablet Take 50 mg by mouth daily as needed for Migraine.    VOLTAREN 1 % Gel     ergocalciferol (ERGOCALCIFEROL) 50,000 unit Cap Take 1 capsule (50,000 Units total) by mouth every 7 days.    oxycodone-acetaminophen (PERCOCET)  mg per tablet Take 1 tablet by mouth every 8 (eight) hours as needed for Pain.    oxycodone-acetaminophen (PERCOCET)  mg per tablet Starting on Jun 17, 2017. Take 1 tablet by mouth every 8 (eight) hours as needed for Pain.    oxycodone-acetaminophen (PERCOCET)  mg per tablet Starting on Jul 16, 2017. Take 1 tablet by mouth every 8 (eight) hours as needed for Pain.           Clinical Reference Information           Your Vitals Were     BP Pulse Height Weight Last Period BMI    134/77 70 5' 6" (1.676 m) 75.6 kg (166 lb 10.7 oz) (LMP Unknown) 26.9 kg/m2      Blood Pressure          Most Recent Value    BP  134/77      Allergies as of 5/19/2017     Keflex [Cephalexin]    Penicillins    Vasotec [Enalapril Maleate]    Ciprofloxacin (Bulk)    Wellbutrin [Bupropion Hcl]    Zithromax [Azithromycin]    Codeine      Immunizations Administered on Date of Encounter - 5/19/2017     None      Language Assistance Services     ATTENTION: Language assistance services are available, free of charge. Please call [phone].      ATENCIÓN: Si habla lyndseyañol, tiene a floyd disposición servicios gratuitos de asistencia lingüística. Llame al [phone].     CHÚ Ý: N?u b?n nói Ti?ng Vi?t, có các d?ch v? h? tr? ngôn ng? mi?n phí dành cho b?n. G?i s? [phone].         [facility] complies with applicable Federal civil rights laws and does not discriminate on the basis of race, color, national " origin, age, disability, or sex.

## 2017-05-19 NOTE — MR AVS SNAPSHOT
[facility]  [address]  [place] [address]  Phone: [phone]  Fax: [phone]                  Nathalie Santiago   2017 8:40 AM   Office Visit    Description:  Female : 1950   Provider:  NATAN Alvarez   Department:  [facility]           Reason for Visit     Hospital Follow Up           Diagnoses this Visit        Comments    Bilateral flank pain    -  Primary     Pyelonephritis, acute         Hypomagnesemia         RUQ pain         Hypertension associated with diabetes         Type 2 diabetes mellitus with stage 3 chronic kidney disease, with long-term current use of insulin         Vitamin deficiency         Low vitamin D level                To Do List           Future Appointments        Provider Department Dept Phone    2017 10:00 AM Maylin Doran PA-C [facility] [phone]    2017 11:30 AM LAB, [facility] [phone]    2017 10:00 AM [facility] US[facility] Ul[facility]ound [phone]      Goals (5 Years of Data)              5/15/17    Patient/caregiver will have an action plan in place to manage and prevent complications of  not taking her medications  prior to discharge from [facility].   On track    Notes - Note edited  5/15/2017  2:19 PM by Noemi Michaels    Overall Time to Completion  2 months from 2017    Short Term Goals  Patient/caregiver will discuss health care needs with Physician and care team during visits or using Patient Portal.  Interventions   · Collaborate with Physician as appropriate to meet patient needs.  · Empower patient/caregiver to discuss treatment plan with Physician/care team.  · In basket message sent to Physician regarding medication discrepancies.  · Provide contact information for [facility] contact information.  · Provide contact information for Outpatient Case Management contact information.  · Provide contact information for Physician office  phone number.   Status  · Met     Patient/caregiver will have contact information for identified community resources IE:[facility] , Pharmacy assistance program, and financial services  for follow-up within 1 month.  Interventions   · Refer to [facility].  · Refer to Outpatient Case Management Social Worker.   · Provide contact information for [facility]    Status  · Partially met    Patient/caregiver will notify RN FERNIE if patient does not hear from [facility]  and Pharmacy Assistance within 2 weeks.  Interventions   · Refer to [facility].  · Refer to Outpatient Case Management Social Worker.   Status  · Met     Patient/caregiver will utilize a pill box organizer to dispense medications daily within 1 month.  Interventions   · Complete medication reconciliation.  · Encourage Medication Compliance.   · Provide pill box organizer to patient    Status  · Met     Patient/caregiver will verbalize dosage/route/frequency/indication of all medications  within Now.  Interventions   · Complete medication reconciliation    Status  · Met          Patient/caregiver will have an action plan in place to manage and prevent complications of diabetes prior to discharge from [facility].   On track    Notes - Note edited  5/15/2017  2:11 PM by Noemi Michaels    Overall Time to Completion  2 months from 04/18/2017    Short Term Goals  Patient/caregiver will identify 2 deficits resulting from Diabetes and list 2 ways to overcome those deficits within 2 months.  Interventions   · Recognize and provide educational material (RONIT).  · Encourage Dietary Compliance.  · Review eating/nutrition habits.  · Encourage Medication Compliance.   Status  · Partially met      Patient/caregiver will measure and record the capillary blood glucose 1 times per day for 2 months.  Interventions   · Recognize and provide educational material (RONIT).   · Mail blood glucose logs  for home use.    Status  · Met     Patient/caregiver will verbalize food required to create a well-balanced food plate within 2 months.  Interventions   Recognize and provide educational material (RONIT).  Encourage Dietary Compliance  Review eating/nutrition habits.   Status  · Partially met        Clinical Reference Documents Added to Patient Instructions       Document    BLOOD SUGAR LOG, USING A (ENGLISH)    BLOOD SUGAR, LOW; HYPOGLYCEMIA (ENGLISH)    Diabetes Nutrition Placemat     GASTROPARESIS (ENGLISH)    HYPERGLYCEMIA (HIGH BLOOD SUGAR) (ENGLISH)           Clinical Reference Documents Added to Patient Instructions  05/01/2017       Document    DIABETES: SICK-DAY PLAN (ENGLISH)   DIABETES GROCERY LIST  UNDERSTANDING CARBOHYDRATES, FATS, AND PROTEIN  UNDERSTANDING CARBOHYDRATES              Patient/caregiver will have an action plan in place to manage and prevent complications of falls  prior to discharge from [facility].   On track    Notes - Note edited  5/15/2017  2:15 PM by Noemi Michaels    Overall Time to Completion  2 months from 04/18/2017    Short Term Goals  Patient/caregiver will put in place 1 keeping room free of clutter measures to decrease the risk of patient falls within 2 months.  Interventions   · Recognize and provide educational material (RONIT).   Status  · Partially met       Interventions 05/15/17  · Encourage compliance with keeping appointment with organizer on May 31, 2017.  · Partially Met        Clinical Reference Documents Added to Patient Instructions       Document            FALLS, PREVENTING, MAKING CHANGES IN YOUR LIVING SPACE (ENGLISH)                          [facility]     [facility] Care Line - 24/7 Assistance  Unless otherwise directed by your provider, please contact [facility], our nurse care line that is available for 24/7 assistance.     Registered nurses in the [facility] provide: appointment scheduling, clinical advisement, health  education, and other advisory services.  Call: [phone] (toll free)               Medications           Message regarding Medications     Verify the changes and/or additions to your medication regime listed below are the same as discussed with your clinician today.  If any of these changes or additions are incorrect, please notify your healthcare provider.             Verify that the below list of medications is an accurate representation of the medications you are currently taking.  If none reported, the list may be blank. If incorrect, please contact your healthcare provider. Carry this list with you in case of emergency.           Current Medications     albuterol-ipratropium 2.5mg-0.5mg/3mL (DUO-NEB) 0.5 mg-3 mg(2.5 mg base)/3 mL nebulizer solution INHALE THE CONTENTS OF 1 VIAL VIA NEBULIZER EVERY 6 HOURS AS NEEDED FOR SHORTNESS OF BREATH  OR  WHEEZING    amlodipine (NORVASC) 5 MG tablet Take 5 mg by mouth once daily.    atorvastatin (LIPITOR) 40 MG tablet TAKE 1 TABLET EVERY DAY    beclomethasone (QVAR) 80 mcg/actuation Aero Inhale 2 puffs into the lungs 2 (two) times daily.    clonazePAM (KLONOPIN) 0.5 MG tablet Take one-half to one tablet PO twice daily PRN anxiety    clopidogrel (PLAVIX) 75 mg tablet Take 1 tablet (75 mg total) by mouth once daily.    doxepin (SINEQUAN) 10 MG capsule Take one to two capsules PO nightly PRN insomnia    duloxetine (CYMBALTA) 30 MG capsule Take 1 capsule (30 mg total) by mouth once daily.    fenofibrate 160 MG Tab Take 1 tablet (160 mg total) by mouth once daily.    fluticasone (FLONASE) 50 mcg/actuation nasal spray 2 sprays by Each Nare route once daily.    gabapentin (NEURONTIN) 100 MG capsule TAKE 3 CAPSULES EVERY EVENING    hydrochlorothiazide (HYDRODIURIL) 25 MG tablet Take 25 mg by mouth once daily.    insulin aspart (NOVOLOG FLEXPEN) 100 unit/mL InPn pen Use per sliding scale    insulin NPH (NOVOLIN N) 100 unit/mL injection Inject 15 Units into the skin 2 (two)  "times daily before meals.    levothyroxine (SYNTHROID) 25 MCG tablet TAKE 1 TABLET BEFORE BREAKFAST    magnesium oxide (MAG-OX) 400 mg tablet Take 1 tablet (400 mg total) by mouth 2 (two) times daily.    metformin (GLUCOPHAGE) 1000 MG tablet Take 1 tablet (1,000 mg total) by mouth 2 (two) times daily with meals.    metoclopramide HCl (REGLAN) 10 MG tablet TAKE 1 TABLET EVERY 8 HOURS    ondansetron (ZOFRAN-ODT) 8 MG TbDL DISSOLVE 1 TABLET ON THE TONGUE EVERY 8 HOURS AS NEEDED    oxybutynin (DITROPAN-XL) 10 MG 24 hr tablet Take 10 mg by mouth 2 (two) times daily.    oxycodone-acetaminophen (PERCOCET)  mg per tablet Take 1 tablet by mouth every 3 (three) hours as needed for Pain.    potassium chloride (MICRO-K) 10 MEQ CpSR Take 1 capsule (10 mEq total) by mouth 3 (three) times daily.    predniSONE (DELTASONE) 5 MG tablet Take 5 mg by mouth once daily.    ropinirole (REQUIP) 5 MG tablet Take 5 mg by mouth every evening.    sertraline (ZOLOFT) 100 MG tablet Take two tablets PO daily    sumatriptan (IMITREX) 50 MG tablet Take 50 mg by mouth daily as needed for Migraine.    VOLTAREN 1 % Gel     ergocalciferol (ERGOCALCIFEROL) 50,000 unit Cap Take 1 capsule (50,000 Units total) by mouth every 7 days.           Clinical Reference Information           Your Vitals Were     BP Pulse Temp Height Weight Last Period    143/82 (BP Location: Right arm, Patient Position: Sitting, BP Method: Automatic) 60 97.5 °F (36.4 °C) (Oral) 5' 6" (1.676 m) 75.6 kg (166 lb 10.7 oz) (LMP Unknown)    BMI                26.9 kg/m2          Blood Pressure          Most Recent Value    BP  (!)  143/82      Allergies as of 5/19/2017     Keflex [Cephalexin]    Penicillins    Vasotec [Enalapril Maleate]    Ciprofloxacin (Bulk)    Wellbutrin [Bupropion Hcl]    Zithromax [Azithromycin]    Codeine      Immunizations Administered on Date of Encounter - 5/19/2017     None      Orders Placed During Today's Visit      Normal Orders This Visit    CULTURE, " URINE     Urinalysis     Future Labs/Procedures Expected by Expires    Amylase  5/19/2017 7/18/2018    CBC auto differential  5/19/2017 7/18/2018    Comprehensive metabolic panel  5/19/2017 7/18/2018    Lipase  5/19/2017 7/18/2018    Magnesium  5/19/2017 7/18/2018    US Retroperitoneal Complete  5/19/2017 5/19/2018    Vitamin D  5/19/2017 7/18/2018      Instructions      Diabetes (General Information)  Diabetes is a long-term health problem. It means your body does not make enough insulin. Or it may mean that your body cannot use the insulin it makes. Insulin is a hormone in your body. It lets blood sugar (glucose) reach the cells in your body. All of your cells need glucose for fuel.  When you have diabetes, the glucose in your blood builds up because it cannot get into the cells. This buildup is called high blood sugar (hyperglycemia).  Your blood sugar level depends on several things. It depends on what kind of food you eat and how much of it you eat. It also depends on how much exercise you get, and how much insulin you have in your body. Eating too much of the wrong kinds of food or not taking diabetes medicine on time can cause high blood sugar. Infections can cause high blood sugar even if you are taking medicines correctly.  These things can also cause low blood sugar:  · Missing meals  · Not eating enough food  · Taking too much diabetes medicine  Diabetes can cause serious problems over time if you do not get treated. These problems include heart disease, stroke, kidney failure, and blindness. They also include nerve pain or loss of feeling in your legs and feet, and gangrene of the feet. By keeping your blood sugar under control you can prevent or delay these problems.  Normal blood sugar levels are 80 to 100 before a meal and less than 180 in the 1 to 2 hours after a meal.  Home care  Follow these guidelines when caring for yourself at home:  · Follow the diet your healthcare provider gives you. Take  insulin or other diabetes medicine exactly as told to.  · Watch your blood sugar as you are told to. Keep a log of your results. This will help your provider change your medicines to keep your blood sugar under control.  · Try to reach your ideal weight. You may be able to cut back on or not have to take diabetes medicine if you eat the right foods and get exercise.  · Do not smoke. Smoking worsens the effects of diabetes on your circulation. You are much more likely to have a heart attack if you have diabetes and you smoke.  · Take good care of your feet. If you have lost feeling in your feet, you may not see an injury or infection. Check your feet and between your toes at least once a week.  · Wear a medical alert bracelet or necklace, or carry a card in your wallet that says you have diabetes. This will help healthcare providers give you the right care if you get very ill and cannot tell them that you have diabetes.  Sick day plan  If you get a cold, the flu, or a bacterial or viral infection, take these steps:  · Look at your diabetes sick plan and call your healthcare provider as you were told to. You may need to call your provider right away if:  ¨ Your blood sugar is above 240 while taking your diabetes medicine  ¨ Your urine ketone levels are above normal or high  ¨ You have been vomiting more than 6 hours  ¨ You have trouble breathing or your breath ha s a fruity smell  ¨ You have a high fever  ¨ You have a fever for several days and you are not getting better  ¨ You get light-headed and are sleepier than usual  · Keep taking your diabetes pills (oral medicine) even if you have been vomiting and are feeling sick. Call your provider right away because you may need insulin to lower your blood sugar until you recover from your illness.  · Keep taking your insulin even if you have been vomiting and are feeling sick. Call your provider right away to ask if you need to change your insulin dose. This will depend  on your blood sugar results.  · Check your blood sugar every 2 to 4 hours, or at least 4 times a day.  · Check your ketones often. If you are vomiting and having diarrhea, watch them more often.  · Do not skip meals. Try to eat small meals on a regular schedule. Do this even if you do not feel like eating.  · Drink water or other liquids that do not have caffeine or calories. This will keep you from getting dehydrated. If you are nauseated or vomiting, takes small sips every 5 minutes. To prevent dehydration try to drink a cup (8 ounces) of fluids every hour while you are awake.  General care  Always bring a source of fast-acting sugar with you in case you have symptoms of low blood sugar (below 70). At the first sign of low blood sugar, eat or drink 15 to 20 grams of fast-acting sugar to raise your blood sugar. Examples are:  · 3 to 4 glucose tablets. You can buy these at most drugstores.  · 4 ounces (1/2 cup) of regular (not diet) soft drinks  · 4 ounces (1/2 cup) of any fruit juice  · 8 ounces (1 cup) of milk  · 5 to 6 pieces of hard candy  · 1 tablespoon of honey  Check your blood sugar 15 minutes after treating yourself. If it is still below 70, take 15 to 20 more grams of fast-acting sugar. Test again in 15 minutes. If it returns to normal (70 or above), eat a snack or meal to keep your blood sugar in a safe range. If it stays low, call your doctor or go to an emergency room.  Follow-up care  Follow-up with your healthcare provider, or as advised. For more information about diabetes, visit the American Diabetes Association website at www.diabetes.org or call 793-007-5656.  When to seek medical advice  Call your healthcare provider right away if you have any of these symptoms of high blood sugar:  · Frequent urination  · Dizziness  · Drowsiness  · Thirst  · Headache  · Nausea or vomiting  · Abdominal pain  · Eyesight changes  · Fast breathing  · Confusion or loss of consciousness  Also call your provider right  away if you have any of these signs of low blood sugar:  · Fatigue  · Headache  · Shakes  · Excess sweating  · Hunger  · Feeling anxious or restless  · Eyesight changes  · Drowsiness  · Weakness  · Confusion or loss of consciousness  Call 911  Call for emergency help right away if any of these occur:  · Chest pain or shortness of breath  · Dizziness or fainting  · Weakness of an arm or leg or one side of the face  · Trouble speaking or seeing   Date Last Reviewed: 6/1/2016 © 2000-2016 [employer]. [address] [place] [address], [place] [address]. All rights reserved. This information is not intended as a substitute for professional medical care. Always follow your healthcare professional's instructions.        Established High Blood Pressure    High blood pressure (hypertension) is a chronic disease. Often health care providers dont know what causes it. But it can be caused by certain health conditions and medicines.  If you have high blood pressure, you may not have any symptoms. If you do have symptoms, they may include headache, dizziness, changes in your vision, chest pain, and shortness of breath. But even without symptoms, high blood pressure thats not treated raises your risk for heart attack and stroke. High blood pressure is a serious health risk and shouldnt be ignored.  A blood pressure reading is made up of two numbers: a higher number over a lower number. The top number is the systolic pressure. The bottom number is the diastolic pressure. A normal blood pressure is less than 120 over less than 80.  High blood pressure is when either the top number is 140 or higher, or the bottom number is 90 or higher. This must be the result when taking your blood pressure a number of times. The blood pressures between normal and high are called prehypertension.  Home care  If you have high blood pressure, you should do what is listed below to lower your blood pressure. If you are taking medicines for high  blood pressure, these methods may reduce or end your need for medicines in the future.  · Begin a weight-loss program if you are overweight.  · Cut back on how much salt you get in your diet. Heres how to do this:  ¨ Dont eat foods that have a lot of salt. These include olives, pickles, smoked meats, and salted potato chips.  ¨ Dont add salt to your food at the table.  ¨ Use only small amounts of salt when cooking.  · Begin an exercise program. Talk with your health care provider about the type of exercise program that would be best for you. It doesn't have to be hard. Even brisk walking for 20 minutes 3 times a week is a good form of exercise.  · Dont take medicines that have heart stimulants. This includes many cold and sinus decongestant pills and sprays, as well as diet pills. Check the warnings about hypertension on the label. Stimulants such as amphetamine or cocaine could be lethal for someone with high blood pressure. Never take these.  · Limit how much caffeine you get in your diet. Switch to caffeine-free products.  · Stop smoking. If you are a long-time smoker, this can be hard. Enroll in a stop-smoking program to make it more likely that you will quit for good.  · Learn how to handle stress. This is an important part of any program to lower blood pressure. Learn about relaxation methods like meditation, yoga, or biofeedback.  · If your provider prescribed medicines, take them exactly as directed. Missing doses may cause your blood pressure get out of control.  · Consider buying an automatic blood pressure machine. You can get one of these at most pharmacies. Use this to watch your blood pressure at home. Give the results to your provider.  Follow-up care  You will need to make regular visits to your health care provider. This is to check your blood pressure and to make changes to your medicines. Make a follow-up appointment as directed.  When to seek medical advice  Call your health care provider  right away if any of these occur:  · Chest pain or shortness of breath  · Severe headache  · Throbbing or rushing sound in the ears  · Nosebleed  · Sudden severe pain in your belly (abdomen)  · Extreme drowsiness, confusion, or fainting  · Dizziness or dizziness with a spinning sensation (vertigo)  · Weakness of an arm or leg or one side of the face  · You have problems speaking or seeing   Date Last Reviewed: 11/25/2014 © 2000-2016 [employer]. [address] [place] [address]. All rights reserved. This information is not intended as a substitute for professional medical care. Always follow your healthcare professional's instructions.        Established High Blood Pressure    High blood pressure (hypertension) is a chronic disease. Often health care providers dont know what causes it. But it can be caused by certain health conditions and medicines.  If you have high blood pressure, you may not have any symptoms. If you do have symptoms, they may include headache, dizziness, changes in your vision, chest pain, and shortness of breath. But even without symptoms, high blood pressure thats not treated raises your risk for heart attack and stroke. High blood pressure is a serious health risk and shouldnt be ignored.  A blood pressure reading is made up of two numbers: a higher number over a lower number. The top number is the systolic pressure. The bottom number is the diastolic pressure. A normal blood pressure is less than 120 over less than 80.  High blood pressure is when either the top number is 140 or higher, or the bottom number is 90 or higher. This must be the result when taking your blood pressure a number of times. The blood pressures between normal and high are called prehypertension.  Home care  If you have high blood pressure, you should do what is listed below to lower your blood pressure. If you are taking medicines for high blood pressure, these methods may reduce or end your  need for medicines in the future.  · Begin a weight-loss program if you are overweight.  · Cut back on how much salt you get in your diet. Heres how to do this:  ¨ Dont eat foods that have a lot of salt. These include olives, pickles, smoked meats, and salted potato chips.  ¨ Dont add salt to your food at the table.  ¨ Use only small amounts of salt when cooking.  · Begin an exercise program. Talk with your health care provider about the type of exercise program that would be best for you. It doesn't have to be hard. Even brisk walking for 20 minutes 3 times a week is a good form of exercise.  · Dont take medicines that have heart stimulants. This includes many cold and sinus decongestant pills and sprays, as well as diet pills. Check the warnings about hypertension on the label. Stimulants such as amphetamine or cocaine could be lethal for someone with high blood pressure. Never take these.  · Limit how much caffeine you get in your diet. Switch to caffeine-free products.  · Stop smoking. If you are a long-time smoker, this can be hard. Enroll in a stop-smoking program to make it more likely that you will quit for good.  · Learn how to handle stress. This is an important part of any program to lower blood pressure. Learn about relaxation methods like meditation, yoga, or biofeedback.  · If your provider prescribed medicines, take them exactly as directed. Missing doses may cause your blood pressure get out of control.  · Consider buying an automatic blood pressure machine. You can get one of these at most pharmacies. Use this to watch your blood pressure at home. Give the results to your provider.  Follow-up care  You will need to make regular visits to your health care provider. This is to check your blood pressure and to make changes to your medicines. Make a follow-up appointment as directed.  When to seek medical advice  Call your health care provider right away if any of these occur:  · Chest pain or  shortness of breath  · Severe headache  · Throbbing or rushing sound in the ears  · Nosebleed  · Sudden severe pain in your belly (abdomen)  · Extreme drowsiness, confusion, or fainting  · Dizziness or dizziness with a spinning sensation (vertigo)  · Weakness of an arm or leg or one side of the face  · You have problems speaking or seeing   Date Last Reviewed: 11/25/2014  © 0298-0891 "[employer] [address] [place] [address], [place] [address]. All rights reserved. This information is not intended as a substitute for professional medical care. Always follow your healthcare professional's instructions.             Language Assistance Services     ATTENTION: Language assistance services are available, free of charge. Please call [phone].      ATENCIÓN: Si dariel luke, tiene a floyd disposición servicios gratuitos de asistencia lingüística. Llame al [phone].     CHÚ Ý: N?u b?n nói Ti?ng Vi?t, có các d?ch v? h? tr? ngôn ng? mi?n phí dành cho b?n. G?i s? [phone].         [place] [facility] complies with applicable Federal civil rights laws and does not discriminate on the basis of race, color, national origin, age, disability, or sex.

## 2017-05-19 NOTE — PATIENT INSTRUCTIONS
Diabetes (General Information)  Diabetes is a long-term health problem. It means your body does not make enough insulin. Or it may mean that your body cannot use the insulin it makes. Insulin is a hormone in your body. It lets blood sugar (glucose) reach the cells in your body. All of your cells need glucose for fuel.  When you have diabetes, the glucose in your blood builds up because it cannot get into the cells. This buildup is called high blood sugar (hyperglycemia).  Your blood sugar level depends on several things. It depends on what kind of food you eat and how much of it you eat. It also depends on how much exercise you get, and how much insulin you have in your body. Eating too much of the wrong kinds of food or not taking diabetes medicine on time can cause high blood sugar. Infections can cause high blood sugar even if you are taking medicines correctly.  These things can also cause low blood sugar:  · Missing meals  · Not eating enough food  · Taking too much diabetes medicine  Diabetes can cause serious problems over time if you do not get treated. These problems include heart disease, stroke, kidney failure, and blindness. They also include nerve pain or loss of feeling in your legs and feet, and gangrene of the feet. By keeping your blood sugar under control you can prevent or delay these problems.  Normal blood sugar levels are 80 to 100 before a meal and less than 180 in the 1 to 2 hours after a meal.  Home care  Follow these guidelines when caring for yourself at home:  · Follow the diet your healthcare provider gives you. Take insulin or other diabetes medicine exactly as told to.  · Watch your blood sugar as you are told to. Keep a log of your results. This will help your provider change your medicines to keep your blood sugar under control.  · Try to reach your ideal weight. You may be able to cut back on or not have to take diabetes medicine if you eat the right foods and get exercise.  · Do  not smoke. Smoking worsens the effects of diabetes on your circulation. You are much more likely to have a heart attack if you have diabetes and you smoke.  · Take good care of your feet. If you have lost feeling in your feet, you may not see an injury or infection. Check your feet and between your toes at least once a week.  · Wear a medical alert bracelet or necklace, or carry a card in your wallet that says you have diabetes. This will help healthcare providers give you the right care if you get very ill and cannot tell them that you have diabetes.  Sick day plan  If you get a cold, the flu, or a bacterial or viral infection, take these steps:  · Look at your diabetes sick plan and call your healthcare provider as you were told to. You may need to call your provider right away if:  ¨ Your blood sugar is above 240 while taking your diabetes medicine  ¨ Your urine ketone levels are above normal or high  ¨ You have been vomiting more than 6 hours  ¨ You have trouble breathing or your breath ha s a fruity smell  ¨ You have a high fever  ¨ You have a fever for several days and you are not getting better  ¨ You get light-headed and are sleepier than usual  · Keep taking your diabetes pills (oral medicine) even if you have been vomiting and are feeling sick. Call your provider right away because you may need insulin to lower your blood sugar until you recover from your illness.  · Keep taking your insulin even if you have been vomiting and are feeling sick. Call your provider right away to ask if you need to change your insulin dose. This will depend on your blood sugar results.  · Check your blood sugar every 2 to 4 hours, or at least 4 times a day.  · Check your ketones often. If you are vomiting and having diarrhea, watch them more often.  · Do not skip meals. Try to eat small meals on a regular schedule. Do this even if you do not feel like eating.  · Drink water or other liquids that do not have caffeine or  calories. This will keep you from getting dehydrated. If you are nauseated or vomiting, takes small sips every 5 minutes. To prevent dehydration try to drink a cup (8 ounces) of fluids every hour while you are awake.  General care  Always bring a source of fast-acting sugar with you in case you have symptoms of low blood sugar (below 70). At the first sign of low blood sugar, eat or drink 15 to 20 grams of fast-acting sugar to raise your blood sugar. Examples are:  · 3 to 4 glucose tablets. You can buy these at most [facility].  · 4 ounces (1/2 cup) of regular (not diet) soft drinks  · 4 ounces (1/2 cup) of any fruit juice  · 8 ounces (1 cup) of milk  · 5 to 6 pieces of hard candy  · 1 tablespoon of honey  Check your blood sugar 15 minutes after treating yourself. If it is still below 70, take 15 to 20 more grams of fast-acting sugar. Test again in 15 minutes. If it returns to normal (70 or above), eat a snack or meal to keep your blood sugar in a safe range. If it stays low, call your doctor or go to an emergency room.  Follow-up care  Follow-up with your healthcare provider, or as advised. For more information about diabetes, visit the American Diabetes Association website at www.diabetes.org or call 278-927-1562.  When to seek medical advice  Call your healthcare provider right away if you have any of these symptoms of high blood sugar:  · Frequent urination  · Dizziness  · Drowsiness  · Thirst  · Headache  · Nausea or vomiting  · Abdominal pain  · Eyesight changes  · Fast breathing  · Confusion or loss of consciousness  Also call your provider right away if you have any of these signs of low blood sugar:  · Fatigue  · Headache  · Shakes  · Excess sweating  · Hunger  · Feeling anxious or restless  · Eyesight changes  · Drowsiness  · Weakness  · Confusion or loss of consciousness  Call 911  Call for emergency help right away if any of these occur:  · Chest pain or shortness of breath  · Dizziness or  fainting  · Weakness of an arm or leg or one side of the face  · Trouble speaking or seeing   Date Last Reviewed: 6/1/2016  © 3797-4651 [employer], [employer]. [address] [place] [address], [place] [address]. All rights reserved. This information is not intended as a substitute for professional medical care. Always follow your healthcare professional's instructions.        Established High Blood Pressure    High blood pressure (hypertension) is a chronic disease. Often health care providers dont know what causes it. But it can be caused by certain health conditions and medicines.  If you have high blood pressure, you may not have any symptoms. If you do have symptoms, they may include headache, dizziness, changes in your vision, chest pain, and shortness of breath. But even without symptoms, high blood pressure thats not treated raises your risk for heart attack and stroke. High blood pressure is a serious health risk and shouldnt be ignored.  A blood pressure reading is made up of two numbers: a higher number over a lower number. The top number is the systolic pressure. The bottom number is the diastolic pressure. A normal blood pressure is less than 120 over less than 80.  High blood pressure is when either the top number is 140 or higher, or the bottom number is 90 or higher. This must be the result when taking your blood pressure a number of times. The blood pressures between normal and high are called prehypertension.  Home care  If you have high blood pressure, you should do what is listed below to lower your blood pressure. If you are taking medicines for high blood pressure, these methods may reduce or end your need for medicines in the future.  · Begin a weight-loss program if you are overweight.  · Cut back on how much salt you get in your diet. Heres how to do this:  ¨ Dont eat foods that have a lot of salt. These include olives, pickles, smoked meats, and salted potato chips.  ¨ Dont add salt to your  food at the table.  ¨ Use only small amounts of salt when cooking.  · Begin an exercise program. Talk with your health care provider about the type of exercise program that would be best for you. It doesn't have to be hard. Even brisk walking for 20 minutes 3 times a week is a good form of exercise.  · Dont take medicines that have heart stimulants. This includes many cold and sinus decongestant pills and sprays, as well as diet pills. Check the warnings about hypertension on the label. Stimulants such as amphetamine or cocaine could be lethal for someone with high blood pressure. Never take these.  · Limit how much caffeine you get in your diet. Switch to caffeine-free products.  · Stop smoking. If you are a long-time smoker, this can be hard. Enroll in a stop-smoking program to make it more likely that you will quit for good.  · Learn how to handle stress. This is an important part of any program to lower blood pressure. Learn about relaxation methods like meditation, yoga, or biofeedback.  · If your provider prescribed medicines, take them exactly as directed. Missing doses may cause your blood pressure get out of control.  · Consider buying an automatic blood pressure machine. You can get one of these at most pharmacies. Use this to watch your blood pressure at home. Give the results to your provider.  Follow-up care  You will need to make regular visits to your health care provider. This is to check your blood pressure and to make changes to your medicines. Make a follow-up appointment as directed.  When to seek medical advice  Call your health care provider right away if any of these occur:  · Chest pain or shortness of breath  · Severe headache  · Throbbing or rushing sound in the ears  · Nosebleed  · Sudden severe pain in your belly (abdomen)  · Extreme drowsiness, confusion, or fainting  · Dizziness or dizziness with a spinning sensation (vertigo)  · Weakness of an arm or leg or one side of the face  · You  have problems speaking or seeing   Date Last Reviewed: 11/25/2014  © 8592-4393 [employer]. [address] [place] [address], [place] [address]. All rights reserved. This information is not intended as a substitute for professional medical care. Always follow your healthcare professional's instructions.        Established High Blood Pressure    High blood pressure (hypertension) is a chronic disease. Often health care providers dont know what causes it. But it can be caused by certain health conditions and medicines.  If you have high blood pressure, you may not have any symptoms. If you do have symptoms, they may include headache, dizziness, changes in your vision, chest pain, and shortness of breath. But even without symptoms, high blood pressure thats not treated raises your risk for heart attack and stroke. High blood pressure is a serious health risk and shouldnt be ignored.  A blood pressure reading is made up of two numbers: a higher number over a lower number. The top number is the systolic pressure. The bottom number is the diastolic pressure. A normal blood pressure is less than 120 over less than 80.  High blood pressure is when either the top number is 140 or higher, or the bottom number is 90 or higher. This must be the result when taking your blood pressure a number of times. The blood pressures between normal and high are called prehypertension.  Home care  If you have high blood pressure, you should do what is listed below to lower your blood pressure. If you are taking medicines for high blood pressure, these methods may reduce or end your need for medicines in the future.  · Begin a weight-loss program if you are overweight.  · Cut back on how much salt you get in your diet. Heres how to do this:  ¨ Dont eat foods that have a lot of salt. These include olives, pickles, smoked meats, and salted potato chips.  ¨ Dont add salt to your food at the table.  ¨ Use only small amounts of salt when  cooking.  · Begin an exercise program. Talk with your health care provider about the type of exercise program that would be best for you. It doesn't have to be hard. Even brisk walking for 20 minutes 3 times a week is a good form of exercise.  · Dont take medicines that have heart stimulants. This includes many cold and sinus decongestant pills and sprays, as well as diet pills. Check the warnings about hypertension on the label. Stimulants such as amphetamine or cocaine could be lethal for someone with high blood pressure. Never take these.  · Limit how much caffeine you get in your diet. Switch to caffeine-free products.  · Stop smoking. If you are a long-time smoker, this can be hard. Enroll in a stop-smoking program to make it more likely that you will quit for good.  · Learn how to handle stress. This is an important part of any program to lower blood pressure. Learn about relaxation methods like meditation, yoga, or biofeedback.  · If your provider prescribed medicines, take them exactly as directed. Missing doses may cause your blood pressure get out of control.  · Consider buying an automatic blood pressure machine. You can get one of these at most pharmacies. Use this to watch your blood pressure at home. Give the results to your provider.  Follow-up care  You will need to make regular visits to your health care provider. This is to check your blood pressure and to make changes to your medicines. Make a follow-up appointment as directed.  When to seek medical advice  Call your health care provider right away if any of these occur:  · Chest pain or shortness of breath  · Severe headache  · Throbbing or rushing sound in the ears  · Nosebleed  · Sudden severe pain in your belly (abdomen)  · Extreme drowsiness, confusion, or fainting  · Dizziness or dizziness with a spinning sensation (vertigo)  · Weakness of an arm or leg or one side of the face  · You have problems speaking or seeing   Date Last Reviewed:  11/25/2014  © 3024-0129 [employer]. [address] [place] [address], [place] [address]. All rights reserved. This information is not intended as a substitute for professional medical care. Always follow your healthcare professional's instructions.

## 2017-05-19 NOTE — PROGRESS NOTES
Subjective:       Patient ID: Nathalie Santiago is a 66 y.o. female.    Chief Complaint: Hospital Follow Up (Kidney infection)    HPI   Patient is a 66 year old  female presenting to the clinic for f/u [facility] stay from acute pyelonephritis. She presented to the ER on 5/2/17 with nausea, vomiting, diarrhea. Na 142, K 3.1, Cr 0.68 Glu 162 WBC 22,000 with CT abdomen show evidence of b/l pyelonepthritis. Patient was placed on IV meropenem 500mg & flagyl 500mg. She improved during hospitalization & was sent home with doxycycline 100mg BID x 10 days. She states she has greatly improved since hospitalization, but does still have b/l flank pain that is constant, dull achy pain. She reports she has a name of a urologist she is going to set up an appointment with in [place]. She denies any fever, chills, difficulty urinating. She has had some RUQ pain, tenderness.   Review of Systems   Constitutional: Positive for fatigue. Negative for activity change, appetite change, chills, diaphoresis and fever.   HENT: Negative for congestion, postnasal drip and rhinorrhea.    Respiratory: Negative.  Negative for cough, shortness of breath and wheezing.    Cardiovascular: Negative.  Negative for chest pain.   Gastrointestinal: Positive for nausea (chronic). Negative for abdominal pain, blood in stool, constipation, diarrhea and vomiting.   Genitourinary: Negative for dysuria, frequency, hematuria and urgency.   Musculoskeletal: Positive for back pain.        Flank pain   Skin: Negative.  Negative for color change and rash.   Neurological: Negative for dizziness and syncope.   Psychiatric/Behavioral: Negative for agitation, behavioral problems and confusion.       Objective:      Physical Exam   Constitutional: Vital signs are normal. She appears well-developed and well-nourished. No distress.   Cardiovascular: Normal rate, regular rhythm, S1 normal, S2 normal and normal heart sounds.  Exam reveals no gallop.     No murmur heard.  Pulses:       Radial pulses are 2+ on the right side, and 2+ on the left side.   <2sec cap refill fingers bilat     Pulmonary/Chest: Effort normal and breath sounds normal. No respiratory distress. She has no wheezes. She has no rhonchi.   Abdominal: Soft. Normal appearance and bowel sounds are normal. There is no hepatosplenomegaly. There is tenderness in the right upper quadrant. There is CVA tenderness (bilateral). There is no rigidity, no guarding, no tenderness at McBurney's point and negative Horton's sign.   Skin: Skin is warm and dry. She is not diaphoretic.   Appropriate skin turgor   Psychiatric: She has a normal mood and affect. Her speech is normal and behavior is normal. Judgment and thought content normal. Cognition and memory are normal.       Assessment:       1. Bilateral flank pain    2. Pyelonephritis, acute    3. Hypomagnesemia    4. RUQ pain    5. Hypertension associated with diabetes    6. Type 2 diabetes mellitus with stage 3 chronic kidney disease, with long-term current use of insulin    7. Vitamin deficiency    8. Low vitamin D level        Plan:   Nathalie was seen today for hospital follow up.    Diagnoses and all orders for this visit:    Bilateral flank pain  -     Cancel: Urinalysis; Future  -     Comprehensive metabolic panel; Future  -     CBC auto differential; Future  -     Urinalysis  -     CULTURE, URINE    Pyelonephritis, acute  -     Cancel: Urinalysis; Future  -     Comprehensive metabolic panel; Future  -     CBC auto differential; Future  -     US Retroperitoneal Complete; Future  -     Urinalysis  -     CULTURE, URINE    Hypomagnesemia  -     Magnesium; Future    RUQ pain  -     Comprehensive metabolic panel; Future  -     Amylase; Future  -     Lipase; Future    Hypertension associated with diabetes  Fair control  Labs today    Type 2 diabetes mellitus with stage 3 chronic kidney disease, with long-term current use of insulin  Good control; continue  bhupendra medications    Low vitamin D level  -     Vitamin D; Future      Patient readiness: acceptance and barriers:none    During the course of the visit the patient was educated and counseled about the following:     Diabetes:  Discussed general issues about diabetes pathophysiology and management.  Hypertension:   Medication: no change.    Goals: Diabetes: Maintain Hemoglobin A1C below 7 and Hypertension: Reduce Blood Pressure    Did patient meet goals/outcomes: No    The following self management tools provided: declined    Patient Instructions (the written plan) was given to the patient/family.     Time spent with patient: 30 minutes

## 2017-05-22 ENCOUNTER — OUTPATIENT CASE MANAGEMENT (OUTPATIENT)
Dept: ADMINISTRATIVE | Facility: OTHER | Age: 67
End: 2017-05-22

## 2017-05-22 DIAGNOSIS — E55.9 VITAMIN D DEFICIENCY: ICD-10-CM

## 2017-05-22 RX ORDER — ERGOCALCIFEROL 1.25 MG/1
50000 CAPSULE ORAL
Qty: 12 CAPSULE | Refills: 1 | Status: SHIPPED | OUTPATIENT
Start: 2017-05-22 | End: 2017-09-27 | Stop reason: SDUPTHER

## 2017-05-23 LAB — BACTERIA UR CULT: NORMAL

## 2017-05-24 ENCOUNTER — OUTPATIENT CASE MANAGEMENT (OUTPATIENT)
Dept: ADMINISTRATIVE | Facility: OTHER | Age: 67
End: 2017-05-24

## 2017-05-24 DIAGNOSIS — N39.0 URINARY TRACT INFECTION WITHOUT HEMATURIA, SITE UNSPECIFIED: Primary | ICD-10-CM

## 2017-05-24 RX ORDER — NITROFURANTOIN (MACROCRYSTALS) 100 MG/1
100 CAPSULE ORAL EVERY 12 HOURS
Qty: 20 CAPSULE | Refills: 0 | Status: SHIPPED | OUTPATIENT
Start: 2017-05-24 | End: 2017-06-03

## 2017-05-24 NOTE — PROGRESS NOTES
"5/24/17: LCSW contacted pt to complete SW assessment.  Pt lives with her younger brother and his wife; she is able to complete ADL's.  Pt has previous hx of depression but says her depression is currently "ok".  Pt receives behavioral health treatment through in-clinic Psychiatrist.  Pt self-reports financial problems, mostly related to medical bills including multiple $300 ambulance bills.  Pt pays the minimum amounts on her medical bills every month.  Pt received [facility] financial assistance paperwork but is unable to complete it at this time as she does not have a copy of her social security awards letter since she does direct deposit.  [facility] [employer] [facility] service number ([phone]).  LCSW told pt he would follow up with her next week; pt stated she would prefer for him not to call her weekly.  LCSW will consult with RN to see about making a conference call with pt upon next follow up.  "

## 2017-05-30 DIAGNOSIS — G43.109 COMPLICATED MIGRAINE: ICD-10-CM

## 2017-05-30 RX ORDER — CLOPIDOGREL BISULFATE 75 MG/1
TABLET ORAL
Qty: 90 TABLET | Refills: 1 | Status: SHIPPED | OUTPATIENT
Start: 2017-05-30 | End: 2017-10-16 | Stop reason: SDUPTHER

## 2017-05-30 RX ORDER — OXYBUTYNIN CHLORIDE 5 MG/1
TABLET ORAL
Qty: 180 TABLET | Refills: 1 | Status: SHIPPED | OUTPATIENT
Start: 2017-05-30 | End: 2017-10-16 | Stop reason: SDUPTHER

## 2017-05-31 ENCOUNTER — OUTPATIENT CASE MANAGEMENT (OUTPATIENT)
Dept: ADMINISTRATIVE | Facility: OTHER | Age: 67
End: 2017-05-31

## 2017-05-31 NOTE — PROGRESS NOTES
"17: LCSW and RN made follow up call to pt.  Pt sounded down; she states how a long time friend of hers  yesterday.  LCSW normalized pt's feelings of sadness and explained to pt the grieving process.  LCSW gave pt the number to [employer] and explained to her how they have emergency crisis counselors available  if she felt like talking to someone.  Pt said she has a really good support system in place and can talk to family/friends if needed.  LCSW asked pt if she had a chance to contact [facility] to request a copy of her awards letter and she misplaced the number; LCSW gave pt the number again.  Pt talked about having trouble sleeping last night; she said she was going to contact in-clinic Psychiatrist.  LCSW will follow up with pt next week.        17: LCSW contacted pt to complete SW assessment.  Pt lives with her younger brother and his wife; she is able to complete ADL's.  Pt has previous hx of depression but says her depression is currently "ok".  Pt receives behavioral health treatment through in-clinic Psychiatrist.  Pt self-reports financial problems, mostly related to medical bills including multiple $300 ambulance bills.  Pt pays the minimum amounts on her medical bills every month.  Pt received [facility] financial assistance paperwork but is unable to complete it at this time as she does not have a copy of her social security awards letter since she does direct deposit.  LCSW gave pt [employer] customer service number ([phone]).  LCSW told pt he would follow up with her next week; pt stated she would prefer for him not to call her weekly.  LCSW will consult with RN to see about making a conference call with pt upon next follow up.  "

## 2017-05-31 NOTE — PROGRESS NOTES
05/31/17-Conference call to patient with ZACHERY Fleming. Patient is upset because her best friend of 58 years passed away suddenly. Patient did place her meds in her pill box and is using the pill box. Patient is taking her medications. Patient is bringing her medications with her to take when she is not at home. Patient is forcing herself to eat while she is grieving for her friend. Patient did not hire the lady  organizer and is letting her niece assist her with organization. Patient with no new falls. Patient said the diabetes grocery list helped a lot. Encouraged patient to call pharmacy assistance, repeated phone number to her. Patient has an appt with a urologist on 06/07/17. Will follow up with patient week to go over educational material mailed to her.

## 2017-06-07 ENCOUNTER — OUTPATIENT CASE MANAGEMENT (OUTPATIENT)
Dept: ADMINISTRATIVE | Facility: OTHER | Age: 67
End: 2017-06-07

## 2017-06-07 NOTE — PROGRESS NOTES
"17: LCSW and RN made follow up call to pt.  Pt stated she was on the way to see a urologist in [place].  Pt went to her friend's  this past Monday; she talked about how it gave her closure.  Pt self-reports depression has lowered; LCSW asked her if she contacted 211 for counseling and she stated it has "not got that bad."  LCSW asked pt if she had a chance to contact [employer] concerning her awards letter; she has not due to having multiple events going on and being extremely busy.  LCSW will place pt in monitoring status and will follow up with pt in two and a half weeks.  LCSW will be taking PTO 17 - 17, and will contact pt the following week.    17: LCSW and RN made follow up call to pt.  Pt sounded down; she states how a long time friend of hers  yesterday.  LCSW normalized pt's feelings of sadness and explained to pt the grieving process.  LCSW gave pt the number to [employer] and explained to her how they have emergency crisis counselors available  if she felt like talking to someone.  Pt said she has a really good support system in place and can talk to family/friends if needed.  LCSW asked pt if she had a chance to contact [facility] to request a copy of her awards letter and she misplaced the number; LCSW gave pt the number again.  Pt talked about having trouble sleeping last night; she said she was going to contact in-clinic Psychiatrist.  LCSW will follow up with pt next week.        17: LCSW contacted pt to complete SW assessment.  Pt lives with her younger brother and his wife; she is able to complete ADL's.  Pt has previous hx of depression but says her depression is currently "ok".  Pt receives behavioral health treatment through in-clinic Psychiatrist.  Pt self-reports financial problems, mostly related to medical bills including multiple $300 ambulance bills.  Pt pays the minimum amounts on her medical bills every month.  Pt received [facility] " financial assistance paperwork but is unable to complete it at this time as she does not have a copy of her social security awards letter since she does direct deposit.  [facility] [employer] customer service number ([phone]).  LCSW told pt he would follow up with her next week; pt stated she would prefer for him not to call her weekly.  PORSCHEW will consult with RN to see about making a conference call with pt upon next follow up.

## 2017-06-07 NOTE — PROGRESS NOTES
06/07/17-Conference call to patient with ZACHERY Fleming. Patient is still grieving over her friend passing away. Patient is taking all her medications and eating. Patient has an appointment today at 1:30 with a urologist in [place]. Patient with no falls. Will mail to patient-diabetes learning about serving and portion sizes, reading food labels, and diabetes shopping for and preparing meals. Will follow up with patient in two weeks. If no further needs will place in monitoring status.

## 2017-06-17 ENCOUNTER — HOSPITAL ENCOUNTER (OUTPATIENT)
Dept: RADIOLOGY | Facility: HOSPITAL | Age: 67
Discharge: HOME | End: 2017-06-17
Attending: FAMILY MEDICINE
Payer: MEDICARE

## 2017-06-17 DIAGNOSIS — N10 PYELONEPHRITIS, ACUTE: ICD-10-CM

## 2017-06-17 PROCEDURE — 76770 US EXAM ABDO BACK WALL COMP: CPT | Mod: 26,,, | Performed by: RADIOLOGY

## 2017-06-17 PROCEDURE — 76770 US EXAM ABDO BACK WALL COMP: CPT | Mod: TC

## 2017-06-19 ENCOUNTER — OUTPATIENT CASE MANAGEMENT (OUTPATIENT)
Dept: ADMINISTRATIVE | Facility: OTHER | Age: 67
End: 2017-06-19

## 2017-06-19 NOTE — PROGRESS NOTES
06/19/17-Called and spoke to patient. Patient with no falls. Patient is eating several small meals a day to control her diabetes and gastroparesis.  Patient's blood glucose is between 91 to 131. Patient is taking all her medications. Patient had an ultrasound last week with her urologist. Will place patient in monitoring status at this time since OPCM SW is still working with patient.

## 2017-06-20 ENCOUNTER — TELEPHONE (OUTPATIENT)
Dept: FAMILY MEDICINE | Facility: CLINIC | Age: 67
End: 2017-06-20

## 2017-06-20 NOTE — TELEPHONE ENCOUNTER
----- Message from Patience Willoughby sent at 6/20/2017  2:29 PM CDT -----  Contact: franci   Test results. US   Call back

## 2017-06-21 DIAGNOSIS — M25.50 POLYARTHRALGIA: ICD-10-CM

## 2017-06-21 RX ORDER — GABAPENTIN 100 MG/1
CAPSULE ORAL
Qty: 90 CAPSULE | Refills: 0 | Status: SHIPPED | OUTPATIENT
Start: 2017-06-21 | End: 2018-02-20

## 2017-06-21 RX ORDER — DULOXETIN HYDROCHLORIDE 30 MG/1
30 CAPSULE, DELAYED RELEASE ORAL DAILY
Qty: 90 CAPSULE | Refills: 0 | Status: SHIPPED | OUTPATIENT
Start: 2017-06-21 | End: 2017-07-19

## 2017-06-22 ENCOUNTER — NURSE TRIAGE (OUTPATIENT)
Dept: ADMINISTRATIVE | Facility: CLINIC | Age: 67
End: 2017-06-22

## 2017-06-22 NOTE — TELEPHONE ENCOUNTER
Spoke to patient,notified patient she should go to ER,patient declined. Notified patient she needs to be seen today and would recommend going to urgent care if not the ER,patient states understanding and will go to urgent care. Notified patient that she should not wait to be seen because the ring can cut off her circulation in her thumb/hand. Patient states understanding.

## 2017-06-22 NOTE — TELEPHONE ENCOUNTER
Reason for Disposition   Finger joint can't be opened (straightened) or closed (bent) completely     Nathalie states she fell two days ago, and she injured her thumb.  She states she thinks it may be broken.  Hurts to bend the thumb, and she did not take a gold band ring off at the time of the fall, as it is her mother's wedding band and she has not taken it off in 20 years.  She cannot remove the ring now, cannot even turn the ring around on the thumb.  She wanted appt on Monday.  Strongly recommended she be seen this evening, to minimize damage to nerves, blood vessels, and tissue.  Recommended she apply ice bag until seen.  Lives in [place], [place], and she will call her sister to bring her to  clinic in [place] for evaluation.  Message to Ladan Almodovar, pcp.  Please contact caller directly with any additional care advice.    Protocols used: ST FINGER INJURY-A-[place]

## 2017-06-23 ENCOUNTER — TELEPHONE (OUTPATIENT)
Dept: FAMILY MEDICINE | Facility: CLINIC | Age: 67
End: 2017-06-23

## 2017-06-23 NOTE — TELEPHONE ENCOUNTER
----- Message from Mya Garcia sent at 6/22/2017  3:57 PM CDT -----  Contact: self  Patient called regarding speaking to Oncall Nurse about possible broken thumb on left hand. Please contact [phone] (oibx)

## 2017-06-26 ENCOUNTER — OUTPATIENT CASE MANAGEMENT (OUTPATIENT)
Dept: ADMINISTRATIVE | Facility: OTHER | Age: 67
End: 2017-06-26

## 2017-07-06 DIAGNOSIS — E87.6 HYPOKALEMIA: ICD-10-CM

## 2017-07-06 RX ORDER — POTASSIUM CHLORIDE 750 MG/1
10 CAPSULE, EXTENDED RELEASE ORAL 3 TIMES DAILY
Qty: 270 CAPSULE | Refills: 3 | Status: SHIPPED | OUTPATIENT
Start: 2017-07-06 | End: 2019-06-13 | Stop reason: SDUPTHER

## 2017-07-10 ENCOUNTER — OUTPATIENT CASE MANAGEMENT (OUTPATIENT)
Dept: ADMINISTRATIVE | Facility: OTHER | Age: 67
End: 2017-07-10

## 2017-07-10 NOTE — PROGRESS NOTES
"7/10/17: LCSW attempted to contact pt for follow up; no answer, left voicemail.  LCSW will mail pt an "attempted to contact" letter.    "

## 2017-07-11 ENCOUNTER — DOCUMENTATION ONLY (OUTPATIENT)
Dept: FAMILY MEDICINE | Facility: CLINIC | Age: 67
End: 2017-07-11

## 2017-07-11 NOTE — PROGRESS NOTES
Pre-Visit Chart Review  For Appointment Scheduled on 7/11/2017    Health Maintenance Due   Topic Date Due    Foot Exam  03/03/2017

## 2017-07-12 ENCOUNTER — OFFICE VISIT (OUTPATIENT)
Dept: FAMILY MEDICINE | Facility: CLINIC | Age: 67
End: 2017-07-12
Payer: MEDICARE

## 2017-07-12 ENCOUNTER — HOSPITAL ENCOUNTER (OUTPATIENT)
Dept: RADIOLOGY | Facility: CLINIC | Age: 67
Discharge: HOME | End: 2017-07-12
Attending: FAMILY MEDICINE
Payer: MEDICARE

## 2017-07-12 VITALS
BODY MASS INDEX: 25.83 KG/M2 | WEIGHT: 160.69 LBS | HEART RATE: 69 BPM | HEIGHT: 66 IN | DIASTOLIC BLOOD PRESSURE: 76 MMHG | TEMPERATURE: 98 F | SYSTOLIC BLOOD PRESSURE: 121 MMHG

## 2017-07-12 DIAGNOSIS — M25.532 LEFT WRIST PAIN: ICD-10-CM

## 2017-07-12 DIAGNOSIS — H66.93 BILATERAL OTITIS MEDIA, UNSPECIFIED CHRONICITY, UNSPECIFIED OTITIS MEDIA TYPE: Primary | ICD-10-CM

## 2017-07-12 PROCEDURE — 1126F AMNT PAIN NOTED NONE PRSNT: CPT | Mod: S$GLB,,, | Performed by: PHYSICIAN ASSISTANT

## 2017-07-12 PROCEDURE — 73130 X-RAY EXAM OF HAND: CPT | Mod: TC,PO,LT

## 2017-07-12 PROCEDURE — 73130 X-RAY EXAM OF HAND: CPT | Mod: 26,LT,S$GLB, | Performed by: RADIOLOGY

## 2017-07-12 PROCEDURE — 99999 PR PBB SHADOW E&M-EST. PATIENT-LVL III: CPT | Mod: PBBFAC,,, | Performed by: PHYSICIAN ASSISTANT

## 2017-07-12 PROCEDURE — 1159F MED LIST DOCD IN RCRD: CPT | Mod: S$GLB,,, | Performed by: PHYSICIAN ASSISTANT

## 2017-07-12 PROCEDURE — 99213 OFFICE O/P EST LOW 20 MIN: CPT | Mod: S$GLB,,, | Performed by: PHYSICIAN ASSISTANT

## 2017-07-12 RX ORDER — CLARITHROMYCIN 500 MG/1
500 TABLET, FILM COATED ORAL 2 TIMES DAILY
Qty: 20 TABLET | Refills: 0 | Status: SHIPPED | OUTPATIENT
Start: 2017-07-12 | End: 2017-07-19 | Stop reason: ALTCHOICE

## 2017-07-12 RX ORDER — BLOOD SUGAR DIAGNOSTIC
STRIP MISCELLANEOUS
COMMUNITY
Start: 2017-07-03 | End: 2019-07-08 | Stop reason: SDUPTHER

## 2017-07-12 NOTE — PROGRESS NOTES
Subjective:       Patient ID: Nathalie Santiago is a 66 y.o. female.    Chief Complaint: Otalgia (Both ears)    Otalgia    There is pain in both ears. The current episode started 1 to 4 weeks ago. The problem has been waxing and waning. There has been no fever. The pain is moderate. Associated symptoms include rhinorrhea. Pertinent negatives include no coughing, diarrhea, headaches, hearing loss, neck pain, sore throat or vomiting. Associated symptoms comments: Right ear pain- possible TMJ related. Treatments tried: Qtips. The treatment provided no relief. history of ear infections   Wrist Injury    The incident occurred more than 1 week ago. The injury mechanism was a fall. The pain is present in the left wrist and left hand. The quality of the pain is described as aching. The pain is at a severity of 6/10. The pain is moderate. The pain has been intermittent since the incident. Pertinent negatives include no chest pain, muscle weakness, numbness or tingling. Nothing aggravates the symptoms. She has tried nothing for the symptoms. The treatment provided no relief.     Review of Systems   HENT: Positive for ear pain and rhinorrhea. Negative for hearing loss and sore throat.    Respiratory: Negative for cough, shortness of breath, wheezing and stridor.    Cardiovascular: Negative for chest pain and palpitations.   Gastrointestinal: Negative for diarrhea and vomiting.   Musculoskeletal: Positive for arthralgias and joint swelling. Negative for back pain and neck pain.   Neurological: Negative for tingling, numbness and headaches.       Objective:      Physical Exam   Constitutional: Vital signs are normal. She appears well-developed and well-nourished. No distress.   Cardiovascular: Normal rate, regular rhythm, S1 normal, S2 normal and normal heart sounds.  Exam reveals no gallop.    No murmur heard.  Pulses:       Radial pulses are 2+ on the right side, and 2+ on the left side.   <2sec cap refill fingers bilat      Pulmonary/Chest: Effort normal and breath sounds normal. No respiratory distress. She has no wheezes. She has no rhonchi.   Musculoskeletal:        Hands:  Skin: Skin is warm and dry. She is not diaphoretic.   Appropriate skin turgor   Psychiatric: She has a normal mood and affect. Her speech is normal and behavior is normal. Judgment and thought content normal. Cognition and memory are normal.       Assessment:       1. Bilateral otitis media, unspecified chronicity, unspecified otitis media type    2. Left wrist pain        Plan:       Nathalie was seen today for otalgia.    Diagnoses and all orders for this visit:    Bilateral otitis media, unspecified chronicity, unspecified otitis media type  -     clarithromycin (BIAXIN) 500 MG tablet; Take 1 tablet (500 mg total) by mouth 2 (two) times daily.  Take antibiotics with food.  Increase fluid intake.  Call the clinic if symptoms worsen, new symptoms develop or if you are not any better after completion of your antibiotics.        Left wrist pain  -     X-Ray Hand Complete Left; Future

## 2017-07-14 ENCOUNTER — TELEPHONE (OUTPATIENT)
Dept: FAMILY MEDICINE | Facility: CLINIC | Age: 67
End: 2017-07-14

## 2017-07-14 NOTE — TELEPHONE ENCOUNTER
----- Message from Mikayla Ingram sent at 7/14/2017 12:17 PM CDT -----  Patient is calling for xray test results.Please call patient back at [phone] to advise.  Thanks!

## 2017-07-17 DIAGNOSIS — M25.50 POLYARTHRALGIA: ICD-10-CM

## 2017-07-18 ENCOUNTER — TELEPHONE (OUTPATIENT)
Dept: FAMILY MEDICINE | Facility: CLINIC | Age: 67
End: 2017-07-18

## 2017-07-18 RX ORDER — GABAPENTIN 100 MG/1
CAPSULE ORAL
Qty: 90 CAPSULE | Refills: 0
Start: 2017-07-18

## 2017-07-18 NOTE — TELEPHONE ENCOUNTER
Notified patient of xray results. Notified patient she can try otc arthritis tylenol notified if does not help with pain to let us know patient states understanding

## 2017-07-18 NOTE — TELEPHONE ENCOUNTER
----- Message from RT Sweetie sent at 7/18/2017 11:45 AM CDT -----  Contact: pt   pt , requesting X-ray test results, thanks.

## 2017-07-19 ENCOUNTER — OFFICE VISIT (OUTPATIENT)
Dept: PSYCHIATRY | Facility: CLINIC | Age: 67
End: 2017-07-19
Payer: MEDICARE

## 2017-07-19 VITALS
WEIGHT: 162.94 LBS | HEART RATE: 78 BPM | SYSTOLIC BLOOD PRESSURE: 127 MMHG | BODY MASS INDEX: 26.19 KG/M2 | DIASTOLIC BLOOD PRESSURE: 81 MMHG | HEIGHT: 66 IN

## 2017-07-19 DIAGNOSIS — G47.00 INSOMNIA, UNSPECIFIED TYPE: ICD-10-CM

## 2017-07-19 DIAGNOSIS — F41.1 GAD (GENERALIZED ANXIETY DISORDER): ICD-10-CM

## 2017-07-19 DIAGNOSIS — F33.41 MDD (MAJOR DEPRESSIVE DISORDER), RECURRENT, IN PARTIAL REMISSION: ICD-10-CM

## 2017-07-19 PROCEDURE — 1159F MED LIST DOCD IN RCRD: CPT | Mod: S$GLB,,, | Performed by: PSYCHIATRY & NEUROLOGY

## 2017-07-19 PROCEDURE — 99499 UNLISTED E&M SERVICE: CPT | Mod: S$GLB,,, | Performed by: PSYCHIATRY & NEUROLOGY

## 2017-07-19 PROCEDURE — 99213 OFFICE O/P EST LOW 20 MIN: CPT | Mod: S$GLB,,, | Performed by: PSYCHIATRY & NEUROLOGY

## 2017-07-19 PROCEDURE — 99999 PR PBB SHADOW E&M-EST. PATIENT-LVL III: CPT | Mod: PBBFAC,,, | Performed by: PSYCHIATRY & NEUROLOGY

## 2017-07-19 RX ORDER — ALPRAZOLAM 0.5 MG/1
0.5 TABLET ORAL 2 TIMES DAILY PRN
Qty: 60 TABLET | Refills: 2 | Status: SHIPPED | OUTPATIENT
Start: 2017-07-19 | End: 2017-10-26 | Stop reason: DRUGHIGH

## 2017-07-19 RX ORDER — DOXEPIN HYDROCHLORIDE 10 MG/1
CAPSULE ORAL
Qty: 180 CAPSULE | Refills: 1 | Status: SHIPPED | OUTPATIENT
Start: 2017-07-19 | End: 2017-10-26 | Stop reason: SDUPTHER

## 2017-07-19 RX ORDER — OXYCODONE AND ACETAMINOPHEN 10; 325 MG/1; MG/1
1 TABLET ORAL EVERY 8 HOURS PRN
Refills: 0 | COMMUNITY
Start: 2017-07-17 | End: 2017-08-11 | Stop reason: SDUPTHER

## 2017-07-19 RX ORDER — ZOLPIDEM TARTRATE 10 MG/1
TABLET ORAL
Qty: 30 TABLET | Refills: 2 | Status: SHIPPED | OUTPATIENT
Start: 2017-07-19 | End: 2017-10-26 | Stop reason: DRUGHIGH

## 2017-07-19 RX ORDER — CLARITHROMYCIN 500 MG/1
500 TABLET, FILM COATED ORAL 2 TIMES DAILY
COMMUNITY
End: 2017-08-11 | Stop reason: ALTCHOICE

## 2017-07-19 RX ORDER — SERTRALINE HYDROCHLORIDE 100 MG/1
TABLET, FILM COATED ORAL
Qty: 180 TABLET | Refills: 1 | Status: SHIPPED | OUTPATIENT
Start: 2017-07-19 | End: 2017-10-26 | Stop reason: SDUPTHER

## 2017-07-19 NOTE — TELEPHONE ENCOUNTER
Patient states she does have swelling,patient states she will try to find Orthopedics doctor closer to her home so she does not have to drive to [place], patient states she will call back if she needs to be seen in [place]

## 2017-07-19 NOTE — PROGRESS NOTES
"Outpatient Psychiatry Follow-Up Visit (MD/NP)    7/19/2017    Clinical Status of Patient:  Outpatient (Ambulatory)    Chief Complaint:  Nathalie Santiago is a 66 y.o. female who presents today for follow-up of depression, anxiety, adjustment.  Met with patient.      Interval History and Content of Current Session:   Interim Events/Subjective Report/Content of Current Session:     Pt presents for follow up of major depression, GARFIELD, insomnia.   Pt reports depression and anxiety began in context of significant psychosocial stressors. Her mother passed away in 2000, her father in 2004. She was diagnosed with breast cancer in 2005, had bilateral mastectomies and then a failed reconstruction. She also has sarcoidosis of the lungs. She sold her home in [place] and moved to [place] because she could no longer care for herself due to medical issues. She has become increasingly dependent on others. She moved back here several years ago and is currently living with her brother and his significant other. The relationship with the latter is strained; her niece who has battled addiction is currently serving time in halfway.   Pt has had significant health issues with worsening depression/anxiety in the last several months.     Interim hx:   Pt is doing "fair."  Recent otitis - worse dizziness.  3 days into tx; no pain,  She also recently fell - injured her LUE - bruised, hurt thumb; she was seen by PCP.  Xray was negative.    No hospitalizations in interim; no recent UTI - "nothing like it was."  No confusion.     Her living situatiotable, sister across the street is "crazy."   Brother supportive.  His significant other is more accepting of her.  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     The clonazepam is not helping. She took 2 mg of clonazepam last night after not sleeping at all last night.  She would like to return to Ambien and Xanax.       With Xanax, she loses dread.    Father dead 13 yrs this month. This has been a difficult " "month.  Close friend  recently during aneurysm repair.  Own reminder of her mortality.     Doxepin does help, not taking it nightly     Anxiety lately: rough; not panic attack; she has reminders of her parents, friend Cathy that .    She starts crying, nervous, some worry; feeling totally helpless  Everyone else in control but me  "the nut sister told her that she needs someone to help her with finances."     She sets up "pharmacy" yesterday - organizer, once a month, she takes BID dosing.    She can tell difference on Sertraline - she does not dread going to bed or getting up   Sleep is still issue - no sleeping during day;  Once she gets to sleep, she can stay asleep.  She has vampire blood, chronically sleep reversed.  Sleeps until noon.   Family tells her it is not normal to sleep until 12 pm.   She is taking 30 mg of Duloexetine; it helps with back pain, using less percocet   Advised not to take NSAIDS b/c she is on Plavix.    Not depressed by losing Cathy; low energy and motivation.   Depression: 0/10   Anxiety: 6-7/10   Appetite is ok; still hopes to lose 20 lbs.  Goal is 140 lbs - comfortable weight before.  She is following diabetic diet better.        Denies suicidal/homicidal ideations.  Uselessness, they won't let her go to trailer and sew - no place to sew at brother's home.  Not hopelessness.   Not driving now due to dizziness.   tmj bothering her - right sided     Self care:  Decreased; she occasionally forgets meds x 2 days    Denies symptoms of arnel/psychosis.     Alcohol:  None   Drug:  None   Caffeine: tea , one coke daily   Tobacco: none     no hx suicide attempts, no self harm.  Guns in home are locked up.    Sleep issues have been chronic.  She used to take Dalmane and Temazepam.      Prior meds: Trazodone (no benefit), mirtazapine, citalopram, duloxetine, temazepam, dalmane, melatonin (3 mg ineffective, higher dose HA), ativan, wellbutrin XL, clonazepam, amitriptyline     Review of " "Systems   PSYCHIATRIC: see above  CONSTITUTIONAL: weight stable, dizziness   MUSCULOSKELETAL:  Back pain 7/10   RESPIRATORY: occasional exertional SOB    Past Medical, Family and Social History: The patient's past medical, family and social history have been reviewed and updated as appropriate within the electronic medical record - see encounter notes.    Medications:  Sertraline 200 mg daily   Cymbalta 30 mg daily   Clonazepam 0.25-0.5 mg BID prn anxiety - typically BID  Doxepin 20 mg nightly prn insomnia   Percoet - not daily;  Takes BID some days    Imitrex - none   Plavix  Reglan taking TID     Compliance: skipped meds last weekend    Side effects:  headaches when Ambien taken too often; dry mouth on amitriptyline, elevated BP on Effexor XR     Risk Parameters:  Patient reports no suicidal ideation  Patient reports no homicidal ideation  Patient reports no self-injurious behavior  Patient reports no violent behavior    Exam (detailed: at least 9 elements; comprehensive: all 15 elements)   Constitutional  Vitals:  Most recent vital signs, dated less than 90 days prior to this appointment, were reviewed.         General:  age appropriate, casually dressed, thinner, calm, wearing no make up, jewelry         Musculoskeletal  Muscle Strength/Tone:  no tremor   Gait & Station:  No ataxia      Psychiatric  Speech:  no latency; no press, spontaneous   Mood & Affect:  "ok"  Dysphoric    Thought Process:  Linear    Associations:  intact   Thought Content:  normal, no suicidality, no homicidality, delusions, or paranoia, hallucinations: (auditory: no, visual: no)   Insight:  has awareness of illness   Judgement: behavior is adequate to circumstances   Orientation:  grossly intact; alert and oriented x 3, except date 13-14th instead of 19th   Memory: intact for content of interview   Language: grossly intact no fluency issues    Attention Span & Concentration:  able to focus grossly intact DLROW   Fund of Knowledge:  intact " and appropriate to age and level of education     Assessment and Diagnosis   Status/Progress: Based on the examination today, the patient's problem(s) is/are under inadequate control.   New problems have not been presented today.   Comorbidities are complicating management of the primary condition.  (multiple medical problems)     Impression: 66 yo female with multiple medical problems and hx of depression, insomnia, and anxiety presents for follow up. Significant family stressors and health issues. Pt has required multiple sleep aides in past. She has required chronic benzodiazepine for stabilization, has failed multiple sleep aides;  Sedative-hyponotics were discontinued in interim due to health issues, reports of falls.  She contacted me in interim with worsening depression/anxiety - low dose clonazepam restarted, but is showing signs of stabilization both physically and mentally.  She reports now that clonazepam is not helping and requests to restart Xanax/Ambien (previous meds).      MDD,recurrent episode, partial remission  GARFIELD  Insomnia disorder   R/O Conversion Disorder   sarcoidosis, hx phyloides tumor of breast, .DM2, RLS, arthritis, chronic neck, hip, and back pain, hx avulsion fracture, JOSE LUIS, hx CVA, recent UTI    GAF: 60     Strengths and Liabilities: Strength: Patient accepts guidance/feedback, Strength: Patient is expressive/articulate., Strength: Patient has reasonable judgment.   Treatment Goals: Specify outcomes written in observable, behavioral terms:   Anxiety: acquiring relapse prevention skills and reducing physical symptoms of anxiety   Depression: acquiring relapse prevention skills, increasing energy, increasing motivation, reducing excessive guilt and reducing negative automatic thoughts     Treatment Plan/Recommendations:   Medication Management: The risks and benefits of medication were discussed with the patient.    1. Continue Sertraline 200 mg daily.  Typical ADEs reviewed with patient.   Advised to avoid Imitrex due to risks of serotonin syndrome on multiple serotonergic meds.  Stressed importance of compliance.     2. D/C Clonazepam;  Restart Xanax 0.5 mg BID prn anxiety.   Advised not to take this med daily. LA  reviewed - no suspicious activity noted. Discussed risk of decreased RT, sedation, addictive potential, and not to mix with alcohol.  Encouraged to use least effective dose, to use PRN not scheduled; discussed risk of respiratory depression if used with pain medications - this combo should not be taken together      3. Psychotherapy is encouraged. Pt has declined due to transportation issues.     4. Call to report any worsening of symptoms or problems with the medication     5. Continue Doxepin 10-20 mg nightly PRN insomnia; typical TAN reviewed    6. D/C Duloxetine - pt cannot afford to continue for now     7. Discussed potential for serotonin syndrome if tramadol and antidepressants are combined - this combo should be avoided     8. Restart Ambien 5-10 mg nightly prn insomnia.  Typical TAN reviewed including complex sleep related behaviors, sedation, falls, dependence, cognitive effects.  This should not be taken with Xanax or pain medications.  Pt reports she will alternate with Doxepin;  She reports 5 mg not effective in past.  Advised that since she has been off of this for awhile, she should resume at lower dose and try to maintain there.      Return to Clinic:  2-3 months

## 2017-07-19 NOTE — TELEPHONE ENCOUNTER
----- Message from Hermila Bonner sent at 7/19/2017  2:17 PM CDT -----  Message sent per elvis:   [7/19/2017 2:12 PM] Hermila Bonner:   I have Nathalie Santiago [id number] She said that carli told her her hand was not broken but the discoloration is getting worse and it is getting harder to move. Can you come speak with her please.   The pain is also getting worse  She is unable to lift anything with that hand    Please call the patient at [phone]

## 2017-07-21 ENCOUNTER — TELEPHONE (OUTPATIENT)
Dept: FAMILY MEDICINE | Facility: CLINIC | Age: 67
End: 2017-07-21

## 2017-07-21 ENCOUNTER — OUTPATIENT CASE MANAGEMENT (OUTPATIENT)
Dept: ADMINISTRATIVE | Facility: OTHER | Age: 67
End: 2017-07-21

## 2017-07-21 NOTE — TELEPHONE ENCOUNTER
----- Message from Noemi Michaels sent at 7/21/2017  2:34 PM CDT -----  Contact: Noemi Michaels RN OPCM EXT. [id number]   Patient complaining of bilateral ears hurting. Patient taking her antibiotics for five days. Patient is also having balancing problems. Patient would like to see Dr. Harris but does not have the $50.00 for a specialist visit. A visit to the clinic is $15.00. Please call and advise patient.     Thank you for your assistance,   Noemi Michaels RN OPCM

## 2017-07-21 NOTE — TELEPHONE ENCOUNTER
Spoke with patient, she advised that the pain is slightly better but not gone. I advised patient to complete the rest of the antibiotics and if pain gets worse , we have [facility].

## 2017-07-21 NOTE — PROGRESS NOTES
07/21/17-Called and spoke to patient. Patient fell on 07/18/17 on her left wrist and hand. Xray was done and no broken bones. Patient fell over two dogs and was tangled up in the rug. Patient is using epsom salts and a hand brace to assist with the pain. Patient is doing exercises for her hand. Encouraged patient to eat a healthy diet and taking her medications. Patient did not take her medications for three days last week. Patient is taking infection medicine for her ear. Patient complaining of both of her ears now hurting and having balance problems. Patient would like to go and see Dr. Harris but can not afford the $50.00. Will send message to PA. Will follow up with patient in two weeks.

## 2017-07-25 ENCOUNTER — DOCUMENTATION ONLY (OUTPATIENT)
Dept: FAMILY MEDICINE | Facility: CLINIC | Age: 67
End: 2017-07-25

## 2017-07-25 NOTE — PROGRESS NOTES
Pre-Visit Chart Review  For Appointment Scheduled on 07/26/17    Health Maintenance Due   Topic Date Due    Mammogram  07/22/1989    Foot Exam  03/03/2017

## 2017-07-31 ENCOUNTER — TELEPHONE (OUTPATIENT)
Dept: PSYCHIATRY | Facility: CLINIC | Age: 67
End: 2017-07-31

## 2017-07-31 NOTE — TELEPHONE ENCOUNTER
Noted - in general, pt should follow up with PCP about medical/health related concerns (outside of mental health).

## 2017-07-31 NOTE — TELEPHONE ENCOUNTER
Spoke with patient who states 'as soon as I left that message I found my neck heating pad and its going away now. So you can just disregard that5 message.'    Patient will call back if she has any more questions or concerns

## 2017-07-31 NOTE — TELEPHONE ENCOUNTER
Patient called an left message on voice mail @ 09:39 am she needs a call back to discuss today she is having her 2nd migraine in 3 days and Dr Pat does not want her to take her Imitrex. She states she has taken her pain medications and it is not helping or working please call to discuss with her

## 2017-08-04 ENCOUNTER — OUTPATIENT CASE MANAGEMENT (OUTPATIENT)
Dept: ADMINISTRATIVE | Facility: OTHER | Age: 67
End: 2017-08-04

## 2017-08-04 NOTE — PROGRESS NOTES
08/04/17-Patient called back. Patient did not take her medications yesterday until late in the day because she placed a book on them. Encouraged patient to take medications. Patient is eating healthy, reviewed with patient her eating habits. Patient is still having problems paying for her medications, cymbalta. Will send message to pharmacy assistance. Patient states that she is depressed but not suicidal. Patient knows what to do but does not do it. Encouraged patient to talk to her psychiatrist and to consider group therapy. Encouraged patient to clear clutter in the house. Patient states that she just procrastinates. Patient sold her car to assist with her financial burdens. Will mail to patient [facility] financial assistance.Talked to TAMIA Fleming about patient. Patient did not return his phone calls. Will mail to patient- 211 phone line, depression and friendship line on [address]. Will follow up with patient next week.

## 2017-08-11 ENCOUNTER — OFFICE VISIT (OUTPATIENT)
Dept: FAMILY MEDICINE | Facility: CLINIC | Age: 67
End: 2017-08-11
Payer: MEDICARE

## 2017-08-11 ENCOUNTER — OFFICE VISIT (OUTPATIENT)
Dept: PAIN MEDICINE | Facility: CLINIC | Age: 67
End: 2017-08-11
Payer: MEDICARE

## 2017-08-11 ENCOUNTER — APPOINTMENT (OUTPATIENT)
Dept: LAB | Facility: HOSPITAL | Age: 67
End: 2017-08-11
Attending: PHYSICIAN ASSISTANT
Payer: MEDICARE

## 2017-08-11 VITALS
DIASTOLIC BLOOD PRESSURE: 76 MMHG | WEIGHT: 167.13 LBS | SYSTOLIC BLOOD PRESSURE: 138 MMHG | RESPIRATION RATE: 18 BRPM | TEMPERATURE: 98 F | HEART RATE: 65 BPM | HEIGHT: 66 IN | BODY MASS INDEX: 26.86 KG/M2

## 2017-08-11 VITALS
SYSTOLIC BLOOD PRESSURE: 137 MMHG | HEIGHT: 66 IN | DIASTOLIC BLOOD PRESSURE: 75 MMHG | BODY MASS INDEX: 26.19 KG/M2 | HEART RATE: 63 BPM | WEIGHT: 162.94 LBS

## 2017-08-11 DIAGNOSIS — Z79.891 CHRONIC USE OF OPIATE DRUGS THERAPEUTIC PURPOSES: ICD-10-CM

## 2017-08-11 DIAGNOSIS — M25.561 CHRONIC PAIN OF RIGHT KNEE: Primary | ICD-10-CM

## 2017-08-11 DIAGNOSIS — G89.29 CHRONIC PAIN OF RIGHT KNEE: Primary | ICD-10-CM

## 2017-08-11 DIAGNOSIS — M70.60 GREATER TROCHANTERIC BURSITIS, UNSPECIFIED LATERALITY: ICD-10-CM

## 2017-08-11 DIAGNOSIS — H65.03 BILATERAL ACUTE SEROUS OTITIS MEDIA, RECURRENCE NOT SPECIFIED: Primary | ICD-10-CM

## 2017-08-11 DIAGNOSIS — M43.16 SPONDYLOLISTHESIS OF LUMBAR REGION: ICD-10-CM

## 2017-08-11 LAB
AMPHET+METHAMPHET UR QL: NEGATIVE
BARBITURATES UR QL SCN>200 NG/ML: NEGATIVE
BENZODIAZ UR QL SCN>200 NG/ML: NORMAL
BZE UR QL SCN: NEGATIVE
CANNABINOIDS UR QL SCN: NEGATIVE
CREAT UR-MCNC: 159 MG/DL
METHADONE UR QL SCN>300 NG/ML: NEGATIVE
OPIATES UR QL SCN: NORMAL
PCP UR QL SCN>25 NG/ML: NEGATIVE
TOXICOLOGY INFORMATION: NORMAL

## 2017-08-11 PROCEDURE — 1126F AMNT PAIN NOTED NONE PRSNT: CPT | Mod: S$GLB,,, | Performed by: FAMILY MEDICINE

## 2017-08-11 PROCEDURE — 1159F MED LIST DOCD IN RCRD: CPT | Mod: S$GLB,,, | Performed by: PHYSICIAN ASSISTANT

## 2017-08-11 PROCEDURE — 99999 PR PBB SHADOW E&M-EST. PATIENT-LVL V: CPT | Mod: PBBFAC,,, | Performed by: FAMILY MEDICINE

## 2017-08-11 PROCEDURE — 80307 DRUG TEST PRSMV CHEM ANLYZR: CPT

## 2017-08-11 PROCEDURE — 3078F DIAST BP <80 MM HG: CPT | Mod: S$GLB,,, | Performed by: PHYSICIAN ASSISTANT

## 2017-08-11 PROCEDURE — 3075F SYST BP GE 130 - 139MM HG: CPT | Mod: S$GLB,,, | Performed by: FAMILY MEDICINE

## 2017-08-11 PROCEDURE — 3075F SYST BP GE 130 - 139MM HG: CPT | Mod: S$GLB,,, | Performed by: PHYSICIAN ASSISTANT

## 2017-08-11 PROCEDURE — 80361 OPIATES 1 OR MORE: CPT

## 2017-08-11 PROCEDURE — 1159F MED LIST DOCD IN RCRD: CPT | Mod: S$GLB,,, | Performed by: FAMILY MEDICINE

## 2017-08-11 PROCEDURE — 3008F BODY MASS INDEX DOCD: CPT | Mod: S$GLB,,, | Performed by: FAMILY MEDICINE

## 2017-08-11 PROCEDURE — 3078F DIAST BP <80 MM HG: CPT | Mod: S$GLB,,, | Performed by: FAMILY MEDICINE

## 2017-08-11 PROCEDURE — 1125F AMNT PAIN NOTED PAIN PRSNT: CPT | Mod: S$GLB,,, | Performed by: PHYSICIAN ASSISTANT

## 2017-08-11 PROCEDURE — 99214 OFFICE O/P EST MOD 30 MIN: CPT | Mod: S$GLB,,, | Performed by: PHYSICIAN ASSISTANT

## 2017-08-11 PROCEDURE — 99213 OFFICE O/P EST LOW 20 MIN: CPT | Mod: S$GLB,,, | Performed by: FAMILY MEDICINE

## 2017-08-11 PROCEDURE — 99999 PR PBB SHADOW E&M-EST. PATIENT-LVL IV: CPT | Mod: PBBFAC,,, | Performed by: PHYSICIAN ASSISTANT

## 2017-08-11 RX ORDER — OXYCODONE AND ACETAMINOPHEN 10; 325 MG/1; MG/1
1 TABLET ORAL EVERY 8 HOURS PRN
Qty: 90 TABLET | Refills: 0 | Status: SHIPPED | OUTPATIENT
Start: 2017-09-13 | End: 2017-08-11 | Stop reason: SDUPTHER

## 2017-08-11 RX ORDER — OXYCODONE AND ACETAMINOPHEN 10; 325 MG/1; MG/1
1 TABLET ORAL EVERY 8 HOURS PRN
Qty: 90 TABLET | Refills: 0 | Status: SHIPPED | OUTPATIENT
Start: 2017-10-12 | End: 2017-10-23

## 2017-08-11 RX ORDER — OXYCODONE AND ACETAMINOPHEN 10; 325 MG/1; MG/1
1 TABLET ORAL EVERY 8 HOURS PRN
Qty: 90 TABLET | Refills: 0 | Status: SHIPPED | OUTPATIENT
Start: 2017-08-15 | End: 2017-08-11 | Stop reason: SDUPTHER

## 2017-08-11 NOTE — PROGRESS NOTES
Subjective:       Patient ID: Nathalie Santiago is a 67 y.o. female.    Chief Complaint: Otitis Media (c/o right ear worse than left, hears bubbling in right ear)    Otitis Media:   Chronicity:  Recurrent  Onset:  1 to 4 weeks ago  Progression since onset:  Waxing and waning  Severity:  Mild  Otitis media characteristics:  Worse background noise and muffled   Associated symptoms: dizziness.  No fever, no rash and no shortness of breath.  Treatments tried: Biaxin in 7/17.  Improvement on treatment:  Moderate    Review of Systems   Constitutional: Negative for fever.   Respiratory: Negative for shortness of breath.    Cardiovascular: Negative for chest pain.   Gastrointestinal: Negative for abdominal pain and nausea.   Skin: Negative for rash.   Neurological: Positive for dizziness.   All other systems reviewed and are negative.      Objective:      Physical Exam   Constitutional: She appears well-developed. No distress.   HENT:   Right Ear: Ear canal normal. A middle ear effusion is present.   Left Ear: Ear canal normal. A middle ear effusion is present.   Cardiovascular: Normal rate and regular rhythm.    No murmur heard.  Pulmonary/Chest: Effort normal and breath sounds normal.       Assessment:       No diagnosis found.    Plan:         Nathalie was seen today for otitis media.    Diagnoses and all orders for this visit:    Bilateral acute serous otitis media, recurrence not specified    Flonase, saline; Gisel Vincent.

## 2017-08-11 NOTE — PROGRESS NOTES
CC: bilateral hip pain, back pain    Interval History: Ms. Santiago is a 67 y.o. female with chronic low back and hip pain who presents  for her follow up.  Left lateral hip benefited from GTB injection.  She continues to take percocet 10mg q8hrs as needed for pain with moderate benefits.  No side effects reported.  Able to perform daily activities with the medications.  She also continues to takes prednisone 5mg daily.   Pain today is rated 7/10.     Prior HPI: The patient is a 64-year-old woman with a history of sarcoidosis, breast CA, diabetes, anxiety and depression who presents in referral from Dr. Nguyen to Dr. Perez for multiple pain complaints including back pain, bilateral hip pain and right shoulder pain.  She is following up with me since I am much closer to her.   She presents today for multiple pain complaints.  She recently was hospitalized for pneumonia, UTI and sarcoidosis exacerbation per patient.  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  She has had past GTB injections with moderate benefit and had an exacerbation of her pain recently.  She was able to receive left GTB with Dr. Perez on 10/3/2014 that provided >50% relief of her left hip and leg pain.     She continues to take Prednisone 5mg daily. She continues Percoet 7.5mg about 3x/day as needed with moderate benefit.  She presents today with worsening right knee pain.  She states having history of right knee osteoarthritis, but has not had any treatment for her knee pain.  Continues to have improvement in her knee pain following completion of visco supplementation injection in her right knee.  Her back and hip pain remain tolerable with her medications.  She continues to take Percoct 7.5mg q8hrs as needed with moderate benefit.  No reported side effects.       Pain intervention history: She takes hydrocodone 7-325 2-4 times a day.  Also has undergone epidural steroid injection at L1/2  "without relief.  She has been doing cervical traction with good relief of her neck pain.  Previous trochanteric bursa injections with good relief.    ROS:She reports fatigability, itching, headaches, swollen glands, chest pain and shortness of breath, nausea vomiting, easy bruising, back pain, joint stiffness, dizziness, difficulty sleeping, anxiety, depression and loss of balance.  Balance of review of systems is negative.    Medical, surgical, family and social history reviewed elsewhere in record.    Medications/Allergies: See med card    Vitals:    08/11/17 0943   BP: 137/75   Pulse: 63   Weight: 73.9 kg (162 lb 14.7 oz)   Height: 5' 6" (1.676 m)   PainSc:   7   PainLoc: Back       Physical exam:  Gen: A and O x3, pleasant, well-groomed  Skin: No rashes or obvious lesions  HEENT: PERRLA, no obvious deformities on ears or in canals. No thyroid masses, trachea midline, no palpable lymph nodes in neck, axilla.  CVS: RRR  Resp: non labored breathing  Abdomen: Soft, NT/ND, normal bowel sounds present.    Neuro:  Lower extremities:   +right knee crepitus. LE erythema and swelling  Reflexes: Patellar 2+, Achilles 2+ bilaterally.  Sensory:  Intact   Lumbar spine:  Lumbar spine: ROM is moderately reduced with flexion extension and oblique extension with increased pain in all directions.    Orlando's test causes pain on both sides.    Supine straight leg raise is negative bilaterally.    Internal and external rotation of the hip causes increased pain in left groin.  Myofascial exam: Tenderness to palpation over both GTB.      Imaging:  Cervical spine MRI report 10/5/12: There is dextroscoliosis in the lower cervical/upper thoracic region.  There is very mild foraminal stenosis on the left at C6/7.    MRI thoracic spine 11/21/2008: No significant degenerative disc disease or central stenosis.    MRI lumbar spine 8/14/08: L1/2 small broad-based central protrusion with mild effacement of ventral thecal sac but not distorting " exiting roots.  L2/3 and L3/4 unremarkable.  At L4/5 there is mild central stenosis with bulging annulus anteriorly and facet hypertrophic degenerative change posteriorly.  No focal disc herniation and neural foramina are patent.  At L5/S1 there is minimal bulging of the annulus with no thecal sac compression.  The small annular fissure within the posterior annulus.  Foramina are patent bilaterally, no stenosis mild, degenerative changes in the facets.    Xray Right Knee 10/13/14  Mild tricompartmental degenerative changes present. No joint effusion. The soft tissues are unremarkable.      Assessment:  Ms. Santiago is a 65-year-old woman with a history of sarcoidosis, breast CA, diabetes, anxiety and depression who presents in referral from Dr. Nguyen for multiple pain complaints including back pain, bilateral hip pain and knee pain   1. Chronic pain of right knee    2. Greater trochanteric bursitis, unspecified laterality    3. Spondylolisthesis of lumbar region          PLAN:  1. I have stressed the importance of physical activity and exercise to improve overall health.    2.  Percocet 10mg q8hrs as needed. UDS today. Script given for three months.  reviewed.    3.  Consider repeat Left GTB injection if pain returns   4.  Continue Parafon Forte 500 mg q 8 h prn   5. Follow up in three months   All medication management was performed by Dr. Lincoln Temple

## 2017-08-15 ENCOUNTER — OUTPATIENT CASE MANAGEMENT (OUTPATIENT)
Dept: ADMINISTRATIVE | Facility: OTHER | Age: 67
End: 2017-08-15

## 2017-08-15 LAB
CODEINE UR-MCNC: NEGATIVE NG/ML
CODEINE UR-MCNC: NEGATIVE NG/ML
HYDROCODONE UR-MCNC: NEGATIVE NG/ML
HYDROMORPHONE UR-MCNC: NEGATIVE NG/ML
MORPHINE UR-MCNC: NEGATIVE NG/ML
NALOXONE BY LS MS/MS: NEGATIVE NG/ML
NORHYDROCODONE BY LC MS/MS: NEGATIVE NG/ML
NOROXYCODONE BY LC-MS/MS: 8408 NG/ML
NOROXYMORPHONE BY LC-MS/MS: NORMAL NG/ML
OXYCODONE UR-MCNC: 8187 NG/ML
OXYCODONE UR-MCNC: 8495 NG/ML
TOXICOLOGIST REVIEW: NORMAL

## 2017-08-17 ENCOUNTER — TELEPHONE (OUTPATIENT)
Dept: PHARMACY | Facility: CLINIC | Age: 67
End: 2017-08-17

## 2017-08-17 ENCOUNTER — OUTPATIENT CASE MANAGEMENT (OUTPATIENT)
Dept: ADMINISTRATIVE | Facility: OTHER | Age: 67
End: 2017-08-17

## 2017-08-17 NOTE — PROGRESS NOTES
08/17/17-Received voice mail from patient, that she is ok  and would like me to call her back next week. Will follow up with patient on Monday.

## 2017-08-21 ENCOUNTER — OUTPATIENT CASE MANAGEMENT (OUTPATIENT)
Dept: ADMINISTRATIVE | Facility: OTHER | Age: 67
End: 2017-08-21

## 2017-08-21 NOTE — PROGRESS NOTES
08/21/17-Called and spoke to patient. Patient is going to get a white erasable board to write down everything including her medications. Patient has called [employer] and they are going to send her a list of her medications and how much she pays for them. Patient has pharmacy assistance phone number. Encouraged patient to follow up with [employer] and with pharmacy assistance. Encouraged medication compliance. Patient is decluttering her rooms with the assistance of her daughter. Patient's sister is coming every other day to assist patient with keeping her house clean and with the clutter. Patient with no falls. Patient is feeling better but being careful with all the heat. Will close case at this time and dis-enroll patient from [facility]. Patient has my phone number if she needs me in the future.

## 2017-08-23 ENCOUNTER — TELEPHONE (OUTPATIENT)
Dept: RHEUMATOLOGY | Facility: CLINIC | Age: 67
End: 2017-08-23

## 2017-08-23 DIAGNOSIS — M25.50 POLYARTHRALGIA: ICD-10-CM

## 2017-08-23 RX ORDER — GABAPENTIN 300 MG/1
300 CAPSULE ORAL 2 TIMES DAILY
Qty: 60 CAPSULE | Refills: 2 | Status: CANCELLED | OUTPATIENT
Start: 2017-08-23

## 2017-08-23 NOTE — TELEPHONE ENCOUNTER
Patient notified that Dr. Plunkett will discuss increasing her dosage on Gabapentin in her office visit on 8/30/17 at 11am. Pt verbalized understanding.

## 2017-08-23 NOTE — TELEPHONE ENCOUNTER
----- Message from Padma Steward sent at 8/23/2017  3:27 PM CDT -----  Contact: patient  Patient calling in regards to her requesting to increasing her Gabapentin from 300 mg to 600 mg a day. Please advise.  Call back   Thanks!

## 2017-08-28 ENCOUNTER — TELEPHONE (OUTPATIENT)
Dept: RHEUMATOLOGY | Facility: CLINIC | Age: 67
End: 2017-08-28

## 2017-09-27 DIAGNOSIS — E55.9 VITAMIN D DEFICIENCY: ICD-10-CM

## 2017-09-27 RX ORDER — ERGOCALCIFEROL 1.25 MG/1
CAPSULE ORAL
Qty: 12 CAPSULE | Refills: 1 | Status: SHIPPED | OUTPATIENT
Start: 2017-09-27 | End: 2018-03-14 | Stop reason: SDUPTHER

## 2017-10-16 DIAGNOSIS — G43.109 COMPLICATED MIGRAINE: ICD-10-CM

## 2017-10-17 RX ORDER — CLOPIDOGREL BISULFATE 75 MG/1
TABLET ORAL
Qty: 90 TABLET | Refills: 1 | Status: SHIPPED | OUTPATIENT
Start: 2017-10-17 | End: 2018-05-21 | Stop reason: SDUPTHER

## 2017-10-17 RX ORDER — OXYBUTYNIN CHLORIDE 5 MG/1
TABLET ORAL
Qty: 180 TABLET | Refills: 1 | Status: SHIPPED | OUTPATIENT
Start: 2017-10-17 | End: 2018-07-26 | Stop reason: SDUPTHER

## 2017-10-18 ENCOUNTER — TELEPHONE (OUTPATIENT)
Dept: PAIN MEDICINE | Facility: CLINIC | Age: 67
End: 2017-10-18

## 2017-10-18 NOTE — TELEPHONE ENCOUNTER
----- Message from Dougie Mcneil sent at 10/18/2017  3:32 PM CDT -----  Contact: same  Patient called in and requested a call back as she is in severe pain.  Patient call back number is [phone]

## 2017-10-23 ENCOUNTER — OFFICE VISIT (OUTPATIENT)
Dept: PAIN MEDICINE | Facility: CLINIC | Age: 67
End: 2017-10-23
Payer: MEDICARE

## 2017-10-23 VITALS
HEIGHT: 66 IN | SYSTOLIC BLOOD PRESSURE: 126 MMHG | WEIGHT: 167 LBS | BODY MASS INDEX: 26.84 KG/M2 | DIASTOLIC BLOOD PRESSURE: 82 MMHG | HEART RATE: 85 BPM

## 2017-10-23 DIAGNOSIS — M79.10 MYALGIA: Primary | ICD-10-CM

## 2017-10-23 DIAGNOSIS — M70.60 GREATER TROCHANTERIC BURSITIS, UNSPECIFIED LATERALITY: ICD-10-CM

## 2017-10-23 DIAGNOSIS — G89.4 CHRONIC PAIN SYNDROME: ICD-10-CM

## 2017-10-23 PROCEDURE — 99214 OFFICE O/P EST MOD 30 MIN: CPT | Mod: 25,S$GLB,, | Performed by: ANESTHESIOLOGY

## 2017-10-23 PROCEDURE — 99999 PR PBB SHADOW E&M-EST. PATIENT-LVL III: CPT | Mod: PBBFAC,,, | Performed by: ANESTHESIOLOGY

## 2017-10-23 PROCEDURE — 96372 THER/PROPH/DIAG INJ SC/IM: CPT | Mod: S$GLB,,, | Performed by: ANESTHESIOLOGY

## 2017-10-23 RX ORDER — OXYCODONE AND ACETAMINOPHEN 10; 325 MG/1; MG/1
1 TABLET ORAL EVERY 8 HOURS PRN
Qty: 90 TABLET | Refills: 0 | Status: SHIPPED | OUTPATIENT
Start: 2018-01-07 | End: 2018-02-06

## 2017-10-23 RX ORDER — KETOROLAC TROMETHAMINE 30 MG/ML
30 INJECTION, SOLUTION INTRAMUSCULAR; INTRAVENOUS
Status: COMPLETED | OUTPATIENT
Start: 2017-10-23 | End: 2017-10-23

## 2017-10-23 RX ORDER — OXYCODONE AND ACETAMINOPHEN 10; 325 MG/1; MG/1
1 TABLET ORAL EVERY 8 HOURS PRN
Qty: 90 TABLET | Refills: 0 | Status: SHIPPED | OUTPATIENT
Start: 2017-12-09 | End: 2017-10-26 | Stop reason: SDUPTHER

## 2017-10-23 RX ORDER — OXYCODONE AND ACETAMINOPHEN 10; 325 MG/1; MG/1
1 TABLET ORAL EVERY 8 HOURS PRN
Qty: 90 TABLET | Refills: 0 | Status: SHIPPED | OUTPATIENT
Start: 2017-11-10 | End: 2017-12-10

## 2017-10-23 RX ADMIN — KETOROLAC TROMETHAMINE 30 MG: 30 INJECTION, SOLUTION INTRAMUSCULAR; INTRAVENOUS at 10:10

## 2017-10-23 NOTE — PROGRESS NOTES
CC: left sided low back and left hip pain    Interval History: Ms. Santiago is a 67 y.o. female with chronic low back and hip pain who presents  for her follow up.  She states having a fall about one week ago and landed over left lower back and left hip.  She was diagnosed with a contusion.  She has bruising over her left lateral thigh that is slowly improving.  She has increased tenderness over that area.  She continues to take percocet 10mg q8hrs as needed for pain with moderate benefits.  No side effects reported.  Able to perform daily activities with the medications.  She also continues to takes prednisone 5mg daily.   Pain today is rated 7/10.     Prior HPI: The patient is a 64-year-old woman with a history of sarcoidosis, breast CA, diabetes, anxiety and depression who presents in referral from Dr. Nguyen to Dr. Perez for multiple pain complaints including back pain, bilateral hip pain and right shoulder pain.  She is following up with me since I am much closer to her.   She presents today for multiple pain complaints.  She recently was hospitalized for pneumonia, UTI and sarcoidosis exacerbation per patient.  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  She has had past GTB injections with moderate benefit and had an exacerbation of her pain recently.  She was able to receive left GTB with Dr. Perez on 10/3/2014 that provided >50% relief of her left hip and leg pain.     She continues to take Prednisone 5mg daily. She continues Percoet 7.5mg about 3x/day as needed with moderate benefit.  She presents today with worsening right knee pain.  She states having history of right knee osteoarthritis, but has not had any treatment for her knee pain.  Continues to have improvement in her knee pain following completion of visco supplementation injection in her right knee.  Her back and hip pain remain tolerable with her medications.  She continues to take Percoct  "7.5mg q8hrs as needed with moderate benefit.  No reported side effects.       Pain intervention history: She takes hydrocodone 7-325 2-4 times a day.  Also has undergone epidural steroid injection at L1/2 without relief.  She has been doing cervical traction with good relief of her neck pain.  Previous trochanteric bursa injections with good relief.    ROS:She reports fatigability, itching, headaches, swollen glands, chest pain and shortness of breath, nausea vomiting, easy bruising, back pain, joint stiffness, dizziness, difficulty sleeping, anxiety, depression and loss of balance.  Balance of review of systems is negative.    Medical, surgical, family and social history reviewed elsewhere in record.    Medications/Allergies: See med card    Vitals:    10/23/17 1012   BP: 126/82   Pulse: 85   Weight: 75.8 kg (167 lb)   Height: 5' 6" (1.676 m)   PainSc:   6   PainLoc: Back       Physical exam:  Gen: A and O x3, pleasant, well-groomed  Skin: No rashes or obvious lesions  HEENT: PERRLA, no obvious deformities on ears or in canals. No thyroid masses, trachea midline, no palpable lymph nodes in neck, axilla.  CVS: RRR  Resp: non labored breathing  Abdomen: Soft, NT/ND, normal bowel sounds present.    Neuro:  Lower extremities:   +right knee crepitus. LE erythema and swelling  Reflexes: Patellar 2+, Achilles 2+ bilaterally.  Sensory:  Intact   Lumbar spine:  Lumbar spine: ROM is moderately reduced with flexion extension and oblique extension with increased pain in all directions.    Orlando's test causes pain on both sides.    Supine straight leg raise is negative bilaterally.    Internal and external rotation of the hip causes increased pain in left groin.  Myofascial exam: Tenderness to palpation over both GTB.      Imaging:  Cervical spine MRI report 10/5/12: There is dextroscoliosis in the lower cervical/upper thoracic region.  There is very mild foraminal stenosis on the left at C6/7.    MRI thoracic spine " 11/21/2008: No significant degenerative disc disease or central stenosis.    MRI lumbar spine 8/14/08: L1/2 small broad-based central protrusion with mild effacement of ventral thecal sac but not distorting exiting roots.  L2/3 and L3/4 unremarkable.  At L4/5 there is mild central stenosis with bulging annulus anteriorly and facet hypertrophic degenerative change posteriorly.  No focal disc herniation and neural foramina are patent.  At L5/S1 there is minimal bulging of the annulus with no thecal sac compression.  The small annular fissure within the posterior annulus.  Foramina are patent bilaterally, no stenosis mild, degenerative changes in the facets.    Xray Right Knee 10/13/14  Mild tricompartmental degenerative changes present. No joint effusion. The soft tissues are unremarkable.      Assessment:  Ms. Santiago is a 65-year-old woman with a history of sarcoidosis, breast CA, diabetes, anxiety and depression who presents in referral from Dr. Nguyen for multiple pain complaints including back pain, bilateral hip pain and knee pain   1. Myalgia    2. Greater trochanteric bursitis, unspecified laterality    3. Chronic pain syndrome          PLAN:  1. I have stressed the importance of physical activity and exercise to improve overall health.    2.  Percocet 10mg q8hrs as needed. UDS last visit consistent with use. Script given for three months.  reviewed.    3.   Continue Parafon Forte 500 mg q 8 h prn   4.  IM toradol injection today to help with recent fall.   5.  Follow up in three months

## 2017-10-23 NOTE — Clinical Note
October 23, 2017      Fauzia Pat MD  [address]  [place] [address]           [place] [facility]  [address] [place] [address]  [address]  [place] [address]  Phone: [phone]          Patient: Nathalie Santiago   MR Number: [id number]   YOB: 1950   Date of Visit: 10/23/2017       Dear Dr. Fauzia Pat:    Thank you for referring Nathalie Santiago to me for evaluation. Attached you will find relevant portions of my assessment and plan of care.    If you have questions, please do not hesitate to call me. I look forward to following Nathalie Santiago along with you.    Sincerely,    Lesly Muro LPN    Enclosure  CC:  No Recipients    If you would like to receive this communication electronically, please contact [email or web address] or [phone] to request more information on [employer] access.    For providers and/or their staff who would like to refer a patient to [facility], please contact us through our one-stop-shop provider referral line, [facility], at [phone].    If you feel you have received this communication in error or would no longer like to receive these types of communications, please e-mail [email or web address]

## 2017-10-26 ENCOUNTER — OFFICE VISIT (OUTPATIENT)
Dept: PSYCHIATRY | Facility: CLINIC | Age: 67
End: 2017-10-26
Payer: MEDICARE

## 2017-10-26 ENCOUNTER — TELEPHONE (OUTPATIENT)
Dept: PSYCHIATRY | Facility: CLINIC | Age: 67
End: 2017-10-26

## 2017-10-26 VITALS
DIASTOLIC BLOOD PRESSURE: 75 MMHG | HEIGHT: 66 IN | SYSTOLIC BLOOD PRESSURE: 122 MMHG | WEIGHT: 155 LBS | HEART RATE: 84 BPM | BODY MASS INDEX: 24.91 KG/M2

## 2017-10-26 DIAGNOSIS — F41.9 ANXIETY: ICD-10-CM

## 2017-10-26 DIAGNOSIS — G47.00 INSOMNIA, UNSPECIFIED TYPE: ICD-10-CM

## 2017-10-26 DIAGNOSIS — F33.1 MODERATE EPISODE OF RECURRENT MAJOR DEPRESSIVE DISORDER: ICD-10-CM

## 2017-10-26 PROCEDURE — 99214 OFFICE O/P EST MOD 30 MIN: CPT | Mod: S$GLB,,, | Performed by: PSYCHIATRY & NEUROLOGY

## 2017-10-26 PROCEDURE — 99499 UNLISTED E&M SERVICE: CPT | Mod: S$GLB,,, | Performed by: PSYCHIATRY & NEUROLOGY

## 2017-10-26 PROCEDURE — 99999 PR PBB SHADOW E&M-EST. PATIENT-LVL III: CPT | Mod: PBBFAC,,, | Performed by: PSYCHIATRY & NEUROLOGY

## 2017-10-26 RX ORDER — ESCITALOPRAM OXALATE 10 MG/1
TABLET ORAL
Qty: 90 TABLET | Refills: 0 | Status: SHIPPED | OUTPATIENT
Start: 2017-10-26 | End: 2017-10-30 | Stop reason: SDUPTHER

## 2017-10-26 RX ORDER — DOXEPIN HYDROCHLORIDE 10 MG/1
CAPSULE ORAL
Qty: 1 CAPSULE | Refills: 0
Start: 2017-10-26 | End: 2018-02-12 | Stop reason: SDUPTHER

## 2017-10-26 RX ORDER — ALPRAZOLAM 0.25 MG/1
0.25 TABLET ORAL 2 TIMES DAILY PRN
Qty: 60 TABLET | Refills: 0 | Status: SHIPPED | OUTPATIENT
Start: 2017-10-26 | End: 2017-11-29 | Stop reason: SDUPTHER

## 2017-10-26 RX ORDER — SERTRALINE HYDROCHLORIDE 100 MG/1
TABLET, FILM COATED ORAL
Qty: 1 TABLET | Refills: 0
Start: 2017-10-26 | End: 2017-12-05 | Stop reason: SDUPTHER

## 2017-10-26 NOTE — PROGRESS NOTES
Outpatient Psychiatry Follow-Up Visit (MD/NP)    10/26/2017    Clinical Status of Patient:  Outpatient (Ambulatory)    Chief Complaint:  Nathalie Santiago is a 67 y.o. female who presents today for follow-up of depression, anxiety, adjustment.  Met with patient.      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   Pt reports depression and anxiety began in context of significant psychosocial stressors. Her mother passed away in 2000, her father in 2004. She was diagnosed with breast cancer in 2005, had bilateral mastectomies and then a failed reconstruction. She also has sarcoidosis of the lungs. She sold her home in [place] and moved to [place] because she could no longer care for herself due to medical issues. She has become increasingly dependent on others. She moved back here several years ago and is currently living with her brother and his significant other. The relationship with the latter is strained; her niece who has battled addiction is currently serving time in [employer].   Pt has had significant health issues with worsening depression/anxiety in the last several months.  Intake 2013.     Past Psychiatric History:  Tx of depression, anxiety and panic attacks   First saw a psychiatrist 2007   No prior hospitalizations  No suicide attempts or self injury   PCP most recently treating her in [place]   Previous meds: Restoril (no effect), Dalmane (no effect), Klonopin, Ativan, could not afford Cymbalta but helped pain and depression, Wellbutrin XL (3rd day fell out of bed x 4, bruised, amnestic), Effexor (raised her BP), no antipsychotics or mood stabilizers, Citalopram (some benefit).   Trazodone (no benefit), mirtazapine, temazepam, melatonin (3 mg ineffective, higher dose HA), ativan, clonazepam, amitriptyline   No prior counseling         Past medical  "history:  DM2  RLS  Sarcoidosis of lung   Hx ovarian CA  HTN  Hx of malignant phylloides tumor of breast   Bilateral cataract surgery   Chronic neck, back and hip pain   Arthritis   MVR  Hx CVA  Hx DJD  No cardiac hx   No hx seizures or head injury      Past Surgical History:  bilateral mastectomy   CYSTOCELE REPAIR  Rectocele  CHOLECYSTECTOMY  PLEURA BIOPSY  Right foot  fRACTURE SURGERY  MASTECTOMY  RECTAL SURGERY     Family History:   Mother - depression  Sister - bipolar disorder vs schizophrenia, hospitalizations      Social History:  Former RN x 35 years, SSID since 2008  Lives with her brother and his SO ex wife heather [place], [place]   3 prior marriages, , 1 daughter   Raised in [place] in intact home - 3 brothers and 2 sisters - "stable"  No  or legal hx   No hx abuse   Hobbies: sewing, beading, reading   Education: Some college   [place]: [employer]     Substance History:  Never smoked  Rare alcohol: 2 times a year   No illicit drug. Denies misuse of opiates, benzodiazepines   Moderate caffeine: coffee, tea, cokes     Interim hx:   Pt came late to appt yesterday, rescheduled for today.   45 minute appt today with several med changes   Med changes last visit: D/c Clonazepam; restarted Ambien and Xanax,  Advised to d/c Cymbalta (she takes 30 mg at times - does not want to remain on this due to cost).  She has continued on Doxepin PRN and Sertraline.  She admits to occasional non compliance  Pt requesting 90 day supplies of controlled substances for cost - discussed I am not able to do this.     She recently fell - injured her left side - recently seen by Dr Temple - given a Toradol injection; slipped on bag placed on her floor near her bed. Hit her nose, left side, contusion on left side LLE, hip.     No hospitalizations in interim; one ER visit for nausea, diarrhea, vomiting.  Disappointed in care received at [facility] (IVF, Reglan, anti-emetic).   Brother supportive.  His significant other is more " accepting of her.  She is not baby sitting anymore, mom had a second child, stay at home mom, very hard.      3rd week Sept brother asked her to move out of the trailer so his GF/son could move in.  They packed up her things and put in storage.    She has gotten rid of many of her belongings - book collections, things of value and meaning to her.   She is still living in [place]' home (was using trailer for storage).   She looks like a hoarder - more unsteady on feet but has cleared a path for walking.  Hard making a decision now - what to keep vs storage.   Pt feels devastated, a place with no home; daughter is in [place], [place].  She is territorial and controlling.  She wants to take over her meds and finances.    She is not ready for that.  She has no place to escape to.     Brother last night took two # 5 mg Ambien and shot of [place] - her JIAN accused him of being an alcoholic.    Brother has similar hx of insomnia.  Discussed with patient how this combination is very risky.   Pt takes Ambien 10 mg about every 3rd night.  Last dose about 6 days ago.   Doxepin she takes 1-3 caps several nights a week (usually 2 nights a week)   Xanax in evening - every evening. She also ran out of this last week.      She is eating more healthy, brother follows a ketogenic diet. She follows this loosely.   Reads until 4:30-5 am in morning;  Takes meds at 9:30-10 pm.  Finishes watching TV at 10 pm. Turns off TV, reads on [place]. It has light. Flashlight besides her if she gets up for sleep.   She enjoys reading. Makes self read daily b/c memory is spotty.  Not stimulated.    She is driving.  No issues with driving, MVA, getting lost.  [place] - brief, able to get her bearings straight.     Does not feel depressed, not tearful, hopeful - somewhat.  Not motivated to do anything.  Self care decreased, ever 2-3 days showers, spends some days in bed. ebergy very low. + worthlessness, no suicidal ideations.   "  World has changed some much; at 56 yo, still in own home.  Feels very dependent.   She takes care of dogs.  Once she gets to sleep - she sleeps 4-12 hrs;  Falls asleep late in am, sleeps all morning, some vampire in her; former night shift worker - all weekends.  RN former hospice nurse.  Does not maintain nursing license.  Does not have a purpose now.    Family in [place] now.    Appetite is ok.    Lack of energy, ashamed to admit, the little poodle she has - thought of euthanizing her 12 yo dog, does not want to take care of her (baths).  Everything is effort.     Anxiety - it is ok;  When she starts feeling anxious - younger sister now cleaning home (she has mental illness).  Her room makes her on edge, clutter, she understands why family thinks she is hoarding.   Loses balance very easily - "my ears are still humming."   She has fallen 3-4 times this year, loses balance regularly .   No panic attacks  Not worrying incessantly   Can relax    No arnel   No psychosis     Pt took one dose of Nyquil - with Xanax - she got up in night and ate strawberry and jam, greens; no Ambien that night  Nj found her in bath tub/shower in middle of night, he changed her clothes, few weeks ago, was throwing up no recollection    No alcohol  Denies prescription drug abuse   Percocet gives her energy - counteracts her sleep   Caffeine: 1 coke daily 12 ounce   No tobacco     Uses pill organizer - daughter helped set it up   Brother encourages her to go to [employer]; elderly neighbor relies on her.     Depression: 0/10 - does not feel depressed   Anxiety: 1-2/10     Nothing to worry about     Self care:  Decreased; she occasionally skips meds - not in last few weeks, using med minders - prior to few weeks ago, 1-2 days a week, she would not have energy to take ANY medications (including diabetes regimen).  Now cannot find accu check.     no hx suicide attempts, no self harm.  Guns in home are locked up.    Sleep issues have " "been chronic.  She used to take Dalmane and Temazepam.      Review of Systems   PSYCHIATRIC: see above  CONSTITUTIONAL: weight loss, dizziness   MUSCULOSKELETAL:  Back pain 6-7/10   RESPIRATORY: occasional exertional SOB    Past Medical, Family and Social History: The patient's past medical, family and social history have been reviewed and updated as appropriate within the electronic medical record - see encounter notes.    Medications:  Sertraline 200 mg daily   Cymbalta 30 mg daily - still taking at times   Ambien 10 mg: take one-half to one tab QHS prn insomnia   Doxepin 20 mg nightly prn insomnia - takes up to three caps some nights   Percoet - daily - multiple times     Compliance: skipped meds at times - none in 2 weeks     Side effects:  headaches when Ambien taken too often; dry mouth on amitriptyline, elevated BP on Effexor XR, falls    Risk Parameters:  Patient reports no suicidal ideation  Patient reports no homicidal ideation  Patient reports no self-injurious behavior  Patient reports no violent behavior    Exam (detailed: at least 9 elements; comprehensive: all 15 elements)   Constitutional  Vitals:  Most recent vital signs, dated more than 90 days prior to this appointment, were reviewed.         General:  age appropriate, casually dressed, thinner, calm, wearing no make up, fair grooming         Musculoskeletal  Muscle Strength/Tone:  no tremor   Gait & Station:  No ataxia      Psychiatric  Speech:  no latency; no press, spontaneous   Mood & Affect:  "no interest"  Dysphoric    Thought Process:  Linear    Associations:  intact   Thought Content:  normal, no suicidality, no homicidality, delusions, or paranoia, hallucinations: (auditory: no, visual: no)   Insight:  has awareness of illness   Judgement: behavior is adequate to circumstances   Orientation:  grossly intact; alert and oriented x 3   Memory: intact for content of interview   Language: grossly intact no fluency issues    Attention Span & " Concentration:  able to focus grossly intact in this appt   Fund of Knowledge:  intact and appropriate to age and level of education     Assessment and Diagnosis   Status/Progress: Based on the examination today, the patient's problem(s) is/are under inadequate control.   New problems have not been presented today.   Comorbidities are complicating management of the primary condition.  (multiple medical problems)     Impression: 68 yo female with multiple medical problems and hx of depression, insomnia, and anxiety presents for follow up. Significant family stressors and health issues. Pt has required multiple sleep aides in past. She has required chronic benzodiazepine for stabilization, has failed multiple sleep aides;  Sedative-hyponotics were discontinued in interim due to health issues, reports of falls.  She contacted me previously with worsening depression/anxiety - low dose clonazepam restarted, but is showing signs of stabilization both physically and mentally.  She reported that clonazepam is not helping and requested to restart Xanax/Ambien (previous meds).  Pt reports fall in interim, and also interaction/amnesia with Nyquil + Xanax which could have been dangerous. She remains depressed, anhedonic, likely over medicated, with sedatives and chronic opioids.     MDD,recurrent episode, moderate  GARFIELD  Insomnia disorder   R/O Conversion Disorder   sarcoidosis, hx phyloides tumor of breast, .DM2, RLS, arthritis, chronic neck, hip, and back pain, hx avulsion fracture, JOSE LUIS, hx CVA, recent UTI    GAF: 59     Strengths and Liabilities: Strength: Patient accepts guidance/feedback, Strength: Patient is expressive/articulate., Strength: Patient has reasonable judgment.   Treatment Goals: Specify outcomes written in observable, behavioral terms:   Anxiety: acquiring relapse prevention skills and reducing physical symptoms of anxiety   Depression: acquiring relapse prevention skills, increasing energy, increasing  motivation, reducing excessive guilt and reducing negative automatic thoughts     Treatment Plan/Recommendations:   Medication Management: The risks and benefits of medication were discussed with the patient.    1. Wean Sertraline 100 mg tabs: Take one and one-half tablets PO daily x 5 days, then reduce to one tablet PO daily x 5 days, then reduce to one-half tablet PO daily x 5 days, then STOP,  2. Start Lexapro 5 mg daily x 10 days, then titrate to 10 mg daily.  EKG 2/17 reviewed.   3. STOP Ambien:  She has less than ten 10 mg tabs left and not taking nightly;  Advised pt may take 5 mg PRN until Rx completed to assist with weaning off.  She does not think it will work, but discussed it may help her in minimizing w/d type symptoms.  No further refills.    4. Reduce Xanax in half:  0.25 mg BID PRN anxiety.  Discussed at length high risk medication in combination with opioids, Ambien, other sedatives.  Advised not to take Nyquil.  Discussed risk of decreased RT, sedation, addictive potential, and not to mix with alcohol.  Encouraged to use least effective dose, to use PRN not scheduled; discussed risk of respiratory depression if used with pain medications - this combo should not be taken together.  Goal will be further reduction of this Rx next visit.  Full plan for harm reduction discussed with patient today.    5. Continue Doxepin 10-30 mg nightly PRN insomnia; typical TAN reviewed  6. D/C Duloxetine - pt cannot afford to continue for now - does not note how it is helping pain or depression   7. Will further discuss importance of therapy next visit; discussed importance of activity, getting involved with [employer], reducing isolation, physical activity   8. Call to report any worsening of symptoms or problems with the medication  9. Pt instructed to go to ER with thoughts of harming self, others   10. [place]  reviewed - last fill of Xanax and Ambien 9/18/17     Return to Clinic:  6 weeks

## 2017-10-27 NOTE — TELEPHONE ENCOUNTER
Spoke to the patient on the phone.  She reports her sister in law and neighbor are both very worried about her - tell her she is losing too much weight, acting like she cannot manage her affairs.  She felt defensive, but able to process with patient that they are likely speaking up out of a concern for her.  She was willing to accept - discussed her question about hypnosis - often that it can be helpful as an aid to psychotherapy.  At this point, I would recommend that she start seeing a therapist - will reach out to CAITIE David LCSW to see if she is accepting new patients.  Pt willing to drive to [place].  With time, pt could be transitioned to [place] with provider here.

## 2017-10-30 ENCOUNTER — TELEPHONE (OUTPATIENT)
Dept: PSYCHIATRY | Facility: CLINIC | Age: 67
End: 2017-10-30

## 2017-10-30 RX ORDER — ESCITALOPRAM OXALATE 10 MG/1
TABLET ORAL
Qty: 90 TABLET | Refills: 0 | Status: SHIPPED | OUTPATIENT
Start: 2017-10-30 | End: 2017-12-05 | Stop reason: ALTCHOICE

## 2017-10-30 NOTE — TELEPHONE ENCOUNTER
Lexapro order needs clarification for the pharmacy to fill it. Take one- (half) PO daily for 10 days then increase to one tablet PO daily    Please advise

## 2017-11-06 ENCOUNTER — TELEPHONE (OUTPATIENT)
Dept: PSYCHIATRY | Facility: CLINIC | Age: 67
End: 2017-11-06

## 2017-11-06 NOTE — TELEPHONE ENCOUNTER
Patient canceled her new patient appointment with Mrs. Ariela Waldron. Pierre stating she does not have the money for her co-pay and with holidays coming up she needs to pull back on funds. States she wants to reschedule just needs to look at finances

## 2017-11-30 RX ORDER — ALPRAZOLAM 0.25 MG/1
TABLET ORAL
Qty: 60 TABLET | Refills: 0 | Status: SHIPPED | OUTPATIENT
Start: 2017-11-30 | End: 2018-02-20 | Stop reason: ALTCHOICE

## 2017-12-05 ENCOUNTER — TELEPHONE (OUTPATIENT)
Dept: PSYCHIATRY | Facility: CLINIC | Age: 67
End: 2017-12-05

## 2017-12-05 RX ORDER — SERTRALINE HYDROCHLORIDE 100 MG/1
TABLET, FILM COATED ORAL
Qty: 1 TABLET | Refills: 0
Start: 2017-12-05 | End: 2018-07-26 | Stop reason: SDUPTHER

## 2017-12-05 NOTE — TELEPHONE ENCOUNTER
Patient requesting a call from Dr. Pat. Has question on her new antidepressant.   Please advice  [phone]  Ashe[place]

## 2017-12-05 NOTE — TELEPHONE ENCOUNTER
Did not have transportation, missed appt today;  Rescheduled appt 1/15/17.  She wanted to let me know she did not start Escitalopram, she received a 90 day supply Sertraline and could not afford to throw away.   She is not getting as much relief from Alprazolam 0.25 mg as the 0.5 mg.  She notes that she is using pain med more often, but also tries to have some days without any controlled substances because she does not want to be addicted to them.  She reports she is functional.  Depression comes and goes, notes she stays in her room more so because this is her living space, then because she is depressed. Also, this way, she can give her brother and JIAN their own space as well.

## 2018-01-31 ENCOUNTER — PES CALL (OUTPATIENT)
Dept: ADMINISTRATIVE | Facility: CLINIC | Age: 68
End: 2018-01-31

## 2018-02-12 ENCOUNTER — TELEPHONE (OUTPATIENT)
Dept: PSYCHIATRY | Facility: CLINIC | Age: 68
End: 2018-02-12

## 2018-02-12 RX ORDER — DOXEPIN HYDROCHLORIDE 10 MG/1
CAPSULE ORAL
Qty: 1 CAPSULE | Refills: 0
Start: 2018-02-12 | End: 2018-05-03 | Stop reason: ALTCHOICE

## 2018-02-12 NOTE — TELEPHONE ENCOUNTER
Patient requesting a call from Dr. Pat in regards to death of patients niece this pass Monday.   Please call [phone]  Ashely

## 2018-02-12 NOTE — TELEPHONE ENCOUNTER
Pt reports her brother's daughter, 25 yo Stacey,  from accidental heroin overdose one week ago.  She had 11 months of sobriety, was trying to get her life together.  She was visiting daughter in [place], she reports it was not good ending there - she and daughter argued about pt being too materialistic.     Pt reports she is feeling angry, shut down, tears up, but cannot cry.  Denies suicidal ideations - this is against her [employer] and she needs to be there for her brother.   Medications reviewed - she is still taking   Sertraline 200 mg daily.  Not taking cymbalta or lexapro - med tray was set up x 3 months in advance.      She feels unsettled that she sat through [facility] without tear - I discussed with pt the indivuality of grief and also how her medication may be blunting that response.   She was sleeping better prior to niece's death one week ago, but increased her doxepin to 40 mg nightly. The medication is well tolerated.  She denies any anticholinergic symptoms or cardiac issues/symptoms.  She is averaging 4 hrs of sleep with delayed sleep onset.     Pt has an appt 3/19/18 - she requests a sooner appt.  Will forward to my assistant to reach out to patient Wednesday to  (clinic holiday tomorrow).  Pt requests titration of Doxepin since it was helping - ok to titrate to 50 mg nightly.  Potential for anticholinergic/cardiac side effects discussed with patient.  Continue Sertraline 200 mg daily. Call to report any worsening of symptoms or problems with the medication

## 2018-02-20 ENCOUNTER — OFFICE VISIT (OUTPATIENT)
Dept: PSYCHIATRY | Facility: CLINIC | Age: 68
End: 2018-02-20
Payer: MEDICARE

## 2018-02-20 ENCOUNTER — TELEPHONE (OUTPATIENT)
Dept: PSYCHIATRY | Facility: CLINIC | Age: 68
End: 2018-02-20

## 2018-02-20 VITALS
SYSTOLIC BLOOD PRESSURE: 140 MMHG | HEART RATE: 64 BPM | WEIGHT: 171.94 LBS | HEIGHT: 66 IN | BODY MASS INDEX: 27.63 KG/M2 | DIASTOLIC BLOOD PRESSURE: 81 MMHG

## 2018-02-20 DIAGNOSIS — F33.41 MDD (MAJOR DEPRESSIVE DISORDER), RECURRENT, IN PARTIAL REMISSION: ICD-10-CM

## 2018-02-20 DIAGNOSIS — Z63.4 BEREAVEMENT: ICD-10-CM

## 2018-02-20 DIAGNOSIS — F41.9 ANXIETY: ICD-10-CM

## 2018-02-20 PROCEDURE — 3008F BODY MASS INDEX DOCD: CPT | Mod: S$GLB,,, | Performed by: PSYCHIATRY & NEUROLOGY

## 2018-02-20 PROCEDURE — 99499 UNLISTED E&M SERVICE: CPT | Mod: S$GLB,,, | Performed by: PSYCHIATRY & NEUROLOGY

## 2018-02-20 PROCEDURE — 1159F MED LIST DOCD IN RCRD: CPT | Mod: S$GLB,,, | Performed by: PSYCHIATRY & NEUROLOGY

## 2018-02-20 PROCEDURE — 1126F AMNT PAIN NOTED NONE PRSNT: CPT | Mod: S$GLB,,, | Performed by: PSYCHIATRY & NEUROLOGY

## 2018-02-20 PROCEDURE — 99999 PR PBB SHADOW E&M-EST. PATIENT-LVL III: CPT | Mod: PBBFAC,,, | Performed by: PSYCHIATRY & NEUROLOGY

## 2018-02-20 PROCEDURE — 99213 OFFICE O/P EST LOW 20 MIN: CPT | Mod: S$GLB,,, | Performed by: PSYCHIATRY & NEUROLOGY

## 2018-02-20 RX ORDER — ESZOPICLONE 2 MG/1
2 TABLET, FILM COATED ORAL NIGHTLY PRN
Qty: 30 TABLET | Refills: 0 | Status: SHIPPED | OUTPATIENT
Start: 2018-02-20 | End: 2018-02-22

## 2018-02-20 SDOH — SOCIAL DETERMINANTS OF HEALTH (SDOH): DISSAPEARANCE AND DEATH OF FAMILY MEMBER: Z63.4

## 2018-02-20 NOTE — TELEPHONE ENCOUNTER
Called patient and informed her of the initiating of the PA for the medications and we will call her when we get approved or denied

## 2018-02-20 NOTE — PROGRESS NOTES
Outpatient Psychiatry Follow-Up Visit (MD/NP)    2/20/2018    Clinical Status of Patient:  Outpatient (Ambulatory)    Chief Complaint:  Nathalie Santiago is a 67 y.o. female who presents today for follow-up of depression, anxiety, adjustment.  Met with patient.      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   Pt reports depression and anxiety began in context of significant psychosocial stressors. Her mother passed away in 2000, her father in 2004. She was diagnosed with breast cancer in 2005, had bilateral mastectomies and then a failed reconstruction. She also has sarcoidosis of the lungs. She sold her home in [place] and moved to [place] because she could no longer care for herself due to medical issues. She has become increasingly dependent on others. She moved back here several years ago and is currently living with her brother and his significant other. The relationship with the latter is strained; her niece who has battled addiction is currently serving time in [facility].   Pt has had significant health issues with worsening depression/anxiety in the last several months.  Intake 2013.     Past Psychiatric History:  Tx of depression, anxiety and panic attacks   First saw a psychiatrist 2007   No prior hospitalizations  No suicide attempts or self injury   PCP most recently treating her in [place]   Previous meds: Restoril (no effect), Dalmane (no effect), Klonopin, Ativan, could not afford Cymbalta but helped pain and depression, Wellbutrin XL (3rd day fell out of bed x 4, bruised, amnestic), Effexor (raised her BP), no antipsychotics or mood stabilizers, Citalopram (some benefit).   Trazodone (no benefit), mirtazapine, melatonin (3 mg ineffective, higher dose HA), ativan, clonazepam, amitriptyline   No prior counseling         Past medical history:  DM2  RLS  Sarcoidosis of  "lung   Hx ovarian CA  HTN  Hx of malignant phylloides tumor of breast   Bilateral cataract surgery   Chronic neck, back and hip pain   Arthritis   MVR  Hx CVA  Hx DJD  No cardiac hx   No hx seizures or head injury      Past Surgical History:  bilateral mastectomy   CYSTOCELE REPAIR  Rectocele  CHOLECYSTECTOMY  PLEURA BIOPSY  Right foot  fRACTURE SURGERY  MASTECTOMY  RECTAL SURGERY     Family History:   Mother - depression  Sister - bipolar disorder vs schizophrenia, hospitalizations      Social History:  Former RN x 35 years, SSID since   Lives with her brother and his SO ex wife heather Briggs, [place]   3 prior marriages, , 1 daughter   Raised in [place] in intact home - 3 brothers and 2 sisters - "stable"  No  or legal hx   No hx abuse   Hobbies: sewing, beading, reading   Education: Some college   S[employer]ism: [employer]     Substance History:  Never smoked  Rare alcohol: 2 times a year   No illicit drug. Denies misuse of opiates, benzodiazepines   Moderate caffeine: coffee, tea, cokes     Interim hx:   Pt in contact 18 by phone:   Pt reports her brother's daughter, 25 yo Stacey,  from accidental heroin overdose one week ago.  She had 11 months of sobriety, was trying to get her life together.  She was visiting daughter in [place], she reports it was not good ending there - she and daughter argued about pt being too materialistic.   Pt reports she is feeling angry, shut down, tears up, but cannot cry.  Denies suicidal ideations - this is against her [employer] and she needs to be there for her brother. Medications reviewed - she is still taking   Sertraline 200 mg daily.  Not taking cymbalta or lexapro - med tray was set up x 3 months in advance.     She feels unsettled that she sat through [facility] service without tear - I discussed with pt the indivuality of grief and also how her medication may be blunting that response.   She was sleeping better prior to niece's death one week ago, but increased " her doxepin to 40 mg nightly. The medication is well tolerated.  She denies any anticholinergic symptoms or cardiac issues/symptoms.  She is averaging 4 hrs of sleep with delayed sleep onset.   Pt has an appt 3/19/18 - she requests a sooner appt.  Will forward to my assistant to reach out to patient Wednesday to schedule (clinic holiday tomorrow).  Pt requests titration of Doxepin since it was helping - ok to titrate to 50 mg nightly.  Potential for anticholinergic/cardiac side effects discussed with patient.  Continue Sertraline 200 mg daily. Call to report any worsening of symptoms or problems with the medication    Pt reports titration Doxepin not fully helpful for sleep.  She did not start Lexapro and has stopped Cymbalta.  She has been maintained on Sertraline 200 mg daily (pill trays are set up months in advance she reports).  She has been using CPAP.  She does take Percocet daily.  Pt has long hx of insomnia.  Past med trials: Ambien (HA) with regular use, Restoril (no effect), Dalmane (no effect), Klonopin, Ativan,Trazodone (no benefit), mirtazapine, temazepam, melatonin (3 mg ineffective, higher dose HA), ativan, clonazepam, amitriptyline   Pt endorses poor sleep onset/maintenance, averages 4-6 hrs of very broken sleep.  No side effects with Doxepin and it does offer some benefit.   Pt does have an upcoming Cardiology appt in [place] given strong family of cardiac problems.  I have asked her to discuss if it is safe for her to remain on Doxepin and she agreed to do so (given potential cardiac adverse effects).  No current chest pain, tachycardia.  HR 64.   Pt reports that she was taking two Xanax 0.25 mg tabs nightly which was most helpful for sleep until she ran out one month ago.  Sleep issues have worsened since the death of her niece.     She reports her health has been stable.  She spend 5-6 weeks with daughter - she enjoyed time with great grandchild, but they got into financial arguments - they have  personality conflicts she feels. Daughter was pleased that pt is active again.  She is caring for dogs, reading, studying scripture.   She is still trying to wrap her mind around her niece's death.  Pt lives with her brother and his significant other.  She is trying to be strong for him.     Psych ROS:   She denies depressed mood.  Motivation is fair.  Energy level is ok.  Appetite is increased. + wt gain, + comfort eating.   Denies hopelessness/worthlessness.   Denies suicidal/homicidal ideations.  + increased anxiety, racing thoughts, worry - worse in am.  She is frustrated she cannot sleep.  She does not have TV in room, has turned down backlight on [place].  She has had a few panic attacks.  She has some ST memory issues at times.    Able to relax.   Denies symptoms of arnel/psychosis.     Pain is controlled. Followed by Dr Temple.     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  Does not maintain nursing license.     No alcohol  Denies prescription drug abuse   Percocet gives her energy - counteracts her sleep   Caffeine: 1 coke daily 12 ounce   No tobacco     Uses pill organizer.   no hx suicide attempts, no self harm.  Guns in home are locked up.      Review of Systems   PSYCHIATRIC: see above  CONSTITUTIONAL: weight gain  MUSCULOSKELETAL: No pain today.   RESPIRATORY: occasional exertional SOB    Past Medical, Family and Social History: The patient's past medical, family and social history have been reviewed and updated as appropriate within the electronic medical record - see encounter notes.    Medications:  Sertraline 200 mg daily   Doxepin 50 mg nightly prn insomnia   Percoet - daily - multiple times     Compliance: ran out of Xanax one month ago    Side effects:  headaches when Ambien taken too often; dry mouth on amitriptyline, elevated BP on Effexor XR, falls    Risk Parameters:  Patient reports no suicidal ideation  Patient reports no homicidal ideation  Patient reports no  "self-injurious behavior  Patient reports no violent behavior    Exam (detailed: at least 9 elements; comprehensive: all 15 elements)   Constitutional  Vitals:  Most recent vital signs, dated less than 90 days prior to this appointment, were reviewed.         General:  age appropriate, casually dressed,  calm, adequate grooming         Musculoskeletal  Muscle Strength/Tone:  no tremor   Gait & Station:  No ataxia      Psychiatric  Speech:  no latency; no press, spontaneous   Mood & Affect:  "ok"  Restricted    Thought Process:  Linear    Associations:  intact   Thought Content:  normal, no suicidality, no homicidality, delusions, or paranoia, hallucinations: (auditory: no, visual: no)   Insight:  has awareness of illness   Judgement: behavior is adequate to circumstances   Orientation:  grossly intact; alert and oriented x 3   Memory: intact for content of interview   Language: grossly intact no fluency issues    Attention Span & Concentration:  able to focus grossly intact in this appt   Fund of Knowledge:  intact and appropriate to age and level of education     Assessment and Diagnosis   Status/Progress: Based on the examination today, the patient's problem(s) is/are under fair control.   New problems have been presented today.   Comorbidities are complicating management of the primary condition.  (multiple medical problems)     Impression: 66 yo female with multiple medical problems and hx of depression, insomnia, and anxiety presents for follow up. Significant family stressors and health issues. Pt has required multiple sleep aides in past. She has required chronic benzodiazepine for stabilization, has failed multiple sleep aides;  Sedative-hyponotics were discontinued in interim due to health issues, reports of falls.  She contacted me previously with worsening depression/anxiety - low dose clonazepam restarted, but is showing signs of stabilization both physically and mentally.  She reported that clonazepam is " not helping and requested to restart Xanax/Ambien (previous meds).  Pt reports fall in interim, and also interaction/amnesia with Nyquil + Xanax which could have been dangerous. + Chronic opioid use.  She ended up staying on Sertraline and this medication has been helping with her maintaining stable mood following her niece's death.     MDD,recurrent episode, partial remission  GARFIELD  Insomnia disorder   Bereavement   R/O Conversion Disorder   sarcoidosis, hx phyloides tumor of breast, .DM2, RLS, arthritis, chronic neck, hip, and back pain, hx avulsion fracture, JOSE LUIS, hx CVA    GAF: 60     Strengths and Liabilities: Strength: Patient accepts guidance/feedback, Strength: Patient is expressive/articulate., Strength: Patient has reasonable judgment.   Treatment Goals: Specify outcomes written in observable, behavioral terms:   Anxiety: acquiring relapse prevention skills and reducing physical symptoms of anxiety   Depression: acquiring relapse prevention skills, increasing energy, increasing motivation, reducing excessive guilt and reducing negative automatic thoughts     Treatment Plan/Recommendations:   Medication Management: The risks and benefits of medication were discussed with the patient.    1. Sertraline 100 mg tabs: Continue 200 mg daily  2. Trial of Doxepin up to 50 mg nightly PRN insomnia, no Rx today.  Discussed potential for anticholinergic side effects, cardiac effects.  She will discuss if this medication is safe to take with Cardiologist at her upcoming consultation in [place].    3. Trial of Lunesta 2 mg nightly prn insomnia.  Pt denies trying in past; she doubts insurance will cover; she requests to remain on Xanax. Will try first to have this medication approved since non benzodiazepine.  Advised to not mix with pain medication, discussed risks of respiratory depression.  4. Hold Xanax for now.   5. Call to report any worsening of symptoms or problems with the medication  6. Pt instructed to go to ER  with thoughts of harming self, others   7. Consider referral to [place] therapist next visit.       Return to Clinic:  3 months

## 2018-02-20 NOTE — TELEPHONE ENCOUNTER
Please notify patient that we have initiated PA and should have answer within a few days if medication is approved and affordable.

## 2018-02-21 ENCOUNTER — TELEPHONE (OUTPATIENT)
Dept: PSYCHIATRY | Facility: CLINIC | Age: 68
End: 2018-02-21

## 2018-02-21 NOTE — TELEPHONE ENCOUNTER
Patient called stating she spoke with [facility] and they told her it will cost 468$ a month for the lunesta and only 5.50$ for xanax so she believes that's the best option    Advised patient that I have request a prior auth for this medication so it will be covered by her policy and to wait to see what happens with the request    Patient agreed- I will give update tomorrow on prior Auth status

## 2018-02-22 RX ORDER — ALPRAZOLAM 0.25 MG/1
0.25 TABLET ORAL 2 TIMES DAILY PRN
Qty: 60 TABLET | Refills: 2 | Status: SHIPPED | OUTPATIENT
Start: 2018-02-22 | End: 2018-04-17 | Stop reason: SINTOL

## 2018-02-22 NOTE — TELEPHONE ENCOUNTER
Please advise the patient that Lunesta is costly even with PA.   I have sent Xanax 0.25 mg BID prn anxiety/insomnia to [facility], but as monthly supply with refills (controlled substance).   Did she need a one week supply called into local pharmacy while she awaits mail order?     Please also cancel Lunesta Rx at local pharmacy - thanks

## 2018-02-22 NOTE — TELEPHONE ENCOUNTER
Spoke with patient who verbalized understanding.  Will wait for mail order pharmacy to mail her the medications. She understands it will take a few days.

## 2018-02-22 NOTE — TELEPHONE ENCOUNTER
Prior auth for [facility] approved. Called [facility] and medication will cost 184.50$ for 30 days

## 2018-02-27 ENCOUNTER — OFFICE VISIT (OUTPATIENT)
Dept: PAIN MEDICINE | Facility: CLINIC | Age: 68
End: 2018-02-27
Payer: MEDICARE

## 2018-02-27 VITALS
DIASTOLIC BLOOD PRESSURE: 75 MMHG | WEIGHT: 171 LBS | HEART RATE: 66 BPM | SYSTOLIC BLOOD PRESSURE: 121 MMHG | HEIGHT: 66 IN | BODY MASS INDEX: 27.48 KG/M2

## 2018-02-27 DIAGNOSIS — M70.60 GREATER TROCHANTERIC BURSITIS, UNSPECIFIED LATERALITY: ICD-10-CM

## 2018-02-27 DIAGNOSIS — M47.896 OTHER SPONDYLOSIS, LUMBAR REGION: ICD-10-CM

## 2018-02-27 DIAGNOSIS — M51.36 DDD (DEGENERATIVE DISC DISEASE), LUMBAR: Primary | ICD-10-CM

## 2018-02-27 PROCEDURE — 1159F MED LIST DOCD IN RCRD: CPT | Mod: S$GLB,,, | Performed by: ANESTHESIOLOGY

## 2018-02-27 PROCEDURE — 1125F AMNT PAIN NOTED PAIN PRSNT: CPT | Mod: S$GLB,,, | Performed by: ANESTHESIOLOGY

## 2018-02-27 PROCEDURE — 3008F BODY MASS INDEX DOCD: CPT | Mod: S$GLB,,, | Performed by: ANESTHESIOLOGY

## 2018-02-27 PROCEDURE — 99999 PR PBB SHADOW E&M-EST. PATIENT-LVL V: CPT | Mod: PBBFAC,,, | Performed by: ANESTHESIOLOGY

## 2018-02-27 PROCEDURE — 99214 OFFICE O/P EST MOD 30 MIN: CPT | Mod: S$GLB,,, | Performed by: ANESTHESIOLOGY

## 2018-02-27 RX ORDER — OXYCODONE AND ACETAMINOPHEN 10; 325 MG/1; MG/1
1 TABLET ORAL EVERY 8 HOURS PRN
Qty: 90 TABLET | Refills: 0 | Status: SHIPPED | OUTPATIENT
Start: 2018-03-06 | End: 2018-04-05

## 2018-02-27 RX ORDER — OXYCODONE AND ACETAMINOPHEN 10; 325 MG/1; MG/1
1 TABLET ORAL EVERY 8 HOURS PRN
Qty: 90 TABLET | Refills: 0 | Status: SHIPPED | OUTPATIENT
Start: 2018-05-03 | End: 2018-05-03 | Stop reason: SDUPTHER

## 2018-02-27 RX ORDER — NITROGLYCERIN 0.4 MG/1
TABLET SUBLINGUAL
COMMUNITY
Start: 2018-02-21 | End: 2020-09-14

## 2018-02-27 RX ORDER — ROSUVASTATIN CALCIUM 40 MG/1
40 TABLET, COATED ORAL DAILY
COMMUNITY
Start: 2018-02-21 | End: 2018-08-22 | Stop reason: SDUPTHER

## 2018-02-27 RX ORDER — OXYCODONE AND ACETAMINOPHEN 10; 325 MG/1; MG/1
1 TABLET ORAL EVERY 8 HOURS PRN
Qty: 90 TABLET | Refills: 0 | Status: SHIPPED | OUTPATIENT
Start: 2018-04-04 | End: 2018-05-29 | Stop reason: SDUPTHER

## 2018-02-27 RX ORDER — ESZOPICLONE 2 MG/1
TABLET, COATED ORAL
COMMUNITY
Start: 2018-02-23 | End: 2018-04-17

## 2018-02-27 RX ORDER — METOPROLOL SUCCINATE 25 MG/1
25 TABLET, EXTENDED RELEASE ORAL DAILY
COMMUNITY
Start: 2018-02-21 | End: 2019-06-13 | Stop reason: SDUPTHER

## 2018-02-27 RX ORDER — SPIRONOLACTONE 25 MG/1
25 TABLET ORAL DAILY
COMMUNITY
Start: 2018-02-21 | End: 2019-06-13 | Stop reason: SDUPTHER

## 2018-02-27 NOTE — PROGRESS NOTES
CC: left sided low back and left hip pain    Interval History: Ms. Santaigo is a 67 y.o. female with chronic low back and hip pain who presents  for her follow up.  She continues to have lower back, left greater than right lower back pain.  Hip pain is currently tolerable.   She continues to take percocet 10mg q8hrs as needed for pain with moderate benefits.  No side effects reported.  Able to perform daily activities with the medications.  She also continues to takes prednisone 5mg daily.   Pain today is rated 7/10.     Prior HPI: The patient is a 64-year-old woman with a history of sarcoidosis, breast CA, diabetes, anxiety and depression who presents in referral from Dr. Nguyen to Dr. Perez for multiple pain complaints including back pain, bilateral hip pain and right shoulder pain.  She is following up with me since I am much closer to her.   She presents today for multiple pain complaints.  She recently was hospitalized for pneumonia, UTI and sarcoidosis exacerbation per patient.  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  She has had past GTB injections with moderate benefit and had an exacerbation of her pain recently.  She was able to receive left GTB with Dr. Perez on 10/3/2014 that provided >50% relief of her left hip and leg pain.     She continues to take Prednisone 5mg daily. She continues Percoet 7.5mg about 3x/day as needed with moderate benefit.  She presents today with worsening right knee pain.  She states having history of right knee osteoarthritis, but has not had any treatment for her knee pain.  Continues to have improvement in her knee pain following completion of visco supplementation injection in her right knee.  Her back and hip pain remain tolerable with her medications.  She continues to take Percoct 7.5mg q8hrs as needed with moderate benefit.  No reported side effects.       Pain intervention history: She takes hydrocodone 7-325 2-4  "times a day.  Also has undergone epidural steroid injection at L1/2 without relief.  She has been doing cervical traction with good relief of her neck pain.  Previous trochanteric bursa injections with good relief.    ROS:She reports fatigability, itching, headaches, swollen glands, chest pain and shortness of breath, nausea vomiting, easy bruising, back pain, joint stiffness, dizziness, difficulty sleeping, anxiety, depression and loss of balance.  Balance of review of systems is negative.    Medical, surgical, family and social history reviewed elsewhere in record.    Medications/Allergies: See med card    Vitals:    02/27/18 1128   BP: 121/75   Pulse: 66   Weight: 77.6 kg (171 lb)   Height: 5' 6" (1.676 m)   PainSc:   6   PainLoc: Back       Physical exam:  Gen: A and O x3, pleasant, well-groomed  Skin: No rashes or obvious lesions  HEENT: PERRLA, no obvious deformities on ears or in canals. No thyroid masses, trachea midline, no palpable lymph nodes in neck, axilla.  CVS: RRR  Resp: non labored breathing  Abdomen: Soft, NT/ND, normal bowel sounds present.    Neuro:  Lower extremities:   +right knee crepitus. LE erythema and swelling  Reflexes: Patellar 2+, Achilles 2+ bilaterally.  Sensory:  Intact   Lumbar spine:  Lumbar spine: ROM is moderately reduced with flexion extension and oblique extension with increased pain in all directions.    Orlando's test causes pain on both sides.    Supine straight leg raise is negative bilaterally.    Internal and external rotation of the hip causes increased pain in left groin.  Myofascial exam: Tenderness to palpation over both GTB.      Imaging:  Cervical spine MRI report 10/5/12: There is dextroscoliosis in the lower cervical/upper thoracic region.  There is very mild foraminal stenosis on the left at C6/7.    MRI thoracic spine 11/21/2008: No significant degenerative disc disease or central stenosis.    MRI lumbar spine 8/14/08: L1/2 small broad-based central protrusion " with mild effacement of ventral thecal sac but not distorting exiting roots.  L2/3 and L3/4 unremarkable.  At L4/5 there is mild central stenosis with bulging annulus anteriorly and facet hypertrophic degenerative change posteriorly.  No focal disc herniation and neural foramina are patent.  At L5/S1 there is minimal bulging of the annulus with no thecal sac compression.  The small annular fissure within the posterior annulus.  Foramina are patent bilaterally, no stenosis mild, degenerative changes in the facets.    Xray Right Knee 10/13/14  Mild tricompartmental degenerative changes present. No joint effusion. The soft tissues are unremarkable.      Assessment:  Ms. Santiago is a 65-year-old woman with a history of sarcoidosis, breast CA, diabetes, anxiety and depression who presents in referral from Dr. Nguyen for multiple pain complaints including back pain, bilateral hip pain and knee pain   1. DDD (degenerative disc disease), lumbar    2. Other spondylosis, lumbar region    3. Greater trochanteric bursitis, unspecified laterality          PLAN:  1. I have stressed the importance of physical activity and exercise to improve overall health.    2.  Percocet 10mg q8hrs as needed.  Script given for three months.  reviewed.    3.   Continue Parafon Forte 500 mg q 8 h prn   4.  I believe her low back pain maybe due to facet arthropathy and have recommended lumbar medial branch blocks as a diagnostic procedure.  If successful, would proceed with radiofrequency ablation.  May consider this procedure in the future  5.  Follow up in three months

## 2018-03-14 DIAGNOSIS — E55.9 VITAMIN D DEFICIENCY: ICD-10-CM

## 2018-03-14 RX ORDER — ERGOCALCIFEROL 1.25 MG/1
CAPSULE ORAL
Qty: 8 CAPSULE | Refills: 0 | Status: SHIPPED | OUTPATIENT
Start: 2018-03-14 | End: 2018-10-18 | Stop reason: SDUPTHER

## 2018-04-17 ENCOUNTER — TELEPHONE (OUTPATIENT)
Dept: PSYCHIATRY | Facility: CLINIC | Age: 68
End: 2018-04-17

## 2018-04-18 ENCOUNTER — TELEPHONE (OUTPATIENT)
Dept: PSYCHIATRY | Facility: CLINIC | Age: 68
End: 2018-04-18

## 2018-04-18 NOTE — TELEPHONE ENCOUNTER
Spoke with patient. Has appointment on May 5th. Patient on wait list for earlier appointment.    Discussed with patient that daughter called yesterday about xanax use. Patient states she knows that her daughter is concerned about the medication and is making efforts to not take the medication at all. And has not for part 3 days. She is staying with daughter currently. And patient will invite daughter to next appointment

## 2018-04-18 NOTE — TELEPHONE ENCOUNTER
Patient called an left voice mail about needing to talk to nurse or Dr Pat about needing to get a medication like Adderall or Vyvanse for the situation she is having.  Called and let her know will be forwarding message to Ciara BERMUDEZ to call her and discuss what she is needing.  Please call when available

## 2018-05-03 ENCOUNTER — OFFICE VISIT (OUTPATIENT)
Dept: PSYCHIATRY | Facility: CLINIC | Age: 68
End: 2018-05-03
Payer: MEDICARE

## 2018-05-03 VITALS
WEIGHT: 184.63 LBS | HEART RATE: 65 BPM | HEIGHT: 66 IN | DIASTOLIC BLOOD PRESSURE: 80 MMHG | SYSTOLIC BLOOD PRESSURE: 153 MMHG | BODY MASS INDEX: 29.67 KG/M2

## 2018-05-03 DIAGNOSIS — F33.1 MODERATE EPISODE OF RECURRENT MAJOR DEPRESSIVE DISORDER: ICD-10-CM

## 2018-05-03 DIAGNOSIS — G47.00 INSOMNIA, UNSPECIFIED TYPE: ICD-10-CM

## 2018-05-03 DIAGNOSIS — F41.9 ANXIETY: ICD-10-CM

## 2018-05-03 PROCEDURE — 99499 UNLISTED E&M SERVICE: CPT | Mod: S$GLB,,, | Performed by: PSYCHIATRY & NEUROLOGY

## 2018-05-03 PROCEDURE — 99999 PR PBB SHADOW E&M-EST. PATIENT-LVL III: CPT | Mod: PBBFAC,,, | Performed by: PSYCHIATRY & NEUROLOGY

## 2018-05-03 PROCEDURE — 99214 OFFICE O/P EST MOD 30 MIN: CPT | Mod: S$GLB,,, | Performed by: PSYCHIATRY & NEUROLOGY

## 2018-05-03 PROCEDURE — 3077F SYST BP >= 140 MM HG: CPT | Mod: CPTII,S$GLB,, | Performed by: PSYCHIATRY & NEUROLOGY

## 2018-05-03 PROCEDURE — 90833 PSYTX W PT W E/M 30 MIN: CPT | Mod: S$GLB,,, | Performed by: PSYCHIATRY & NEUROLOGY

## 2018-05-03 PROCEDURE — 3079F DIAST BP 80-89 MM HG: CPT | Mod: CPTII,S$GLB,, | Performed by: PSYCHIATRY & NEUROLOGY

## 2018-05-03 RX ORDER — ALPRAZOLAM 0.25 MG/1
0.25 TABLET ORAL 2 TIMES DAILY PRN
COMMUNITY
End: 2018-05-03 | Stop reason: DRUGHIGH

## 2018-05-03 RX ORDER — QUETIAPINE FUMARATE 25 MG/1
25 TABLET, FILM COATED ORAL NIGHTLY
Qty: 30 TABLET | Refills: 0 | Status: SHIPPED | OUTPATIENT
Start: 2018-05-03 | End: 2018-07-05 | Stop reason: SDUPTHER

## 2018-05-03 NOTE — PROGRESS NOTES
Outpatient Psychiatry Follow-Up Visit (MD/NP)    5/3/2018    Clinical Status of Patient:  Outpatient (Ambulatory)    Chief Complaint:  Nathalie Santiago is a 67 y.o. female who presents today for follow-up of depression, anxiety, adjustment.  Met with patient and her daughter, Effie.  50 min visit.      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   Pt reports depression and anxiety began in context of significant psychosocial stressors. Her mother passed away in 2000, her father in 2004. She was diagnosed with breast cancer in 2005, had bilateral mastectomies and then a failed reconstruction. She also has sarcoidosis of the lungs. She sold her home in [place] and moved to [place] because she could no longer care for herself due to medical issues. She has become increasingly dependent on others. She moved back here several years ago and is currently living with her brother and his significant other. The relationship with the latter is strained; her niece who has battled addiction is currently serving time in alf.   Pt has had significant health issues with worsening depression/anxiety in the last several months.  Intake 2013.     Past Psychiatric History:  Tx of depression, anxiety and panic attacks   First saw a psychiatrist 2007   No prior hospitalizations  No suicide attempts or self injury   PCP most recently treating her in [place]   Previous meds: Restoril (no effect), Dalmane (no effect), Klonopin, Ativan, could not afford Cymbalta but helped pain and depression, Wellbutrin XL (3rd day fell out of bed x 4, bruised, amnestic), Effexor (raised her BP), no antipsychotics or mood stabilizers, Citalopram (some benefit).   Trazodone (no benefit), mirtazapine, melatonin (3 mg ineffective, higher dose HA), ativan, clonazepam, amitriptyline   No prior counseling         Past  "medical history:  DM2  RLS  Sarcoidosis of lung   Hx ovarian CA  HTN  Hx of malignant phylloides tumor of breast   Bilateral cataract surgery   Chronic neck, back and hip pain   Arthritis   MVR  Hx CVA  Hx DJD  No cardiac hx   No hx seizures or head injury      Past Surgical History:  bilateral mastectomy   CYSTOCELE REPAIR  Rectocele  CHOLECYSTECTOMY  PLEURA BIOPSY  Right foot  fRACTURE SURGERY  MASTECTOMY  RECTAL SURGERY     Family History:   Mother - depression  Sister - bipolar disorder vs schizophrenia, hospitalizations      Social History:  Former RN x 35 years, SSID since 2008  Lives with her brother and his SO ex wife heather Briggs, [place]   3 prior marriages, , 1 daughter   Raised in [place] in intact home - 3 brothers and 2 sisters - "stable"  No  or legal hx   No hx abuse   Hobbies: sewing, beading, reading   Education: Some college   [employer]on: [employer]     Substance History:  Never smoked  Rare alcohol: 2 times a year   No illicit drug. Denies misuse of opiates, benzodiazepines   Moderate caffeine: coffee, tea, cokes     Interim hx:   Pt has been taking Sertraline 200 mg daily, Xanax 0.25 mg - not nightly since advised to d/c, Doxepin 50 mg nightly.   Pt resumed Ambien from an old Rx and took with Xanax and had episode of amnesia - meds were taken hrs apart.  She went to the kitchen and was "loopy", slurring words. Pt was shoving handfuls of jelly beans in her mouth, was all over the place.  Daughter reports this is not the first time this happened; she called my office to voice concern, refill of Xanax cancelled and pt and daughter came in for appt today.   Daughter also expressed concerns about pt's hoarding. She shared pictures of her room with barely any passable routes to ambulate in room.     Pt is now taking Metoprolol; she is followed by Cardiologist in [place] and will have a LHC 5/14/18 due to abnormal stress test and intermittent CP, SOB.    Pt also takes Oxycodone 1-2 times day.  Pain " "today is 7-8/10.   Daughter feels pt is in denial about how much time she spends in bed.  She fears her mother has dementia.  Pt feels her memory varies.  ST memory is limited, but she does not think she has a memory problem.     Pt reports her brain does not shut off at night.  Denies napping during the day. Motivation is fair, energy up/down.   Pt has been staying with her daughter in [place] for the last several months.  Brother Nj is grieving the loss of his daughter.  She does plan to return home with him. She denies feeling hopeless.  Enjoys helping with granddaughter.  Medical issues limit her.  Self care is "50/50."  She spends a lot of time in bed.   Denies suicidal/homicidal ideations.  Pt minimizes hoarding.   Denies symptoms of arnel/psychosis.     Alcohol: infrequent.  Denies drug use; pt did restart old Rx of Ambien 9/18/17.   She has been using CPAP.      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  Does not maintain nursing license.     Uses pill organizer.   no hx suicide attempts, no self harm.  Guns in home are locked up.      Normal clock drawing today    Psychotherapy:   · Target symptoms: depression, anxiety, insomnia, hoarding   · Why chosen therapy is appropriate versus another modality: relevant to diagnosis, patient responds to this modality  · Outcome monitoring methods: self-report, observation, feedback from family   · Therapeutic intervention type: supportive psychotherapy, insight oriented   · Topics discussed/themes: building skills sets for symptom management, symptom recognition, risks of medications, medical issues, grief   · The patient's response to the intervention is accepting. The patient's progress toward treatment goals is limited progress.  · Duration of intervention: 25 minutes    Review of Systems   PSYCHIATRIC: see above  CONSTITUTIONAL: weight gain  MUSCULOSKELETAL: 8/10 back pain today.  CV: occasional exertional SOB, intermittent " "chest pain     Past Medical, Family and Social History: The patient's past medical, family and social history have been reviewed and updated as appropriate within the electronic medical record - see encounter notes.    Medications:  Sertraline 200 mg daily   Doxepin 50 mg nightly prn insomnia   Percoet - daily - multiple times   Xanax 0.25 mg BID prn anxiety, insomnia    Compliance: pt advised to d/c Xanax, restarted Ambien PRN     Side effects:  Amnesia, complex sleep related symptoms on Xanax, Ambien   headaches when Ambien taken too often; dry mouth on amitriptyline, elevated BP on Effexor XR, falls    Risk Parameters:  Patient reports no suicidal ideation  Patient reports no homicidal ideation  Patient reports no self-injurious behavior  Patient reports no violent behavior    Exam (detailed: at least 9 elements; comprehensive: all 15 elements)   Constitutional  Vitals:  Most recent vital signs, dated more than 90 days prior to this appointment, were reviewed.         General:  age appropriate, casually dressed,  calm, adequate grooming         Musculoskeletal  Muscle Strength/Tone:  no tremor   Gait & Station:  No ataxia      Psychiatric  Speech:  no latency; no press, spontaneous   Mood & Affect:  "ok"  Restricted    Thought Process:  Linear    Associations:  intact   Thought Content:  normal, no suicidality, no homicidality, delusions, or paranoia, hallucinations: (auditory: no, visual: no)   Insight:  has awareness of illness   Judgement: behavior is adequate to circumstances   Orientation:  grossly intact; alert and oriented x 3    Memory: intact for content of interview, 3/3 immediate recall, 3/3 delayed recall    Language: grossly intact no fluency issues, can repeat    Attention Span & Concentration:  able to focus grossly intact in this [facility]   Fund of Knowledge:  intact and appropriate to age and level of education, can list 3 of 4 recent presidents      Assessment and Diagnosis   Status/Progress: " Based on the examination today, the patient's problem(s) is/are under inadequate control.   New problems have been presented today.   Comorbidities are complicating management of the primary condition.  (multiple medical problems)     Impression: 66 yo female with multiple medical problems and hx of depression, insomnia, and anxiety presents for follow up. Significant family stressors and health issues. Pt has required multiple sleep aides in past. She has required chronic benzodiazepine for stabilization, has failed multiple sleep aides;  Sedative-hyponotics were discontinued in interim due to health issues, reports of falls.  She contacted me previously with worsening depression/anxiety - low dose clonazepam restarted, but is showing signs of stabilization both physically and mentally.  She reported that clonazepam is not helping and requested to restart Xanax/Ambien (previous meds).  Pt reports fall in interim, and also interaction/amnesia with Nyquil + Xanax which could have been dangerous. + Chronic opioid use.  She ended up staying on Sertraline and this medication has been helping with her maintaining stable mood following her niece's death.   Pt mixed Ambien and Xanax and had amnesia, complex sleep related behaviors.  I discussed with her and daughter that sedatives are not safe medications for her.  Mood is fairly stable; daughter reports pt is hoarding, also concerned about her memory.     MDD,recurrent episode, mild   GARFIELD  Insomnia disorder   Bereavement   R.O Hoarding Disorder   R/O Cognitive Disorder   sarcoidosis, hx phyloides tumor of breast, .DM2, RLS, arthritis, chronic neck, hip, and back pain, hx avulsion fracture, JOSE LUIS, hx CVA    GAF: 58     Strengths and Liabilities: Strength: Patient accepts guidance/feedback, Strength: Patient is expressive/articulate., Strength: Patient has reasonable judgment.   Treatment Goals: Specify outcomes written in observable, behavioral terms:   Anxiety: acquiring  relapse prevention skills and reducing physical symptoms of anxiety   Depression: acquiring relapse prevention skills, increasing energy, increasing motivation, reducing excessive guilt and reducing negative automatic thoughts     Treatment Plan/Recommendations:   Medication Management: The risks and benefits of medication were discussed with the patient.    1. Sertraline 100 mg tabs: Continue 200 mg daily  2. D/C Doxepin due to CV issues. Pt did not take last night.   3. D/C Xanax  4. Begin Seroquel 25 mg nightly. Typical TAN's reviewed including weight gain, abnormal movements, EPS, TD, metabolic side effects. AIMS done.  5. Call to report any worsening of symptoms or problems with the medication  6. Pt instructed to go to ER with thoughts of harming self, others   7. Consider referral to [facility] therapist next visit.   8. MOCA next visit     Return to Clinic:  4 weeks

## 2018-05-08 RX ORDER — ASPIRIN 325 MG
TABLET, DELAYED RELEASE (ENTERIC COATED) ORAL
Qty: 8 CAPSULE | Refills: 0 | Status: SHIPPED | OUTPATIENT
Start: 2018-05-08 | End: 2018-06-23 | Stop reason: SDUPTHER

## 2018-05-21 DIAGNOSIS — G43.109 COMPLICATED MIGRAINE: ICD-10-CM

## 2018-05-22 RX ORDER — CLOPIDOGREL BISULFATE 75 MG/1
TABLET ORAL
Qty: 90 TABLET | Refills: 0 | Status: SHIPPED | OUTPATIENT
Start: 2018-05-22 | End: 2018-07-26 | Stop reason: SDUPTHER

## 2018-05-29 ENCOUNTER — OFFICE VISIT (OUTPATIENT)
Dept: PAIN MEDICINE | Facility: CLINIC | Age: 68
End: 2018-05-29
Payer: MEDICARE

## 2018-05-29 VITALS
WEIGHT: 184 LBS | DIASTOLIC BLOOD PRESSURE: 87 MMHG | HEART RATE: 66 BPM | HEIGHT: 66 IN | BODY MASS INDEX: 29.57 KG/M2 | SYSTOLIC BLOOD PRESSURE: 153 MMHG

## 2018-05-29 DIAGNOSIS — M51.36 DDD (DEGENERATIVE DISC DISEASE), LUMBAR: ICD-10-CM

## 2018-05-29 DIAGNOSIS — M70.60 GREATER TROCHANTERIC BURSITIS, UNSPECIFIED LATERALITY: Primary | ICD-10-CM

## 2018-05-29 DIAGNOSIS — M43.17 SPONDYLOLISTHESIS OF LUMBOSACRAL REGION: ICD-10-CM

## 2018-05-29 DIAGNOSIS — M79.10 MYALGIA: ICD-10-CM

## 2018-05-29 PROCEDURE — 99499 UNLISTED E&M SERVICE: CPT | Mod: S$GLB,,, | Performed by: PHYSICIAN ASSISTANT

## 2018-05-29 PROCEDURE — 3077F SYST BP >= 140 MM HG: CPT | Mod: CPTII,S$GLB,, | Performed by: PHYSICIAN ASSISTANT

## 2018-05-29 PROCEDURE — 99999 PR PBB SHADOW E&M-EST. PATIENT-LVL IV: CPT | Mod: PBBFAC,,, | Performed by: PHYSICIAN ASSISTANT

## 2018-05-29 PROCEDURE — 3079F DIAST BP 80-89 MM HG: CPT | Mod: CPTII,S$GLB,, | Performed by: PHYSICIAN ASSISTANT

## 2018-05-29 PROCEDURE — 99214 OFFICE O/P EST MOD 30 MIN: CPT | Mod: S$GLB,,, | Performed by: PHYSICIAN ASSISTANT

## 2018-05-29 RX ORDER — OXYCODONE AND ACETAMINOPHEN 10; 325 MG/1; MG/1
1 TABLET ORAL EVERY 8 HOURS PRN
Qty: 90 TABLET | Refills: 0 | Status: SHIPPED | OUTPATIENT
Start: 2018-06-01 | End: 2018-06-30

## 2018-05-29 RX ORDER — OXYCODONE AND ACETAMINOPHEN 10; 325 MG/1; MG/1
1 TABLET ORAL EVERY 8 HOURS PRN
Qty: 90 TABLET | Refills: 0 | Status: SHIPPED | OUTPATIENT
Start: 2018-07-29 | End: 2018-08-27

## 2018-05-29 RX ORDER — GABAPENTIN 300 MG/1
300 CAPSULE ORAL 3 TIMES DAILY
Qty: 270 CAPSULE | Refills: 1 | Status: SHIPPED | OUTPATIENT
Start: 2018-05-29 | End: 2018-08-22 | Stop reason: SDUPTHER

## 2018-05-29 RX ORDER — OXYCODONE AND ACETAMINOPHEN 10; 325 MG/1; MG/1
1 TABLET ORAL EVERY 8 HOURS PRN
Qty: 90 TABLET | Refills: 0 | Status: SHIPPED | OUTPATIENT
Start: 2018-06-30 | End: 2018-07-29

## 2018-05-29 NOTE — PROGRESS NOTES
CC: left sided low back and left hip pain    Interval History: Ms. Santiago is a 67 y.o. female with chronic low back and hip pain who presents  for her follow up.  She continues to have lower back, left greater than right lower back pain.  Hip pain is currently tolerable.   She continues to take percocet 10mg q8hrs as needed for pain with moderate benefits.  No side effects reported.  Able to perform daily activities with the medications.  She also continues to takes prednisone 5 mg daily. She has taken Gabapentin in the past with good benefit. She would like to try this again.  Pain today is rated 4/10.     Prior HPI: The patient is a 64-year-old woman with a history of sarcoidosis, breast CA, diabetes, anxiety and depression who presents in referral from Dr. Nguyen to Dr. Perez for multiple pain complaints including back pain, bilateral hip pain and right shoulder pain.  She is following up with me since I am much closer to her.   She presents today for multiple pain complaints.  She recently was hospitalized for pneumonia, UTI and sarcoidosis exacerbation per patient.  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  She has had past GTB injections with moderate benefit and had an exacerbation of her pain recently.  She was able to receive left GTB with Dr. Perez on 10/3/2014 that provided >50% relief of her left hip and leg pain.     She continues to take Prednisone 5mg daily. She continues Percoet 7.5mg about 3x/day as needed with moderate benefit.  She presents today with worsening right knee pain.  She states having history of right knee osteoarthritis, but has not had any treatment for her knee pain.  Continues to have improvement in her knee pain following completion of visco supplementation injection in her right knee.  Her back and hip pain remain tolerable with her medications.  She continues to take Percoct 7.5mg q8hrs as needed with moderate benefit.   "No reported side effects.       Pain intervention history: She takes hydrocodone 7-325 2-4 times a day.  Also has undergone epidural steroid injection at L1/2 without relief.  She has been doing cervical traction with good relief of her neck pain.  Previous trochanteric bursa injections with good relief.    ROS:She reports fatigability, itching, headaches, swollen glands, chest pain and shortness of breath, nausea vomiting, easy bruising, back pain, joint stiffness, dizziness, difficulty sleeping, anxiety, depression and loss of balance.  Balance of review of systems is negative.    Medical, surgical, family and social history reviewed elsewhere in record.    Medications/Allergies: See med card    Vitals:    05/29/18 1105   BP: (!) 153/87   Pulse: 66   Weight: 83.5 kg (184 lb)   Height: 5' 6" (1.676 m)   PainSc:   4   PainLoc: Back       Physical exam:  Gen: A and O x3, pleasant, well-groomed  Skin: No rashes or obvious lesions  HEENT: PERRLA, no obvious deformities on ears or in canals. No thyroid masses, trachea midline, no palpable lymph nodes in neck, axilla.  CVS: RRR  Resp: non labored breathing  Abdomen: Soft, NT/ND, normal bowel sounds present.  Neuro:  Lower extremities:   +right knee crepitus. LE erythema and swelling  Reflexes: Patellar 2+, Achilles 2+ bilaterally.  Sensory:  Intact   Lumbar spine:  Lumbar spine: ROM is moderately reduced with flexion extension and oblique extension with increased pain in all directions.    Orlando's test causes pain on both sides.    Supine straight leg raise is negative bilaterally.    Internal and external rotation of the hip causes increased pain in left groin.  Myofascial exam: Tenderness to palpation over both GTB.      Imaging:  Cervical spine MRI report 10/5/12: There is dextroscoliosis in the lower cervical/upper thoracic region.  There is very mild foraminal stenosis on the left at C6/7.    MRI thoracic spine 11/21/2008: No significant degenerative disc disease " or central stenosis.    MRI lumbar spine 8/14/08: L1/2 small broad-based central protrusion with mild effacement of ventral thecal sac but not distorting exiting roots.  L2/3 and L3/4 unremarkable.  At L4/5 there is mild central stenosis with bulging annulus anteriorly and facet hypertrophic degenerative change posteriorly.  No focal disc herniation and neural foramina are patent.  At L5/S1 there is minimal bulging of the annulus with no thecal sac compression.  The small annular fissure within the posterior annulus.  Foramina are patent bilaterally, no stenosis mild, degenerative changes in the facets.    Xray Right Knee 10/13/14  Mild tricompartmental degenerative changes present. No joint effusion. The soft tissues are unremarkable.    Assessment:  Ms. Santiago is a 65-year-old woman with a history of sarcoidosis, breast CA, diabetes, anxiety and depression who presents in referral from Dr. Nguyen for multiple pain complaints including back pain, bilateral hip pain and knee pain   1. Greater trochanteric bursitis, unspecified laterality    2. DDD (degenerative disc disease), lumbar    3. Myalgia    4. Spondylolisthesis of lumbosacral region        PLAN:  1. I have stressed the importance of physical activity and exercise to improve overall health.    2.  Percocet 10mg q8hrs as needed.  Script given for three months.  reviewed.   UDS next clinic visit.   3.  Continue Parafon Forte 500 mg q 8 h prn   4.  I believe her low back pain maybe due to facet arthropathy and have recommended lumbar medial branch blocks as a diagnostic procedure.  If successful, would proceed with radiofrequency ablation.  May consider this procedure in the future  5. Start Gabapentin 300mg TID for radicular pain. Will start at 300 mg qhs for 1 week, if tolerated, increase to 300 mg in am and pm for 1 week, if tolerated increase to TID. We will titrate up to 9368-4542 mg based on therapeutic response and SEs.  6.  Follow up with PCP  regarding elevated BP  7.  Follow up in three months   All medication management was performed by Dr. Lincoln Temple

## 2018-06-01 ENCOUNTER — PES CALL (OUTPATIENT)
Dept: ADMINISTRATIVE | Facility: CLINIC | Age: 68
End: 2018-06-01

## 2018-06-06 ENCOUNTER — PATIENT MESSAGE (OUTPATIENT)
Dept: PSYCHIATRY | Facility: CLINIC | Age: 68
End: 2018-06-06

## 2018-06-06 ENCOUNTER — TELEPHONE (OUTPATIENT)
Dept: PSYCHIATRY | Facility: CLINIC | Age: 68
End: 2018-06-06

## 2018-06-06 NOTE — TELEPHONE ENCOUNTER
Called patient several times to reschedule her appointment dated 06/21/18. Today sent patient a my chart e-mail asking her to contact office to reschedule this appointment

## 2018-06-13 ENCOUNTER — PES CALL (OUTPATIENT)
Dept: ADMINISTRATIVE | Facility: CLINIC | Age: 68
End: 2018-06-13

## 2018-06-25 RX ORDER — ASPIRIN 325 MG
TABLET, DELAYED RELEASE (ENTERIC COATED) ORAL
Qty: 12 CAPSULE | Refills: 0 | Status: SHIPPED | OUTPATIENT
Start: 2018-06-25 | End: 2018-08-31 | Stop reason: SDUPTHER

## 2018-07-05 ENCOUNTER — TELEPHONE (OUTPATIENT)
Dept: PSYCHIATRY | Facility: CLINIC | Age: 68
End: 2018-07-05

## 2018-07-05 RX ORDER — FENOFIBRATE 160 MG/1
160 TABLET ORAL DAILY
Qty: 90 TABLET | Refills: 0 | Status: SHIPPED | OUTPATIENT
Start: 2018-07-05 | End: 2018-08-22 | Stop reason: SDUPTHER

## 2018-07-05 RX ORDER — QUETIAPINE FUMARATE 25 MG/1
TABLET, FILM COATED ORAL
Qty: 60 TABLET | Refills: 0 | Status: SHIPPED | OUTPATIENT
Start: 2018-07-05 | End: 2018-07-31 | Stop reason: DRUGHIGH

## 2018-07-05 NOTE — TELEPHONE ENCOUNTER
"I spoke to the patient; she reports she was upset after the last visit - felt daughter and I had ganged up on her about benzo use.  "I am over it now."  Seroquel did help her, but she felt dose (25 mg) was not strong enough.  Pt asking for refill and higher dose - she stopped several weeks ago.  Pt also is asking for a refill of Clonazepam 1 mg which she has taken BID x 3 days.  I advised pt I could not refill this.  She reports stress has been high.  Her grandson moved to [place] with his GF and when they got there, she threw him out.  She reports she has a few Clonazepam left and will use them sparingly.  I advised against this given risks.  I also advised pt to use caution w/ Seroquel which can increase her risks of falling.  Pt now living with daughter.  Seroquel prescribed 25 mg nightly x 1 week, then increase to 50 mg nightly.  Pt reports problems with sleeping.  She did not improvement in energy on Seroquel.  Follow up scheduled 7/31/18.   "

## 2018-07-05 NOTE — TELEPHONE ENCOUNTER
Patient called and wanted to see if she can get refills on her medication.  The first one is Seroquel 25 mg, she wanted to see if can get this medication increased because she is still not sleeping well through the night?    Also she wants her Klonopin refilled for the daytime because she has been having problems getting through the day.    The patient also wanted me to let Dr Pat know that she is not in any way feeling harmful to herself or others but in the course of the day she feels like at any moment she can have a break down. She has been fighting thi feeling and she thinks it maybe because she is not sleeping well through the night.  She asked if there was any afternoon appointments to be seen sooner than her 07/31/18 and I explained not at this time and will put her on wait list for sooner afternoon appointment.  The patient cannot be seen in the morning because she is in [facility] at her daughters and needs time to arrive for appointment.  Also she needs the medication sent to the [facility] pharmacy [phone]  Explained to her that Dr Pat is out of clinic this afternoon and maybe the morning before refills are sent to pharmacy. Patient understood

## 2018-07-06 ENCOUNTER — TELEPHONE (OUTPATIENT)
Dept: FAMILY MEDICINE | Facility: CLINIC | Age: 68
End: 2018-07-06

## 2018-07-06 NOTE — TELEPHONE ENCOUNTER
Spoke to patient that refills sent to [facility] pharmacy and an appointment set for her annual on 8/22/18 with Leidy Dover , same day as Andreea .

## 2018-07-26 DIAGNOSIS — G43.109 COMPLICATED MIGRAINE: ICD-10-CM

## 2018-07-26 RX ORDER — SERTRALINE HYDROCHLORIDE 100 MG/1
TABLET, FILM COATED ORAL
Qty: 180 TABLET | Refills: 1 | Status: SHIPPED | OUTPATIENT
Start: 2018-07-26 | End: 2018-07-31 | Stop reason: SDUPTHER

## 2018-07-26 RX ORDER — OXYBUTYNIN CHLORIDE 5 MG/1
TABLET ORAL
Qty: 180 TABLET | Refills: 3 | Status: SHIPPED | OUTPATIENT
Start: 2018-07-26 | End: 2019-06-13 | Stop reason: SDUPTHER

## 2018-07-26 RX ORDER — CLOPIDOGREL BISULFATE 75 MG/1
TABLET ORAL
Qty: 90 TABLET | Refills: 3 | Status: SHIPPED | OUTPATIENT
Start: 2018-07-26 | End: 2018-08-22 | Stop reason: SDUPTHER

## 2018-07-31 ENCOUNTER — OFFICE VISIT (OUTPATIENT)
Dept: PSYCHIATRY | Facility: CLINIC | Age: 68
End: 2018-07-31
Payer: MEDICARE

## 2018-07-31 VITALS
DIASTOLIC BLOOD PRESSURE: 82 MMHG | HEIGHT: 66 IN | SYSTOLIC BLOOD PRESSURE: 148 MMHG | BODY MASS INDEX: 31.16 KG/M2 | WEIGHT: 193.88 LBS | HEART RATE: 73 BPM

## 2018-07-31 DIAGNOSIS — F33.41 MDD (MAJOR DEPRESSIVE DISORDER), RECURRENT, IN PARTIAL REMISSION: ICD-10-CM

## 2018-07-31 DIAGNOSIS — F41.9 ANXIETY: ICD-10-CM

## 2018-07-31 DIAGNOSIS — G47.00 INSOMNIA, UNSPECIFIED TYPE: ICD-10-CM

## 2018-07-31 PROCEDURE — 99999 PR PBB SHADOW E&M-EST. PATIENT-LVL III: CPT | Mod: PBBFAC,,, | Performed by: PSYCHIATRY & NEUROLOGY

## 2018-07-31 PROCEDURE — 99499 UNLISTED E&M SERVICE: CPT | Mod: S$GLB,,, | Performed by: PSYCHIATRY & NEUROLOGY

## 2018-07-31 PROCEDURE — 3079F DIAST BP 80-89 MM HG: CPT | Mod: CPTII,S$GLB,, | Performed by: PSYCHIATRY & NEUROLOGY

## 2018-07-31 PROCEDURE — 3077F SYST BP >= 140 MM HG: CPT | Mod: CPTII,S$GLB,, | Performed by: PSYCHIATRY & NEUROLOGY

## 2018-07-31 PROCEDURE — 99214 OFFICE O/P EST MOD 30 MIN: CPT | Mod: S$GLB,,, | Performed by: PSYCHIATRY & NEUROLOGY

## 2018-07-31 RX ORDER — SERTRALINE HYDROCHLORIDE 100 MG/1
TABLET, FILM COATED ORAL
Qty: 180 TABLET | Refills: 0 | Status: SHIPPED | OUTPATIENT
Start: 2018-07-31 | End: 2018-10-30 | Stop reason: SDUPTHER

## 2018-07-31 RX ORDER — QUETIAPINE FUMARATE 50 MG/1
50 TABLET, FILM COATED ORAL NIGHTLY
Qty: 90 TABLET | Refills: 0 | Status: SHIPPED | OUTPATIENT
Start: 2018-07-31 | End: 2018-10-30

## 2018-07-31 NOTE — PROGRESS NOTES
Outpatient Psychiatry Follow-Up Visit (MD/NP)    7/31/2018    Clinical Status of Patient:  Outpatient (Ambulatory)    Chief Complaint:  Nathalie Santiago is a 68 y.o. female who presents today for follow-up of depression, anxiety, adjustment.  Met with patient alone, and pt and her daughter, Effie together.     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   Pt reports depression and anxiety began in context of significant psychosocial stressors. Her mother passed away in 2000, her father in 2004. She was diagnosed with breast cancer in 2005, had bilateral mastectomies and then a failed reconstruction. She also has sarcoidosis of the lungs. She sold her home in [place] and moved to [place] because she could no longer care for herself due to medical issues. She has become increasingly dependent on others. She moved back here several years ago and is currently living with her brother and his significant other. The relationship with the latter is strained; her niece who has battled addiction is currently serving time in [facility].   Pt has had significant health issues with worsening depression/anxiety in the last several months.  Intake 2013.     Past Psychiatric History:  Tx of depression, anxiety and panic attacks   First saw a psychiatrist 2007   No prior hospitalizations  No suicide attempts or self injury   PCP most recently treating her in [place]   Previous meds: Restoril (no effect), Dalmane (no effect), Klonopin, Ativan, could not afford Cymbalta but helped pain and depression, Wellbutrin XL (3rd day fell out of bed x 4, bruised, amnestic), Effexor (raised her BP), no antipsychotics or mood stabilizers, Citalopram (some benefit).   Trazodone (no benefit), mirtazapine, melatonin (3 mg ineffective, higher dose HA), ativan, clonazepam, amitriptyline   No prior counseling  "        Past medical history:  DM2  RLS  Sarcoidosis of lung   Hx ovarian CA  HTN  Hx of malignant phylloides tumor of breast   Bilateral cataract surgery   Chronic neck, back and hip pain   Arthritis   MVR  Hx CVA  Hx DJD  No cardiac hx   No hx seizures or head injury      Past Surgical History:  bilateral mastectomy   CYSTOCELE REPAIR  Rectocele  CHOLECYSTECTOMY  PLEURA BIOPSY  Right foot  fRACTURE SURGERY  MASTECTOMY  RECTAL SURGERY     Family History:   Mother - depression  Sister - bipolar disorder vs schizophrenia, hospitalizations      Social History:  Former RN x 35 years, SSID since 2008  Lives with her brother and his SO ex wife heather Briggs, [place]   3 prior marriages, , 1 daughter   Raised in [place] in intact home - 3 brothers and 2 sisters - "stable"  No  or legal hx   No hx abuse   Hobbies: sewing, beading, reading   Education: Some college   Pro[employer]ant: [employer]     Substance History:  Never smoked  Rare alcohol: 2 times a year   No illicit drug. Denies misuse of opiates, benzodiazepines   Moderate caffeine: coffee, tea, cokes     Interim hx:   Pt has been taking Sertraline 200 mg daily, Seroquel 50 mg nightly.    She is concerned about wt gain.  She feels best at 155 lbs.  Disgruntled about her weight. Up 9 lbs in interim (3 months).  Daughter tells her she is a grazer.  Not comfortable living up to her daughter's expectations.  Lives with daughter 50% of time in [place].   Other half of the time, she is with brother Nj in [place].   More active with Nj.  Tends to home, watches the grandchildren.  Less housework at daughter's - but it is her turf.  Loves to read.  Free electronic book sites.  Enjoys mysteries, thrillers.     Feels fine.  Health stable,  Cardiac catheterization - negative.  Done in [place].  Bothers daughter that she is out of condition.    Belongs to [employer]   Lives with daughter in [place].  Daughter's significant other is off shore. Her " "stuff is at N[place]' home.    Daughter Effie tells her she has too much - pt believes this is true. Describes her [employer] collections "I love it." she has a 10 x 10 storage unit full.   Looking to donate some items.      Not depressed.  She feels more withdrawn. Daughter pushes her - to get out of bed.  Periods of time she wants to rest.   Health issues more stable.  Sleeping well at night with Seroquel 50 mg nightly.  Wishes daughter accepted her.  Daughter makes sure she is eating.  Does not want to mess with her daughter's things; last worked on this one year ago.  Loves [employer] making -   Misses room, stuff, [employer] movie channel.  No hopelessness. Denies suicidal/homicidal ideations.  Hardly watching TV. Out of bed.  Dealing with dog.  Not always getting dressed.   Takes gabapentin BID - has helped tremendously.  Motivation is not good. Feels this is due to daughter's criticism.   1 yo Great granddaughter has been moved to [place] (23 yo grandson).    Anxiety goes up and down.  Misses benzodiazepine when her daughter harps on things.   + SOB at times when anxious - no panic attack. 3-4 times a week;  Not worrying excessively, can relax. Was having racing thoughts at night prior to Seroquel.   Starts to get shaky at times.   Denies symptoms of arnel/psychosis.     "I am 69 yo, I should be able to sit there and read my books."   Denies alcohol/drug use.  Cokes: 2 bottles a day, 2 cups coffee daily   Has not been using CPAP - hypopnea.      No falls at all. Some balance issues.  No worse with pain meds.  Clonazepam was a security blanket.   She anticipates her daughter will tell her she is not making changes.     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  Does not maintain nursing license.   She has met a woman - tapes for exercises with people with disabilities.   Her memory is fair; could be better.     Uses pill organizer.   no hx suicide attempts, no self harm.  Guns in home " are locked up.      Normal clock drawing today  MOCA today: 30/30.   PHQ-9:  9 (not difficult at all),  Fatigue, appetite changes, decreased motivation, feeling down about herself.     Per daughter:  She is the same.  Back in bed often per daughter.  Does not seem happy, but not sad;  Just existing.  Tries hard to not say things.  It hurts her feelings.  Wants to quilt, not comfortable quilting without an ironing quilt.   Hates to ask mother to do things due to back pain;    Mother talked all night last night in her dreams;  This is unusual.  She was going through purse last night (recalls part of it).  Took Reglan 10 mg last PM at bedtime. She was also fidgeting at night last pm.   On Seroquel, she is much better;  Daughter reminds her to eat and overall, she is eating better now that she is improved.     HbA1c 6.1 per patient. Cardiac work up was negative in interim.     Review of Systems   PSYCHIATRIC: see above  CONSTITUTIONAL: weight gain  MUSCULOSKELETAL: 6/10 back pain today.  CV: occasional exertional SOB, no chest pain     Past Medical, Family and Social History: The patient's past medical, family and social history have been reviewed and updated as appropriate within the electronic medical record - see encounter notes.    Medications:  Sertraline 200 mg daily   Seroquel 50 mg nightly   Percoet - daily - multiple times     Compliance: yes, pt asks for refill of benzodiazepine    Side effects:  Wt gain   Amnesia, complex sleep related symptoms on Xanax, Ambien   headaches when Ambien taken too often; dry mouth on amitriptyline, elevated BP on Effexor XR, falls    Risk Parameters:  Patient reports no suicidal ideation  Patient reports no homicidal ideation  Patient reports no self-injurious behavior  Patient reports no violent behavior    Exam (detailed: at least 9 elements; comprehensive: all 15 elements)   Constitutional  Vitals:  Most recent vital signs, dated less than 90 days prior to this appointment,  "were reviewed.         General:  age appropriate, casually dressed,  calm, adequate grooming         Musculoskeletal  Muscle Strength/Tone:  no tremor   Gait & Station:  No ataxia      Psychiatric  Speech:  no latency; no press, spontaneous   Mood & Affect:  "pretty good"  Restricted    Thought Process:  Linear    Associations:  intact   Thought Content:  normal, no suicidality, no homicidality, delusions, or paranoia, hallucinations: (auditory: no, visual: no)   Insight:  has awareness of illness   Judgement: behavior is adequate to circumstances   Orientation:  grossly intact; alert and oriented x 3    Memory: intact for content of interview, 5/5 immediate recall, 5/5 delayed recall    Language: grossly intact no fluency issues, can repeat    Attention Span & Concentration:  able to focus grossly intact in this appt, serial 7s intact   Fund of Knowledge:  intact and appropriate to age and level of education     Assessment and Diagnosis   Status/Progress: Based on the examination today, the patient's problem(s) is/are under improved control.   New problems have not been presented today.   Comorbidities are complicating management of the primary condition.  (multiple medical problems)     Impression: 69 yo female with multiple medical problems and hx of depression, insomnia, and anxiety presents for follow up. Significant family stressors and health issues. Pt has required multiple sleep aides in past. She has required chronic benzodiazepine for stabilization, has failed multiple sleep aides;  Sedative-hyponotics were discontinued in interim due to health issues, reports of falls.  She contacted me previously with worsening depression/anxiety - low dose clonazepam restarted, but is showing signs of stabilization both physically and mentally.  She reported that clonazepam is not helping and requested to restart Xanax/Ambien (previous meds).  Pt reports fall in interim, and also interaction/amnesia with Nyquil + Xanax " which could have been dangerous. + Chronic opioid use.  She ended up staying on Sertraline and this medication has been helping with her maintaining stable mood following her niece's death.   Pt mixed Ambien and Xanax and had amnesia, complex sleep related behaviors.  I have discussed with her and daughter that sedatives are not safe medications for her.  Mood is improved and stable since addition of Seroquel; scored perfect MOCA today    MDD,recurrent episode, in partial remission   GARFIELD  Insomnia disorder   Bereavement   R/O Hoarding Disorder   sarcoidosis, hx phyloides tumor of breast, .DM2, RLS, arthritis, chronic neck, hip, and back pain, hx avulsion fracture, JOSE LUIS, hx CVA    GAF: 60     Strengths and Liabilities: Strength: Patient accepts guidance/feedback, Strength: Patient is expressive/articulate., Strength: Patient has reasonable judgment.   Treatment Goals: Specify outcomes written in observable, behavioral terms:   Anxiety: acquiring relapse prevention skills and reducing physical symptoms of anxiety   Depression: acquiring relapse prevention skills, increasing energy, increasing motivation, reducing excessive guilt and reducing negative automatic thoughts     Treatment Plan/Recommendations:   Medication Management: The risks and benefits of medication were discussed with the patient.    1. Sertraline 100 mg tabs: Continue 200 mg daily  2. Continue Seroquel 50 mg nightly. Typical TAN's reviewed including weight gain, abnormal movements, EPS, TD, metabolic side effects. AIMS done.  3. Call to report any worsening of symptoms or problems with the medication  4. Pt instructed to go to ER with thoughts of harming self, others   5. High risk medication monitoring: I have obtained a signed request to release info from pt's Cardiologist - pt reports lab monitoring was ordered from this provider, Dr Champagne, [place]  6. I again reviewed risks with patient that benzodiazepines are not a safe class of  medications for her to take and pointed out improvement she has made off of these medications.  Pt accepting of this    Return to Clinic:  12 weeks

## 2018-08-05 PROBLEM — F33.41 MDD (MAJOR DEPRESSIVE DISORDER), RECURRENT, IN PARTIAL REMISSION: Status: ACTIVE | Noted: 2018-08-05

## 2018-08-05 PROBLEM — F41.9 ANXIETY: Status: ACTIVE | Noted: 2018-08-05

## 2018-08-14 ENCOUNTER — PES CALL (OUTPATIENT)
Dept: ADMINISTRATIVE | Facility: CLINIC | Age: 68
End: 2018-08-14

## 2018-08-22 ENCOUNTER — OFFICE VISIT (OUTPATIENT)
Dept: FAMILY MEDICINE | Facility: CLINIC | Age: 68
End: 2018-08-22
Payer: MEDICARE

## 2018-08-22 ENCOUNTER — OFFICE VISIT (OUTPATIENT)
Dept: PAIN MEDICINE | Facility: CLINIC | Age: 68
End: 2018-08-22
Payer: MEDICARE

## 2018-08-22 ENCOUNTER — LAB VISIT (OUTPATIENT)
Dept: LAB | Facility: HOSPITAL | Age: 68
End: 2018-08-22
Attending: PHYSICIAN ASSISTANT
Payer: MEDICARE

## 2018-08-22 VITALS
RESPIRATION RATE: 16 BRPM | SYSTOLIC BLOOD PRESSURE: 136 MMHG | TEMPERATURE: 98 F | HEIGHT: 66 IN | OXYGEN SATURATION: 96 % | BODY MASS INDEX: 31.5 KG/M2 | HEART RATE: 63 BPM | DIASTOLIC BLOOD PRESSURE: 68 MMHG | WEIGHT: 196 LBS

## 2018-08-22 VITALS
HEART RATE: 67 BPM | BODY MASS INDEX: 31.34 KG/M2 | WEIGHT: 195 LBS | SYSTOLIC BLOOD PRESSURE: 154 MMHG | HEIGHT: 66 IN | DIASTOLIC BLOOD PRESSURE: 87 MMHG

## 2018-08-22 DIAGNOSIS — J45.909 UNCOMPLICATED ASTHMA, UNSPECIFIED ASTHMA SEVERITY, UNSPECIFIED WHETHER PERSISTENT: ICD-10-CM

## 2018-08-22 DIAGNOSIS — M70.60 GREATER TROCHANTERIC BURSITIS, UNSPECIFIED LATERALITY: ICD-10-CM

## 2018-08-22 DIAGNOSIS — Z78.0 ASYMPTOMATIC MENOPAUSAL STATE: ICD-10-CM

## 2018-08-22 DIAGNOSIS — I15.2 HYPERTENSION ASSOCIATED WITH DIABETES: ICD-10-CM

## 2018-08-22 DIAGNOSIS — N18.30 CKD STAGE 3 DUE TO TYPE 2 DIABETES MELLITUS: Primary | ICD-10-CM

## 2018-08-22 DIAGNOSIS — F33.41 MDD (MAJOR DEPRESSIVE DISORDER), RECURRENT, IN PARTIAL REMISSION: ICD-10-CM

## 2018-08-22 DIAGNOSIS — E03.4 HYPOTHYROIDISM DUE TO ACQUIRED ATROPHY OF THYROID: ICD-10-CM

## 2018-08-22 DIAGNOSIS — M47.896 OTHER SPONDYLOSIS, LUMBAR REGION: ICD-10-CM

## 2018-08-22 DIAGNOSIS — E11.59 HYPERTENSION ASSOCIATED WITH DIABETES: ICD-10-CM

## 2018-08-22 DIAGNOSIS — M51.36 DDD (DEGENERATIVE DISC DISEASE), LUMBAR: ICD-10-CM

## 2018-08-22 DIAGNOSIS — E11.69 HYPERLIPIDEMIA ASSOCIATED WITH TYPE 2 DIABETES MELLITUS: ICD-10-CM

## 2018-08-22 DIAGNOSIS — G25.81 RESTLESS LEGS SYNDROME (RLS): ICD-10-CM

## 2018-08-22 DIAGNOSIS — N18.30 CKD STAGE 3 DUE TO TYPE 2 DIABETES MELLITUS: ICD-10-CM

## 2018-08-22 DIAGNOSIS — Z79.891 CHRONIC USE OF OPIATE FOR THERAPEUTIC PURPOSE: Primary | ICD-10-CM

## 2018-08-22 DIAGNOSIS — D86.0 SARCOIDOSIS OF LUNG: ICD-10-CM

## 2018-08-22 DIAGNOSIS — E78.5 HYPERLIPIDEMIA ASSOCIATED WITH TYPE 2 DIABETES MELLITUS: ICD-10-CM

## 2018-08-22 DIAGNOSIS — E66.9 OBESITY (BMI 30.0-34.9): ICD-10-CM

## 2018-08-22 DIAGNOSIS — K31.84 GASTROPARESIS: ICD-10-CM

## 2018-08-22 DIAGNOSIS — M79.10 MYALGIA: ICD-10-CM

## 2018-08-22 DIAGNOSIS — E11.22 CKD STAGE 3 DUE TO TYPE 2 DIABETES MELLITUS: Primary | ICD-10-CM

## 2018-08-22 DIAGNOSIS — E11.22 CKD STAGE 3 DUE TO TYPE 2 DIABETES MELLITUS: ICD-10-CM

## 2018-08-22 DIAGNOSIS — M43.17 SPONDYLOLISTHESIS OF LUMBOSACRAL REGION: ICD-10-CM

## 2018-08-22 DIAGNOSIS — Z79.52 CURRENT CHRONIC USE OF SYSTEMIC STEROIDS: ICD-10-CM

## 2018-08-22 LAB
ALBUMIN SERPL BCP-MCNC: 4.3 G/DL
ALP SERPL-CCNC: 50 U/L
ALT SERPL W/O P-5'-P-CCNC: 19 U/L
ANION GAP SERPL CALC-SCNC: 9 MMOL/L
AST SERPL-CCNC: 17 U/L
BASOPHILS # BLD AUTO: 0.05 K/UL
BASOPHILS NFR BLD: 0.5 %
BILIRUB SERPL-MCNC: 0.3 MG/DL
BUN SERPL-MCNC: 27 MG/DL
CALCIUM SERPL-MCNC: 10.3 MG/DL
CHLORIDE SERPL-SCNC: 105 MMOL/L
CHOLEST SERPL-MCNC: 149 MG/DL
CHOLEST/HDLC SERPL: 3.4 {RATIO}
CO2 SERPL-SCNC: 25 MMOL/L
CREAT SERPL-MCNC: 1 MG/DL
DIFFERENTIAL METHOD: ABNORMAL
EOSINOPHIL # BLD AUTO: 0.1 K/UL
EOSINOPHIL NFR BLD: 1.5 %
ERYTHROCYTE [DISTWIDTH] IN BLOOD BY AUTOMATED COUNT: 14.1 %
EST. GFR  (AFRICAN AMERICAN): >60 ML/MIN/1.73 M^2
EST. GFR  (NON AFRICAN AMERICAN): 57.6 ML/MIN/1.73 M^2
ESTIMATED AVG GLUCOSE: 146 MG/DL
GLUCOSE SERPL-MCNC: 144 MG/DL
HBA1C MFR BLD HPLC: 6.7 %
HCT VFR BLD AUTO: 41.4 %
HDLC SERPL-MCNC: 44 MG/DL
HDLC SERPL: 29.5 %
HGB BLD-MCNC: 13.3 G/DL
IMM GRANULOCYTES # BLD AUTO: 0.06 K/UL
IMM GRANULOCYTES NFR BLD AUTO: 0.6 %
LDLC SERPL CALC-MCNC: 62.4 MG/DL
LYMPHOCYTES # BLD AUTO: 1.7 K/UL
LYMPHOCYTES NFR BLD: 17.9 %
MCH RBC QN AUTO: 29.6 PG
MCHC RBC AUTO-ENTMCNC: 32.1 G/DL
MCV RBC AUTO: 92 FL
MONOCYTES # BLD AUTO: 0.8 K/UL
MONOCYTES NFR BLD: 8.2 %
NEUTROPHILS # BLD AUTO: 6.9 K/UL
NEUTROPHILS NFR BLD: 71.3 %
NONHDLC SERPL-MCNC: 105 MG/DL
NRBC BLD-RTO: 0 /100 WBC
PLATELET # BLD AUTO: 316 K/UL
PMV BLD AUTO: 9.5 FL
POTASSIUM SERPL-SCNC: 4.3 MMOL/L
PROT SERPL-MCNC: 8 G/DL
RBC # BLD AUTO: 4.5 M/UL
SODIUM SERPL-SCNC: 139 MMOL/L
TRIGL SERPL-MCNC: 213 MG/DL
TSH SERPL DL<=0.005 MIU/L-ACNC: 2.13 UIU/ML
WBC # BLD AUTO: 9.61 K/UL

## 2018-08-22 PROCEDURE — 3078F DIAST BP <80 MM HG: CPT | Mod: CPTII,S$GLB,, | Performed by: PHYSICIAN ASSISTANT

## 2018-08-22 PROCEDURE — 3079F DIAST BP 80-89 MM HG: CPT | Mod: CPTII,S$GLB,, | Performed by: PHYSICIAN ASSISTANT

## 2018-08-22 PROCEDURE — 99499 UNLISTED E&M SERVICE: CPT | Mod: S$GLB,,, | Performed by: PHYSICIAN ASSISTANT

## 2018-08-22 PROCEDURE — 83036 HEMOGLOBIN GLYCOSYLATED A1C: CPT

## 2018-08-22 PROCEDURE — 99214 OFFICE O/P EST MOD 30 MIN: CPT | Mod: S$GLB,,, | Performed by: PHYSICIAN ASSISTANT

## 2018-08-22 PROCEDURE — 3074F SYST BP LT 130 MM HG: CPT | Mod: CPTII,S$GLB,, | Performed by: PHYSICIAN ASSISTANT

## 2018-08-22 PROCEDURE — 80307 DRUG TEST PRSMV CHEM ANLYZR: CPT

## 2018-08-22 PROCEDURE — 85025 COMPLETE CBC W/AUTO DIFF WBC: CPT

## 2018-08-22 PROCEDURE — 99999 PR PBB SHADOW E&M-EST. PATIENT-LVL V: CPT | Mod: PBBFAC,,, | Performed by: PHYSICIAN ASSISTANT

## 2018-08-22 PROCEDURE — 80053 COMPREHEN METABOLIC PANEL: CPT

## 2018-08-22 PROCEDURE — 3075F SYST BP GE 130 - 139MM HG: CPT | Mod: CPTII,S$GLB,, | Performed by: PHYSICIAN ASSISTANT

## 2018-08-22 PROCEDURE — 82164 ANGIOTENSIN I ENZYME TEST: CPT

## 2018-08-22 PROCEDURE — 80061 LIPID PANEL: CPT

## 2018-08-22 PROCEDURE — 84443 ASSAY THYROID STIM HORMONE: CPT

## 2018-08-22 RX ORDER — CLOPIDOGREL BISULFATE 75 MG/1
75 TABLET ORAL DAILY
Qty: 90 TABLET | Refills: 3 | Status: SHIPPED | OUTPATIENT
Start: 2018-08-22 | End: 2019-06-13 | Stop reason: SDUPTHER

## 2018-08-22 RX ORDER — AMLODIPINE BESYLATE 5 MG/1
5 TABLET ORAL DAILY
Qty: 90 TABLET | Refills: 3 | Status: SHIPPED | OUTPATIENT
Start: 2018-08-22 | End: 2019-06-13 | Stop reason: SDUPTHER

## 2018-08-22 RX ORDER — PREDNISONE 5 MG/1
5 TABLET ORAL DAILY
Qty: 90 TABLET | Refills: 0 | Status: SHIPPED | OUTPATIENT
Start: 2018-08-22 | End: 2019-09-06 | Stop reason: ALTCHOICE

## 2018-08-22 RX ORDER — OXYCODONE AND ACETAMINOPHEN 10; 325 MG/1; MG/1
1 TABLET ORAL EVERY 8 HOURS PRN
Qty: 90 TABLET | Refills: 0 | Status: SHIPPED | OUTPATIENT
Start: 2018-09-27 | End: 2018-10-26

## 2018-08-22 RX ORDER — OXYCODONE AND ACETAMINOPHEN 10; 325 MG/1; MG/1
1 TABLET ORAL EVERY 8 HOURS PRN
Qty: 90 TABLET | Refills: 0 | Status: SHIPPED | OUTPATIENT
Start: 2018-10-26 | End: 2018-11-24

## 2018-08-22 RX ORDER — GABAPENTIN 300 MG/1
300 CAPSULE ORAL 3 TIMES DAILY
Qty: 270 CAPSULE | Refills: 1 | Status: SHIPPED | OUTPATIENT
Start: 2018-08-22 | End: 2018-12-10 | Stop reason: SDUPTHER

## 2018-08-22 RX ORDER — OXYCODONE AND ACETAMINOPHEN 10; 325 MG/1; MG/1
1 TABLET ORAL EVERY 8 HOURS PRN
Qty: 90 TABLET | Refills: 0 | Status: SHIPPED | OUTPATIENT
Start: 2018-08-29 | End: 2018-09-27

## 2018-08-22 RX ORDER — FENOFIBRATE 160 MG/1
160 TABLET ORAL DAILY
Qty: 90 TABLET | Refills: 3 | Status: SHIPPED | OUTPATIENT
Start: 2018-08-22 | End: 2019-06-03 | Stop reason: SDUPTHER

## 2018-08-22 RX ORDER — LEVOTHYROXINE SODIUM 25 UG/1
25 TABLET ORAL
Qty: 90 TABLET | Refills: 3 | Status: SHIPPED | OUTPATIENT
Start: 2018-08-22 | End: 2019-06-13 | Stop reason: SDUPTHER

## 2018-08-22 RX ORDER — ROSUVASTATIN CALCIUM 40 MG/1
40 TABLET, COATED ORAL DAILY
Qty: 90 TABLET | Refills: 3 | Status: SHIPPED | OUTPATIENT
Start: 2018-08-22 | End: 2019-06-13 | Stop reason: SDUPTHER

## 2018-08-22 RX ORDER — DICLOFENAC SODIUM 10 MG/G
GEL TOPICAL 2 TIMES DAILY
Qty: 100 G | Refills: 0 | Status: SHIPPED | OUTPATIENT
Start: 2018-08-22 | End: 2019-12-12 | Stop reason: SDUPTHER

## 2018-08-22 RX ORDER — CYCLOBENZAPRINE HCL 10 MG
10 TABLET ORAL 3 TIMES DAILY PRN
Qty: 90 TABLET | Refills: 2 | Status: SHIPPED | OUTPATIENT
Start: 2018-08-22 | End: 2018-09-21

## 2018-08-22 NOTE — PROGRESS NOTES
CC: left sided low back and left hip pain    Interval History: Ms. Santiago is a 68 y.o. female with chronic low back and hip pain who presents  for her follow up.  She continues to have lower back, left greater than right lower back pain.  Hip pain is currently tolerable.   She continues to take percocet 10mg q8hrs as needed for pain with moderate benefits.  No side effects reported.  Able to perform daily activities with the medications.  She also continues to takes prednisone 5 mg daily. Gabapentin 300 mg TID has been helpful.  Pain today is rated 5/10.     Prior HPI: The patient is a 64-year-old woman with a history of sarcoidosis, breast CA, diabetes, anxiety and depression who presents in referral from Dr. Nguyen to Dr. Perez for multiple pain complaints including back pain, bilateral hip pain and right shoulder pain.  She is following up with me since I am much closer to her.   She presents today for multiple pain complaints.  She recently was hospitalized for pneumonia, UTI and sarcoidosis exacerbation per patient.  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  She has had past GTB injections with moderate benefit and had an exacerbation of her pain recently.  She was able to receive left GTB with Dr. Perez on 10/3/2014 that provided >50% relief of her left hip and leg pain.     She continues to take Prednisone 5mg daily. She continues Percoet 7.5mg about 3x/day as needed with moderate benefit.  She presents today with worsening right knee pain.  She states having history of right knee osteoarthritis, but has not had any treatment for her knee pain.  Continues to have improvement in her knee pain following completion of visco supplementation injection in her right knee.  Her back and hip pain remain tolerable with her medications.  She continues to take Percoct 7.5mg q8hrs as needed with moderate benefit.  No reported side effects.       Pain intervention  "history: She takes hydrocodone 7-325 2-4 times a day.  Also has undergone epidural steroid injection at L1/2 without relief.  She has been doing cervical traction with good relief of her neck pain.  Previous trochanteric bursa injections with good relief.    ROS:She reports fatigability, itching, headaches, swollen glands, chest pain and shortness of breath, nausea vomiting, easy bruising, back pain, joint stiffness, dizziness, difficulty sleeping, anxiety, depression and loss of balance.  Balance of review of systems is negative.    Medical, surgical, family and social history reviewed elsewhere in record.    Medications/Allergies: See med card    Vitals:    08/22/18 1301   BP: (!) 154/87   Pulse: 67   Weight: 88.5 kg (195 lb)   Height: 5' 6" (1.676 m)   PainSc:   5   PainLoc: Back       Physical exam:  Gen: A and O x3, pleasant, well-groomed  Skin: No rashes or obvious lesions  HEENT: PERRLA, no obvious deformities on ears or in canals. No thyroid masses, trachea midline, no palpable lymph nodes in neck, axilla.  CVS: RRR  Resp: non labored breathing  Abdomen: Soft, NT/ND, normal bowel sounds present.  Neuro:  Lower extremities:   +right knee crepitus. LE erythema and swelling  Reflexes: Patellar 2+, Achilles 2+ bilaterally.  Sensory:  Intact   Lumbar spine:  Lumbar spine: ROM is moderately reduced with flexion extension and oblique extension with increased pain in all directions.    Orlando's test causes pain on both sides.    Supine straight leg raise is negative bilaterally.    Internal and external rotation of the hip causes increased pain in left groin.  Myofascial exam: Tenderness to palpation over both GTB.      Imaging:  Cervical spine MRI report 10/5/12: There is dextroscoliosis in the lower cervical/upper thoracic region.  There is very mild foraminal stenosis on the left at C6/7.    MRI thoracic spine 11/21/2008: No significant degenerative disc disease or central stenosis.    MRI lumbar spine 8/14/08: " L1/2 small broad-based central protrusion with mild effacement of ventral thecal sac but not distorting exiting roots.  L2/3 and L3/4 unremarkable.  At L4/5 there is mild central stenosis with bulging annulus anteriorly and facet hypertrophic degenerative change posteriorly.  No focal disc herniation and neural foramina are patent.  At L5/S1 there is minimal bulging of the annulus with no thecal sac compression.  The small annular fissure within the posterior annulus.  Foramina are patent bilaterally, no stenosis mild, degenerative changes in the facets.    Xray Right Knee 10/13/14  Mild tricompartmental degenerative changes present. No joint effusion. The soft tissues are unremarkable.    Assessment:  Ms. Santiago is a 65-year-old woman with a history of sarcoidosis, breast CA, diabetes, anxiety and depression who presents in referral from Dr. Nguyen for multiple pain complaints including back pain, bilateral hip pain and knee pain   1. Chronic use of opiate for therapeutic purpose    2. Greater trochanteric bursitis, unspecified laterality    3. DDD (degenerative disc disease), lumbar    4. Myalgia    5. Other spondylosis, lumbar region    6. Spondylolisthesis of lumbosacral region        PLAN:  1. I have stressed the importance of physical activity and exercise to improve overall health.    2.  Percocet 10mg q8hrs as needed.  Script given for three months.  reviewed.   UDS  Today   3.  Discontinue Parafon Forte 500 mg q 8 h prn. Start Flexeril 10 mg q 8 h. #90  4.  I believe her low back pain maybe due to facet arthropathy and have recommended lumbar medial branch blocks as a diagnostic procedure.  If successful, would proceed with radiofrequency ablation.  May consider this procedure in the future  5.  Gabapentin 300mg TID for radicular pain. 2 refills  6.  Follow up with PCP regarding elevated BP  7.  Call to schedule left GTB injection if pain does not improve.  Follow up in three months   All medication  management was performed by Dr. Lincoln Temple

## 2018-08-22 NOTE — PROGRESS NOTES
Subjective:       Patient ID: Nathalie Santiago is a 68 y.o. female.    Chief Complaint: Annual Exam (RX refills )    Ms. Santiago comes to clinic today for annual physical. She has not been seen and has not had labs in over a year. The patient is followed by Dr. Pat as her psychiatrist. The patient is also followed by Dr. Ashton, cardiologist in [place], [place]. The patient is also followed by pain management Dr. Temple. The patient has previously been seen by Dr. Calle, pulmonologist, for sarcoidosis. She would like to see a pulmonologist in the [facility] system. She also needs to establish care with a gastroenterologist; she previously saw Dr. Aly for gastroparesis. The patient requests refills of medications today.       Review of Systems   Constitutional: Negative for activity change, appetite change and fever.   HENT: Negative for postnasal drip, rhinorrhea and sinus pressure.    Eyes: Negative for visual disturbance.   Respiratory: Negative for cough and shortness of breath.    Cardiovascular: Negative for chest pain.   Gastrointestinal: Negative for abdominal distention and abdominal pain.   Genitourinary: Negative for difficulty urinating and dysuria.   Musculoskeletal: Positive for arthralgias, back pain and myalgias.   Neurological: Negative for headaches.   Hematological: Negative for adenopathy.   Psychiatric/Behavioral: The patient is not nervous/anxious.        Objective:      Physical Exam   Constitutional: She is oriented to person, place, and time.   HENT:   Mouth/Throat: Oropharynx is clear and moist. No oropharyngeal exudate.   Eyes: Conjunctivae are normal. Pupils are equal, round, and reactive to light.   Cardiovascular: Normal rate and regular rhythm.   Pulmonary/Chest: Effort normal and breath sounds normal. She has no wheezes.   Abdominal: Soft. Bowel sounds are normal. There is no tenderness.   Musculoskeletal: She exhibits no edema.   Lymphadenopathy:     She has no cervical adenopathy.    Neurological: She is alert and oriented to person, place, and time.   Skin: No erythema.   Psychiatric: Her behavior is normal.       Assessment:       1. CKD stage 3 due to type 2 diabetes mellitus    2. Gastroparesis    3. Uncomplicated asthma, unspecified asthma severity, unspecified whether persistent    4. Hyperlipidemia associated with type 2 diabetes mellitus    5. Hypertension associated with diabetes    6. Hypothyroidism due to acquired atrophy of thyroid    7. MDD (major depressive disorder), recurrent, in partial remission    8. Restless legs syndrome (RLS)    9. Sarcoidosis of lung    10. Uncontrolled type 2 diabetes mellitus with complication, with long-term current use of insulin    11. Current chronic use of systemic steroids    12. Asymptomatic menopausal state    13. Obesity (BMI 30.0-34.9)        Plan:       Nathalie was seen today for annual exam.    Diagnoses and all orders for this visit:    CKD stage 3 due to type 2 diabetes mellitus  -     Comprehensive metabolic panel; Future  -     CBC auto differential; Future    Gastroparesis  -     Ambulatory referral to Gastroenterology  reglan interacts with antipsychotics. Patient to discuss this possible interaction with Dr. Pat.   Uncomplicated asthma, unspecified asthma severity, unspecified whether persistent  -     beclomethasone (QVAR) 80 mcg/actuation Aero; Inhale 2 puffs into the lungs 2 (two) times daily.    Hyperlipidemia associated with type 2 diabetes mellitus  -     fenofibrate 160 MG Tab; Take 1 tablet (160 mg total) by mouth once daily.  -     rosuvastatin (CRESTOR) 40 MG Tab; Take 1 tablet (40 mg total) by mouth once daily.  -     Lipid panel; Future    Hypertension associated with diabetes  -     amLODIPine (NORVASC) 5 MG tablet; Take 1 tablet (5 mg total) by mouth once daily.  -     Ambulatory referral to Ophthalmology  Controlled  Low salt diet  Continue current medication  Hypothyroidism due to acquired atrophy of thyroid  -      levothyroxine (SYNTHROID) 25 MCG tablet; Take 1 tablet (25 mcg total) by mouth before breakfast.  -     TSH; Future    MDD (major depressive disorder), recurrent, in partial remission  Follow up with Dr. Pat  Restless legs syndrome (RLS)  requip not on med list but it interacts with seroquel. Discuss risk of taking this medication with Dr. Pat  Sarcoidosis of lung  -     Ambulatory referral to Pulmonology  -     Angiotensin converting enzyme; Future    Uncontrolled type 2 diabetes mellitus with complication, with long-term current use of insulin  -     Hemoglobin A1c; Future  -     Lipid panel; Future  -     Ambulatory referral to Podiatry  -     Ambulatory referral to Ophthalmology  Labs today    Current chronic use of systemic steroids  -     DXA Bone Density Spine And Hip; Future    Asymptomatic menopausal state  -     DXA Bone Density Spine And Hip; Future    Other orders  -     clopidogrel (PLAVIX) 75 mg tablet; Take 1 tablet (75 mg total) by mouth once daily.  -     predniSONE (DELTASONE) 5 MG tablet; Take 1 tablet (5 mg total) by mouth once daily.  -     VOLTAREN 1 % Gel; Apply topically 2 (two) times daily.      Obesity (BMI 30.0-34.9)  Low carbohydrate, high fiber diet  Increase exercise as able      Patient readiness: acceptance and barriers:none    During the course of the visit the patient was educated and counseled about the following:     Diabetes:  Discussed general issues about diabetes pathophysiology and management.  Educational material distributed.  Addressed ADA diet.  Suggested low cholesterol diet.  Encouraged aerobic exercise.  Discussed foot care.  Reminded to get yearly retinal exam.  Hypertension:   Medication: no change.  Dietary sodium restriction.  Regular aerobic exercise.  Obesity:   General weight loss/lifestyle modification strategies discussed (elicit support from others; identify saboteurs; non-food rewards, etc).  Informal exercise measures discussed, e.g. taking stairs  instead of elevator.  Regular aerobic exercise program discussed.    Goals: Diabetes: Maintain Hemoglobin A1C below 7, Hypertension: Reduce Blood Pressure and Obesity: Reduce calorie intake and BMI    Did patient meet goals/outcomes: No    The following self management tools provided: declined    Patient Instructions (the written plan) was given to the patient/family.     Time spent with patient: 30 minutes    Barriers to medications present (no )    Adverse reactions to current medications (no)    Over the counter medications reviewed (Yes)

## 2018-08-23 LAB — ACE SERPL-CCNC: 33 U/L

## 2018-08-29 LAB

## 2018-08-31 RX ORDER — ASPIRIN 325 MG
TABLET, DELAYED RELEASE (ENTERIC COATED) ORAL
Qty: 12 CAPSULE | Refills: 0 | Status: SHIPPED | OUTPATIENT
Start: 2018-08-31 | End: 2018-11-02 | Stop reason: SDUPTHER

## 2018-09-30 ENCOUNTER — DOCUMENTATION ONLY (OUTPATIENT)
Dept: PSYCHIATRY | Facility: CLINIC | Age: 68
End: 2018-09-30

## 2018-09-30 NOTE — PROGRESS NOTES
Lab work received from Dr Monae collected 7/24/18:     BMP:  Na 139/K 4.0/BUN 16.7/Cr 0.90/Ca 9.8/eGFR 62/Glucose 237 (high)    CBC  WBC 12.6 (high), normal RBC, Hb, PLT  ANC 9.4     BNP   55.93

## 2018-10-11 ENCOUNTER — TELEPHONE (OUTPATIENT)
Dept: PULMONOLOGY | Facility: CLINIC | Age: 68
End: 2018-10-11

## 2018-10-11 ENCOUNTER — TELEPHONE (OUTPATIENT)
Dept: PSYCHIATRY | Facility: CLINIC | Age: 68
End: 2018-10-11

## 2018-10-11 NOTE — TELEPHONE ENCOUNTER
Spoke with patient who states that she needs to be seen sooner. Has a lot of family drama and would like a earlier appointment.     Currently scheduled for Dec. 12th at 1pm    Denied SI. States that she is okay. Just needs to talk

## 2018-10-11 NOTE — TELEPHONE ENCOUNTER
Advised pt we have no later appts avaiable. Pt states she will try to keep current appt.     ----- Message from Nilton Leo sent at 10/11/2018  2:39 PM CDT -----  Contact: patient  Nathalie, [phone]. Calling to see about changing her appointment on 10/18 at 9 to a later time in the day. If not available on 10/18 would take any day just as long as it's later in the day. Please advise. Thanks.

## 2018-10-12 NOTE — TELEPHONE ENCOUNTER
Patient declined appointment. States she is at her daughter's house two hours away    States she is okay. Coping with family drama. It is not urgent and states she can wait for her appointment  Appreciates the returned call and offered appointment

## 2018-10-18 ENCOUNTER — OFFICE VISIT (OUTPATIENT)
Dept: PULMONOLOGY | Facility: CLINIC | Age: 68
End: 2018-10-18
Payer: MEDICARE

## 2018-10-18 VITALS
BODY MASS INDEX: 31.25 KG/M2 | HEART RATE: 66 BPM | OXYGEN SATURATION: 92 % | SYSTOLIC BLOOD PRESSURE: 140 MMHG | DIASTOLIC BLOOD PRESSURE: 70 MMHG | HEIGHT: 66 IN | WEIGHT: 194.44 LBS

## 2018-10-18 DIAGNOSIS — D86.0 SARCOIDOSIS OF LUNG: Primary | ICD-10-CM

## 2018-10-18 DIAGNOSIS — Z79.52 CURRENT CHRONIC USE OF SYSTEMIC STEROIDS: ICD-10-CM

## 2018-10-18 DIAGNOSIS — G47.33 OBSTRUCTIVE SLEEP APNEA SYNDROME: ICD-10-CM

## 2018-10-18 DIAGNOSIS — J45.51 ASTHMA EXACERBATION, NON-ALLERGIC, SEVERE PERSISTENT: ICD-10-CM

## 2018-10-18 PROCEDURE — 3078F DIAST BP <80 MM HG: CPT | Mod: CPTII,,, | Performed by: INTERNAL MEDICINE

## 2018-10-18 PROCEDURE — 99205 OFFICE O/P NEW HI 60 MIN: CPT | Mod: S$PBB,,, | Performed by: INTERNAL MEDICINE

## 2018-10-18 PROCEDURE — 99213 OFFICE O/P EST LOW 20 MIN: CPT | Mod: PBBFAC,PO | Performed by: INTERNAL MEDICINE

## 2018-10-18 PROCEDURE — 3077F SYST BP >= 140 MM HG: CPT | Mod: CPTII,,, | Performed by: INTERNAL MEDICINE

## 2018-10-18 PROCEDURE — 1101F PT FALLS ASSESS-DOCD LE1/YR: CPT | Mod: CPTII,,, | Performed by: INTERNAL MEDICINE

## 2018-10-18 PROCEDURE — 99999 PR PBB SHADOW E&M-EST. PATIENT-LVL III: CPT | Mod: PBBFAC,,, | Performed by: INTERNAL MEDICINE

## 2018-10-18 RX ORDER — ALBUTEROL SULFATE 90 UG/1
2 AEROSOL, METERED RESPIRATORY (INHALATION) EVERY 6 HOURS PRN
COMMUNITY
End: 2019-02-26

## 2018-10-18 RX ORDER — ALBUTEROL SULFATE 90 UG/1
2 AEROSOL, METERED RESPIRATORY (INHALATION) EVERY 4 HOURS PRN
Qty: 3 INHALER | Refills: 3 | Status: SHIPPED | OUTPATIENT
Start: 2018-10-18 | End: 2021-10-27 | Stop reason: SDUPTHER

## 2018-10-18 RX ORDER — PREDNISONE 5 MG/1
10 TABLET ORAL DAILY
Qty: 180 TABLET | Refills: 3 | Status: SHIPPED | OUTPATIENT
Start: 2018-10-18 | End: 2018-10-28

## 2018-10-18 RX ORDER — MONTELUKAST SODIUM 10 MG/1
10 TABLET ORAL NIGHTLY
Qty: 90 TABLET | Refills: 3 | Status: SHIPPED | OUTPATIENT
Start: 2018-10-18 | End: 2019-06-13 | Stop reason: SDUPTHER

## 2018-10-18 RX ORDER — IPRATROPIUM BROMIDE AND ALBUTEROL SULFATE 2.5; .5 MG/3ML; MG/3ML
SOLUTION RESPIRATORY (INHALATION)
Qty: 120 VIAL | Refills: 3 | Status: SHIPPED | OUTPATIENT
Start: 2018-10-18 | End: 2021-02-19 | Stop reason: SDUPTHER

## 2018-10-18 RX ORDER — FLUTICASONE FUROATE AND VILANTEROL 200; 25 UG/1; UG/1
1 POWDER RESPIRATORY (INHALATION) DAILY
Qty: 3 EACH | Refills: 3 | Status: SHIPPED | OUTPATIENT
Start: 2018-10-18 | End: 2019-06-13 | Stop reason: SDUPTHER

## 2018-10-18 NOTE — PATIENT INSTRUCTIONS
Chronic steroids   Would try to use as little as able   5 mg daily - minimize best, add little more asthma therapy, do peak flow/pft monitoring with taper?  Do cxr with flare?-- studies ordered.     Try 7.5 every other ?      Try 2.5 alt 5 ?     Decrease monthly as able?     Min dose 2.5 every other day?    Use cpap    Boost prednsione as needed.  20/d?    Use singulair nightly - sinus and asthma benefit  Use breo daily.  Call if any issues.    Breathing test was normal 12/15 and ct chest good 1/2016

## 2018-10-18 NOTE — LETTER
October 18, 2018      Leidy Dover PA-C  [address] [facility]  [place] [address]           [facility]  [address]  [facility] [address]  Phone: [phone]  Fax: [phone]          Patient: Nathalie Santiago   MR Number: [id number]   YOB: 1950   Date of Visit: 10/18/2018       Dear Leidy Dover:    Thank you for referring Nathalie Santiago to me for evaluation. Attached you will find relevant portions of my assessment and plan of care.    If you have questions, please do not hesitate to call me. I look forward to following Nathalie Santiago along with you.    Sincerely,    Nj Steele MD    Enclosure  CC:  No Recipients    If you would like to receive this communication electronically, please contact [email or web address] or [phone] to request more information on [facility] Link access.    For providers and/or their staff who would like to refer a patient to [facility], please contact us through our one-stop-shop provider referral line, [facility], at [phone].    If you feel you have received this communication in error or would no longer like to receive these types of communications, please e-mail [email or web address]

## 2018-10-29 ENCOUNTER — TELEPHONE (OUTPATIENT)
Dept: PSYCHIATRY | Facility: CLINIC | Age: 68
End: 2018-10-29

## 2018-10-29 NOTE — TELEPHONE ENCOUNTER
Patient scheduled from waitlist for earlier appointment  Patient scheduled for tomorrow 10/30/2018 at 3pm  Has eye appointment at 2pm with Dr. Smith

## 2018-10-30 ENCOUNTER — OFFICE VISIT (OUTPATIENT)
Dept: PSYCHIATRY | Facility: CLINIC | Age: 68
End: 2018-10-30
Payer: MEDICARE

## 2018-10-30 VITALS
BODY MASS INDEX: 30.99 KG/M2 | WEIGHT: 192.81 LBS | HEIGHT: 66 IN | DIASTOLIC BLOOD PRESSURE: 78 MMHG | HEART RATE: 69 BPM | SYSTOLIC BLOOD PRESSURE: 137 MMHG

## 2018-10-30 DIAGNOSIS — G47.00 INSOMNIA, UNSPECIFIED TYPE: ICD-10-CM

## 2018-10-30 DIAGNOSIS — F41.1 GAD (GENERALIZED ANXIETY DISORDER): ICD-10-CM

## 2018-10-30 DIAGNOSIS — F33.41 MDD (MAJOR DEPRESSIVE DISORDER), RECURRENT, IN PARTIAL REMISSION: ICD-10-CM

## 2018-10-30 PROCEDURE — 99214 OFFICE O/P EST MOD 30 MIN: CPT | Mod: HCWC,S$GLB,, | Performed by: PSYCHIATRY & NEUROLOGY

## 2018-10-30 PROCEDURE — 3078F DIAST BP <80 MM HG: CPT | Mod: CPTII,HCWC,S$GLB, | Performed by: PSYCHIATRY & NEUROLOGY

## 2018-10-30 PROCEDURE — 99999 PR PBB SHADOW E&M-EST. PATIENT-LVL III: CPT | Mod: PBBFAC,HCWC,, | Performed by: PSYCHIATRY & NEUROLOGY

## 2018-10-30 PROCEDURE — 1101F PT FALLS ASSESS-DOCD LE1/YR: CPT | Mod: CPTII,HCWC,S$GLB, | Performed by: PSYCHIATRY & NEUROLOGY

## 2018-10-30 PROCEDURE — 99499 UNLISTED E&M SERVICE: CPT | Mod: HCNC,S$GLB,, | Performed by: PSYCHIATRY & NEUROLOGY

## 2018-10-30 PROCEDURE — 3075F SYST BP GE 130 - 139MM HG: CPT | Mod: CPTII,HCWC,S$GLB, | Performed by: PSYCHIATRY & NEUROLOGY

## 2018-10-30 RX ORDER — SERTRALINE HYDROCHLORIDE 100 MG/1
TABLET, FILM COATED ORAL
Qty: 180 TABLET | Refills: 2 | Status: SHIPPED | OUTPATIENT
Start: 2018-10-30 | End: 2019-02-26 | Stop reason: SDUPTHER

## 2018-10-30 NOTE — PROGRESS NOTES
Outpatient Psychiatry Follow-Up Visit (MD/NP)    10/30/2018    Clinical Status of Patient:  Outpatient (Ambulatory)    Chief Complaint:  Nathalie Santiago is a 68 y.o. female who presents today for follow-up of depression, anxiety, adjustment.  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   Pt reports depression and anxiety began in context of significant psychosocial stressors. Her mother passed away in 2000, her father in 2004. She was diagnosed with breast cancer in 2005, had bilateral mastectomies and then a failed reconstruction. She also has sarcoidosis of the lungs. She sold her home in [place] and moved to [place] because she could no longer care for herself due to medical issues. She has become increasingly dependent on others. She moved back here several years ago and is currently living with her brother and his significant other. The relationship with the latter is strained; her niece who has battled addiction is currently serving time in senior care.   Pt has had significant health issues with worsening depression/anxiety in the last several months.  Intake 2013.     Past Psychiatric History:  Tx of depression, anxiety and panic attacks   First saw a psychiatrist 2007   No prior hospitalizations  No suicide attempts or self injury   PCP most recently treating her in [place]   Previous meds: Restoril (no effect), Dalmane (no effect), Klonopin, Ativan, could not afford Cymbalta but helped pain and depression, Wellbutrin XL (3rd day fell out of bed x 4, bruised, amnestic), Effexor (raised her BP), no antipsychotics or mood stabilizers, Citalopram (some benefit).   Trazodone (no benefit), mirtazapine, melatonin (3 mg ineffective, higher dose HA), ativan, clonazepam, amitriptyline   No prior counseling         Past medical history:  DM2  RLS  Sarcoidosis  "of lung   Hx ovarian CA  HTN  Hx of malignant phylloides tumor of breast   Bilateral cataract surgery   Chronic neck, back and hip pain   Arthritis   MVR  Hx CVA  Hx DJD  No cardiac hx   No hx seizures or head injury      Past Surgical History:  bilateral mastectomy   CYSTOCELE REPAIR  Rectocele  CHOLECYSTECTOMY  PLEURA BIOPSY  Right foot  fRACTURE SURGERY  MASTECTOMY  RECTAL SURGERY     Family History:   Mother - depression  Sister - bipolar disorder vs schizophrenia, hospitalizations      Social History:  Former RN x 35 years, SSID since 2008  Lives with her brother and his SO ex wife heather [place], [place]   3 prior marriages, , 1 daughter   Raised in [place] in intact home - 3 brothers and 2 sisters - "stable"  No  or legal hx   No hx abuse   Hobbies: sewing, beading, reading   Education: Some college   Sabianism: Shinto     Substance History:  Never smoked  Rare alcohol: 2 times a year   No illicit drug. Denies misuse of opiates, benzodiazepines   Moderate caffeine: coffee, tea, cokes     Interim hx:   Pt has been taking Sertraline 200 mg daily, and ended up stopping Seroquel - did not tolerate.     Her 18 yo grandson involved in gang.  He is incarcerated and may be facing life in residential.  Facing 5 felonies.  She and daughter are under stress, daughter now has custody of son's 1 yo.  Pt stayed with her daughter all summer; daughter tends to be critical, but pt can acknowledge improvement in her health since living with her.    Pt now making some quilts again, has the space to do so.  She ended up flushing  the Seroquel down toilet - left her with terrible hangover; 25 mg ok, 50 mg slept all day.  It made her talk in sleep. Was not herself.  Now she has a fitbit, 10K steps daily walking     Sleeping overall is better now that she is exercising.   Mood is stressed,  Bleed over from daughter who can be mean  Will stay with daughter through holidays.  Plans to live permanantly with her.  Daughter does " motivate and and now she has a sense of purpose to help her with grandchild.  She has ups and downs, but overall is stable.     + wt gain.  Appetite good for wrong things. Drinks cokes/soft drinks. Trying to eat more salads.   Fell once.  Hit head getting into car, no black out, saw stars  No hospital admissions.     Not depressed.  Agitated with daughter at times. Pt working on not reacting to daughter.  She is getting close to her grandson's child.   Denies suicidal/homicidal ideations.    Focus issue, fine otherwise, handles meds, daughter helps. Manages her monthly bills.    Mind will not shut off easily, has been a night person a long time.  Her plan to combat is exercise, wt bearing     Uses CPAP now   Plans to give away some of her items in storage.  This has been hard for her, but she realizes it is best.      Denies symptoms of arnel/psychosis.     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  Does not maintain nursing license.   no hx suicide attempts, no self harm.  Guns in home are locked up.      Normal clock drawing last visit   MOCA last visit: 30/30.     Review of Systems   PSYCHIATRIC: see above  CONSTITUTIONAL: weight stable   MUSCULOSKELETAL: 8/10 back pain today.  CV: occasional exertional SOB, no chest pain     Past Medical, Family and Social History: The patient's past medical, family and social history have been reviewed and updated as appropriate within the electronic medical record - see encounter notes.    Medications:  Sertraline 200 mg daily     Gabapentin   Percoet - daily - multiple times     Compliance: stopped Seroquel    Side effects:  Over sedation   Amnesia, complex sleep related symptoms on Xanax, Ambien   headaches when Ambien taken too often; dry mouth on amitriptyline, elevated BP on Effexor XR, falls    Risk Parameters:  Patient reports no suicidal ideation  Patient reports no homicidal ideation  Patient reports no self-injurious behavior  Patient  reports no violent behavior    Exam (detailed: at least 9 elements; comprehensive: all 15 elements)   Constitutional  Vitals:  Most recent vital signs, dated less than 90 days prior to this appointment, were reviewed.         General:  age appropriate, casually dressed,  calm, adequate grooming         Musculoskeletal  Muscle Strength/Tone:  no tremor   Gait & Station:  No ataxia      Psychiatric  Speech:  no latency; no press, spontaneous   Mood & Affect:  Level   Restricted    Thought Process:  Linear    Associations:  intact   Thought Content:  normal, no suicidality, no homicidality, delusions, or paranoia, hallucinations: (auditory: no, visual: no)   Insight:  has awareness of illness   Judgement: behavior is adequate to circumstances   Orientation:  grossly intact; alert and oriented x 3  Oct 30, 2018    Memory: intact for content of interview, 5/5 immediate recall, 5/5 delayed recall    Language: grossly intact no fluency issues, can repeat    Attention Span & Concentration:  able to focus grossly intact in this appt, serial 7s intact  [facility]   Fund of Knowledge:  intact and appropriate to age and level of education Pres Trump - Obama- Naqvi - Naqvi      Assessment and Diagnosis   Status/Progress: Based on the examination today, the patient's problem(s) is/are under improved control.   New problems have not been presented today.   Comorbidities are complicating management of the primary condition.  (multiple medical problems)     Impression: 67 yo female with multiple medical problems and hx of depression, insomnia, and anxiety presents for follow up. Significant family stressors and health issues. Pt has required multiple sleep aides in past. She has required chronic benzodiazepine for stabilization, has failed multiple sleep aides;  Sedative-hyponotics were discontinued in interim due to health issues, reports of falls.  She contacted me previously with worsening depression/anxiety - low dose clonazepam  restarted, but is showing signs of stabilization both physically and mentally.  She reported that clonazepam is not helping and requested to restart Xanax/Ambien (previous meds).  Pt reports fall in interim, and also interaction/amnesia with Nyquil + Xanax which could have been dangerous. + Chronic opioid use.  She ended up staying on Sertraline and this medication has been helping with her maintaining stable mood following her niece's death.   Pt mixed Ambien and Xanax and had amnesia, complex sleep related behaviors.  I have discussed with her and daughter that sedatives are not safe medications for her.  Scored perfect MOCA last visit.  Did not tolerate Seroquel, but becoming more active, living with daughter, less isolation - pt is improving.     MDD,recurrent episode, in partial remission   GARFIELD  Insomnia disorder   Bereavement   R/O Hoarding Disorder   sarcoidosis, hx phyloides tumor of breast, .DM2, RLS, arthritis, chronic neck, hip, and back pain, hx avulsion fracture, JOSE LUIS, hx CVA    GAF: 65    Strengths and Liabilities: Strength: Patient accepts guidance/feedback, Strength: Patient is expressive/articulate., Strength: Patient has reasonable judgment.   Treatment Goals: Specify outcomes written in observable, behavioral terms:   Anxiety: acquiring relapse prevention skills and reducing physical symptoms of anxiety   Depression: acquiring relapse prevention skills, increasing energy, increasing motivation, reducing excessive guilt and reducing negative automatic thoughts     Treatment Plan/Recommendations:   Medication Management: The risks and benefits of medication were discussed with the patient.    1. Sertraline 100 mg tabs: Continue 200 mg daily  2. Call to report any worsening of symptoms or problems with the medication  3. Pt instructed to go to ER with thoughts of harming self, others     Return to Clinic:  4 months

## 2018-11-05 RX ORDER — ASPIRIN 325 MG
TABLET, DELAYED RELEASE (ENTERIC COATED) ORAL
Qty: 12 CAPSULE | Refills: 0 | Status: SHIPPED | OUTPATIENT
Start: 2018-11-05 | End: 2019-07-12 | Stop reason: SDUPTHER

## 2018-12-04 RX ORDER — OXYCODONE AND ACETAMINOPHEN 10; 325 MG/1; MG/1
1 TABLET ORAL EVERY 8 HOURS PRN
Qty: 90 TABLET | Refills: 0 | Status: SHIPPED | OUTPATIENT
Start: 2018-12-06 | End: 2019-01-04

## 2018-12-04 RX ORDER — OXYCODONE AND ACETAMINOPHEN 10; 325 MG/1; MG/1
1 TABLET ORAL EVERY 8 HOURS PRN
Qty: 90 TABLET | Refills: 0 | Status: SHIPPED | OUTPATIENT
Start: 2019-01-04 | End: 2019-02-02

## 2018-12-04 RX ORDER — OXYCODONE AND ACETAMINOPHEN 10; 325 MG/1; MG/1
1 TABLET ORAL EVERY 8 HOURS PRN
Qty: 90 TABLET | Refills: 0 | Status: SHIPPED | OUTPATIENT
Start: 2019-02-02 | End: 2019-02-26 | Stop reason: SDUPTHER

## 2018-12-10 ENCOUNTER — OFFICE VISIT (OUTPATIENT)
Dept: PAIN MEDICINE | Facility: CLINIC | Age: 68
End: 2018-12-10
Payer: MEDICARE

## 2018-12-10 VITALS
DIASTOLIC BLOOD PRESSURE: 81 MMHG | SYSTOLIC BLOOD PRESSURE: 152 MMHG | WEIGHT: 192 LBS | HEIGHT: 66 IN | BODY MASS INDEX: 30.86 KG/M2 | HEART RATE: 63 BPM

## 2018-12-10 DIAGNOSIS — M70.60 GREATER TROCHANTERIC BURSITIS, UNSPECIFIED LATERALITY: Primary | ICD-10-CM

## 2018-12-10 DIAGNOSIS — M47.896 OTHER SPONDYLOSIS, LUMBAR REGION: ICD-10-CM

## 2018-12-10 DIAGNOSIS — M25.561 CHRONIC PAIN OF RIGHT KNEE: ICD-10-CM

## 2018-12-10 DIAGNOSIS — M79.10 MYALGIA: ICD-10-CM

## 2018-12-10 DIAGNOSIS — M51.36 DDD (DEGENERATIVE DISC DISEASE), LUMBAR: ICD-10-CM

## 2018-12-10 DIAGNOSIS — G89.29 CHRONIC PAIN OF RIGHT KNEE: ICD-10-CM

## 2018-12-10 PROCEDURE — 1101F PT FALLS ASSESS-DOCD LE1/YR: CPT | Mod: CPTII,HCWC,S$GLB, | Performed by: PHYSICIAN ASSISTANT

## 2018-12-10 PROCEDURE — 3077F SYST BP >= 140 MM HG: CPT | Mod: CPTII,HCWC,S$GLB, | Performed by: PHYSICIAN ASSISTANT

## 2018-12-10 PROCEDURE — 3079F DIAST BP 80-89 MM HG: CPT | Mod: CPTII,HCWC,S$GLB, | Performed by: PHYSICIAN ASSISTANT

## 2018-12-10 PROCEDURE — 99214 OFFICE O/P EST MOD 30 MIN: CPT | Mod: HCWC,S$GLB,, | Performed by: PHYSICIAN ASSISTANT

## 2018-12-10 PROCEDURE — 99999 PR PBB SHADOW E&M-EST. PATIENT-LVL V: CPT | Mod: PBBFAC,HCWC,, | Performed by: PHYSICIAN ASSISTANT

## 2018-12-10 RX ORDER — GABAPENTIN 300 MG/1
300 CAPSULE ORAL 3 TIMES DAILY
Qty: 270 CAPSULE | Refills: 1 | Status: SHIPPED | OUTPATIENT
Start: 2018-12-10 | End: 2019-02-26 | Stop reason: SDUPTHER

## 2018-12-10 RX ORDER — CYCLOBENZAPRINE HCL 10 MG
10 TABLET ORAL 3 TIMES DAILY PRN
Qty: 90 TABLET | Refills: 2 | Status: SHIPPED | OUTPATIENT
Start: 2018-12-10 | End: 2019-02-26 | Stop reason: SDUPTHER

## 2018-12-10 NOTE — PROGRESS NOTES
CC: left sided low back and left hip pain    Interval History: Ms. Santiago is a 68 y.o. female with chronic low back and hip pain who presents  for her follow up.  She continues to have lower back, left greater than right lower back pain.  Left GTB and right knee pain has returned. She had a series of Euflexxa injections in 2014. Reports pain just recently returned. She has had left GTB injections in the past with benefit.  She continues to take percocet 10mg q8hrs as needed for pain with moderate benefits.  No side effects reported.  Able to perform daily activities with the medications.  She also continues to takes prednisone 5 mg daily. Gabapentin 300 mg TID has been helpful.  Pain today is rated 7/10.     Prior HPI: The patient is a 64-year-old woman with a history of sarcoidosis, breast CA, diabetes, anxiety and depression who presents in referral from Dr. Nguyen to Dr. Perez for multiple pain complaints including back pain, bilateral hip pain and right shoulder pain.  She is following up with me since I am much closer to her.   She presents today for multiple pain complaints.  She recently was hospitalized for pneumonia, UTI and sarcoidosis exacerbation per patient.  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  She has had past GTB injections with moderate benefit and had an exacerbation of her pain recently.  She was able to receive left GTB with Dr. Perez on 10/3/2014 that provided >50% relief of her left hip and leg pain.     She continues to take Prednisone 5mg daily. She continues Percoet 7.5mg about 3x/day as needed with moderate benefit.  She presents today with worsening right knee pain.  She states having history of right knee osteoarthritis, but has not had any treatment for her knee pain.  Continues to have improvement in her knee pain following completion of visco supplementation injection in her right knee.  Her back and hip pain remain  "tolerable with her medications.  She continues to take Percoct 7.5mg q8hrs as needed with moderate benefit.  No reported side effects.       Pain intervention history: She takes hydrocodone 7-325 2-4 times a day.  Also has undergone epidural steroid injection at L1/2 without relief.  She has been doing cervical traction with good relief of her neck pain.  Previous trochanteric bursa injections with good relief.    ROS:She reports fatigability, itching, headaches, swollen glands, chest pain and shortness of breath, nausea vomiting, easy bruising, back pain, joint stiffness, dizziness, difficulty sleeping, anxiety, depression and loss of balance.  Balance of review of systems is negative.    Medical, surgical, family and social history reviewed elsewhere in record.    Medications/Allergies: See med card    Vitals:    12/10/18 1403   BP: (!) 152/81   Pulse: 63   Weight: 87.1 kg (192 lb)   Height: 5' 6" (1.676 m)   PainSc:   7   PainLoc: Back       Physical exam:  Gen: A and O x3, pleasant, well-groomed  Skin: No rashes or obvious lesions  HEENT: PERRLA, no obvious deformities on ears or in canals. No thyroid masses, trachea midline, no palpable lymph nodes in neck, axilla.  CVS: RRR  Resp: non labored breathing  Abdomen: Soft, NT/ND, normal bowel sounds present.  Neuro:  Lower extremities:   +right knee crepitus. LE erythema and swelling  Reflexes: Patellar 2+, Achilles 2+ bilaterally.  Sensory:  Intact   Lumbar spine:  Lumbar spine: ROM is moderately reduced with flexion extension and oblique extension with increased pain in all directions.    Orlando's test causes pain on both sides.    Supine straight leg raise is negative bilaterally.    Internal and external rotation of the hip causes increased pain in left groin.  Myofascial exam: Tenderness to palpation over both GTB.      Imaging:  Cervical spine MRI report 10/5/12: There is dextroscoliosis in the lower cervical/upper thoracic region.  There is very mild " foraminal stenosis on the left at C6/7.    MRI thoracic spine 11/21/2008: No significant degenerative disc disease or central stenosis.    MRI lumbar spine 8/14/08: L1/2 small broad-based central protrusion with mild effacement of ventral thecal sac but not distorting exiting roots.  L2/3 and L3/4 unremarkable.  At L4/5 there is mild central stenosis with bulging annulus anteriorly and facet hypertrophic degenerative change posteriorly.  No focal disc herniation and neural foramina are patent.  At L5/S1 there is minimal bulging of the annulus with no thecal sac compression.  The small annular fissure within the posterior annulus.  Foramina are patent bilaterally, no stenosis mild, degenerative changes in the facets.    Xray Right Knee 10/13/14  Mild tricompartmental degenerative changes present. No joint effusion. The soft tissues are unremarkable.    Assessment:  Ms. Santiago is a 65-year-old woman with a history of sarcoidosis, breast CA, diabetes, anxiety and depression who presents in referral from Dr. Nguyen for multiple pain complaints including back pain, bilateral hip pain and knee pain   1. Greater trochanteric bursitis, unspecified laterality    2. DDD (degenerative disc disease), lumbar    3. Myalgia    4. Other spondylosis, lumbar region    5. Chronic pain of right knee        PLAN:  1. I have stressed the importance of physical activity and exercise to improve overall health.    2.  Percocet 10mg q8hrs as needed.  Script given for three months.  reviewed.   UDS  3.  Flexeril 10 mg q 8 h. #90  4.  I believe her low back pain maybe due to facet arthropathy and have recommended lumbar medial branch blocks as a diagnostic procedure.  If successful, would proceed with radiofrequency ablation.  May consider this procedure in the future  5.  Gabapentin 300mg TID for radicular pain. 2 refills  6.  Follow up with PCP regarding elevated BP  7.  Call to schedule left GTB injection and right knee Euflexxa if  pain does not improve.  Follow up in three months   All medication management was performed by Dr. Lincoln Temple

## 2019-01-14 ENCOUNTER — TELEPHONE (OUTPATIENT)
Dept: FAMILY MEDICINE | Facility: CLINIC | Age: 69
End: 2019-01-14

## 2019-01-14 NOTE — TELEPHONE ENCOUNTER
----- Message from Jose M Madera sent at 1/14/2019  1:17 PM CST -----  Contact: Aliya with [facility]  Aliya called to ask if the pt can be advised to discontinue blood thinners prior to dental work that will be done. Please call to advise.      Call Back #: [phone]  Thanks

## 2019-01-29 ENCOUNTER — DOCUMENTATION ONLY (OUTPATIENT)
Dept: FAMILY MEDICINE | Facility: CLINIC | Age: 69
End: 2019-01-29

## 2019-01-29 NOTE — PROGRESS NOTES
Pre-Visit Chart Review  For Appointment Scheduled on 2/14/19.    Health Maintenance Due   Topic Date Due    TETANUS VACCINE  07/22/1968    Foot Exam  03/03/2017    DEXA SCAN  09/16/2017    Eye Exam  05/19/2018    Influenza Vaccine  08/01/2018    Urine Microalbumin  10/03/2018

## 2019-01-31 ENCOUNTER — PATIENT OUTREACH (OUTPATIENT)
Dept: ADMINISTRATIVE | Facility: HOSPITAL | Age: 69
End: 2019-01-31

## 2019-01-31 DIAGNOSIS — Z79.4 TYPE 2 DIABETES MELLITUS WITH COMPLICATION, WITH LONG-TERM CURRENT USE OF INSULIN: Primary | ICD-10-CM

## 2019-01-31 DIAGNOSIS — E11.8 TYPE 2 DIABETES MELLITUS WITH COMPLICATION, WITH LONG-TERM CURRENT USE OF INSULIN: Primary | ICD-10-CM

## 2019-01-31 NOTE — LETTER
February 8, 2019    Nathalie Santiago  [address] [place] [address] [place] [address]             [facility]  [address]  [facility] [address]  Phone: [phone] Dear Pamela [facility] is committed to your overall health and would like to ensure that you are up to date on your recommended test and/or procedures.   Ladan Almodovar MD  has found that your chart shows you may be due for the following:     YEARLY DIABETIC EYE EXAM & FOOT EXAM   HEMOGLOBIN  A1C   URINE MICRO-ALBUMIN   BONE MINERAL DENSITY SCAN       If you have had any of the above done at another facility, please let us know so that we may obtain copies from that facility.  If you have a copy of these records, please provide a copy for us to scan into your chart.  You are welcome to request that the report be faxed to us at  ([phone]).     Otherwise, please schedule these appointments at your earliest convenience by calling [phone] or going to [email or web address].     If you have an upcoming scheduled appointment for the item above, please disregard this letter.     Sincerely,   Your [facility]   MD Sonal Apodaca LPN Clinical Care Coordinator   [facility] [address] [place] [address]   Phone [phone]   Fax [phone]

## 2019-02-26 ENCOUNTER — OFFICE VISIT (OUTPATIENT)
Dept: PSYCHIATRY | Facility: CLINIC | Age: 69
End: 2019-02-26
Payer: MEDICARE

## 2019-02-26 ENCOUNTER — OFFICE VISIT (OUTPATIENT)
Dept: PAIN MEDICINE | Facility: CLINIC | Age: 69
End: 2019-02-26
Payer: MEDICARE

## 2019-02-26 VITALS
WEIGHT: 189.94 LBS | HEIGHT: 66 IN | SYSTOLIC BLOOD PRESSURE: 137 MMHG | HEART RATE: 95 BPM | BODY MASS INDEX: 30.53 KG/M2 | DIASTOLIC BLOOD PRESSURE: 83 MMHG

## 2019-02-26 VITALS
SYSTOLIC BLOOD PRESSURE: 137 MMHG | DIASTOLIC BLOOD PRESSURE: 84 MMHG | HEART RATE: 96 BPM | BODY MASS INDEX: 30.86 KG/M2 | HEIGHT: 66 IN | WEIGHT: 192 LBS

## 2019-02-26 DIAGNOSIS — M70.60 GREATER TROCHANTERIC BURSITIS, UNSPECIFIED LATERALITY: Primary | ICD-10-CM

## 2019-02-26 DIAGNOSIS — F41.1 GAD (GENERALIZED ANXIETY DISORDER): ICD-10-CM

## 2019-02-26 DIAGNOSIS — G47.00 INSOMNIA, UNSPECIFIED TYPE: ICD-10-CM

## 2019-02-26 DIAGNOSIS — M51.36 DDD (DEGENERATIVE DISC DISEASE), LUMBAR: ICD-10-CM

## 2019-02-26 DIAGNOSIS — F33.41 MDD (MAJOR DEPRESSIVE DISORDER), RECURRENT, IN PARTIAL REMISSION: ICD-10-CM

## 2019-02-26 DIAGNOSIS — M47.896 OTHER SPONDYLOSIS, LUMBAR REGION: ICD-10-CM

## 2019-02-26 PROCEDURE — 99499 RISK ADDL DX/OHS AUDIT: ICD-10-PCS | Mod: HCNC,S$GLB,, | Performed by: PSYCHIATRY & NEUROLOGY

## 2019-02-26 PROCEDURE — 3075F SYST BP GE 130 - 139MM HG: CPT | Mod: HCNC,CPTII,S$GLB, | Performed by: PHYSICIAN ASSISTANT

## 2019-02-26 PROCEDURE — 99214 OFFICE O/P EST MOD 30 MIN: CPT | Mod: HCNC,S$GLB,, | Performed by: PSYCHIATRY & NEUROLOGY

## 2019-02-26 PROCEDURE — 1100F PTFALLS ASSESS-DOCD GE2>/YR: CPT | Mod: HCNC,CPTII,S$GLB, | Performed by: PSYCHIATRY & NEUROLOGY

## 2019-02-26 PROCEDURE — 99214 OFFICE O/P EST MOD 30 MIN: CPT | Mod: HCNC,S$GLB,, | Performed by: PHYSICIAN ASSISTANT

## 2019-02-26 PROCEDURE — 3079F PR MOST RECENT DIASTOLIC BLOOD PRESSURE 80-89 MM HG: ICD-10-PCS | Mod: HCNC,CPTII,S$GLB, | Performed by: PHYSICIAN ASSISTANT

## 2019-02-26 PROCEDURE — 3079F DIAST BP 80-89 MM HG: CPT | Mod: HCNC,CPTII,S$GLB, | Performed by: PHYSICIAN ASSISTANT

## 2019-02-26 PROCEDURE — 99999 PR PBB SHADOW E&M-EST. PATIENT-LVL IV: ICD-10-PCS | Mod: PBBFAC,HCNC,, | Performed by: PHYSICIAN ASSISTANT

## 2019-02-26 PROCEDURE — 99214 PR OFFICE/OUTPT VISIT, EST, LEVL IV, 30-39 MIN: ICD-10-PCS | Mod: HCNC,S$GLB,, | Performed by: PSYCHIATRY & NEUROLOGY

## 2019-02-26 PROCEDURE — 3079F DIAST BP 80-89 MM HG: CPT | Mod: HCNC,CPTII,S$GLB, | Performed by: PSYCHIATRY & NEUROLOGY

## 2019-02-26 PROCEDURE — 3288F PR FALLS RISK ASSESSMENT DOCUMENTED: ICD-10-PCS | Mod: HCNC,CPTII,S$GLB, | Performed by: PSYCHIATRY & NEUROLOGY

## 2019-02-26 PROCEDURE — 3075F PR MOST RECENT SYSTOLIC BLOOD PRESS GE 130-139MM HG: ICD-10-PCS | Mod: HCNC,CPTII,S$GLB, | Performed by: PSYCHIATRY & NEUROLOGY

## 2019-02-26 PROCEDURE — 1100F PTFALLS ASSESS-DOCD GE2>/YR: CPT | Mod: HCNC,CPTII,S$GLB, | Performed by: PHYSICIAN ASSISTANT

## 2019-02-26 PROCEDURE — 3288F FALL RISK ASSESSMENT DOCD: CPT | Mod: HCNC,CPTII,S$GLB, | Performed by: PHYSICIAN ASSISTANT

## 2019-02-26 PROCEDURE — 99999 PR PBB SHADOW E&M-EST. PATIENT-LVL IV: CPT | Mod: PBBFAC,HCNC,, | Performed by: PHYSICIAN ASSISTANT

## 2019-02-26 PROCEDURE — 3288F PR FALLS RISK ASSESSMENT DOCUMENTED: ICD-10-PCS | Mod: HCNC,CPTII,S$GLB, | Performed by: PHYSICIAN ASSISTANT

## 2019-02-26 PROCEDURE — 3288F FALL RISK ASSESSMENT DOCD: CPT | Mod: HCNC,CPTII,S$GLB, | Performed by: PSYCHIATRY & NEUROLOGY

## 2019-02-26 PROCEDURE — 3075F PR MOST RECENT SYSTOLIC BLOOD PRESS GE 130-139MM HG: ICD-10-PCS | Mod: HCNC,CPTII,S$GLB, | Performed by: PHYSICIAN ASSISTANT

## 2019-02-26 PROCEDURE — 1100F PR PT FALLS ASSESS DOC 2+ FALLS/FALL W/INJURY/YR: ICD-10-PCS | Mod: HCNC,CPTII,S$GLB, | Performed by: PHYSICIAN ASSISTANT

## 2019-02-26 PROCEDURE — 3079F PR MOST RECENT DIASTOLIC BLOOD PRESSURE 80-89 MM HG: ICD-10-PCS | Mod: HCNC,CPTII,S$GLB, | Performed by: PSYCHIATRY & NEUROLOGY

## 2019-02-26 PROCEDURE — 99499 UNLISTED E&M SERVICE: CPT | Mod: HCNC,S$GLB,, | Performed by: PSYCHIATRY & NEUROLOGY

## 2019-02-26 PROCEDURE — 99214 PR OFFICE/OUTPT VISIT, EST, LEVL IV, 30-39 MIN: ICD-10-PCS | Mod: HCNC,S$GLB,, | Performed by: PHYSICIAN ASSISTANT

## 2019-02-26 PROCEDURE — 99999 PR PBB SHADOW E&M-EST. PATIENT-LVL III: CPT | Mod: PBBFAC,HCNC,, | Performed by: PSYCHIATRY & NEUROLOGY

## 2019-02-26 PROCEDURE — 3075F SYST BP GE 130 - 139MM HG: CPT | Mod: HCNC,CPTII,S$GLB, | Performed by: PSYCHIATRY & NEUROLOGY

## 2019-02-26 PROCEDURE — 1100F PR PT FALLS ASSESS DOC 2+ FALLS/FALL W/INJURY/YR: ICD-10-PCS | Mod: HCNC,CPTII,S$GLB, | Performed by: PSYCHIATRY & NEUROLOGY

## 2019-02-26 PROCEDURE — 99999 PR PBB SHADOW E&M-EST. PATIENT-LVL III: ICD-10-PCS | Mod: PBBFAC,HCNC,, | Performed by: PSYCHIATRY & NEUROLOGY

## 2019-02-26 RX ORDER — SERTRALINE HYDROCHLORIDE 100 MG/1
TABLET, FILM COATED ORAL
Qty: 180 TABLET | Refills: 2 | Status: SHIPPED | OUTPATIENT
Start: 2019-02-26 | End: 2019-06-13 | Stop reason: SDUPTHER

## 2019-02-26 RX ORDER — NITROFURANTOIN 25; 75 MG/1; MG/1
CAPSULE ORAL
Refills: 0 | COMMUNITY
Start: 2019-02-19 | End: 2019-09-06

## 2019-02-26 RX ORDER — OXYCODONE AND ACETAMINOPHEN 10; 325 MG/1; MG/1
1 TABLET ORAL EVERY 8 HOURS PRN
Qty: 90 TABLET | Refills: 0 | Status: SHIPPED | OUTPATIENT
Start: 2019-03-16 | End: 2019-02-26 | Stop reason: SDUPTHER

## 2019-02-26 RX ORDER — OXYCODONE AND ACETAMINOPHEN 10; 325 MG/1; MG/1
1 TABLET ORAL EVERY 8 HOURS PRN
Qty: 90 TABLET | Refills: 0 | Status: SHIPPED | OUTPATIENT
Start: 2019-04-14 | End: 2019-05-13

## 2019-02-26 RX ORDER — DULOXETIN HYDROCHLORIDE 30 MG/1
30 CAPSULE, DELAYED RELEASE ORAL DAILY
Qty: 90 CAPSULE | Refills: 0 | Status: SHIPPED | OUTPATIENT
Start: 2019-02-26 | End: 2019-05-22 | Stop reason: SDUPTHER

## 2019-02-26 RX ORDER — OXYCODONE AND ACETAMINOPHEN 10; 325 MG/1; MG/1
1 TABLET ORAL EVERY 8 HOURS PRN
Qty: 90 TABLET | Refills: 0 | Status: SHIPPED | OUTPATIENT
Start: 2019-05-13 | End: 2019-06-11

## 2019-02-26 RX ORDER — CYCLOBENZAPRINE HCL 10 MG
10 TABLET ORAL 3 TIMES DAILY PRN
Qty: 90 TABLET | Refills: 2 | Status: SHIPPED | OUTPATIENT
Start: 2019-02-26 | End: 2019-03-28

## 2019-02-26 RX ORDER — GABAPENTIN 300 MG/1
300 CAPSULE ORAL 3 TIMES DAILY
Qty: 270 CAPSULE | Refills: 1 | Status: SHIPPED | OUTPATIENT
Start: 2019-02-26 | End: 2019-06-13 | Stop reason: SDUPTHER

## 2019-02-26 NOTE — Clinical Note
Good evening, I recently saw Ms Santiago who is reporting more pain, decreased activity and motivation, insomnia.  I restarted low dose Cymbalta.  Would you be comfortable with her titrating evening dose of Gabapentin - currently she is taking 300 mg in am and 600 mg in pm - an increase in PM dose to 900 mg nightly may help her sleep better.  I wanted to check with you first since you are prescribing it - thank you

## 2019-02-26 NOTE — PROGRESS NOTES
CC: left sided low back and left hip pain    Interval History: Ms. Santiago is a 68 y.o. female with chronic low back and hip pain who presents  for her follow up.  She has had 2 trip and falls and a fall in which she missed the edge of the bed since V. She continues to have lower back, left greater than right lower back pain.  Left GTB and right knee pain has worsened. She had a series of Euflexxa injections in 2014. She has had left GTB injections in the past with benefit.  She continues to take percocet 10mg q8hrs as needed for pain with moderate benefits.  No side effects reported.  Able to perform daily activities with the medications.  She also continues to takes prednisone 5 mg daily. Gabapentin 300 mg TID has been helpful.  Pain today is rated 10/10.     Prior HPI: The patient is a 64-year-old woman with a history of sarcoidosis, breast CA, diabetes, anxiety and depression who presents in referral from Dr. Nguyen to Dr. Perez for multiple pain complaints including back pain, bilateral hip pain and right shoulder pain.  She is following up with me since I am much closer to her.   She presents today for multiple pain complaints.  She recently was hospitalized for pneumonia, UTI and sarcoidosis exacerbation per patient.  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  She has had past GTB injections with moderate benefit and had an exacerbation of her pain recently.  She was able to receive left GTB with Dr. Perez on 10/3/2014 that provided >50% relief of her left hip and leg pain.     She continues to take Prednisone 5mg daily. She continues Percoet 7.5mg about 3x/day as needed with moderate benefit.  She presents today with worsening right knee pain.  She states having history of right knee osteoarthritis, but has not had any treatment for her knee pain.  Continues to have improvement in her knee pain following completion of visco supplementation injection  "in her right knee.  Her back and hip pain remain tolerable with her medications.  She continues to take Percoct 7.5mg q8hrs as needed with moderate benefit.  No reported side effects.       Pain intervention history: She takes hydrocodone 7-325 2-4 times a day.  Also has undergone epidural steroid injection at L1/2 without relief.  She has been doing cervical traction with good relief of her neck pain.  Previous trochanteric bursa injections with good relief.    ROS:She reports fatigability, itching, headaches, swollen glands, chest pain and shortness of breath, nausea vomiting, easy bruising, back pain, joint stiffness, dizziness, difficulty sleeping, anxiety, depression and loss of balance.  Balance of review of systems is negative.    Medical, surgical, family and social history reviewed elsewhere in record.    Medications/Allergies: See med card    Vitals:    02/26/19 1353   BP: 137/84   Pulse: 96   Weight: 87.1 kg (192 lb)   Height: 5' 6" (1.676 m)   PainSc: 10-Worst pain ever   PainLoc: Back       Physical exam:  Gen: A and O x3, pleasant, well-groomed  Skin: No rashes or obvious lesions  HEENT: PERRLA, no obvious deformities on ears or in canals. No thyroid masses, trachea midline, no palpable lymph nodes in neck, axilla.  CVS: RRR  Resp: non labored breathing  Abdomen: Soft, NT/ND, normal bowel sounds present.  Neuro:  Lower extremities:   +right knee crepitus. LE erythema and swelling  Reflexes: Patellar 2+, Achilles 2+ bilaterally.  Sensory:  Intact   Lumbar spine:  Lumbar spine: ROM is moderately reduced with flexion extension and oblique extension with increased pain in all directions.    Orlando's test causes pain on both sides.    Supine straight leg raise is negative bilaterally.    Internal and external rotation of the hip causes increased pain in left groin.  Myofascial exam: Tenderness to palpation over both GTB.      Imaging:  Cervical spine MRI report 10/5/12: There is dextroscoliosis in the " lower cervical/upper thoracic region.  There is very mild foraminal stenosis on the left at C6/7.    MRI thoracic spine 11/21/2008: No significant degenerative disc disease or central stenosis.    MRI lumbar spine 8/14/08: L1/2 small broad-based central protrusion with mild effacement of ventral thecal sac but not distorting exiting roots.  L2/3 and L3/4 unremarkable.  At L4/5 there is mild central stenosis with bulging annulus anteriorly and facet hypertrophic degenerative change posteriorly.  No focal disc herniation and neural foramina are patent.  At L5/S1 there is minimal bulging of the annulus with no thecal sac compression.  The small annular fissure within the posterior annulus.  Foramina are patent bilaterally, no stenosis mild, degenerative changes in the facets.    Xray Right Knee 10/13/14  Mild tricompartmental degenerative changes present. No joint effusion. The soft tissues are unremarkable.    Assessment:  Ms. Santiago is a 65-year-old woman with a history of sarcoidosis, breast CA, diabetes, anxiety and depression who presents in referral from Dr. Nguyen for multiple pain complaints including back pain, bilateral hip pain and knee pain   1. Greater trochanteric bursitis, unspecified laterality    2. Other spondylosis, lumbar region    3. DDD (degenerative disc disease), lumbar        PLAN:  1. I have stressed the importance of physical activity and exercise to improve overall health.    2.  Percocet 10mg q8hrs as needed.  Script given for three months.  reviewed.     3.  Flexeril 10 mg q 8 h. #90  4.  I believe her low back pain maybe due to facet arthropathy and have recommended lumbar medial branch blocks as a diagnostic procedure.  If successful, would proceed with radiofrequency ablation.  May consider this procedure in the future  5.  Gabapentin 300mg TID for radicular pain. 2 refills  6.  schedule left GTB injection. Will also consider right knee Euflexxa if pain does not improve.    7.  Follow up in three months   All medication management was performed by Dr. Lincoln Temple

## 2019-02-26 NOTE — PROGRESS NOTES
Outpatient Psychiatry Follow-Up Visit (MD/NP)    2/26/2019    Clinical Status of Patient:  Outpatient (Ambulatory)    Chief Complaint:  Nathalie Santiago is a 68 y.o. female who presents today for follow-up of depression, anxiety, adjustment.  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   Pt reports depression and anxiety began in context of significant psychosocial stressors. Her mother passed away in 2000, her father in 2004. She was diagnosed with breast cancer in 2005, had bilateral mastectomies and then a failed reconstruction. She also has sarcoidosis of the lungs. She sold her home in [place] and moved to [place] because she could no longer care for herself due to medical issues. She has become increasingly dependent on others. She moved back here several years ago and is currently living with her brother and his significant other. The relationship with the latter is strained; her niece who has battled addiction is currently serving time in care home.   Pt has had significant health issues with worsening depression/anxiety in the last several months.  Intake 2013.     Past Psychiatric History:  Tx of depression, anxiety and panic attacks   First saw a psychiatrist 2007   No prior hospitalizations  No suicide attempts or self injury   PCP most recently treating her in [place]   Previous meds: Restoril (no effect), Dalmane (no effect), Klonopin, Ativan, could not afford Cymbalta but helped pain and depression, Wellbutrin XL (3rd day fell out of bed x 4, bruised, amnestic), Effexor (raised her BP), no antipsychotics or mood stabilizers, Citalopram (some benefit).   Trazodone (no benefit), mirtazapine, melatonin (3 mg ineffective, higher dose HA), ativan, clonazepam, amitriptyline   No prior counseling         Past medical history:  DM2  RLS  Sarcoidosis  "of lung   Hx ovarian CA  HTN  Hx of malignant phylloides tumor of breast   Bilateral cataract surgery   Chronic neck, back and hip pain   Arthritis   MVR  Hx CVA  Hx DJD  No cardiac hx   No hx seizures or head injury      Past Surgical History:  bilateral mastectomy   CYSTOCELE REPAIR  Rectocele  CHOLECYSTECTOMY  PLEURA BIOPSY  Right foot  fRACTURE SURGERY  MASTECTOMY  RECTAL SURGERY     Family History:   Mother - depression  Sister - bipolar disorder vs schizophrenia, hospitalizations      Social History:  Former RN x 35 years, SSID since 2008  Lives with her brother and his SO ex wife heather [place], [place]   3 prior marriages, , 1 daughter   Raised in [place] in intact home - 3 brothers and 2 sisters - "stable"  No  or legal hx   No hx abuse   Hobbies: sewing, beading, reading   Education: Some college   Orthodoxy: Zoroastrianism     Substance History:  Never smoked  Rare alcohol: 2 times a year   No illicit drug. Denies misuse of opiates, benzodiazepines   Moderate caffeine: coffee, tea, cokes     Interim hx:   Pt has been taking Sertraline 200 mg daily, and previously stopped Seroquel - did not tolerate.   Pt inquires about Rx for Adderall today.      She has fallen 3 times in 3 weeks, right buttocks and shoulder pain, bursitis in left hip.  He did hit head with one fall.  Not enough to bruise or cause headacher. No LOC.  Daughter Effie is upset with her - prior to fall, 3 teeth extracted, hurting in bed x 1 week. Not meeting 10K steps daily.  Back hurts, on Macrobid for UTI.   4/10 pain - to lift UE is hard.  Seen by Andreea, she is taking Flexeril Percocet, and Gabapentin (helps her sleep, 2-2:30 am to 11 am). .      Going to [place] this week to see her grandson.   She states "I would have "went and bought a Xanax last pm if I could have."  Living with Effie, who is caring for 1 yo full time (her father in [place], [place]); grandson is incarcerated, indefinitely.  Very stressful.  Every other month they " "go visit him in [place], [place].     Psych ROS:  Pt reports feeling yucky, not doing a lot in home. Daughter told her she is hank for her to go home, tired of seeing her in the bed.   Not depressed, but aggravated that daughter wants her to do more.  20 yo is staying with her (girl calls Effie mom).   Appetite is fine, + nausea 1-2 times a week - thinks it is related to her K+ and slow digestion.  Motivation zilch, + fatigue.   Needs something to help her get up and get moving.  Daughter is on it.  Sees her goal oriented.  She has been working on quilts, cannot finish them.  + back pain, 2 year old in home  -> limiting factors.   "has a memory thing" - relates it to being not interested, motivated, or paying attention.  Had wrong date for appt with Dr Temple.     No real panic attacks, stressed out -> Effie makes her feel she is forgetful.  Not to point it is impairing her function.  She feels she could live alone - would prefer it. She would consider going back to live with brother Nj.   She feels she assists her daughter, but her daughter disagrees.   + hopelessness - "what will it be like when I am 89 yo, she (daughter) is trying to get rid of me now,"  no worthlessness, but feels useless.   Pain is limiting factor.     + worry, no panic attacks.  Laid there x 1 hr this am trying to figure out how to make things easier on Effie.   Not really irritable -> "I turn it inside anyway."   No arnel, no psychosis. Denies suicidal/homicidal ideations.    No hospital admissions in interim - health much improved since living with daughter.   Feels like a failure - not getting 10K steps in or getting quilts together     Pt had LHC 3-4 months ago due to chest tightness.  Her EF was 78% and she had non obstructive disease (20%) per pt report, MD is Dr Bal.   CPAP compliant. No tobacco.   Denies alcohol, drug use.  Takes pain medications 2-3 times a daily.      Denies symptoms of arnel/psychosis.     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  Does not maintain nursing license.   no hx suicide attempts, no self harm.  Guns in home are locked up.      Normal clock drawing and MOCA 30/30 previous visit.     Review of Systems   PSYCHIATRIC: see above  CONSTITUTIONAL: weight loss   MUSCULOSKELETAL: 6/10 back pain today.  CV: occasional exertional SOB, no chest pain     Past Medical, Family and Social History: The patient's past medical, family and social history have been reviewed and updated as appropriate within the electronic medical record - see encounter notes.    Medications:  Sertraline 200 mg daily     Gabapentin   Percoet - daily - multiple times     Compliance: stopped Seroquel    Side effects:  Over sedation Seroquel   Amnesia, complex sleep related symptoms on Xanax, Ambien   headaches when Ambien taken too often; dry mouth on amitriptyline, elevated BP on Effexor XR, falls    Risk Parameters:  Patient reports no suicidal ideation  Patient reports no homicidal ideation  Patient reports no self-injurious behavior  Patient reports no violent behavior    Exam (detailed: at least 9 elements; comprehensive: all 15 elements)   Constitutional  Vitals:  Most recent vital signs, dated more than 90 days prior to this appointment, were reviewed.         General:  age appropriate, casually dressed,  calm, adequate grooming         Musculoskeletal  Muscle Strength/Tone:  no tremor   Gait & Station:  No ataxia      Psychiatric  Speech:  no latency; no press, spontaneous   Mood & Affect:  "not good"  Restricted    Thought Process:  Linear    Associations:  intact   Thought Content:  normal, no suicidality, no homicidality, delusions, or paranoia, hallucinations: (auditory: no, visual: no)   Insight:  has awareness of illness   Judgement: behavior is adequate to circumstances   Orientation:  grossly intact; alert and oriented x 4    Memory: intact for content of interview,    Language: grossly intact " no fluency issues    Attention Span & Concentration:  able to focus grossly intact    Fund of Knowledge:  intact and appropriate to age and level of education     Assessment and Diagnosis   Status/Progress: Based on the examination today, the patient's problem(s) is/are under inadequate control.   New problems have been presented today.   Comorbidities are complicating management of the primary condition.  (multiple medical problems)     Impression: 67 yo female with multiple medical problems and hx of depression, insomnia, and anxiety presents for follow up. Significant family stressors and health issues. Pt has required multiple sleep aides in past. She has required chronic benzodiazepine for stabilization, has failed multiple sleep aides;  Sedative-hyponotics were discontinued in interim due to health issues, reports of falls.  She contacted me previously with worsening depression/anxiety - low dose clonazepam restarted, but is showing signs of stabilization both physically and mentally.  She reported that clonazepam is not helping and requested to restart Xanax/Ambien (previous meds).  Pt reports fall in interim, and also interaction/amnesia with Nyquil + Xanax which could have been dangerous. + Chronic opioid use.  She ended up staying on Sertraline and this medication has been helping with her maintaining stable mood following her niece's death.   Pt mixed Ambien and Xanax and had amnesia, complex sleep related behaviors.  I have discussed with her and daughter that sedatives are not safe medications for her.  Scored perfect MOCA last visit.  Did not tolerate Seroquel, but pt was becoming more active, living with daughter, less isolation - pt was improving.   Today, she reports pain has worsened, which is limited her activity and leading to increased anxiety, depression, motivation issues     MDD,recurrent episode, mild   GARFIELD  Insomnia disorder   Bereavement   R/O Hoarding Disorder   sarcoidosis, hx phyloides  tumor of breast, .DM2, RLS, arthritis, chronic neck, hip, and back pain, hx avulsion fracture, JOSE LUIS, hx CVA    GAF: 60    Strengths and Liabilities: Strength: Patient accepts guidance/feedback, Strength: Patient is expressive/articulate., Strength: Patient has reasonable judgment.   Treatment Goals: Specify outcomes written in observable, behavioral terms:   Anxiety: acquiring relapse prevention skills and reducing physical symptoms of anxiety   Depression: acquiring relapse prevention skills, increasing energy, increasing motivation, reducing excessive guilt and reducing negative automatic thoughts     Treatment Plan/Recommendations:   Medication Management: The risks and benefits of medication were discussed with the patient.    1. Sertraline 100 mg tabs: Continue 200 mg daily  2. Call to report any worsening of symptoms or problems with the medication  3. Pt instructed to go to ER with thoughts of harming self, others   4. Restart Cymbalta 30 mg daily. Target depression, anxiety, pain. Discussed potential for med interaction, serotonin syndrome, hepatic impairment   5. For sleep, we discussed trial to increase her Gabapentin to 300 mg in am and 900 mg at bedtime (currently taking 300 mg in am and 600 mg in PM).  Will coordinate with pain management     Return to Clinic:  2-3 months

## 2019-02-26 NOTE — PATIENT INSTRUCTIONS
1. Cymbalta (duloxetine) 30 mg daily sent to [facility] - please let me know if not affordable     2. Continue Sertraline     3. I will contact Pain Management about increasing Gabapentin to three caps at night (900 mg)

## 2019-02-27 ENCOUNTER — TELEPHONE (OUTPATIENT)
Dept: PAIN MEDICINE | Facility: CLINIC | Age: 69
End: 2019-02-27

## 2019-02-27 NOTE — TELEPHONE ENCOUNTER
----- Message from Azael Gong sent at 2/27/2019 12:49 PM CST -----  Contact: Patient  Type: Needs Medical Advice    Who Called:  Patient  Best Call Back Number: [phone]  Additional Information: Patient has questions regarding injections. Please call to advise. Thanks!

## 2019-03-06 ENCOUNTER — TELEPHONE (OUTPATIENT)
Dept: PAIN MEDICINE | Facility: CLINIC | Age: 69
End: 2019-03-06

## 2019-03-06 NOTE — TELEPHONE ENCOUNTER
----- Message from Fauzia Pat MD sent at 3/3/2019  4:51 PM CST -----  Good evening,     I recently saw Ms Santiago who is reporting more pain, decreased activity and motivation, insomnia.  I restarted low dose Cymbalta.  Would you be comfortable with her titrating evening dose of Gabapentin - currently she is taking 300 mg in am and 600 mg in pm - an increase in PM dose to 900 mg nightly may help her sleep better.  I wanted to check with you first since you are prescribing it - thank you

## 2019-03-06 NOTE — TELEPHONE ENCOUNTER
Please contact MsRola Jack and let her know that she can increase her Gabapentin to 900 mg nightly which may help with her difficulty sleeping.

## 2019-03-06 NOTE — TELEPHONE ENCOUNTER
Informed patient of instructions for gabapentin per NATAN Carlisle. Patient accepted and voiced understanding.

## 2019-03-21 ENCOUNTER — PATIENT OUTREACH (OUTPATIENT)
Dept: ADMINISTRATIVE | Facility: HOSPITAL | Age: 69
End: 2019-03-21

## 2019-03-21 DIAGNOSIS — Z78.0 ASYMPTOMATIC MENOPAUSAL STATE: Primary | ICD-10-CM

## 2019-04-03 ENCOUNTER — TELEPHONE (OUTPATIENT)
Dept: FAMILY MEDICINE | Facility: CLINIC | Age: 69
End: 2019-04-03

## 2019-04-03 DIAGNOSIS — Z79.4 TYPE 2 DIABETES MELLITUS WITH COMPLICATION, WITH LONG-TERM CURRENT USE OF INSULIN: Primary | ICD-10-CM

## 2019-04-03 DIAGNOSIS — E11.8 TYPE 2 DIABETES MELLITUS WITH COMPLICATION, WITH LONG-TERM CURRENT USE OF INSULIN: Primary | ICD-10-CM

## 2019-04-03 NOTE — TELEPHONE ENCOUNTER
Spoke to pt , scheduled diabetic  eye cam appointment Friday April 5, 2018 at 2:00 following Erin Lepe's appt.

## 2019-04-05 ENCOUNTER — DOCUMENTATION ONLY (OUTPATIENT)
Dept: FAMILY MEDICINE | Facility: CLINIC | Age: 69
End: 2019-04-05

## 2019-04-05 ENCOUNTER — TELEPHONE (OUTPATIENT)
Dept: PAIN MEDICINE | Facility: CLINIC | Age: 69
End: 2019-04-05

## 2019-04-05 NOTE — TELEPHONE ENCOUNTER
Per patient request cancelled appointment today. Patient stated that she will keep her scheduled appointment on 6/11/19 with NATAN Carlisle.

## 2019-04-05 NOTE — TELEPHONE ENCOUNTER
----- Message from Kerry Sosa sent at 4/5/2019  9:33 AM CDT -----  Contact: Patient  Patient has an appointment for 11:00 appointment and states she is going to be running late and wants to make sure he is going to still see her. Patient states she need to get that left hip injection by Dr. Temple    Please contact to advise [phone] (home)

## 2019-04-05 NOTE — PROGRESS NOTES
Pre-Visit Chart Review  For Appointment Scheduled on 04/05/19    Health Maintenance Due   Topic Date Due    TETANUS VACCINE  07[birth year]22/1968    Foot Exam  03/03/2017    DEXA SCAN  09/16/2017    Eye Exam  05/19/2018    Influenza Vaccine  08/01/2018    Urine Microalbumin  10/03/2018    Hemoglobin A1c  02/22/2019

## 2019-05-14 ENCOUNTER — TELEPHONE (OUTPATIENT)
Dept: PAIN MEDICINE | Facility: CLINIC | Age: 69
End: 2019-05-14

## 2019-05-14 NOTE — TELEPHONE ENCOUNTER
PA submitted for Flexeril waiting for response. Patient notified. Will notify patient and pharmacy of outcome.

## 2019-05-22 RX ORDER — DULOXETIN HYDROCHLORIDE 30 MG/1
CAPSULE, DELAYED RELEASE ORAL
Qty: 90 CAPSULE | Refills: 0 | Status: SHIPPED | OUTPATIENT
Start: 2019-05-22 | End: 2019-06-13 | Stop reason: SDUPTHER

## 2019-06-03 DIAGNOSIS — E78.5 HYPERLIPIDEMIA ASSOCIATED WITH TYPE 2 DIABETES MELLITUS: ICD-10-CM

## 2019-06-03 DIAGNOSIS — E11.69 HYPERLIPIDEMIA ASSOCIATED WITH TYPE 2 DIABETES MELLITUS: ICD-10-CM

## 2019-06-03 RX ORDER — FENOFIBRATE 160 MG/1
160 TABLET ORAL DAILY
Qty: 90 TABLET | Refills: 0 | Status: SHIPPED | OUTPATIENT
Start: 2019-06-03 | End: 2019-06-13 | Stop reason: SDUPTHER

## 2019-06-07 ENCOUNTER — OFFICE VISIT (OUTPATIENT)
Dept: PODIATRY | Facility: CLINIC | Age: 69
End: 2019-06-07
Payer: MEDICARE

## 2019-06-07 ENCOUNTER — TELEPHONE (OUTPATIENT)
Dept: FAMILY MEDICINE | Facility: CLINIC | Age: 69
End: 2019-06-07

## 2019-06-07 VITALS — WEIGHT: 189.81 LBS | BODY MASS INDEX: 30.51 KG/M2 | HEIGHT: 66 IN

## 2019-06-07 DIAGNOSIS — E11.42 DIABETIC POLYNEUROPATHY ASSOCIATED WITH TYPE 2 DIABETES MELLITUS: Primary | ICD-10-CM

## 2019-06-07 PROCEDURE — 99999 PR PBB SHADOW E&M-EST. PATIENT-LVL II: ICD-10-PCS | Mod: PBBFAC,HCNC,, | Performed by: PODIATRIST

## 2019-06-07 PROCEDURE — 99999 PR PBB SHADOW E&M-EST. PATIENT-LVL II: CPT | Mod: PBBFAC,HCNC,, | Performed by: PODIATRIST

## 2019-06-07 PROCEDURE — 3288F FALL RISK ASSESSMENT DOCD: CPT | Mod: HCNC,CPTII,S$GLB, | Performed by: PODIATRIST

## 2019-06-07 PROCEDURE — 99202 OFFICE O/P NEW SF 15 MIN: CPT | Mod: HCNC,S$GLB,, | Performed by: PODIATRIST

## 2019-06-07 PROCEDURE — 3044F HG A1C LEVEL LT 7.0%: CPT | Mod: HCNC,CPTII,S$GLB, | Performed by: PODIATRIST

## 2019-06-07 PROCEDURE — 3288F PR FALLS RISK ASSESSMENT DOCUMENTED: ICD-10-PCS | Mod: HCNC,CPTII,S$GLB, | Performed by: PODIATRIST

## 2019-06-07 PROCEDURE — 99202 PR OFFICE/OUTPT VISIT, NEW, LEVL II, 15-29 MIN: ICD-10-PCS | Mod: HCNC,S$GLB,, | Performed by: PODIATRIST

## 2019-06-07 PROCEDURE — 3044F PR MOST RECENT HEMOGLOBIN A1C LEVEL <7.0%: ICD-10-PCS | Mod: HCNC,CPTII,S$GLB, | Performed by: PODIATRIST

## 2019-06-07 PROCEDURE — 1100F PR PT FALLS ASSESS DOC 2+ FALLS/FALL W/INJURY/YR: ICD-10-PCS | Mod: HCNC,CPTII,S$GLB, | Performed by: PODIATRIST

## 2019-06-07 PROCEDURE — 1100F PTFALLS ASSESS-DOCD GE2>/YR: CPT | Mod: HCNC,CPTII,S$GLB, | Performed by: PODIATRIST

## 2019-06-07 NOTE — TELEPHONE ENCOUNTER
----- Message from Socorro Hernadez sent at 6/7/2019 10:56 AM CDT -----  Ms. Santiago is asking if she may be scheduled any sooner for her appointment with Batsheva Torres.  Please call patient to coordinate a new date if anything sooner available.

## 2019-06-07 NOTE — PROGRESS NOTES
Subjective:      Patient ID: Nathalie Santiago is a 68 y.o. female.    Chief Complaint: Diabetic Foot Exam (Ladan Almodovar- upcoming 6/13/19 appointment)    Pt presents for a diabetic foot exam, no new pedal complaints. Diabetes, increased risk amputation needing evaluation/management/optomization of foot care.     Review of Systems   Constitution: Negative for chills, diaphoresis, fever, malaise/fatigue and night sweats.   Cardiovascular: Negative for claudication, cyanosis, leg swelling and syncope.   Skin: Negative for color change, dry skin, nail changes, rash, suspicious lesions and unusual hair distribution.   Musculoskeletal: Negative for falls, joint pain, joint swelling, muscle cramps, muscle weakness and stiffness.   Gastrointestinal: Negative for constipation, diarrhea, nausea and vomiting.   Neurological: Negative for brief paralysis, disturbances in coordination, focal weakness, numbness, paresthesias, sensory change and tremors.           Objective:      Physical Exam   Constitutional: She is oriented to person, place, and time. She appears well-developed and well-nourished. She is cooperative. No distress.   Oriented to time, place, and person.   Cardiovascular:   Capillary refill 3 seconds all toes/distal feet, all toes/both feet warm to touch.      Negative lymphadenopathy bilateral popliteal fossa and tarsal tunnel.      Negavie lower extremity edema bilateral.     Musculoskeletal: Normal range of motion. She exhibits deformity. She exhibits no edema or tenderness.        Right ankle: Normal. She exhibits normal range of motion, no swelling, no ecchymosis, no deformity, no laceration and normal pulse. Achilles tendon normal. Achilles tendon exhibits no pain, no defect and normal Gotti's test results.        Right foot: There is deformity.        Left foot: There is deformity.   Normal angle, base, station of gait. Decreased stride length, early heel off, moderately propulsive toe off  bilateral.    All ten toes without clubbing, cyanosis, or signs of ischemia.      Patient has hammertoes of digits    3,4,5 bilateral               partially reducible without symptom       No pain to palpation bilateral lower extremities.      Range of motion, stability, muscle strength, and muscle tone are age and health appropriate normal bilateral feet and legs.       Feet:   Right Foot:   Protective Sensation: 10 sites tested. 7 sites sensed.   Skin Integrity: Negative for ulcer, blister, skin breakdown, erythema, warmth, callus or dry skin.   Left Foot:   Protective Sensation: 10 sites tested. 7 sites sensed.   Skin Integrity: Negative for ulcer, blister, skin breakdown, erythema, warmth, callus or dry skin.   Lymphadenopathy: No inguinal adenopathy noted on the right or left side.   Negative lymphadenopathy bilateral popliteal fossa and tarsal tunnel.   Neurological: She is alert and oriented to person, place, and time. She has normal strength. She is not disoriented. She displays no atrophy, no tremor and normal reflexes. A sensory deficit is present. She exhibits normal muscle tone. She displays no seizure activity. Gait normal.   Decreased/absent vibratory sensation bilateral feet to 128Hz tuning fork.     Skin: Skin is warm, dry and intact. Capillary refill takes less than 2 seconds. No abrasion, no bruising, no burn, no ecchymosis, no laceration, no lesion, no petechiae and no rash noted. She is not diaphoretic. No cyanosis or erythema. No pallor. Nails show no clubbing.     Skin is normal age and health appropriate color, turgor, texture, and temperature bilateral lower extremities without ulceration, hyperpigmentation, discoloration, masses nodules or cords palpated.  No ecchymosis, erythema, edema, or cardinal signs of infection bilateral lower extremities.               Assessment:       Encounter Diagnosis   Name Primary?    Diabetic polyneuropathy associated with type 2 diabetes mellitus Yes          Plan:       Nathalie was seen today for diabetic foot exam.    Diagnoses and all orders for this visit:    Diabetic polyneuropathy associated with type 2 diabetes mellitus      I counseled the patient on her conditions, their implications and medical management.    - Shoe inspection. Diabetic Foot Education. Patient reminded of the importance of good nutrition and blood sugar control to help prevent podiatric complications of diabetes. Patient instructed on proper foot hygeine. We discussed wearing proper shoe gear, daily foot inspections, never walking without protective shoe gear, never putting sharp instruments to feet, routine podiatric visits at least annually.          Follow up in about 1 year (around 6/7/2020).     Cathy Mills, JUANJO, PGY3    Discussed conservative treatment with shoes of adequate dimensions, material, and style to alleviate symptoms and delay or prevent surgical intervention.

## 2019-06-07 NOTE — TELEPHONE ENCOUNTER
Spoke to patient. Patient requesting to rescheduled appointment. Patient states she is coming in for a follow up on diabetes and medication

## 2019-06-13 ENCOUNTER — TELEPHONE (OUTPATIENT)
Dept: OPHTHALMOLOGY | Facility: CLINIC | Age: 69
End: 2019-06-13

## 2019-06-13 ENCOUNTER — OFFICE VISIT (OUTPATIENT)
Dept: FAMILY MEDICINE | Facility: CLINIC | Age: 69
End: 2019-06-13
Payer: MEDICARE

## 2019-06-13 ENCOUNTER — TELEPHONE (OUTPATIENT)
Dept: FAMILY MEDICINE | Facility: CLINIC | Age: 69
End: 2019-06-13

## 2019-06-13 ENCOUNTER — PATIENT MESSAGE (OUTPATIENT)
Dept: OPHTHALMOLOGY | Facility: CLINIC | Age: 69
End: 2019-06-13

## 2019-06-13 VITALS
SYSTOLIC BLOOD PRESSURE: 131 MMHG | HEART RATE: 76 BPM | WEIGHT: 186.94 LBS | HEIGHT: 66 IN | TEMPERATURE: 98 F | DIASTOLIC BLOOD PRESSURE: 76 MMHG | BODY MASS INDEX: 30.04 KG/M2

## 2019-06-13 DIAGNOSIS — Z79.4 ENCOUNTER FOR LONG-TERM (CURRENT) USE OF INSULIN: ICD-10-CM

## 2019-06-13 DIAGNOSIS — E11.65 UNCONTROLLED TYPE 2 DIABETES MELLITUS WITH HYPERGLYCEMIA: ICD-10-CM

## 2019-06-13 DIAGNOSIS — N18.30 CKD STAGE 3 DUE TO TYPE 2 DIABETES MELLITUS: ICD-10-CM

## 2019-06-13 DIAGNOSIS — E11.59 HYPERTENSION ASSOCIATED WITH DIABETES: ICD-10-CM

## 2019-06-13 DIAGNOSIS — E78.5 HYPERLIPIDEMIA ASSOCIATED WITH TYPE 2 DIABETES MELLITUS: ICD-10-CM

## 2019-06-13 DIAGNOSIS — E11.22 CKD STAGE 3 DUE TO TYPE 2 DIABETES MELLITUS: ICD-10-CM

## 2019-06-13 DIAGNOSIS — N32.81 OVERACTIVE BLADDER: ICD-10-CM

## 2019-06-13 DIAGNOSIS — J45.20 INTERMITTENT ASTHMA WITHOUT COMPLICATION, UNSPECIFIED ASTHMA SEVERITY: ICD-10-CM

## 2019-06-13 DIAGNOSIS — R42 DIZZINESS: ICD-10-CM

## 2019-06-13 DIAGNOSIS — E03.4 HYPOTHYROIDISM DUE TO ACQUIRED ATROPHY OF THYROID: ICD-10-CM

## 2019-06-13 DIAGNOSIS — F33.1 MODERATE EPISODE OF RECURRENT MAJOR DEPRESSIVE DISORDER: Primary | ICD-10-CM

## 2019-06-13 DIAGNOSIS — I15.2 HYPERTENSION ASSOCIATED WITH DIABETES: ICD-10-CM

## 2019-06-13 DIAGNOSIS — E11.69 HYPERLIPIDEMIA ASSOCIATED WITH TYPE 2 DIABETES MELLITUS: ICD-10-CM

## 2019-06-13 DIAGNOSIS — K31.84 GASTROPARESIS: ICD-10-CM

## 2019-06-13 DIAGNOSIS — E87.6 HYPOKALEMIA: ICD-10-CM

## 2019-06-13 PROCEDURE — 99499 UNLISTED E&M SERVICE: CPT | Mod: HCNC,S$GLB,, | Performed by: NURSE PRACTITIONER

## 2019-06-13 PROCEDURE — 3075F SYST BP GE 130 - 139MM HG: CPT | Mod: HCNC,CPTII,S$GLB, | Performed by: NURSE PRACTITIONER

## 2019-06-13 PROCEDURE — 3044F PR MOST RECENT HEMOGLOBIN A1C LEVEL <7.0%: ICD-10-PCS | Mod: HCNC,CPTII,S$GLB, | Performed by: NURSE PRACTITIONER

## 2019-06-13 PROCEDURE — 3078F DIAST BP <80 MM HG: CPT | Mod: HCNC,CPTII,S$GLB, | Performed by: NURSE PRACTITIONER

## 2019-06-13 PROCEDURE — 3288F FALL RISK ASSESSMENT DOCD: CPT | Mod: HCNC,CPTII,S$GLB, | Performed by: NURSE PRACTITIONER

## 2019-06-13 PROCEDURE — 1100F PTFALLS ASSESS-DOCD GE2>/YR: CPT | Mod: HCNC,CPTII,S$GLB, | Performed by: NURSE PRACTITIONER

## 2019-06-13 PROCEDURE — 3075F PR MOST RECENT SYSTOLIC BLOOD PRESS GE 130-139MM HG: ICD-10-PCS | Mod: HCNC,CPTII,S$GLB, | Performed by: NURSE PRACTITIONER

## 2019-06-13 PROCEDURE — 99214 PR OFFICE/OUTPT VISIT, EST, LEVL IV, 30-39 MIN: ICD-10-PCS | Mod: HCNC,S$GLB,, | Performed by: NURSE PRACTITIONER

## 2019-06-13 PROCEDURE — 99999 PR PBB SHADOW E&M-EST. PATIENT-LVL IV: CPT | Mod: PBBFAC,HCNC,, | Performed by: NURSE PRACTITIONER

## 2019-06-13 PROCEDURE — 99214 OFFICE O/P EST MOD 30 MIN: CPT | Mod: HCNC,S$GLB,, | Performed by: NURSE PRACTITIONER

## 2019-06-13 PROCEDURE — 99499 RISK ADDL DX/OHS AUDIT: ICD-10-PCS | Mod: HCNC,S$GLB,, | Performed by: NURSE PRACTITIONER

## 2019-06-13 PROCEDURE — 3288F PR FALLS RISK ASSESSMENT DOCUMENTED: ICD-10-PCS | Mod: HCNC,CPTII,S$GLB, | Performed by: NURSE PRACTITIONER

## 2019-06-13 PROCEDURE — 3078F PR MOST RECENT DIASTOLIC BLOOD PRESSURE < 80 MM HG: ICD-10-PCS | Mod: HCNC,CPTII,S$GLB, | Performed by: NURSE PRACTITIONER

## 2019-06-13 PROCEDURE — 1100F PR PT FALLS ASSESS DOC 2+ FALLS/FALL W/INJURY/YR: ICD-10-PCS | Mod: HCNC,CPTII,S$GLB, | Performed by: NURSE PRACTITIONER

## 2019-06-13 PROCEDURE — 99999 PR PBB SHADOW E&M-EST. PATIENT-LVL IV: ICD-10-PCS | Mod: PBBFAC,HCNC,, | Performed by: NURSE PRACTITIONER

## 2019-06-13 PROCEDURE — 3044F HG A1C LEVEL LT 7.0%: CPT | Mod: HCNC,CPTII,S$GLB, | Performed by: NURSE PRACTITIONER

## 2019-06-13 RX ORDER — MONTELUKAST SODIUM 10 MG/1
10 TABLET ORAL NIGHTLY
Qty: 90 TABLET | Refills: 3 | Status: SHIPPED | OUTPATIENT
Start: 2019-06-13 | End: 2020-02-20

## 2019-06-13 RX ORDER — CYCLOBENZAPRINE HCL 10 MG
10 TABLET ORAL 3 TIMES DAILY PRN
Refills: 0 | COMMUNITY
Start: 2019-05-15 | End: 2019-07-02 | Stop reason: SDUPTHER

## 2019-06-13 RX ORDER — CLOPIDOGREL BISULFATE 75 MG/1
75 TABLET ORAL DAILY
Qty: 90 TABLET | Refills: 3 | Status: SHIPPED | OUTPATIENT
Start: 2019-06-13 | End: 2020-05-08 | Stop reason: SDUPTHER

## 2019-06-13 RX ORDER — METOPROLOL SUCCINATE 25 MG/1
25 TABLET, EXTENDED RELEASE ORAL DAILY
Qty: 90 TABLET | Refills: 3 | Status: SHIPPED | OUTPATIENT
Start: 2019-06-13 | End: 2020-04-09 | Stop reason: SDUPTHER

## 2019-06-13 RX ORDER — METOCLOPRAMIDE 10 MG/1
10 TABLET ORAL EVERY 8 HOURS
Qty: 270 TABLET | Refills: 1 | Status: SHIPPED | OUTPATIENT
Start: 2019-06-13 | End: 2019-12-12 | Stop reason: SDUPTHER

## 2019-06-13 RX ORDER — POTASSIUM CHLORIDE 750 MG/1
10 CAPSULE, EXTENDED RELEASE ORAL 3 TIMES DAILY
Qty: 270 CAPSULE | Refills: 3 | Status: SHIPPED | OUTPATIENT
Start: 2019-06-13 | End: 2020-04-09 | Stop reason: SDUPTHER

## 2019-06-13 RX ORDER — GABAPENTIN 300 MG/1
300 CAPSULE ORAL 3 TIMES DAILY
Qty: 270 CAPSULE | Refills: 3 | Status: SHIPPED | OUTPATIENT
Start: 2019-06-13 | End: 2019-07-02 | Stop reason: SDUPTHER

## 2019-06-13 RX ORDER — LEVOTHYROXINE SODIUM 25 UG/1
25 TABLET ORAL
Qty: 90 TABLET | Refills: 3 | Status: SHIPPED | OUTPATIENT
Start: 2019-06-13 | End: 2019-08-31 | Stop reason: SDUPTHER

## 2019-06-13 RX ORDER — ROSUVASTATIN CALCIUM 40 MG/1
40 TABLET, COATED ORAL DAILY
Qty: 90 TABLET | Refills: 3 | Status: SHIPPED | OUTPATIENT
Start: 2019-06-13 | End: 2020-05-08 | Stop reason: SDUPTHER

## 2019-06-13 RX ORDER — SERTRALINE HYDROCHLORIDE 100 MG/1
TABLET, FILM COATED ORAL
Qty: 180 TABLET | Refills: 2 | Status: SHIPPED | OUTPATIENT
Start: 2019-06-13 | End: 2019-09-06 | Stop reason: SDUPTHER

## 2019-06-13 RX ORDER — HYDROCHLOROTHIAZIDE 25 MG/1
25 TABLET ORAL DAILY
Qty: 90 TABLET | Refills: 3 | Status: SHIPPED | OUTPATIENT
Start: 2019-06-13 | End: 2019-12-12 | Stop reason: SDUPTHER

## 2019-06-13 RX ORDER — AMLODIPINE BESYLATE 5 MG/1
5 TABLET ORAL DAILY
Qty: 90 TABLET | Refills: 3 | Status: SHIPPED | OUTPATIENT
Start: 2019-06-13 | End: 2019-08-31 | Stop reason: SDUPTHER

## 2019-06-13 RX ORDER — OXYBUTYNIN CHLORIDE 5 MG/1
5 TABLET ORAL 2 TIMES DAILY
Qty: 180 TABLET | Refills: 3 | Status: SHIPPED | OUTPATIENT
Start: 2019-06-13 | End: 2019-07-22 | Stop reason: SDUPTHER

## 2019-06-13 RX ORDER — FENOFIBRATE 160 MG/1
160 TABLET ORAL DAILY
Qty: 90 TABLET | Refills: 3 | Status: SHIPPED | OUTPATIENT
Start: 2019-06-13 | End: 2019-08-31 | Stop reason: SDUPTHER

## 2019-06-13 RX ORDER — METFORMIN HYDROCHLORIDE 1000 MG/1
1000 TABLET ORAL 2 TIMES DAILY WITH MEALS
Qty: 180 TABLET | Refills: 3 | Status: SHIPPED | OUTPATIENT
Start: 2019-06-13 | End: 2019-12-12

## 2019-06-13 RX ORDER — FLUTICASONE FUROATE AND VILANTEROL 200; 25 UG/1; UG/1
1 POWDER RESPIRATORY (INHALATION) DAILY
Qty: 3 EACH | Refills: 3 | Status: SHIPPED | OUTPATIENT
Start: 2019-06-13 | End: 2021-10-27 | Stop reason: SDUPTHER

## 2019-06-13 RX ORDER — DULOXETIN HYDROCHLORIDE 30 MG/1
30 CAPSULE, DELAYED RELEASE ORAL DAILY
Qty: 90 CAPSULE | Refills: 3 | Status: SHIPPED | OUTPATIENT
Start: 2019-06-13 | End: 2019-09-06 | Stop reason: DRUGHIGH

## 2019-06-13 RX ORDER — SPIRONOLACTONE 25 MG/1
25 TABLET ORAL DAILY
Qty: 90 TABLET | Refills: 3 | Status: SHIPPED | OUTPATIENT
Start: 2019-06-13 | End: 2019-12-12 | Stop reason: SDUPTHER

## 2019-06-13 NOTE — PATIENT INSTRUCTIONS
Shingles Vaccination    *Due to a national backorder of the Shingles vaccination, this cannot be administered until January 2019.    Shingles vaccination is the only way to protect against shingles (herpes zoster), a painful rash of blisters most often wrapping around the side of the body. It is caused by the same virus that causes chickenpox. Shingrix is the preferred vaccine to prevent shingles and its complications. You are recommended to receive this vaccine at the age of 50 years. Anyone who received the previous shingles vaccine should be revaccinated with Shingrix. It is a 2-dose series  by 2 to 6 months.

## 2019-06-14 PROBLEM — N17.9 AKI (ACUTE KIDNEY INJURY): Status: RESOLVED | Noted: 2017-03-29 | Resolved: 2019-06-14

## 2019-06-14 NOTE — PROGRESS NOTES
Subjective:       Patient ID: Nathalie Santiago is a 68 y.o. female.    Chief Complaint: Diabetes    Six month follow up diabetes and hypertension. She is overdue for A1c. She has well controlled blood pressure today. She complains of balance problem and falls.  She has not been lightheaded or had vertigo but has just been off balance. She has to hold onto walls to walk sometimes.  She denies headaches.  There has been no weakness or numbness.  She requests refill of metoclopramide which she takes as needed for gastroparesis. She knows the risks of tardive dyskinesia and still wishes to take this medication.   Denies any involuntary movements of the face/mouth/tongue.      Review of Systems   Constitutional: Negative for chills and fever.   HENT: Negative for congestion, ear pain and sinus pressure.    Respiratory: Negative for cough and shortness of breath.    Cardiovascular: Negative for chest pain, palpitations and leg swelling.   Gastrointestinal: Negative for abdominal pain, constipation and diarrhea.   Musculoskeletal: Positive for gait problem. Negative for arthralgias.   Neurological: Positive for dizziness (off balance). Negative for seizures, syncope, weakness, light-headedness and headaches.       Objective:      Physical Exam   Constitutional: She is oriented to person, place, and time. She appears well-nourished. No distress.   HENT:   Head: Normocephalic and atraumatic.   Right Ear: External ear normal.   Left Ear: External ear normal.   Mouth/Throat: Oropharynx is clear and moist. No oropharyngeal exudate.   Eyes: Pupils are equal, round, and reactive to light. Right eye exhibits no discharge. Left eye exhibits no discharge.   Neck: Neck supple. No thyromegaly present.   Cardiovascular: Normal rate and regular rhythm. Exam reveals no gallop and no friction rub.   No murmur heard.  Pulmonary/Chest: Effort normal and breath sounds normal. No respiratory distress. She has no wheezes. She has no rales.    Abdominal: Soft. She exhibits no distension. There is no tenderness.   Lymphadenopathy:     She has no cervical adenopathy.   Neurological: She is alert and oriented to person, place, and time. She has normal strength. She is not disoriented. Coordination normal. GCS eye subscore is 4. GCS verbal subscore is 5. GCS motor subscore is 6.   Positive Romberg.  Abnormal finger to nose.   Skin: Skin is warm and dry.   Psychiatric: She has a normal mood and affect. Her behavior is normal. Thought content normal.   Vitals reviewed.          Current Outpatient Medications:     isocketU-CHEK SMARTVIEW TEST STRIP Strp, , Disp: , Rfl:     albuterol (PROVENTIL/VENTOLIN HFA) 90 mcg/actuation inhaler, Inhale 2 puffs into the lungs every 4 (four) hours as needed for Wheezing. This is a rescue inhaler medication and should be used as needed, Disp: 3 Inhaler, Rfl: 3    albuterol-ipratropium (DUO-NEB) 2.5 mg-0.5 mg/3 mL nebulizer solution, INHALE THE CONTENTS OF 1 VIAL VIA NEBULIZER EVERY 6 HOURS AS NEEDED FOR SHORTNESS OF BREATH  OR  WHEEZING, Disp: 120 vial, Rfl: 3    amLODIPine (NORVASC) 5 MG tablet, Take 1 tablet (5 mg total) by mouth once daily., Disp: 90 tablet, Rfl: 3    cholecalciferol, vitamin D3, 50,000 unit capsule, TAKE 1 CAPSULE EVERY 7 DAYS., Disp: 12 capsule, Rfl: 0    clopidogrel (PLAVIX) 75 mg tablet, Take 1 tablet (75 mg total) by mouth once daily., Disp: 90 tablet, Rfl: 3    cyclobenzaprine (FLEXERIL) 10 MG tablet, Take 10 mg by mouth 3 (three) times daily as needed., Disp: , Rfl: 0    DULoxetine (CYMBALTA) 30 MG capsule, Take 1 capsule (30 mg total) by mouth once daily., Disp: 90 capsule, Rfl: 3    fenofibrate 160 MG Tab, Take 1 tablet (160 mg total) by mouth once daily., Disp: 90 tablet, Rfl: 3    fluticasone (FLONASE) 50 mcg/actuation nasal spray, 2 sprays by Each Nare route once daily. (Patient taking differently: 2 sprays by Each Nare route daily as needed. ), Disp: 1 Bottle, Rfl: 0    fluticasone  furoate-vilanterol (BREO ELLIPTA) 200-25 mcg/dose DsDv diskus inhaler, Inhale 1 puff into the lungs once daily. Controller, Disp: 3 each, Rfl: 3    gabapentin (NEURONTIN) 300 MG capsule, Take 1 capsule (300 mg total) by mouth 3 (three) times daily., Disp: 270 capsule, Rfl: 3    hydroCHLOROthiazide (HYDRODIURIL) 25 MG tablet, Take 1 tablet (25 mg total) by mouth once daily., Disp: 90 tablet, Rfl: 3    insulin aspart (NOVOLOG FLEXPEN) 100 unit/mL InPn pen, Use per sliding scale, Disp: 1 Box, Rfl: 0    insulin  unit/mL injection, Inject 15 Units into the skin 2 (two) times daily before meals., Disp: 3 vial, Rfl: 3    levothyroxine (SYNTHROID) 25 MCG tablet, Take 1 tablet (25 mcg total) by mouth before breakfast., Disp: 90 tablet, Rfl: 3    magnesium oxide (MAG-OX) 400 mg tablet, Take 1 tablet (400 mg total) by mouth 2 (two) times daily., Disp: , Rfl: 0    metFORMIN (GLUCOPHAGE) 1000 MG tablet, Take 1 tablet (1,000 mg total) by mouth 2 (two) times daily with meals., Disp: 180 tablet, Rfl: 3    metoclopramide HCl (REGLAN) 10 MG tablet, Take 1 tablet (10 mg total) by mouth every 8 (eight) hours., Disp: 270 tablet, Rfl: 1    metoprolol succinate (TOPROL-XL) 25 MG 24 hr tablet, Take 1 tablet (25 mg total) by mouth once daily., Disp: 90 tablet, Rfl: 3    montelukast (SINGULAIR) 10 mg tablet, Take 1 tablet (10 mg total) by mouth every evening., Disp: 90 tablet, Rfl: 3    multivitamin Chew, Take by mouth., Disp: , Rfl:     nitrofurantoin, macrocrystal-monohydrate, (MACROBID) 100 MG capsule, TAKE ONE CAPSULE BY MOUTH TWICE DAILY WITH a meal, Disp: , Rfl: 0    nitroGLYCERIN (NITROSTAT) 0.4 MG SL tablet, , Disp: , Rfl:     oxybutynin (DITROPAN) 5 MG Tab, Take 1 tablet (5 mg total) by mouth 2 (two) times daily., Disp: 180 tablet, Rfl: 3    potassium chloride (MICRO-K) 10 MEQ CpSR, Take 1 capsule (10 mEq total) by mouth 3 (three) times daily., Disp: 270 capsule, Rfl: 3    predniSONE (DELTASONE) 5 MG tablet,  Take 1 tablet (5 mg total) by mouth once daily. (Patient taking differently: Take 2.5 mg by mouth once daily. ), Disp: 90 tablet, Rfl: 0    rosuvastatin (CRESTOR) 40 MG Tab, Take 1 tablet (40 mg total) by mouth once daily., Disp: 90 tablet, Rfl: 3    sertraline (ZOLOFT) 100 MG tablet, Take two tablets PO daily, Disp: 180 tablet, Rfl: 2    spironolactone (ALDACTONE) 25 MG tablet, Take 1 tablet (25 mg total) by mouth once daily., Disp: 90 tablet, Rfl: 3    VOLTAREN 1 % Gel, Apply topically 2 (two) times daily., Disp: 100 g, Rfl: 0  Assessment:       1. Moderate episode of recurrent major depressive disorder    2. Hyperlipidemia associated with type 2 diabetes mellitus    3. Hypokalemia    4. Gastroparesis    5. Hypertension associated with diabetes    6. Diabetes type 2, uncontrolled    7. Hypothyroidism due to acquired atrophy of thyroid    8. Overactive bladder    9. Dizziness    10. Encounter for long-term (current) use of insulin    11. CKD stage 3 due to type 2 diabetes mellitus    12. Intermittent asthma without complication, unspecified asthma severity        Plan:       Moderate episode of recurrent major depressive disorder  Stable, continue current medication.  -     sertraline (ZOLOFT) 100 MG tablet; Take two tablets PO daily  Dispense: 180 tablet; Refill: 2  -     DULoxetine (CYMBALTA) 30 MG capsule; Take 1 capsule (30 mg total) by mouth once daily.  Dispense: 90 capsule; Refill: 3    Hyperlipidemia associated with type 2 diabetes mellitus  Continue current medication.  -     rosuvastatin (CRESTOR) 40 MG Tab; Take 1 tablet (40 mg total) by mouth once daily.  Dispense: 90 tablet; Refill: 3  -     fenofibrate 160 MG Tab; Take 1 tablet (160 mg total) by mouth once daily.  Dispense: 90 tablet; Refill: 3    Hypokalemia  -     potassium chloride (MICRO-K) 10 MEQ CpSR; Take 1 capsule (10 mEq total) by mouth 3 (three) times daily.  Dispense: 270 capsule; Refill: 3    Gastroparesis  Stable, continue current  medication.  -     metoclopramide HCl (REGLAN) 10 MG tablet; Take 1 tablet (10 mg total) by mouth every 8 (eight) hours.  Dispense: 270 tablet; Refill: 1    Hypertension associated with diabetes  Stable, continue current medication.  -     spironolactone (ALDACTONE) 25 MG tablet; Take 1 tablet (25 mg total) by mouth once daily.  Dispense: 90 tablet; Refill: 3  -     metoprolol succinate (TOPROL-XL) 25 MG 24 hr tablet; Take 1 tablet (25 mg total) by mouth once daily.  Dispense: 90 tablet; Refill: 3  -     amLODIPine (NORVASC) 5 MG tablet; Take 1 tablet (5 mg total) by mouth once daily.  Dispense: 90 tablet; Refill: 3  -     hydroCHLOROthiazide (HYDRODIURIL) 25 MG tablet; Take 1 tablet (25 mg total) by mouth once daily.  Dispense: 90 tablet; Refill: 3  -     Comprehensive metabolic panel; Future; Expected date: 06/13/2019  -     Lipid panel; Future; Expected date: 06/13/2019  -     Microalbumin/creatinine urine ratio; Future; Expected date: 06/13/2019    Diabetes type 2, uncontrolled  Further recs after abs  -     metFORMIN (GLUCOPHAGE) 1000 MG tablet; Take 1 tablet (1,000 mg total) by mouth 2 (two) times daily with meals.  Dispense: 180 tablet; Refill: 3  -     gabapentin (NEURONTIN) 300 MG capsule; Take 1 capsule (300 mg total) by mouth 3 (three) times daily.  Dispense: 270 capsule; Refill: 3  -     insulin  unit/mL injection; Inject 15 Units into the skin 2 (two) times daily before meals.  Dispense: 3 vial; Refill: 3  -     Comprehensive metabolic panel; Future; Expected date: 06/13/2019  -     Hemoglobin A1c; Future; Expected date: 06/13/2019  -     Lipid panel; Future; Expected date: 06/13/2019  -     Microalbumin/creatinine urine ratio; Future; Expected date: 06/13/2019    Hypothyroidism due to acquired atrophy of thyroid  Continue current medication.  -     levothyroxine (SYNTHROID) 25 MCG tablet; Take 1 tablet (25 mcg total) by mouth before breakfast.  Dispense: 90 tablet; Refill: 3  -     Comprehensive  metabolic panel; Future; Expected date: 06/13/2019  -     CBC auto differential; Future; Expected date: 06/13/2019  -     TSH; Future; Expected date: 06/13/2019    Overactive bladder  Stable, continue current medication.  -     oxybutynin (DITROPAN) 5 MG Tab; Take 1 tablet (5 mg total) by mouth 2 (two) times daily.  Dispense: 180 tablet; Refill: 3    Dizziness  -     CT Head Without Contrast; Future; Expected date: 06/13/2019    Encounter for long-term (current) use of insulin  -     insulin  unit/mL injection; Inject 15 Units into the skin 2 (two) times daily before meals.  Dispense: 3 vial; Refill: 3    CKD stage 3 due to type 2 diabetes mellitus  CMP    Intermittent asthma without complication, unspecified asthma severity  Stable, continue current medication.  -     montelukast (SINGULAIR) 10 mg tablet; Take 1 tablet (10 mg total) by mouth every evening.  Dispense: 90 tablet; Refill: 3  -     fluticasone furoate-vilanterol (BREO ELLIPTA) 200-25 mcg/dose DsDv diskus inhaler; Inhale 1 puff into the lungs once daily. Controller  Dispense: 3 each; Refill: 3    Other orders  -     clopidogrel (PLAVIX) 75 mg tablet; Take 1 tablet (75 mg total) by mouth once daily.  Dispense: 90 tablet; Refill: 3      Patient readiness: acceptance and barriers:none    During the course of the visit the patient was educated and counseled about the following:     Diabetes:  Discussed general issues about diabetes pathophysiology and management.  Hypertension:   Medication: no change.    Goals: Diabetes: Maintain Hemoglobin A1C below 7 and Hypertension: Reduce Blood Pressure    Did patient meet goals/outcomes: Yes    The following self management tools provided: declined    Patient Instructions (the written plan) was given to the patient/family.     Time spent with patient: 30 minutes    Barriers to medications present (no )    Adverse reactions to current medications (no)    Over the counter medications reviewed (Yes)      RTC 3  mos

## 2019-07-02 ENCOUNTER — OFFICE VISIT (OUTPATIENT)
Dept: PAIN MEDICINE | Facility: CLINIC | Age: 69
End: 2019-07-02
Payer: MEDICARE

## 2019-07-02 ENCOUNTER — OFFICE VISIT (OUTPATIENT)
Dept: OPHTHALMOLOGY | Facility: CLINIC | Age: 69
End: 2019-07-02
Payer: MEDICARE

## 2019-07-02 VITALS
WEIGHT: 186 LBS | HEART RATE: 81 BPM | HEIGHT: 66 IN | DIASTOLIC BLOOD PRESSURE: 77 MMHG | BODY MASS INDEX: 29.89 KG/M2 | SYSTOLIC BLOOD PRESSURE: 138 MMHG

## 2019-07-02 DIAGNOSIS — H40.039 ANATOMICAL NARROW ANGLE: Primary | ICD-10-CM

## 2019-07-02 DIAGNOSIS — E11.9 DIABETES MELLITUS TYPE 2 WITHOUT RETINOPATHY: ICD-10-CM

## 2019-07-02 DIAGNOSIS — M47.896 OTHER SPONDYLOSIS, LUMBAR REGION: ICD-10-CM

## 2019-07-02 DIAGNOSIS — M25.561 CHRONIC PAIN OF RIGHT KNEE: ICD-10-CM

## 2019-07-02 DIAGNOSIS — G89.29 CHRONIC PAIN OF RIGHT KNEE: ICD-10-CM

## 2019-07-02 DIAGNOSIS — M70.60 GREATER TROCHANTERIC BURSITIS, UNSPECIFIED LATERALITY: Primary | ICD-10-CM

## 2019-07-02 DIAGNOSIS — H25.013 CORTICAL AGE-RELATED CATARACT OF BOTH EYES: ICD-10-CM

## 2019-07-02 DIAGNOSIS — H52.7 REFRACTIVE ERROR: ICD-10-CM

## 2019-07-02 DIAGNOSIS — H25.13 NUCLEAR SCLEROSIS, BILATERAL: ICD-10-CM

## 2019-07-02 DIAGNOSIS — E11.65 UNCONTROLLED TYPE 2 DIABETES MELLITUS WITH HYPERGLYCEMIA: ICD-10-CM

## 2019-07-02 DIAGNOSIS — M51.36 DDD (DEGENERATIVE DISC DISEASE), LUMBAR: ICD-10-CM

## 2019-07-02 PROCEDURE — 3075F PR MOST RECENT SYSTOLIC BLOOD PRESS GE 130-139MM HG: ICD-10-PCS | Mod: HCNC,CPTII,S$GLB, | Performed by: PHYSICIAN ASSISTANT

## 2019-07-02 PROCEDURE — 99999 PR PBB SHADOW E&M-EST. PATIENT-LVL V: CPT | Mod: PBBFAC,HCNC,, | Performed by: PHYSICIAN ASSISTANT

## 2019-07-02 PROCEDURE — 1101F PT FALLS ASSESS-DOCD LE1/YR: CPT | Mod: HCNC,CPTII,S$GLB, | Performed by: PHYSICIAN ASSISTANT

## 2019-07-02 PROCEDURE — 92014 COMPRE OPH EXAM EST PT 1/>: CPT | Mod: HCNC,S$GLB,, | Performed by: OPHTHALMOLOGY

## 2019-07-02 PROCEDURE — 3078F DIAST BP <80 MM HG: CPT | Mod: HCNC,CPTII,S$GLB, | Performed by: PHYSICIAN ASSISTANT

## 2019-07-02 PROCEDURE — 99214 OFFICE O/P EST MOD 30 MIN: CPT | Mod: HCNC,S$GLB,, | Performed by: PHYSICIAN ASSISTANT

## 2019-07-02 PROCEDURE — 3078F PR MOST RECENT DIASTOLIC BLOOD PRESSURE < 80 MM HG: ICD-10-PCS | Mod: HCNC,CPTII,S$GLB, | Performed by: PHYSICIAN ASSISTANT

## 2019-07-02 PROCEDURE — 92014 PR EYE EXAM, EST PATIENT,COMPREHESV: ICD-10-PCS | Mod: HCNC,S$GLB,, | Performed by: OPHTHALMOLOGY

## 2019-07-02 PROCEDURE — 92020 GONIOSCOPY: CPT | Mod: HCNC,S$GLB,, | Performed by: OPHTHALMOLOGY

## 2019-07-02 PROCEDURE — 99214 PR OFFICE/OUTPT VISIT, EST, LEVL IV, 30-39 MIN: ICD-10-PCS | Mod: HCNC,S$GLB,, | Performed by: PHYSICIAN ASSISTANT

## 2019-07-02 PROCEDURE — 3044F HG A1C LEVEL LT 7.0%: CPT | Mod: HCNC,CPTII,S$GLB, | Performed by: PHYSICIAN ASSISTANT

## 2019-07-02 PROCEDURE — 99499 RISK ADDL DX/OHS AUDIT: ICD-10-PCS | Mod: HCNC,S$GLB,, | Performed by: OPHTHALMOLOGY

## 2019-07-02 PROCEDURE — 99499 UNLISTED E&M SERVICE: CPT | Mod: HCNC,S$GLB,, | Performed by: OPHTHALMOLOGY

## 2019-07-02 PROCEDURE — 99999 PR PBB SHADOW E&M-EST. PATIENT-LVL V: ICD-10-PCS | Mod: PBBFAC,HCNC,, | Performed by: PHYSICIAN ASSISTANT

## 2019-07-02 PROCEDURE — 99999 PR PBB SHADOW E&M-EST. PATIENT-LVL IV: ICD-10-PCS | Mod: PBBFAC,HCNC,, | Performed by: OPHTHALMOLOGY

## 2019-07-02 PROCEDURE — 92020 PR SPECIAL EYE EVAL,GONIOSCOPY: ICD-10-PCS | Mod: HCNC,S$GLB,, | Performed by: OPHTHALMOLOGY

## 2019-07-02 PROCEDURE — 1101F PR PT FALLS ASSESS DOC 0-1 FALLS W/OUT INJ PAST YR: ICD-10-PCS | Mod: HCNC,CPTII,S$GLB, | Performed by: PHYSICIAN ASSISTANT

## 2019-07-02 PROCEDURE — 3075F SYST BP GE 130 - 139MM HG: CPT | Mod: HCNC,CPTII,S$GLB, | Performed by: PHYSICIAN ASSISTANT

## 2019-07-02 PROCEDURE — 3044F PR MOST RECENT HEMOGLOBIN A1C LEVEL <7.0%: ICD-10-PCS | Mod: HCNC,CPTII,S$GLB, | Performed by: PHYSICIAN ASSISTANT

## 2019-07-02 PROCEDURE — 99999 PR PBB SHADOW E&M-EST. PATIENT-LVL IV: CPT | Mod: PBBFAC,HCNC,, | Performed by: OPHTHALMOLOGY

## 2019-07-02 RX ORDER — CYCLOBENZAPRINE HCL 10 MG
10 TABLET ORAL 3 TIMES DAILY PRN
Qty: 270 TABLET | Refills: 0 | Status: SHIPPED | OUTPATIENT
Start: 2019-07-02 | End: 2020-07-13 | Stop reason: SDUPTHER

## 2019-07-02 RX ORDER — GABAPENTIN 300 MG/1
300 CAPSULE ORAL 3 TIMES DAILY
Qty: 270 CAPSULE | Refills: 0 | Status: SHIPPED | OUTPATIENT
Start: 2019-07-02 | End: 2019-12-12 | Stop reason: DRUGHIGH

## 2019-07-02 RX ORDER — OXYCODONE AND ACETAMINOPHEN 10; 325 MG/1; MG/1
1 TABLET ORAL EVERY 8 HOURS PRN
Qty: 90 TABLET | Refills: 0 | Status: SHIPPED | OUTPATIENT
Start: 2019-09-02 | End: 2019-09-30

## 2019-07-02 RX ORDER — OXYCODONE AND ACETAMINOPHEN 10; 325 MG/1; MG/1
1 TABLET ORAL EVERY 8 HOURS PRN
Qty: 90 TABLET | Refills: 0 | Status: SHIPPED | OUTPATIENT
Start: 2019-08-04 | End: 2019-09-02

## 2019-07-02 RX ORDER — OXYCODONE AND ACETAMINOPHEN 10; 325 MG/1; MG/1
1 TABLET ORAL EVERY 8 HOURS PRN
Qty: 90 TABLET | Refills: 0 | Status: SHIPPED | OUTPATIENT
Start: 2019-07-06 | End: 2019-08-04

## 2019-07-02 NOTE — PATIENT INSTRUCTIONS
What Are Cataracts?  A clear lens in the eye focuses light. This lets the eye see images sharply. With age, the lens slowly becomes cloudy. The cloudy lens is a cataract. A cataract scatters light and makes it hard for the eye to focus. Cataracts often form in both eyes. But one lens may cloud faster than the other.      The aging of your lens  Your lens may cloud so slowly that you don`t notice any vision changes at first. But as the cataract gets worse, the eye has a harder time focusing. In early stages, glasses may help you see better. As the lens gets more cloudy, your doctor may recommend surgery to restore your vision.  Date Last Reviewed: 6/14/2015  © 9230-5585 [employer]. [address] [place] [address]. All rights reserved. This information is not intended as a substitute for professional medical care. Always follow your healthcare professional's instructions.        Small-Incision Cataract Surgery: Removing the Old Lens  You may be surprised by how little time small-incision cataract surgery takes.  The procedure  Your doctor uses a microscope and tiny tools to make the incision and remove the old lens. A special tool breaks apart the old lens with sound waves (ultrasound) and then takes out the pieces. This process is called phacoemulsification. The natural membrane (capsule) that held your lens is left in place.  Incision size  A smaller incision (top) means a shorter recovery time for you. The location of the incision will vary.         Date Last Reviewed: 6/14/2015  © 8098-9615 [employer]. [address] [place] [address]. All rights reserved. This information is not intended as a substitute for professional medical care. Always follow your healthcare professional's instructions.        Small-Incision Cataract Surgery: Implanting the New Lens  Once your old lens has been removed, your doctor slips the new lens (IOL or intraocular lens) in through the  incision. The IOL is then placed in the capsule that held your old lens. With the new lens in place, your doctor is ready to close the incision. Some incisions may be closed with stitches. Others are self-sealing. That means they will stay closed on their own without stitches. The IOL does much the same thing as your old lens did before it became cloudy. It focuses light, letting you see sharp images and vivid colors. The IOL normally lasts a lifetime.  How small is an IOL?        Flexible tabs hold the IOL in place in the eye's natural capsule.     Date Last Reviewed: 6/14/2015  © 5014-3316 [employer]. [address] [place] [address], [place] [address]. All rights reserved. This information is not intended as a substitute for professional medical care. Always follow your healthcare professional's instructions.        Before Small-Incision Cataract Surgery    Like any operation, small-incision cataract surgery requires preparation.  Your health history  Your eye doctor will review your health history. Based on that, he or she may order tests or talk to your other health care providers. Tell your eye doctor which medicines you take. That includes over-the-counter medicine such as aspirin.  Your eye exam  You will have a complete eye exam. Your eye doctor or a technician will use devices that measure the length and curve of your eye. These measurements let your doctor select the proper new lens (IOL or intraocular lens) for you.  The night before surgery  Dont eat or drink anything after midnight the night before your surgery. This includes water, coffee, chewing gum, and mints. If you have been told to continue your daily medicine, take it only with small sips of water. Make sure you follow any other instructions your doctor gives you.  The day of surgery  Have someone you know drive you to and from the outpatient surgery clinic or hospital. Plan to be there for about 2 to 3 hours. When you arrive, youll sign  a consent form if you havent done so already. This form explains the risks of surgery. Just before surgery, your doctor will give you medicine that will relax you and keep you from feeling pain. You may sleep lightly.  Risks and complications  · Your doctor may have to shift from a small incision to a larger incision.  · There is a small chance of bleeding, infection, or swelling.   Date Last Reviewed: 6/14/2015 © 2000-2016 [employer]. [address] [place] [address], [place] [address]. All rights reserved. This information is not intended as a substitute for professional medical care. Always follow your healthcare professional's instructions.        After Small-Incision Cataract Surgery: The First 24 Hours    After surgery, youll rest in a recovery area for about an hour. Even though you may feel fine, you should take it easy. Your doctor will let you know what you should and shouldnt do once you get home. You may need to wear eye protection the first day. Also remember to take any eye drops or other medicine your doctor prescribes.  Back at home  Spend your first day relaxing at home. Watch TV, read, or talk to a friend. Be careful to:  · Not rub your eye  · Not lift anything that makes you strain  · Not drink alcohol within the first 24 hours  What to expect  Its normal for your eye to be bruised or bloodshot at first. You may also feel itching or mild discomfort. You may have some short-term (temporary) fluid discharge. These wont last long.   Getting back in action  You may be able to get back to much of your routine on the first day. But with some tasks, your doctor may ask you to wait. Always follow your doctor's recommendations.  Here are some general guidelines:  · You can shower again in 2 to 3 days.  · You can cook again in 2 to 3 days.  · You can drive again in 5 to 7 days.  · You can exercise again in 14 days.  When to call your doctor  Call your doctor if:  · Your pain is not relieved by  over-the-counter medicine.  · You have nausea or vomiting.  · Your vision suddenly becomes worse.   Date Last Reviewed: 6/14/2015  © 6060-6381 [employer]. [address] [place] [address], [place] [address]. All rights reserved. This information is not intended as a substitute for professional medical care. Always follow your healthcare professional's instructions.

## 2019-07-02 NOTE — PROGRESS NOTES
CC: left sided low back and left hip pain    Interval History: Ms. Santiago is a 68 y.o. female with chronic low back and hip pain who presents  for her follow up.   She continues to have lower back, left greater than right lower back pain.  Left GTB and right knee pain has worsened. She had a series of Euflexxa injections in 2014. She has had left GTB injections in the past with benefit.  She continues to take percocet 10mg q8hrs as needed for pain with moderate benefits.  No side effects reported.  Able to perform daily activities with the medications.  She also continues to takes prednisone 5 mg daily. Flexeril 10 mg Gabapentin 300 mg TID has been helpful.  Pain today is rated 6/10.     Prior HPI: The patient is a 64-year-old woman with a history of sarcoidosis, breast CA, diabetes, anxiety and depression who presents in referral from Dr. Nguyen to Dr. Perez for multiple pain complaints including back pain, bilateral hip pain and right shoulder pain.  She is following up with me since I am much closer to her.   She presents today for multiple pain complaints.  She recently was hospitalized for pneumonia, UTI and sarcoidosis exacerbation per patient.  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  She has had past GTB injections with moderate benefit and had an exacerbation of her pain recently.  She was able to receive left GTB with Dr. Perez on 10/3/2014 that provided >50% relief of her left hip and leg pain.     She continues to take Prednisone 5mg daily. She continues Percoet 7.5mg about 3x/day as needed with moderate benefit.  She presents today with worsening right knee pain.  She states having history of right knee osteoarthritis, but has not had any treatment for her knee pain.  Continues to have improvement in her knee pain following completion of visco supplementation injection in her right knee.  Her back and hip pain remain tolerable with her  "medications.  She continues to take Percoct 7.5mg q8hrs as needed with moderate benefit.  No reported side effects.       Pain intervention history: She takes hydrocodone 7-325 2-4 times a day.  Also has undergone epidural steroid injection at L1/2 without relief.  She has been doing cervical traction with good relief of her neck pain.  Previous trochanteric bursa injections with good relief.    ROS:She reports fatigability, itching, headaches, swollen glands, chest pain and shortness of breath, nausea vomiting, easy bruising, back pain, joint stiffness, dizziness, difficulty sleeping, anxiety, depression and loss of balance.  Balance of review of systems is negative.    Medical, surgical, family and social history reviewed elsewhere in record.    Medications/Allergies: See med card    Vitals:    07/02/19 1306   BP: 138/77   Pulse: 81   Weight: 84.4 kg (186 lb)   Height: 5' 6" (1.676 m)   PainSc:   6   PainLoc: Back       Physical exam:  Gen: A and O x3, pleasant, well-groomed  Skin: No rashes or obvious lesions  HEENT: PERRLA, no obvious deformities on ears or in canals. No thyroid masses, trachea midline, no palpable lymph nodes in neck, axilla.  CVS: RRR  Resp: non labored breathing  Abdomen: Soft, NT/ND, normal bowel sounds present.  Neuro:  Lower extremities:   +right knee crepitus. LE erythema and swelling  Reflexes: Patellar 2+, Achilles 2+ bilaterally.  Sensory:  Intact   Lumbar spine:  Lumbar spine: ROM is moderately reduced with flexion extension and oblique extension with increased pain in all directions.    Orlando's test causes pain on both sides.    Supine straight leg raise is negative bilaterally.    Internal and external rotation of the hip causes increased pain in left groin.  Myofascial exam: Tenderness to palpation over both GTB.      Imaging:  Cervical spine MRI report 10/5/12: There is dextroscoliosis in the lower cervical/upper thoracic region.  There is very mild foraminal stenosis on the " left at C6/7.    MRI thoracic spine 11/21/2008: No significant degenerative disc disease or central stenosis.    MRI lumbar spine 8/14/08: L1/2 small broad-based central protrusion with mild effacement of ventral thecal sac but not distorting exiting roots.  L2/3 and L3/4 unremarkable.  At L4/5 there is mild central stenosis with bulging annulus anteriorly and facet hypertrophic degenerative change posteriorly.  No focal disc herniation and neural foramina are patent.  At L5/S1 there is minimal bulging of the annulus with no thecal sac compression.  The small annular fissure within the posterior annulus.  Foramina are patent bilaterally, no stenosis mild, degenerative changes in the facets.    Xray Right Knee 10/13/14  Mild tricompartmental degenerative changes present. No joint effusion. The soft tissues are unremarkable.    Assessment:  Ms. Santiago is a 65-year-old woman with a history of sarcoidosis, breast CA, diabetes, anxiety and depression who presents in referral from Dr. Nguyen for multiple pain complaints including back pain, bilateral hip pain and knee pain   1. Greater trochanteric bursitis, unspecified laterality    2. Uncontrolled type 2 diabetes mellitus with hyperglycemia    3. Other spondylosis, lumbar region    4. DDD (degenerative disc disease), lumbar    5. Chronic pain of right knee        PLAN:  1. I have stressed the importance of physical activity and exercise to improve overall health.    2.  Percocet 10mg q8hrs as needed.  Script given for three months.  reviewed.  UDS next clinic visit.  3.  Flexeril 10 mg q 8 h. #90  4.  I believe her low back pain maybe due to facet arthropathy and have recommended lumbar medial branch blocks as a diagnostic procedure.  If successful, would proceed with radiofrequency ablation.  May consider this procedure in the future  5.  Gabapentin 300mg TID for radicular pain. 2 refills  6.  Schedule left GTB injection. Will also consider right knee Euflexxa  if pain does not improve.    7. Follow up in three months   All medication management was performed by Dr. Lincoln Temple

## 2019-07-02 NOTE — PROGRESS NOTES
HPI     Here for annual Diabetic eye exam. C/o discomfort in the right eye feels   achy like she wants to shield it last few months more so when she is tired   does not recall any eye injuries. Does not use any eye drops.     Lab Results       Component                Value               Date                       HGBA1C                   6.7 (H)             08/22/2018                 HGBA1C                   6.7 (H)             03/28/2017                 HGBA1C                   6.9 (H)             11/17/2016            No results found for: LABA1C    Agree with above. Blurred vision sometimes; always when reading. Avoids   going outside during day due to glare. No longer able to drive at night   due to difficulty driving, daughter does not want her driving during the   day either.    Last edited by Elizabeth Smith MD on 7/2/2019  2:38 PM.   (History)        ROS     Positive for: Constitutional (sardoidosis - on long-term steroids;   hospitalized twice), Endocrine (DM diagnosed 2009 after steroids for   sarcoid; hypothyroid), Cardiovascular (HTN - controlled with meds per pt),   Respiratory (pulmonary sarcoid), Heme/Lymph (ASA)    Negative for: Eyes (denies surgery or trauma)    Other comments for: Neurological (denies neuropathy), Genitourinary   (denies nephropathy)    Last edited by Elizabeth Smith MD on 7/2/2019  2:31 PM.   (History)        Assessment /Plan     For exam results, see Encounter Report.    Anatomical narrow angle    Cortical age-related cataract of both eyes    Diabetes mellitus type 2 without retinopathy    Nuclear sclerosis, bilateral    Refractive error        1. Diabetes mellitus type II, controlled  No CSME, no NPDR   2. PVD OU RD precautions.   3. ERM OD Nasal to fovea. First time noted on clinical exam. Observe. Baseline OCT done prior visit.    4. Nuclear sclerosis, bilateral  Avoid nighttime driving for now. Pt on long-term steroids for sarcoidosis   5. Cortical  cataract   Becoming symptomatic. Approaching visual significance OU. Re-check 1 year, sooner if MRx does not help with ADL's.   6. Sarcoidosis  quiet   7. Hyperopia with presbyopia  MRx given      Discussed concerns regarding cataract surgery

## 2019-07-08 RX ORDER — BLOOD SUGAR DIAGNOSTIC
1 STRIP MISCELLANEOUS 2 TIMES DAILY
Qty: 200 EACH | Refills: 3 | Status: SHIPPED | OUTPATIENT
Start: 2019-07-08 | End: 2020-09-14

## 2019-07-14 RX ORDER — ASPIRIN 325 MG
TABLET, DELAYED RELEASE (ENTERIC COATED) ORAL
Qty: 12 CAPSULE | Refills: 0 | Status: SHIPPED | OUTPATIENT
Start: 2019-07-14 | End: 2019-12-12 | Stop reason: SDUPTHER

## 2019-07-22 DIAGNOSIS — N32.81 OVERACTIVE BLADDER: ICD-10-CM

## 2019-07-22 RX ORDER — OXYBUTYNIN CHLORIDE 5 MG/1
5 TABLET ORAL 2 TIMES DAILY
Qty: 180 TABLET | Refills: 3 | Status: SHIPPED | OUTPATIENT
Start: 2019-07-22 | End: 2019-12-12

## 2019-07-31 ENCOUNTER — TELEPHONE (OUTPATIENT)
Dept: OPHTHALMOLOGY | Facility: CLINIC | Age: 69
End: 2019-07-31

## 2019-07-31 NOTE — TELEPHONE ENCOUNTER
----- Message from Keshia Mg sent at 7/31/2019  3:25 PM CDT -----  Contact: [phone]  Type:  Sooner Apoointment Request    Caller is requesting a sooner appointment.  Caller declined first available appointment listed below.  Caller will not accept being placed on the waitlist and is requesting a message be sent to doctor.    Name of Caller: TRAMAINE WALLACE [[id number]]   When is the first available appointment?  Sept 2019  Symptoms: right eye aching and painful   Best Call Back Number: [phone]

## 2019-08-31 DIAGNOSIS — E78.5 HYPERLIPIDEMIA ASSOCIATED WITH TYPE 2 DIABETES MELLITUS: ICD-10-CM

## 2019-08-31 DIAGNOSIS — E03.4 HYPOTHYROIDISM DUE TO ACQUIRED ATROPHY OF THYROID: ICD-10-CM

## 2019-08-31 DIAGNOSIS — E11.69 HYPERLIPIDEMIA ASSOCIATED WITH TYPE 2 DIABETES MELLITUS: ICD-10-CM

## 2019-08-31 DIAGNOSIS — I15.2 HYPERTENSION ASSOCIATED WITH DIABETES: ICD-10-CM

## 2019-08-31 DIAGNOSIS — E11.59 HYPERTENSION ASSOCIATED WITH DIABETES: ICD-10-CM

## 2019-09-03 ENCOUNTER — TELEPHONE (OUTPATIENT)
Dept: OPHTHALMOLOGY | Facility: CLINIC | Age: 69
End: 2019-09-03

## 2019-09-03 RX ORDER — LEVOTHYROXINE SODIUM 25 UG/1
25 TABLET ORAL
Qty: 90 TABLET | Refills: 0 | Status: SHIPPED | OUTPATIENT
Start: 2019-09-03 | End: 2019-11-13 | Stop reason: SDUPTHER

## 2019-09-03 RX ORDER — AMLODIPINE BESYLATE 5 MG/1
5 TABLET ORAL DAILY
Qty: 90 TABLET | Refills: 0 | Status: SHIPPED | OUTPATIENT
Start: 2019-09-03 | End: 2020-07-16 | Stop reason: SDUPTHER

## 2019-09-03 RX ORDER — FENOFIBRATE 160 MG/1
160 TABLET ORAL DAILY
Qty: 90 TABLET | Refills: 0 | Status: SHIPPED | OUTPATIENT
Start: 2019-09-03 | End: 2019-12-05 | Stop reason: SDUPTHER

## 2019-09-03 NOTE — TELEPHONE ENCOUNTER
----- Message from Dougie FERRER Frisard sent at 9/3/2019  7:41 AM CDT -----  Contact: same  Patient called in and requested a message be sent to Dr. Smith to let her know that her right eye pain is worsening & would like to be seen this Friday 9/6/19 around before 11am or after lunch.    Patient call back number is [phone] or daughter's house phone [phone]

## 2019-09-06 ENCOUNTER — OFFICE VISIT (OUTPATIENT)
Dept: OPHTHALMOLOGY | Facility: CLINIC | Age: 69
End: 2019-09-06
Payer: MEDICARE

## 2019-09-06 ENCOUNTER — OFFICE VISIT (OUTPATIENT)
Dept: PSYCHIATRY | Facility: CLINIC | Age: 69
End: 2019-09-06
Payer: MEDICARE

## 2019-09-06 VITALS
HEART RATE: 71 BPM | BODY MASS INDEX: 29.69 KG/M2 | SYSTOLIC BLOOD PRESSURE: 117 MMHG | DIASTOLIC BLOOD PRESSURE: 69 MMHG | HEIGHT: 66 IN | WEIGHT: 184.75 LBS

## 2019-09-06 DIAGNOSIS — E11.9 DIABETES MELLITUS TYPE 2 WITHOUT RETINOPATHY: ICD-10-CM

## 2019-09-06 DIAGNOSIS — H25.013 CORTICAL AGE-RELATED CATARACT OF BOTH EYES: ICD-10-CM

## 2019-09-06 DIAGNOSIS — F41.1 GAD (GENERALIZED ANXIETY DISORDER): ICD-10-CM

## 2019-09-06 DIAGNOSIS — H40.039 ANATOMICAL NARROW ANGLE: ICD-10-CM

## 2019-09-06 DIAGNOSIS — H57.11 EYE PAIN, RIGHT: Primary | ICD-10-CM

## 2019-09-06 DIAGNOSIS — H52.7 REFRACTIVE ERROR: ICD-10-CM

## 2019-09-06 DIAGNOSIS — G47.00 INSOMNIA, UNSPECIFIED TYPE: ICD-10-CM

## 2019-09-06 DIAGNOSIS — F33.41 MDD (MAJOR DEPRESSIVE DISORDER), RECURRENT, IN PARTIAL REMISSION: ICD-10-CM

## 2019-09-06 DIAGNOSIS — H25.13 NUCLEAR SCLEROSIS, BILATERAL: ICD-10-CM

## 2019-09-06 LAB
LEFT EYE DM RETINOPATHY: NEGATIVE
RIGHT EYE DM RETINOPATHY: NEGATIVE

## 2019-09-06 PROCEDURE — 1101F PT FALLS ASSESS-DOCD LE1/YR: CPT | Mod: HCNC,CPTII,S$GLB, | Performed by: PSYCHIATRY & NEUROLOGY

## 2019-09-06 PROCEDURE — 99499 UNLISTED E&M SERVICE: CPT | Mod: S$GLB,,, | Performed by: PSYCHIATRY & NEUROLOGY

## 2019-09-06 PROCEDURE — 99499 RISK ADDL DX/OHS AUDIT: ICD-10-PCS | Mod: HCNC,S$GLB,, | Performed by: OPHTHALMOLOGY

## 2019-09-06 PROCEDURE — 99213 PR OFFICE/OUTPT VISIT, EST, LEVL III, 20-29 MIN: ICD-10-PCS | Mod: HCNC,S$GLB,, | Performed by: PSYCHIATRY & NEUROLOGY

## 2019-09-06 PROCEDURE — 99499 UNLISTED E&M SERVICE: CPT | Mod: HCNC,S$GLB,, | Performed by: OPHTHALMOLOGY

## 2019-09-06 PROCEDURE — 92012 INTRM OPH EXAM EST PATIENT: CPT | Mod: HCNC,S$GLB,, | Performed by: OPHTHALMOLOGY

## 2019-09-06 PROCEDURE — 99213 OFFICE O/P EST LOW 20 MIN: CPT | Mod: HCNC,S$GLB,, | Performed by: PSYCHIATRY & NEUROLOGY

## 2019-09-06 PROCEDURE — 3078F DIAST BP <80 MM HG: CPT | Mod: HCNC,CPTII,S$GLB, | Performed by: PSYCHIATRY & NEUROLOGY

## 2019-09-06 PROCEDURE — 92012 PR EYE EXAM, EST PATIENT,INTERMED: ICD-10-PCS | Mod: HCNC,S$GLB,, | Performed by: OPHTHALMOLOGY

## 2019-09-06 PROCEDURE — 1101F PR PT FALLS ASSESS DOC 0-1 FALLS W/OUT INJ PAST YR: ICD-10-PCS | Mod: HCNC,CPTII,S$GLB, | Performed by: PSYCHIATRY & NEUROLOGY

## 2019-09-06 PROCEDURE — 99999 PR PBB SHADOW E&M-EST. PATIENT-LVL III: ICD-10-PCS | Mod: PBBFAC,HCNC,, | Performed by: PSYCHIATRY & NEUROLOGY

## 2019-09-06 PROCEDURE — 3074F SYST BP LT 130 MM HG: CPT | Mod: HCNC,CPTII,S$GLB, | Performed by: PSYCHIATRY & NEUROLOGY

## 2019-09-06 PROCEDURE — 90833 PR PSYCHOTHERAPY W/PATIENT W/E&M, 30 MIN (ADD ON): ICD-10-PCS | Mod: HCNC,S$GLB,, | Performed by: PSYCHIATRY & NEUROLOGY

## 2019-09-06 PROCEDURE — 3074F PR MOST RECENT SYSTOLIC BLOOD PRESSURE < 130 MM HG: ICD-10-PCS | Mod: HCNC,CPTII,S$GLB, | Performed by: PSYCHIATRY & NEUROLOGY

## 2019-09-06 PROCEDURE — 3078F PR MOST RECENT DIASTOLIC BLOOD PRESSURE < 80 MM HG: ICD-10-PCS | Mod: HCNC,CPTII,S$GLB, | Performed by: PSYCHIATRY & NEUROLOGY

## 2019-09-06 PROCEDURE — 90833 PSYTX W PT W E/M 30 MIN: CPT | Mod: HCNC,S$GLB,, | Performed by: PSYCHIATRY & NEUROLOGY

## 2019-09-06 PROCEDURE — 99499 RISK ADDL DX/OHS AUDIT: ICD-10-PCS | Mod: S$GLB,,, | Performed by: PSYCHIATRY & NEUROLOGY

## 2019-09-06 PROCEDURE — 99999 PR PBB SHADOW E&M-EST. PATIENT-LVL IV: ICD-10-PCS | Mod: PBBFAC,HCNC,, | Performed by: OPHTHALMOLOGY

## 2019-09-06 PROCEDURE — 99999 PR PBB SHADOW E&M-EST. PATIENT-LVL IV: CPT | Mod: PBBFAC,HCNC,, | Performed by: OPHTHALMOLOGY

## 2019-09-06 PROCEDURE — 99999 PR PBB SHADOW E&M-EST. PATIENT-LVL III: CPT | Mod: PBBFAC,HCNC,, | Performed by: PSYCHIATRY & NEUROLOGY

## 2019-09-06 RX ORDER — DULOXETIN HYDROCHLORIDE 60 MG/1
60 CAPSULE, DELAYED RELEASE ORAL NIGHTLY
Qty: 90 CAPSULE | Refills: 2 | Status: SHIPPED | OUTPATIENT
Start: 2019-09-06 | End: 2020-01-06

## 2019-09-06 RX ORDER — PREDNISOLONE ACETATE 10 MG/ML
1 SUSPENSION/ DROPS OPHTHALMIC EVERY 4 HOURS
Qty: 5 ML | Refills: 0 | Status: SHIPPED | OUTPATIENT
Start: 2019-09-06 | End: 2019-09-06 | Stop reason: CLARIF

## 2019-09-06 RX ORDER — PREDNISOLONE ACETATE 10 MG/ML
SUSPENSION/ DROPS OPHTHALMIC
Qty: 1 BOTTLE | Refills: 0 | Status: SHIPPED | OUTPATIENT
Start: 2019-09-06 | End: 2019-09-16

## 2019-09-06 RX ORDER — SERTRALINE HYDROCHLORIDE 100 MG/1
TABLET, FILM COATED ORAL
Qty: 180 TABLET | Refills: 2 | Status: SHIPPED | OUTPATIENT
Start: 2019-09-06 | End: 2020-02-20 | Stop reason: SDUPTHER

## 2019-09-06 NOTE — PROGRESS NOTES
Outpatient Psychiatry Follow-Up Visit (MD/NP)    9/6/2019    Clinical Status of Patient:  Outpatient (Ambulatory)    Chief Complaint:  Nathalie Santiago is a 69 y.o. female who presents today for follow-up of depression, anxiety, adjustment.  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   Pt reports depression and anxiety began in context of significant psychosocial stressors. Her mother passed away in 2000, her father in 2004. She was diagnosed with breast cancer in 2005, had bilateral mastectomies and then a failed reconstruction. She also has sarcoidosis of the lungs. She sold her home in [place] and moved to [place] because she could no longer care for herself due to medical issues. She has become increasingly dependent on others. She moved back here several years ago and is currently living with her brother and his significant other. The relationship with the latter is strained; her niece who has battled addiction is currently serving time in group home.   Pt has had significant health issues with worsening depression/anxiety in the last several months.  Intake 2013.     Past Psychiatric History:  Tx of depression, anxiety and panic attacks   First saw a psychiatrist 2007   No prior hospitalizations  No suicide attempts or self injury   PCP most recently treating her in [place]   Previous meds: Restoril (no effect), Dalmane (no effect), Klonopin, Ativan, could not afford Cymbalta but helped pain and depression, Wellbutrin XL (3rd day fell out of bed x 4, bruised, amnestic), Effexor (raised her BP), no antipsychotics or mood stabilizers, Citalopram (some benefit).   Trazodone (no benefit), mirtazapine, melatonin (3 mg ineffective, higher dose HA), ativan, clonazepam, amitriptyline   No prior counseling         Past medical history:  DM2  RLS  Sarcoidosis  "of lung   Hx ovarian CA  HTN  Hx of malignant phylloides tumor of breast   Bilateral cataract surgery   Chronic neck, back and hip pain   Arthritis   MVR  Hx CVA  Hx DJD  No cardiac hx   No hx seizures or head injury      Past Surgical History:  bilateral mastectomy   CYSTOCELE REPAIR  Rectocele  CHOLECYSTECTOMY  PLEURA BIOPSY  Right foot  fRACTURE SURGERY  MASTECTOMY  RECTAL SURGERY     Family History:   Mother - depression  Sister - bipolar disorder vs schizophrenia, hospitalizations      Social History:  Former RN x 35 years, SSID since 2008  Lives with her brother and his SO ex wife heather [place], [place]   3 prior marriages, , 1 daughter   Raised in [place] in intact home - 3 brothers and 2 sisters - "stable"  No  or legal hx   No hx abuse   Hobbies: sewing, beading, reading   Education: Some college   Latter day: [employer] orthodox     Substance History:  Never smoked  Rare alcohol: 2 times a year   No illicit drug. Denies misuse of opiates, benzodiazepines   Moderate caffeine: coffee, tea, cokes     Interim hx:   Pt has been taking Sertraline 200 mg daily, and Cymbalta 30 mg daily. she previously stopped Seroquel - did not tolerate.   Pt reports there have been significant stressors in interim.  Daughter Effie sustained head injury when dog knocked her onto the ground.   She sustained head trauma with intracranial hemmorhage - hardly can function.  This occurred 2-3 weeks weeks ago.  Pt was not there when it occurred.  Taken to [facility].  No surgery required.  This has bee traumatic and emotional for family.   Her daughter has been having headaches, not functioning.   She had adopted her 3 yo granddaughter whom pt is not helping to care for.  Pt had moved back with brother Nj in [place], [place] for a brief time, and was doing quite well.   She is now back in [place] full time.   Daughter had a cancerous nodule on her thyroid one week prior to accident, dx of hypothyroidism which is also " impacting her functioning.     Pt states she is very busy, 7 am to 9 pm. Feels she needs time for herself which makes her feel dizzy. Her daughter's  works off [place].   Pt's heatth has been more stable.  She does not fall as much, but loses balance.  She has not hit her head.   Pt is under care of pain management, she is not comfortable taking pain meds with caring for 3 yo; back is sore/painful, but able to push through. Pt requests to titrate Cymbalta to higher dosing - it has helped her mood and pain.   No recent hospitalizations, she has had some incontinence (bowel and bladder, she thinks IBS related). Believes she is having a UTI now  Est appt with PERCY Rodriguez 12/19 - she wants to see MD.  She takes Reglan several times a week for reflux like symptoms.   Reads, no time for quilting or sewing.    Sleep is better - she is taking Gabapentin 300 mg in am and 900 mg in pm which helps.    She is trying to eat healthier - she has lost 5 lbs.     PHQ-9: 10, somewhat difficult. (ahedonia, sleep issues, fatigue, overeating, guilt, poor focus).  Slept great when she was living with brother.  Great granddaughter wakes her up.  Pt is making self get dressed and go outside.  She does not drive.  Memory is subjectively improved.   GARFIELD-7: 7, somewhat difficult. (worry, trouble relaxing, restlessness, irritable, impending doom).    Feels her brain will not shut off at night.  No panic attacks.      Pt had LHC earlier in year due to chest tightness.  Her EF was 78% and she had non obstructive disease (20%) per pt report, MD is Dr Bal.   CPAP compliant.   No tobacco.   Infrequent alcohol ise, drug use.     Denies symptoms of arnel/psychosis. Denies suicidal/homicidal ideations. No self harm or violence.     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  Does not maintain nursing license.   no hx suicide attempts, no self harm.  Guns in home are locked up.      Normal clock drawing and MOCA 30/30  "previous visit.     Review of Systems   PSYCHIATRIC: see above  CONSTITUTIONAL: weight loss 5 lbs   MUSCULOSKELETAL: 6/10 back pain today.  CV: occasional exertional SOB, no chest pain     Past Medical, Family and Social History: The patient's past medical, family and social history have been reviewed and updated as appropriate within the electronic medical record - see encounter notes.    Medications:  Sertraline 200 mg daily   Cymbalta 30 mg daily     Gabapentin 300  Mg in am and 900 mg in pm   Percoet - pain management     Compliance: yes     Side effects:  Over sedation Seroquel   Amnesia, complex sleep related symptoms on Xanax, Ambien   headaches when Ambien taken too often; dry mouth on amitriptyline, elevated BP on Effexor XR, falls    Risk Parameters:  Patient reports no suicidal ideation  Patient reports no homicidal ideation  Patient reports no self-injurious behavior  Patient reports no violent behavior    Exam (detailed: at least 9 elements; comprehensive: all 15 elements)   Constitutional  Vitals:  Most recent vital signs, dated more than 90 days prior to this appointment, were reviewed.         General:  age appropriate, casually dressed,  calm, adequate grooming         Musculoskeletal  Muscle Strength/Tone:  no tremor   Gait & Station:  No ataxia      Psychiatric  Speech:  no latency; no press, spontaneous   Mood & Affect:  "stressed"   Restricted    Thought Process:  Linear    Associations:  intact   Thought Content:  normal, no suicidality, no homicidality, delusions, or paranoia, hallucinations: (auditory: no, visual: no)   Insight:  has awareness of illness   Judgement: behavior is adequate to circumstances   Orientation:  grossly intact; alert and oriented x 4    Memory: intact for content of interview,    Language: grossly intact no fluency issues    Attention Span & Concentration:  able to focus grossly intact    Fund of Knowledge:  intact and appropriate to age and level of education "     Assessment and Diagnosis   Status/Progress: Based on the examination today, the patient's problem(s) is/are under fair but inadequate control.   New problems have not been presented today.   Comorbidities are complicating management of the primary condition.  (multiple medical problems)     Impression: 68 yo female with multiple medical problems and hx of depression, insomnia, and anxiety presents for follow up. Significant family stressors and health issues. Pt has required multiple sleep aides in past. She has required chronic benzodiazepine for stabilization, has failed multiple sleep aides;  Sedative-hyponotics were discontinued in interim due to health issues, reports of falls.  She contacted me previously with worsening depression/anxiety - low dose clonazepam restarted, but is showing signs of stabilization both physically and mentally.  She reported that clonazepam is not helping and requested to restart Xanax/Ambien (previous meds).  Pt reports fall in interim, and also interaction/amnesia with Nyquil + Xanax which could have been dangerous. + Chronic opioid use.  She ended up staying on Sertraline and this medication has been helping with her maintaining stable mood following her niece's death.   Pt mixed Ambien and Xanax and had amnesia, complex sleep related behaviors.  I have discussed with her and daughter that sedatives are not safe medications for her.  Scored perfect MOCA last visit.  Did not tolerate Seroquel, but pt was becoming more active, living with daughter, less isolation - pt was improving.   At last visit, she reported pain has worsened, which limited her activity and leading to increased anxiety, depression, motivation issues   Pt's daughter sustained head injury in interim - pt is primary caregiver to her and great granddaughter.  She is managing, but overwhelmed at times.  Cymbalta is helping and she requests titration for mood/anxiety and pain management.     MDD,recurrent episode,  in partial remission   GARFIELD  Insomnia disorder   R/O Hoarding Disorder   sarcoidosis, hx phyloides tumor of breast, .DM2, RLS, arthritis, chronic neck, hip, and back pain, hx avulsion fracture, JOSE LUIS, hx CVA    GAF: 60    Strengths and Liabilities: Strength: Patient accepts guidance/feedback, Strength: Patient is expressive/articulate., Strength: Patient has reasonable judgment.   Treatment Goals: Specify outcomes written in observable, behavioral terms:   Anxiety: acquiring relapse prevention skills and reducing physical symptoms of anxiety   Depression: acquiring relapse prevention skills, increasing energy, increasing motivation, reducing excessive guilt and reducing negative automatic thoughts     Treatment Plan/Recommendations:   Medication Management: The risks and benefits of medication were discussed with the patient.    1. Continue Sertraline 200 mg daily  2. Call to report any worsening of symptoms or problems with the medication  3. Pt instructed to go to ER with thoughts of harming self, others   4. Titrate Cymbalta to 60 mg nightly (it does seem to help her sleep too). Target depression, anxiety, pain. Discussed potential for med interaction, serotonin syndrome, hepatic impairment   5. For sleep,  Gabapentin to 300 mg in am and 900 mg at bedtime is helping    Return to Clinic:  3 months or sooner PRN

## 2019-09-30 ENCOUNTER — OFFICE VISIT (OUTPATIENT)
Dept: PAIN MEDICINE | Facility: CLINIC | Age: 69
End: 2019-09-30
Payer: MEDICARE

## 2019-09-30 VITALS
HEART RATE: 72 BPM | DIASTOLIC BLOOD PRESSURE: 78 MMHG | BODY MASS INDEX: 29.57 KG/M2 | HEIGHT: 66 IN | WEIGHT: 184 LBS | SYSTOLIC BLOOD PRESSURE: 143 MMHG

## 2019-09-30 DIAGNOSIS — G89.4 CHRONIC PAIN DISORDER: ICD-10-CM

## 2019-09-30 DIAGNOSIS — M51.36 DDD (DEGENERATIVE DISC DISEASE), LUMBAR: ICD-10-CM

## 2019-09-30 DIAGNOSIS — M70.60 GREATER TROCHANTERIC BURSITIS, UNSPECIFIED LATERALITY: ICD-10-CM

## 2019-09-30 DIAGNOSIS — M47.896 OTHER SPONDYLOSIS, LUMBAR REGION: ICD-10-CM

## 2019-09-30 DIAGNOSIS — F11.90 CHRONIC, CONTINUOUS USE OF OPIOIDS: Primary | ICD-10-CM

## 2019-09-30 PROCEDURE — 20610 DRAIN/INJ JOINT/BURSA W/O US: CPT | Mod: HCNC,LT,S$GLB, | Performed by: ANESTHESIOLOGY

## 2019-09-30 PROCEDURE — 1101F PR PT FALLS ASSESS DOC 0-1 FALLS W/OUT INJ PAST YR: ICD-10-PCS | Mod: HCNC,CPTII,S$GLB, | Performed by: ANESTHESIOLOGY

## 2019-09-30 PROCEDURE — 3077F SYST BP >= 140 MM HG: CPT | Mod: HCNC,CPTII,S$GLB, | Performed by: ANESTHESIOLOGY

## 2019-09-30 PROCEDURE — 1101F PT FALLS ASSESS-DOCD LE1/YR: CPT | Mod: HCNC,CPTII,S$GLB, | Performed by: ANESTHESIOLOGY

## 2019-09-30 PROCEDURE — 3078F DIAST BP <80 MM HG: CPT | Mod: HCNC,CPTII,S$GLB, | Performed by: ANESTHESIOLOGY

## 2019-09-30 PROCEDURE — 99999 PR PBB SHADOW E&M-EST. PATIENT-LVL V: CPT | Mod: PBBFAC,HCNC,, | Performed by: ANESTHESIOLOGY

## 2019-09-30 PROCEDURE — 3078F PR MOST RECENT DIASTOLIC BLOOD PRESSURE < 80 MM HG: ICD-10-PCS | Mod: HCNC,CPTII,S$GLB, | Performed by: ANESTHESIOLOGY

## 2019-09-30 PROCEDURE — 99213 OFFICE O/P EST LOW 20 MIN: CPT | Mod: HCNC,25,S$GLB, | Performed by: ANESTHESIOLOGY

## 2019-09-30 PROCEDURE — 3077F PR MOST RECENT SYSTOLIC BLOOD PRESSURE >= 140 MM HG: ICD-10-PCS | Mod: HCNC,CPTII,S$GLB, | Performed by: ANESTHESIOLOGY

## 2019-09-30 PROCEDURE — 80307 DRUG TEST PRSMV CHEM ANLYZR: CPT | Mod: HCNC

## 2019-09-30 PROCEDURE — 99213 PR OFFICE/OUTPT VISIT, EST, LEVL III, 20-29 MIN: ICD-10-PCS | Mod: HCNC,25,S$GLB, | Performed by: ANESTHESIOLOGY

## 2019-09-30 PROCEDURE — 20610 LARGE JOINT ASPIRATION/INJECTION: L GREATER TROCHANTERIC BURSA: ICD-10-PCS | Mod: HCNC,LT,S$GLB, | Performed by: ANESTHESIOLOGY

## 2019-09-30 PROCEDURE — 99999 PR PBB SHADOW E&M-EST. PATIENT-LVL V: ICD-10-PCS | Mod: PBBFAC,HCNC,, | Performed by: ANESTHESIOLOGY

## 2019-09-30 RX ORDER — METHYLPREDNISOLONE ACETATE 40 MG/ML
40 INJECTION, SUSPENSION INTRA-ARTICULAR; INTRALESIONAL; INTRAMUSCULAR; SOFT TISSUE
Status: DISCONTINUED | OUTPATIENT
Start: 2019-09-30 | End: 2019-09-30 | Stop reason: HOSPADM

## 2019-09-30 RX ORDER — OXYCODONE AND ACETAMINOPHEN 10; 325 MG/1; MG/1
1 TABLET ORAL EVERY 8 HOURS PRN
Qty: 90 TABLET | Refills: 0 | Status: SHIPPED | OUTPATIENT
Start: 2019-09-30 | End: 2019-10-30

## 2019-09-30 RX ORDER — OXYCODONE AND ACETAMINOPHEN 10; 325 MG/1; MG/1
1 TABLET ORAL EVERY 8 HOURS PRN
Qty: 90 TABLET | Refills: 0 | Status: SHIPPED | OUTPATIENT
Start: 2019-11-27 | End: 2019-12-27

## 2019-09-30 RX ORDER — OXYCODONE AND ACETAMINOPHEN 10; 325 MG/1; MG/1
1 TABLET ORAL EVERY 8 HOURS PRN
Qty: 90 TABLET | Refills: 0 | Status: SHIPPED | OUTPATIENT
Start: 2019-10-29 | End: 2019-11-28

## 2019-09-30 RX ORDER — ONDANSETRON 8 MG/1
TABLET, ORALLY DISINTEGRATING ORAL
COMMUNITY
Start: 2016-12-28 | End: 2020-01-06 | Stop reason: ALTCHOICE

## 2019-09-30 RX ORDER — DOXEPIN HYDROCHLORIDE 10 MG/1
CAPSULE ORAL
COMMUNITY
Start: 2017-04-20 | End: 2020-02-20

## 2019-09-30 RX ADMIN — METHYLPREDNISOLONE ACETATE 40 MG: 40 INJECTION, SUSPENSION INTRA-ARTICULAR; INTRALESIONAL; INTRAMUSCULAR; SOFT TISSUE at 11:09

## 2019-09-30 NOTE — PROCEDURES
Large Joint Aspiration/Injection: L greater trochanteric bursa  Date/Time: 9/30/2019 11:00 AM  Performed by: Lincoln Temple MD  Authorized by: Lincoln Temple MD     Consent Done?:  Yes (Written)  Indications:  Diagnostic evaluation  Procedure site marked: Yes    Timeout: Prior to procedure the correct patient, procedure, and site was verified    Anesthesia  Local anesthesia used  Anesthesia: local infiltration  Anesthetic: bupivacaine 0.25% without epinephrine  Anesthetic total: 4mL    Location:  Hip  Site:  L greater trochanteric bursa  Prep: Patient was prepped and draped in usual sterile fashion    Needle size:  25 G  Ultrasonic Guidance for needle placement: No  Approach:  Lateral  Medications:  40 mg methylPREDNISolone acetate 40 mg/mL  Patient tolerance:  Patient tolerated the procedure well with no immediate complications

## 2019-09-30 NOTE — PROGRESS NOTES
CC: left sided low back and left hip pain    Interval History: Ms. Santiago is a 69 y.o. female with chronic low back and hip pain who presents  for her follow up.   She continues to have lower back, left greater than right lower back pain.  Left GTB and right knee pain has worsened. She had a series of Euflexxa injections in 2014. She has had left GTB injections in the past with benefit.  She desires repeat injection today with us.  She continues to take percocet 10mg q8hrs as needed for pain with moderate benefits.  No side effects reported.  Able to perform daily activities with the medications.  She also continues to takes prednisone 5 mg daily. Flexeril 10 mg Gabapentin 300 mg TID has been helpful.  Pain today is rated 6/10.     Prior HPI: The patient is a 64-year-old woman with a history of sarcoidosis, breast CA, diabetes, anxiety and depression who presents in referral from Dr. Nguyen to Dr. Perez for multiple pain complaints including back pain, bilateral hip pain and right shoulder pain.  She is following up with me since I am much closer to her.   She presents today for multiple pain complaints.  She recently was hospitalized for pneumonia, UTI and sarcoidosis exacerbation per patient.  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  She has had past GTB injections with moderate benefit and had an exacerbation of her pain recently.  She was able to receive left GTB with Dr. Perez on 10/3/2014 that provided >50% relief of her left hip and leg pain.     She continues to take Prednisone 5mg daily. She continues Percoet 7.5mg about 3x/day as needed with moderate benefit.  She presents today with worsening right knee pain.  She states having history of right knee osteoarthritis, but has not had any treatment for her knee pain.  Continues to have improvement in her knee pain following completion of visco supplementation injection in her right knee.  Her back  "and hip pain remain tolerable with her medications.  She continues to take Percoct 7.5mg q8hrs as needed with moderate benefit.  No reported side effects.       Pain intervention history: She takes hydrocodone 7-325 2-4 times a day.  Also has undergone epidural steroid injection at L1/2 without relief.  She has been doing cervical traction with good relief of her neck pain.  Previous trochanteric bursa injections with good relief.    ROS:She reports fatigability, itching, headaches, swollen glands, chest pain and shortness of breath, nausea vomiting, easy bruising, back pain, joint stiffness, dizziness, difficulty sleeping, anxiety, depression and loss of balance.  Balance of review of systems is negative.    Medical, surgical, family and social history reviewed elsewhere in record.    Medications/Allergies: See med card    Vitals:    09/30/19 1105   BP: (!) 143/78   Pulse: 72   Weight: 83.5 kg (184 lb)   Height: 5' 6" (1.676 m)   PainSc:   8   PainLoc: Back       Physical exam:  Gen: A and O x3, pleasant, well-groomed  Skin: No rashes or obvious lesions  HEENT: PERRLA, no obvious deformities on ears or in canals. No thyroid masses, trachea midline, no palpable lymph nodes in neck, axilla.  CVS: RRR  Resp: non labored breathing  Abdomen: Soft, NT/ND, normal bowel sounds present.  Neuro:  Lower extremities:   +right knee crepitus. LE erythema and swelling  Reflexes: Patellar 2+, Achilles 2+ bilaterally.  Sensory:  Intact   Lumbar spine:  Lumbar spine: ROM is moderately reduced with flexion extension and oblique extension with increased pain in all directions.    Orlando's test causes pain on both sides.    Supine straight leg raise is negative bilaterally.    Internal and external rotation of the hip causes increased pain in left groin.  Myofascial exam: Tenderness to palpation over both GTB.      Imaging:  Cervical spine MRI report 10/5/12: There is dextroscoliosis in the lower cervical/upper thoracic region.  There " is very mild foraminal stenosis on the left at C6/7.    MRI thoracic spine 11/21/2008: No significant degenerative disc disease or central stenosis.    MRI lumbar spine 8/14/08: L1/2 small broad-based central protrusion with mild effacement of ventral thecal sac but not distorting exiting roots.  L2/3 and L3/4 unremarkable.  At L4/5 there is mild central stenosis with bulging annulus anteriorly and facet hypertrophic degenerative change posteriorly.  No focal disc herniation and neural foramina are patent.  At L5/S1 there is minimal bulging of the annulus with no thecal sac compression.  The small annular fissure within the posterior annulus.  Foramina are patent bilaterally, no stenosis mild, degenerative changes in the facets.    Xray Right Knee 10/13/14  Mild tricompartmental degenerative changes present. No joint effusion. The soft tissues are unremarkable.    Assessment:  Ms. Santiago is a 65-year-old woman with a history of sarcoidosis, breast CA, diabetes, anxiety and depression who presents in referral from Dr. Nguyen for multiple pain complaints including back pain, bilateral hip pain and knee pain   1. Chronic, continuous use of opioids    2. Chronic pain disorder    3. Greater trochanteric bursitis, unspecified laterality    4. Other spondylosis, lumbar region    5. DDD (degenerative disc disease), lumbar        PLAN:  1. I have stressed the importance of physical activity and exercise to improve overall health.    2.  Percocet 10mg q8hrs as needed.  Script given for three months.  reviewed.  UDS today  3.  Flexeril 10 mg q 8 h. #90  4.  I believe her low back pain maybe due to facet arthropathy and have recommended lumbar medial branch blocks as a diagnostic procedure.  If successful, would proceed with radiofrequency ablation.  May consider this procedure in the future  5.  Gabapentin 300mg TID for radicular pain. 2 refills  6.  Schedule left GTB injection.   7. Follow up in three months

## 2019-10-03 LAB
6MAM UR QL: NOT DETECTED
7AMINOCLONAZEPAM UR QL: NOT DETECTED
A-OH ALPRAZ UR QL: NOT DETECTED
ALPRAZ UR QL: NOT DETECTED
AMPHET UR QL SCN: NOT DETECTED
BARBITURATES UR QL: NOT DETECTED
BUPRENORPHINE UR QL: NOT DETECTED
BZE UR QL: NOT DETECTED
CARBOXYTHC UR QL: NOT DETECTED
CARISOPRODOL UR QL: NOT DETECTED
CLONAZEPAM UR QL: NOT DETECTED
CODEINE UR QL: NOT DETECTED
CREAT UR-MCNC: 192.4 MG/DL (ref 20–400)
DIAZEPAM UR QL: NOT DETECTED
ETHYL GLUCURONIDE UR QL: NOT DETECTED
FENTANYL UR QL: NOT DETECTED
HYDROCODONE UR QL: NOT DETECTED
HYDROMORPHONE UR QL: NOT DETECTED
LORAZEPAM UR QL: NOT DETECTED
MDA UR QL: NOT DETECTED
MDEA UR QL: NOT DETECTED
MDMA UR QL: NOT DETECTED
ME-PHENIDATE UR QL: NOT DETECTED
MEPERIDINE UR QL: NOT DETECTED
METHADONE UR QL: NOT DETECTED
METHAMPHET UR QL: NOT DETECTED
MIDAZOLAM UR QL SCN: NOT DETECTED
MORPHINE UR QL: NOT DETECTED
NORBUPRENORPHINE UR QL CFM: NOT DETECTED
NORDIAZEPAM UR QL: NOT DETECTED
NORFENTANYL UR QL: NOT DETECTED
NORHYDROCODONE UR QL CFM: NOT DETECTED
NOROXYCODONE UR QL CFM: PRESENT
NOROXYMORPHONE: PRESENT
OXAZEPAM UR QL: NOT DETECTED
OXYCODONE UR QL: PRESENT
OXYMORPHONE UR QL: NOT DETECTED
PATHOLOGY STUDY: NORMAL
PCP UR QL: NOT DETECTED
PHENTERMINE UR QL: NOT DETECTED
PROPOXYPH UR QL: NOT DETECTED
SERVICE CMNT-IMP: NORMAL
TAPENTADOL UR QL SCN: NOT DETECTED
TAPENTADOL-O-SULF: NOT DETECTED
TEMAZEPAM UR QL: NOT DETECTED
TRAMADOL UR QL: NOT DETECTED
ZOLPIDEM UR QL: NOT DETECTED

## 2019-10-08 ENCOUNTER — TELEPHONE (OUTPATIENT)
Dept: FAMILY MEDICINE | Facility: CLINIC | Age: 69
End: 2019-10-08

## 2019-10-08 NOTE — TELEPHONE ENCOUNTER
----- Message from Glenda Rodriguez NP sent at 10/8/2019 10:17 AM CDT -----  Contact: self [phone]  Has appt schedule with me 12/20. Pt is more than welcome to be seen sooner.     ----- Message -----  From: Salinas Flor RN  Sent: 10/8/2019  10:11 AM CDT  To: Glenda Rodriguez NP        ----- Message -----  From: Bebe Albarado  Sent: 10/8/2019   9:51 AM CDT  To: Michael Doshi Staff    She is calling back about the [email or web address] appt, she wants to establish with a MD.  Please call her.  Thank you!

## 2019-10-25 ENCOUNTER — TELEPHONE (OUTPATIENT)
Dept: FAMILY MEDICINE | Facility: CLINIC | Age: 69
End: 2019-10-25

## 2019-10-25 NOTE — TELEPHONE ENCOUNTER
----- Message from Catarina Leo sent at 10/25/2019  9:53 AM CDT -----  Message from Brian Inbox   ----- Message -----  From: Danuta Piper  Sent: 10/10/2019   4:37 PM CDT  To: Results Brian Herman    Caller needs call back rg therapy on Duloxetine rx. [phone]

## 2019-10-25 NOTE — TELEPHONE ENCOUNTER
Called [facility] regarding below message.  prescription has been submitted. Pharmacy states unsure why call was made. Prescription was submitted to pt yesterday with no further needs.

## 2019-11-13 DIAGNOSIS — E03.4 HYPOTHYROIDISM DUE TO ACQUIRED ATROPHY OF THYROID: ICD-10-CM

## 2019-11-14 RX ORDER — LEVOTHYROXINE SODIUM 25 UG/1
TABLET ORAL
Qty: 90 TABLET | Refills: 0 | Status: SHIPPED | OUTPATIENT
Start: 2019-11-14 | End: 2019-12-12 | Stop reason: SDUPTHER

## 2019-12-05 DIAGNOSIS — E78.5 HYPERLIPIDEMIA ASSOCIATED WITH TYPE 2 DIABETES MELLITUS: ICD-10-CM

## 2019-12-05 DIAGNOSIS — E11.69 HYPERLIPIDEMIA ASSOCIATED WITH TYPE 2 DIABETES MELLITUS: ICD-10-CM

## 2019-12-06 RX ORDER — FENOFIBRATE 160 MG/1
TABLET ORAL
Qty: 90 TABLET | Refills: 0 | Status: SHIPPED | OUTPATIENT
Start: 2019-12-06 | End: 2020-07-16 | Stop reason: SDUPTHER

## 2019-12-11 ENCOUNTER — TELEPHONE (OUTPATIENT)
Dept: FAMILY MEDICINE | Facility: CLINIC | Age: 69
End: 2019-12-11

## 2019-12-11 NOTE — TELEPHONE ENCOUNTER
Pt has scheduled appt for later this month, stated that she needed to be seen earlier.  Pt stated that she was not feeling well, pain in the epigastric region, and new lump in the right breast area, (pt has hx of bilateral mastectomies).  Pt turned down next appt available this afternoon, Appt scheduled for tomorrow at 1300hrs.

## 2019-12-11 NOTE — TELEPHONE ENCOUNTER
----- Message from Petra Mtz sent at 12/11/2019 10:34 AM CST -----  Contact: pt [phone]  Patient called and asked if you will have an earlier appt  She would like to be seen sooner than Dec 20

## 2019-12-12 ENCOUNTER — OFFICE VISIT (OUTPATIENT)
Dept: FAMILY MEDICINE | Facility: CLINIC | Age: 69
End: 2019-12-12
Payer: MEDICARE

## 2019-12-12 ENCOUNTER — LAB VISIT (OUTPATIENT)
Dept: LAB | Facility: HOSPITAL | Age: 69
End: 2019-12-12
Attending: INTERNAL MEDICINE
Payer: MEDICARE

## 2019-12-12 VITALS
DIASTOLIC BLOOD PRESSURE: 70 MMHG | WEIGHT: 178.38 LBS | SYSTOLIC BLOOD PRESSURE: 130 MMHG | BODY MASS INDEX: 28.67 KG/M2 | TEMPERATURE: 98 F | OXYGEN SATURATION: 98 % | HEART RATE: 69 BPM | HEIGHT: 66 IN

## 2019-12-12 DIAGNOSIS — E78.5 HYPERLIPIDEMIA ASSOCIATED WITH TYPE 2 DIABETES MELLITUS: ICD-10-CM

## 2019-12-12 DIAGNOSIS — R26.89 BALANCE DISORDER: ICD-10-CM

## 2019-12-12 DIAGNOSIS — K52.9 GASTROENTERITIS: ICD-10-CM

## 2019-12-12 DIAGNOSIS — I15.2 HYPERTENSION ASSOCIATED WITH DIABETES: ICD-10-CM

## 2019-12-12 DIAGNOSIS — K31.84 GASTROPARESIS: ICD-10-CM

## 2019-12-12 DIAGNOSIS — N18.30 TYPE 2 DIABETES MELLITUS WITH STAGE 3 CHRONIC KIDNEY DISEASE, WITH LONG-TERM CURRENT USE OF INSULIN: ICD-10-CM

## 2019-12-12 DIAGNOSIS — E87.6 HYPOKALEMIA: ICD-10-CM

## 2019-12-12 DIAGNOSIS — E11.69 HYPERLIPIDEMIA ASSOCIATED WITH TYPE 2 DIABETES MELLITUS: ICD-10-CM

## 2019-12-12 DIAGNOSIS — Z86.73 HISTORY OF CVA (CEREBROVASCULAR ACCIDENT): ICD-10-CM

## 2019-12-12 DIAGNOSIS — Z79.4 TYPE 2 DIABETES MELLITUS WITH STAGE 3 CHRONIC KIDNEY DISEASE, WITH LONG-TERM CURRENT USE OF INSULIN: ICD-10-CM

## 2019-12-12 DIAGNOSIS — D86.0 SARCOIDOSIS OF LUNG: ICD-10-CM

## 2019-12-12 DIAGNOSIS — G25.81 RESTLESS LEGS SYNDROME (RLS): ICD-10-CM

## 2019-12-12 DIAGNOSIS — I77.9 BILATERAL CAROTID ARTERY DISEASE, UNSPECIFIED TYPE: ICD-10-CM

## 2019-12-12 DIAGNOSIS — E83.42 HYPOMAGNESEMIA: ICD-10-CM

## 2019-12-12 DIAGNOSIS — F33.41 MDD (MAJOR DEPRESSIVE DISORDER), RECURRENT, IN PARTIAL REMISSION: ICD-10-CM

## 2019-12-12 DIAGNOSIS — N30.00 ACUTE CYSTITIS WITHOUT HEMATURIA: ICD-10-CM

## 2019-12-12 DIAGNOSIS — R29.6 REPEATED FALLS: ICD-10-CM

## 2019-12-12 DIAGNOSIS — E11.22 CKD STAGE 3 DUE TO TYPE 2 DIABETES MELLITUS: ICD-10-CM

## 2019-12-12 DIAGNOSIS — E03.4 HYPOTHYROIDISM DUE TO ACQUIRED ATROPHY OF THYROID: ICD-10-CM

## 2019-12-12 DIAGNOSIS — E11.59 HYPERTENSION ASSOCIATED WITH DIABETES: ICD-10-CM

## 2019-12-12 DIAGNOSIS — N18.30 CKD STAGE 3 DUE TO TYPE 2 DIABETES MELLITUS: Primary | ICD-10-CM

## 2019-12-12 DIAGNOSIS — E11.22 CKD STAGE 3 DUE TO TYPE 2 DIABETES MELLITUS: Primary | ICD-10-CM

## 2019-12-12 DIAGNOSIS — G89.4 CHRONIC PAIN SYNDROME: ICD-10-CM

## 2019-12-12 DIAGNOSIS — E11.22 TYPE 2 DIABETES MELLITUS WITH STAGE 3 CHRONIC KIDNEY DISEASE, WITH LONG-TERM CURRENT USE OF INSULIN: ICD-10-CM

## 2019-12-12 DIAGNOSIS — N18.30 CKD STAGE 3 DUE TO TYPE 2 DIABETES MELLITUS: ICD-10-CM

## 2019-12-12 DIAGNOSIS — R41.3 MEMORY CHANGE: ICD-10-CM

## 2019-12-12 DIAGNOSIS — J44.89 CHRONIC OBSTRUCTIVE ASTHMA: ICD-10-CM

## 2019-12-12 DIAGNOSIS — Z79.4 ENCOUNTER FOR LONG-TERM (CURRENT) USE OF INSULIN: ICD-10-CM

## 2019-12-12 DIAGNOSIS — N39.41 URGE INCONTINENCE: ICD-10-CM

## 2019-12-12 DIAGNOSIS — N30.90 CYSTITIS, UNSPECIFIED WITHOUT HEMATURIA: ICD-10-CM

## 2019-12-12 DIAGNOSIS — R30.0 DYSURIA: ICD-10-CM

## 2019-12-12 PROBLEM — Z79.52 CURRENT CHRONIC USE OF SYSTEMIC STEROIDS: Status: RESOLVED | Noted: 2018-10-18 | Resolved: 2019-12-12

## 2019-12-12 LAB
ALBUMIN SERPL BCP-MCNC: 4.2 G/DL (ref 3.5–5.2)
ALP SERPL-CCNC: 77 U/L (ref 55–135)
ALT SERPL W/O P-5'-P-CCNC: 19 U/L (ref 10–44)
AMPHET+METHAMPHET UR QL: NEGATIVE
ANION GAP SERPL CALC-SCNC: 7 MMOL/L (ref 8–16)
AST SERPL-CCNC: 21 U/L (ref 10–40)
BARBITURATES UR QL SCN>200 NG/ML: NEGATIVE
BASOPHILS # BLD AUTO: 0.04 K/UL (ref 0–0.2)
BASOPHILS NFR BLD: 0.6 % (ref 0–1.9)
BENZODIAZ UR QL SCN>200 NG/ML: NEGATIVE
BILIRUB SERPL-MCNC: 0.4 MG/DL (ref 0.1–1)
BILIRUB SERPL-MCNC: ABNORMAL MG/DL
BLOOD URINE, POC: ABNORMAL
BUN SERPL-MCNC: 15 MG/DL (ref 8–23)
BZE UR QL SCN: NEGATIVE
CALCIUM SERPL-MCNC: 9.7 MG/DL (ref 8.7–10.5)
CANNABINOIDS UR QL SCN: NEGATIVE
CHLORIDE SERPL-SCNC: 104 MMOL/L (ref 95–110)
CO2 SERPL-SCNC: 28 MMOL/L (ref 23–29)
COLOR, POC UA: ABNORMAL
CREAT SERPL-MCNC: 0.8 MG/DL (ref 0.5–1.4)
CREAT UR-MCNC: 299 MG/DL (ref 15–325)
DIFFERENTIAL METHOD: ABNORMAL
EOSINOPHIL # BLD AUTO: 0.7 K/UL (ref 0–0.5)
EOSINOPHIL NFR BLD: 11.2 % (ref 0–8)
ERYTHROCYTE [DISTWIDTH] IN BLOOD BY AUTOMATED COUNT: 14.1 % (ref 11.5–14.5)
EST. GFR  (AFRICAN AMERICAN): >60 ML/MIN/1.73 M^2
EST. GFR  (NON AFRICAN AMERICAN): >60 ML/MIN/1.73 M^2
ETHANOL UR-MCNC: <10 MG/DL
GLUCOSE SERPL-MCNC: 206 MG/DL (ref 70–110)
GLUCOSE UR QL STRIP: 100
HCT VFR BLD AUTO: 38.5 % (ref 37–48.5)
HGB BLD-MCNC: 12.7 G/DL (ref 12–16)
IMM GRANULOCYTES # BLD AUTO: 0.02 K/UL (ref 0–0.04)
IMM GRANULOCYTES NFR BLD AUTO: 0.3 % (ref 0–0.5)
KETONES UR QL STRIP: ABNORMAL
LEUKOCYTE ESTERASE URINE, POC: ABNORMAL
LYMPHOCYTES # BLD AUTO: 1.6 K/UL (ref 1–4.8)
LYMPHOCYTES NFR BLD: 24.3 % (ref 18–48)
MAGNESIUM SERPL-MCNC: 1.5 MG/DL (ref 1.6–2.6)
MCH RBC QN AUTO: 29.5 PG (ref 27–31)
MCHC RBC AUTO-ENTMCNC: 33 G/DL (ref 32–36)
MCV RBC AUTO: 90 FL (ref 82–98)
METHADONE UR QL SCN>300 NG/ML: NEGATIVE
MONOCYTES # BLD AUTO: 0.5 K/UL (ref 0.3–1)
MONOCYTES NFR BLD: 8.1 % (ref 4–15)
NEUTROPHILS # BLD AUTO: 3.6 K/UL (ref 1.8–7.7)
NEUTROPHILS NFR BLD: 55.5 % (ref 38–73)
NITRITE, POC UA: ABNORMAL
NRBC BLD-RTO: 0 /100 WBC
OPIATES UR QL SCN: NORMAL
PCP UR QL SCN>25 NG/ML: NEGATIVE
PH, POC UA: 5
PLATELET # BLD AUTO: 277 K/UL (ref 150–350)
PMV BLD AUTO: 9.9 FL (ref 9.2–12.9)
POTASSIUM SERPL-SCNC: 3.4 MMOL/L (ref 3.5–5.1)
PROT SERPL-MCNC: 7.7 G/DL (ref 6–8.4)
PROTEIN, POC: ABNORMAL
RBC # BLD AUTO: 4.3 M/UL (ref 4–5.4)
SODIUM SERPL-SCNC: 139 MMOL/L (ref 136–145)
SPECIFIC GRAVITY, POC UA: 1.02
T4 FREE SERPL-MCNC: 0.9 NG/DL (ref 0.71–1.51)
TOXICOLOGY INFORMATION: NORMAL
TSH SERPL DL<=0.005 MIU/L-ACNC: 3.53 UIU/ML (ref 0.4–4)
UROBILINOGEN, POC UA: ABNORMAL
WBC # BLD AUTO: 6.51 K/UL (ref 3.9–12.7)

## 2019-12-12 PROCEDURE — 81001 URINALYSIS AUTO W/SCOPE: CPT | Mod: HCNC,S$GLB,, | Performed by: INTERNAL MEDICINE

## 2019-12-12 PROCEDURE — 99215 OFFICE O/P EST HI 40 MIN: CPT | Mod: 25,HCNC,S$GLB, | Performed by: INTERNAL MEDICINE

## 2019-12-12 PROCEDURE — 99499 RISK ADDL DX/OHS AUDIT: ICD-10-PCS | Mod: HCNC,S$GLB,, | Performed by: INTERNAL MEDICINE

## 2019-12-12 PROCEDURE — 84439 ASSAY OF FREE THYROXINE: CPT | Mod: HCNC

## 2019-12-12 PROCEDURE — 83970 ASSAY OF PARATHORMONE: CPT | Mod: HCNC

## 2019-12-12 PROCEDURE — 36415 COLL VENOUS BLD VENIPUNCTURE: CPT | Mod: HCNC,PO

## 2019-12-12 PROCEDURE — 81001 POCT URINALYSIS, DIPSTICK OR TABLET REAGENT, AUTOMATED, WITH MICROSCOP: ICD-10-PCS | Mod: HCNC,S$GLB,, | Performed by: INTERNAL MEDICINE

## 2019-12-12 PROCEDURE — 99999 PR PBB SHADOW E&M-EST. PATIENT-LVL III: ICD-10-PCS | Mod: PBBFAC,HCNC,, | Performed by: INTERNAL MEDICINE

## 2019-12-12 PROCEDURE — 99499 UNLISTED E&M SERVICE: CPT | Mod: HCNC,S$GLB,, | Performed by: INTERNAL MEDICINE

## 2019-12-12 PROCEDURE — 87077 CULTURE AEROBIC IDENTIFY: CPT | Mod: HCNC

## 2019-12-12 PROCEDURE — 82043 UR ALBUMIN QUANTITATIVE: CPT | Mod: HCNC

## 2019-12-12 PROCEDURE — 87186 SC STD MICRODIL/AGAR DIL: CPT | Mod: HCNC

## 2019-12-12 PROCEDURE — 84443 ASSAY THYROID STIM HORMONE: CPT | Mod: HCNC

## 2019-12-12 PROCEDURE — 85025 COMPLETE CBC W/AUTO DIFF WBC: CPT | Mod: HCNC

## 2019-12-12 PROCEDURE — 83735 ASSAY OF MAGNESIUM: CPT | Mod: HCNC

## 2019-12-12 PROCEDURE — 99215 PR OFFICE/OUTPT VISIT, EST, LEVL V, 40-54 MIN: ICD-10-PCS | Mod: 25,HCNC,S$GLB, | Performed by: INTERNAL MEDICINE

## 2019-12-12 PROCEDURE — 87088 URINE BACTERIA CULTURE: CPT | Mod: HCNC

## 2019-12-12 PROCEDURE — 87086 URINE CULTURE/COLONY COUNT: CPT | Mod: HCNC

## 2019-12-12 PROCEDURE — 80053 COMPREHEN METABOLIC PANEL: CPT | Mod: HCNC

## 2019-12-12 PROCEDURE — 99999 PR PBB SHADOW E&M-EST. PATIENT-LVL III: CPT | Mod: PBBFAC,HCNC,, | Performed by: INTERNAL MEDICINE

## 2019-12-12 PROCEDURE — 83036 HEMOGLOBIN GLYCOSYLATED A1C: CPT | Mod: HCNC

## 2019-12-12 PROCEDURE — 80307 DRUG TEST PRSMV CHEM ANLYZR: CPT | Mod: HCNC

## 2019-12-12 RX ORDER — GABAPENTIN 300 MG/1
600 CAPSULE ORAL 2 TIMES DAILY
Qty: 360 CAPSULE | Refills: 0 | Status: SHIPPED | OUTPATIENT
Start: 2019-12-12 | End: 2020-01-06 | Stop reason: SDUPTHER

## 2019-12-12 RX ORDER — LEVOTHYROXINE SODIUM 25 UG/1
TABLET ORAL
Qty: 90 TABLET | Refills: 0 | Status: SHIPPED | OUTPATIENT
Start: 2019-12-12 | End: 2020-03-19

## 2019-12-12 RX ORDER — SPIRONOLACTONE 25 MG/1
25 TABLET ORAL DAILY
Qty: 90 TABLET | Refills: 3 | Status: SHIPPED | OUTPATIENT
Start: 2019-12-12 | End: 2020-10-21

## 2019-12-12 RX ORDER — METOCLOPRAMIDE 10 MG/1
10 TABLET ORAL EVERY 8 HOURS
Qty: 270 TABLET | Refills: 1 | Status: SHIPPED | OUTPATIENT
Start: 2019-12-12 | End: 2020-05-29

## 2019-12-12 RX ORDER — NITROFURANTOIN 25; 75 MG/1; MG/1
100 CAPSULE ORAL 2 TIMES DAILY
Qty: 10 CAPSULE | Refills: 0 | Status: SHIPPED | OUTPATIENT
Start: 2019-12-12 | End: 2019-12-16 | Stop reason: ALTCHOICE

## 2019-12-12 RX ORDER — METFORMIN HYDROCHLORIDE 500 MG/1
1000 TABLET, EXTENDED RELEASE ORAL
Qty: 180 TABLET | Refills: 3 | Status: SHIPPED | OUTPATIENT
Start: 2019-12-12 | End: 2019-12-13

## 2019-12-12 RX ORDER — NITROFURANTOIN 25; 75 MG/1; MG/1
100 CAPSULE ORAL 2 TIMES DAILY
Qty: 10 CAPSULE | Refills: 0 | Status: SHIPPED | OUTPATIENT
Start: 2019-12-12 | End: 2019-12-12

## 2019-12-12 RX ORDER — GABAPENTIN 300 MG/1
600 CAPSULE ORAL 2 TIMES DAILY
COMMUNITY
End: 2019-12-12 | Stop reason: DRUGHIGH

## 2019-12-12 RX ORDER — HYDROCHLOROTHIAZIDE 25 MG/1
25 TABLET ORAL DAILY
Qty: 90 TABLET | Refills: 3 | Status: SHIPPED | OUTPATIENT
Start: 2019-12-12 | End: 2020-10-21

## 2019-12-12 RX ORDER — DICLOFENAC SODIUM 10 MG/G
GEL TOPICAL 2 TIMES DAILY
Qty: 100 G | Refills: 0 | Status: SHIPPED | OUTPATIENT
Start: 2019-12-12 | End: 2019-12-18

## 2019-12-12 RX ORDER — ASPIRIN 325 MG
TABLET, DELAYED RELEASE (ENTERIC COATED) ORAL
Qty: 12 CAPSULE | Refills: 0 | Status: SHIPPED | OUTPATIENT
Start: 2019-12-12 | End: 2020-09-14

## 2019-12-12 NOTE — ASSESSMENT & PLAN NOTE
Concerned with basiliar artery insufficiency and/or another CVA causing worsening sx   Polypharmacy may also be contributing- anticholinergics and narcotics  Obtain MRI/MRA Brain, need renal function studies first before ordering test   If imaging negative, will refer to PT

## 2019-12-12 NOTE — PATIENT INSTRUCTIONS
Today  - recommend MRI for falls and balance evaluation, need kidney results before ordering    - new medication of myrbetriq for urinary incontience   - stop oxybutynin with memory complaints   - labs today

## 2019-12-12 NOTE — PROGRESS NOTES
Primary Care Provider Appointment    Subjective:      Patient ID: Nathalie Santiago is a 69 y.o. female. sarcoidosis, hx of breast CA/uterine/ovarian cancer, diabetes complicated by gastroparesis, HLD, HTN,  anxiety and depression, hc of CVA (brain stem stroke), DJD, JOSE LUIS, chronic low back and hip pain     Chief Complaint: Establish Care    Pt new to me, prior pcp no longer with clinic.     Pt c/o of when she looks up, she loses balance and can fall backwards.   Has issues with side stepping and can fall   Does not get dizziness  No triggers or warnings  Worsening in the last 8-10 months, has not been evaluated for this, does not like doctor appts and has not made appts.   Has had 6 falls since last year    Also reports occasional upper gastric pain, nodules on her hands, occasional diarrhea and constipation,     Hx of Breast cancer (phyllodes tumor) in 2005 and 2007 s/p b/l mastectomy and  breast reconstruction, had complications with implants and had these removed. Discharged from oncology given cancer was so long ago.     Sarcoidoisis dx in 2006, pt does not follow up even she feels like she may be having a flare. Does not recall last time she had a flare.   Reports some sob at time. Is no longer taking prednisone for sarcoid    Admits to not being complaint with diet and medications, reprots am readings under 100, evening glucose between 200-300. Does not check every day but will take insulin if her glucose levels are high. Denies any lows.     Does not have good system with taking oral medications- missing 2-3x a week but some weeks she does not miss doses.   Cannot leave out meds at her daugther and brother's house due to children so this makes it difficult for her to remember.   She does have a pill box for one month that she fixes with her daugther.     Memory:   Yes has noticed some decline with the following:    - medications  - notices that she has watched movies and not realized that she has seen these  before.   - does not remember if she has fed the dogs or not  - has trouble remembering recent activities   - trouble remebering digits   - does not drive much ( 6,000 miles less a year) but does not notice much  Had 30/30 testing on moca test this year with psychiatry and normal clock drawing test.     Eats at 10 am and 10 pm, drink soda in between (coca-cola)   Not hungry - will snack on pretzels and cheetos  Reports she is trying to lose weight but not able to exercise, eating less.     + aspirates since having stroke , does not recall exercises. Cannot use thickening agent that is recommended for her, cannot tolerate.      Functional Assessment     Patient is independent  of bathing, dressing, eating, transferring, using toilet and walking     Patient is independent of finance management, driving/transportation management, shopping and phone/computer use and is dependent of housework/household chores  Daughter helps with putting medicaitons in pill box.     Other Providers:   Dr. Pat, psychiatry  Dr. Temple, pain management   Dr. Steele, pul - last seen in 2019    Past Surgical History:   Procedure Laterality Date    APPENDECTOMY      BREAST SURGERY      CHOLECYSTECTOMY      CYSTOCELE REPAIR      FRACTURE SURGERY Right     foot    HYSTERECTOMY      SAADIA/BSO    MASTECTOMY Bilateral     b/l mastectomy    OOPHORECTOMY      PLEURA BIOPSY      RECTAL SURGERY      rectocele      TONSILLECTOMY         Past Medical History:   Diagnosis Date    Abnormal Pap smear 1972    cervical cancer    Anxiety     Arthritis     Asthma     Ataxia     Breast cancer     Cancer     bilateral mastectomy, uterine cancer, ovarian cancer    Cataract     Cervical back pain with evidence of disc disease     Cervical cancer     Chronic bilateral low back pain without sciatica 9/6/2016    Chronic bronchitis     Cortical cataract 2/18/2013    Current chronic use of systemic steroids 10/18/2018    CVA (cerebral vascular  accident) 9/6/2016    brain stem stroke    CVA (cerebral vascular accident)- Confirmed by neurology 9/6/2016    Degenerative joint disease of cervical and lumbar spine 8/1/2014    Depression     Diabetes mellitus     Diabetes mellitus, type II     History of malignant phylloides tumor of breast 2/15/2013    Hyperlipidemia     Hyperopia with presbyopia 2/18/2013    Hypertension     Hypertension     Hypothyroidism     Immunosuppressed status 9/3/2015    Migraine     Mitral valve regurgitation     Nuclear sclerosis 2/18/2013    Obesity     JOSE LUIS (obstructive sleep apnea)     Ovarian cancer     Pneumonia     Polyneuropathy     PVD (posterior vitreous detachment) 2/18/2013    Restless leg syndrome     Ruptured disk     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    Sarcoidosis 2006    Sarcoidosis of lung     Squamous cell carcinoma     Trouble in sleeping     Uterine cancer     Venous insufficiency        Social History     Socioeconomic History    Marital status: Single     Spouse name: Not on file    Number of children: 1    Years of education: Not on file    Highest education level: Bachelor's degree (e.g., BA, AB, BS)   Occupational History    Occupation: retired    Social Needs    Financial resource strain: Not hard at all    Food insecurity:     Worry: Never true     Inability: Never true    Transportation needs:     Medical: No     Non-medical: No   Tobacco Use    Smoking status: Never Smoker    Smokeless tobacco: Never Used   Substance and Sexual Activity    Alcohol use: No    Drug use: No     Types: Oxycodone     Comment: prescription Oycodone    Sexual activity: Never   Lifestyle    Physical activity:     Days per week: 0 days     Minutes per session: 0 min    Stress: Not at all   Relationships    Social connections:     Talks on phone: More than three times a week     Gets together: More than three times a week     Attends Anabaptism service: Never     Active member of club or  "organization: No     Attends meetings of clubs or organizations: Never     Relationship status: Never    Other Topics Concern    Not on file   Social History Narrative    Raised in [place]     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   HENT: Negative for congestion and sinus pressure.    Eyes: Negative for visual disturbance.   Respiratory: Positive for shortness of breath. Negative for cough and wheezing.    Cardiovascular: Negative for chest pain and palpitations.   Gastrointestinal: Positive for abdominal pain, constipation and diarrhea. Negative for blood in stool.   Genitourinary: Positive for dyspareunia and urgency. Negative for difficulty urinating and pelvic pain.   Musculoskeletal: Positive for back pain. Negative for gait problem.   Skin: Negative for rash.   Allergic/Immunologic: Negative for immunocompromised state.   Neurological: Negative for weakness.        Balance instabilty    Hematological: Does not bruise/bleed easily.   Psychiatric/Behavioral: Positive for confusion. Negative for suicidal ideas. The patient is not nervous/anxious.        Objective:   /70 (BP Location: Right arm, Patient Position: Sitting, BP Method: Medium (Manual))   Pulse 69   Temp 98 °F (36.7 °C) (Oral)   Ht 5' 6" (1.676 m)   Wt 80.9 kg (178 lb 5.6 oz)   LMP  (LMP Unknown)   SpO2 98%   BMI 28.79 kg/m²     Physical Exam   Constitutional: She is oriented to person, place, and time. She appears well-developed and well-nourished.   HENT:   Head: Normocephalic.   Right Ear: External ear normal.   Left Ear: External ear normal.   Nose: Nose normal.   Mouth/Throat: No oropharyngeal exudate.   Eyes: Pupils are equal, round, and reactive to light. Conjunctivae and EOM are normal.   Neck: Normal " range of motion. Neck supple. No thyromegaly present.   Cardiovascular: Normal rate, regular rhythm, normal heart sounds and intact distal pulses.   No murmur heard.  Pulmonary/Chest: Effort normal and breath sounds normal. No respiratory distress. She has no wheezes.   Abdominal: Soft. Bowel sounds are normal. She exhibits no distension and no mass. There is no tenderness. There is no guarding. No hernia.   Musculoskeletal: Normal range of motion. She exhibits no deformity.   Neurological: She is alert and oriented to person, place, and time. She displays normal reflexes. No cranial nerve deficit or sensory deficit. She exhibits normal muscle tone. Coordination abnormal.   Tremor in left hand with intention only   No resting tremor  No cogwheeling or rigidity noted    + romberg  Unable to stand with one front of the other   Normal appearing gait    Skin: Skin is warm and dry. No rash noted.   Psychiatric: She has a normal mood and affect. Her behavior is normal.   Vitals reviewed.          Lab Results   Component Value Date    WBC 9.61 08/22/2018    HGB 13.3 08/22/2018    HCT 41.4 08/22/2018     08/22/2018    CHOL 149 08/22/2018    TRIG 213 (H) 08/22/2018    HDL 44 08/22/2018    ALT 19 08/22/2018    AST 17 08/22/2018     08/22/2018    K 4.3 08/22/2018     08/22/2018    CREATININE 1.0 08/22/2018    BUN 27 (H) 08/22/2018    CO2 25 08/22/2018    TSH 2.134 08/22/2018    INR 1.0 06/25/2015    HGBA1C 6.7 (H) 08/22/2018       Current Outpatient Medications on File Prior to Visit   Medication Sig Dispense Refill    ACCU-CHEK SMARTVIEW TEST STRIP Strp 1 each by Subdermal route 2 (two) times daily. 200 each 3    albuterol (PROVENTIL/VENTOLIN HFA) 90 mcg/actuation inhaler Inhale 2 puffs into the lungs every 4 (four) hours as needed for Wheezing. This is a rescue inhaler medication and should be used as needed 3 Inhaler 3    albuterol-ipratropium (DUO-NEB) 2.5 mg-0.5 mg/3 mL nebulizer solution INHALE THE  CONTENTS OF 1 VIAL VIA NEBULIZER EVERY 6 HOURS AS NEEDED FOR SHORTNESS OF BREATH  OR  WHEEZING 120 vial 3    amLODIPine (NORVASC) 5 MG tablet TAKE 1 TABLET (5 MG TOTAL) BY MOUTH ONCE DAILY. 90 tablet 0    clopidogrel (PLAVIX) 75 mg tablet Take 1 tablet (75 mg total) by mouth once daily. 90 tablet 3    DULoxetine (CYMBALTA) 60 MG capsule Take 1 capsule (60 mg total) by mouth every evening. 90 capsule 2    fenofibrate 160 MG Tab TAKE 1 TABLET EVERY DAY 90 tablet 0    fluticasone (FLONASE) 50 mcg/actuation nasal spray 2 sprays by Each Nare route once daily. (Patient taking differently: 2 sprays by Each Nare route daily as needed. ) 1 Bottle 0    fluticasone furoate-vilanterol (BREO ELLIPTA) 200-25 mcg/dose DsDv diskus inhaler Inhale 1 puff into the lungs once daily. Controller 3 each 3    insulin aspart (NOVOLOG FLEXPEN) 100 unit/mL InPn pen Use per sliding scale 1 Box 0    insulin  unit/mL injection Inject 15 Units into the skin 2 (two) times daily before meals. (Patient taking differently: Inject 20 Units into the skin 2 (two) times daily before meals. ) 3 vial 3    magnesium oxide (MAG-OX) 400 mg tablet Take 1 tablet (400 mg total) by mouth 2 (two) times daily.  0    metoprolol succinate (TOPROL-XL) 25 MG 24 hr tablet Take 1 tablet (25 mg total) by mouth once daily. 90 tablet 3    montelukast (SINGULAIR) 10 mg tablet Take 1 tablet (10 mg total) by mouth every evening. 90 tablet 3    multivitamin Chew Take by mouth.      nitroGLYCERIN (NITROSTAT) 0.4 MG SL tablet       oxyCODONE-acetaminophen (PERCOCET)  mg per tablet Take 1 tablet by mouth every 8 (eight) hours as needed for Pain. Medically necessary for for greater than 7 days chronic pain 90 tablet 0    potassium chloride (MICRO-K) 10 MEQ CpSR Take 1 capsule (10 mEq total) by mouth 3 (three) times daily. 270 capsule 3    rosuvastatin (CRESTOR) 40 MG Tab Take 1 tablet (40 mg total) by mouth once daily. 90 tablet 3    sertraline  (ZOLOFT) 100 MG tablet Take two tablets PO daily 180 tablet 2    [DISCONTINUED] cholecalciferol, vitamin D3, 50,000 unit capsule TAKE 1 CAPSULE EVERY 7 DAYS 12 capsule 0    [DISCONTINUED] gabapentin (NEURONTIN) 300 MG capsule Take 1 capsule (300 mg total) by mouth 3 (three) times daily. (Patient taking differently: Take 600 mg by mouth 2 (two) times daily. ) 270 capsule 0    [DISCONTINUED] gabapentin (NEURONTIN) 300 MG capsule Take 600 mg by mouth 2 (two) times daily.      [DISCONTINUED] hydroCHLOROthiazide (HYDRODIURIL) 25 MG tablet Take 1 tablet (25 mg total) by mouth once daily. 90 tablet 3    [DISCONTINUED] levothyroxine (SYNTHROID) 25 MCG tablet TAKE 1 TABLET BEFORE BREAKFAST 90 tablet 0    [DISCONTINUED] metFORMIN (GLUCOPHAGE) 1000 MG tablet Take 1 tablet (1,000 mg total) by mouth 2 (two) times daily with meals. 180 tablet 3    [DISCONTINUED] metoclopramide HCl (REGLAN) 10 MG tablet Take 1 tablet (10 mg total) by mouth every 8 (eight) hours. 270 tablet 1    [DISCONTINUED] oxybutynin (DITROPAN) 5 MG Tab Take 1 tablet (5 mg total) by mouth 2 (two) times daily. 180 tablet 3    [DISCONTINUED] spironolactone (ALDACTONE) 25 MG tablet Take 1 tablet (25 mg total) by mouth once daily. 90 tablet 3    [DISCONTINUED] VOLTAREN 1 % Gel Apply topically 2 (two) times daily. 100 g 0    doxepin (SINEQUAN) 10 MG capsule Take one to two capsules PO nightly PRN insomnia      ondansetron (ZOFRAN-ODT) 8 MG TbDL DISSOLVE 1 TABLET ON THE TONGUE EVERY 8 HOURS AS NEEDED       No current facility-administered medications on file prior to visit.          Assessment:   69 y.o. female with multiple co-morbid illnesses here to establish care with new PCP and continue work-up of chronic issues notably sarcoidosis, hx of breast CA/uterine/ovarian cancer, diabetes complicated by gastroparesis, HLD, HTN,  anxiety and depression, hc of CVA (brain stem stroke), DJD, JOSE LUIS, chronic low back and hip pain    Most concerning is her worsening  balance and increased falls over the past year. I suspect a mutlifactorial cause due to her having an prior brainstem stroke along with medication side effects but it is very possible that she has had another stroke but not recently.     Plan:     Problem List Items Addressed This Visit        Neuro    Chronic pain     Following pain management  Monitor   Check uds          Relevant Orders    TOXICOLOGY SCREEN, URINE, RANDOM (COMPLIANCE)    Restless legs syndrome (RLS)     Currently not taking requip  Avoid with falls hx          History of CVA (cerebrovascular accident)     On plavix   Needs additional evaluation          Memory change     Discussed that her memory deficits could be due to depression and polypharmacy  Has scored 30/30 on moca and normal clock draw test with psychiatry  D/c oxybutynin               Psychiatric    MDD (major depressive disorder), recurrent, in partial remission     On zoloft   F/u with psychiatry   No SI or HI             Pulmonary    Chronic obstructive asthma     Continue inhaler regimen as directed   Monitor   Address sx at next visit             Cardiac/Vascular    Hypertension associated with diabetes     BP at goal   Cont current regimen          Relevant Medications    hydroCHLOROthiazide (HYDRODIURIL) 25 MG tablet    spironolactone (ALDACTONE) 25 MG tablet    metFORMIN (GLUCOPHAGE-XR) 500 MG 24 hr tablet    Other Relevant Orders    CBC auto differential    Comprehensive metabolic panel    TSH    T4, free    POCT URINE DIPSTICK WITH MICROSCOPE, AUTOMATED (Completed)    Urine culture    Hemoglobin A1c    MICROALBUMIN / CREATININE RATIO URINE    PTH, intact    Magnesium    Hyperlipidemia associated with type 2 diabetes mellitus     Check labs   On statin          Relevant Medications    metFORMIN (GLUCOPHAGE-XR) 500 MG 24 hr tablet    Bilateral carotid artery disease     On plavix and statin             Renal/    Hypokalemia     Check labs          Hypomagnesemia     Check  labs          Acute cystitis without hematuria     Urine studies ordered   If positive dipstick will order antibiotics and await culture results.          Urge incontinence     D/c oxybutynin with potential effect on cognition   Try mybretriq             Immunology/Multi System    Sarcoidosis of lung     No active flare currently   Pt needs to f/u with pulmonary - discuss at next visit             Endocrine    Hypothyroidism due to acquired atrophy of thyroid    Relevant Medications    levothyroxine (SYNTHROID) 25 MCG tablet    Other Relevant Orders    CBC auto differential    Comprehensive metabolic panel    TSH    T4, free    POCT URINE DIPSTICK WITH MICROSCOPE, AUTOMATED (Completed)    Urine culture    Hemoglobin A1c    MICROALBUMIN / CREATININE RATIO URINE    PTH, intact    Magnesium    Encounter for long-term (current) use of insulin     Check labs   Pt not consistent with regimen          CKD stage 3 due to type 2 diabetes mellitus - Primary    Relevant Medications    metFORMIN (GLUCOPHAGE-XR) 500 MG 24 hr tablet    Other Relevant Orders    CBC auto differential    Comprehensive metabolic panel    TSH    T4, free    POCT URINE DIPSTICK WITH MICROSCOPE, AUTOMATED (Completed)    Urine culture    Hemoglobin A1c    MICROALBUMIN / CREATININE RATIO URINE    PTH, intact    Magnesium    Type 2 diabetes mellitus with diabetic chronic kidney disease    Relevant Medications    metFORMIN (GLUCOPHAGE-XR) 500 MG 24 hr tablet    Other Relevant Orders    CBC auto differential    Comprehensive metabolic panel    TSH    T4, free    POCT URINE DIPSTICK WITH MICROSCOPE, AUTOMATED (Completed)    Urine culture    Hemoglobin A1c    MICROALBUMIN / CREATININE RATIO URINE    PTH, intact    Magnesium       GI    Gastroenteritis     Takes reglan prn             Other    Repeated falls     Has significant balance deficits on exam  Need further evaluation and workup          Balance disorder     Concerned with basiliar artery insufficiency  and/or another CVA causing worsening sx   Polypharmacy may also be contributing- anticholinergics and narcotics  Obtain MRI/MRA Brain, need renal function studies first before ordering test   If imaging negative, will refer to PT            Other Visit Diagnoses     Gastroparesis        Relevant Medications    metoclopramide HCl (REGLAN) 10 MG tablet    Other Relevant Orders    CBC auto differential    Comprehensive metabolic panel    TSH    T4, free    POCT URINE DIPSTICK WITH MICROSCOPE, AUTOMATED (Completed)    Urine culture    Hemoglobin A1c    MICROALBUMIN / CREATININE RATIO URINE    PTH, intact    Magnesium    Dysuria        Relevant Orders    POCT URINE DIPSTICK WITH MICROSCOPE, AUTOMATED (Completed)    Cystitis, unspecified without hematuria         Relevant Orders    Urine culture          Health Maintenance       Date Due Completion Date    TETANUS VACCINE 07/22/1967 ---    Shingles Vaccine (1 of 2) 07/22/1999 ---    DEXA SCAN 09/16/2017 9/16/2014    Hemoglobin A1c 02/22/2019 8/22/2018    Lipid Panel 08/22/2019 8/22/2018    Influenza Vaccine (1) 09/01/2019 11/28/2016 (Declined)    Override on 11/28/2016: Declined    Override on 9/3/2015: Declined    Urine Microalbumin 02/13/2020 2/13/2019    Foot Exam 06/07/2020 6/7/2019    Override on 3/3/2016: Done    Override on 11/30/2015: Done (LUPER)    Override on 3/3/2015: Done    Eye Exam 09/06/2020 9/6/2019    Colonoscopy 04/29/2025 4/29/2015          Follow up in about 3 weeks (around 1/2/2020) for routine care, advanced care directives, HM . Total face-to-face time was 60 min, 50% of this was spent on counseling and coordination of care. The following issues were discussed: sarcoidosis, hx of breast CA/uterine/ovarian cancer, diabetes complicated by gastroparesis, HLD, HTN,  anxiety and depression, hc of CVA (brain stem stroke), DJD, JOSE LUIS, chronic low back and hip pain     Petra Broussard MD  Internal Medicine- Geriatrics  [facility] [place]

## 2019-12-12 NOTE — ASSESSMENT & PLAN NOTE
Discussed that her memory deficits could be due to depression and polypharmacy  Has scored 30/30 on moca and normal clock draw test with psychiatry  D/c oxybutynin

## 2019-12-13 ENCOUNTER — TELEPHONE (OUTPATIENT)
Dept: FAMILY MEDICINE | Facility: CLINIC | Age: 69
End: 2019-12-13

## 2019-12-13 LAB
ALBUMIN/CREAT UR: 15.4 UG/MG (ref 0–30)
CREAT UR-MCNC: 299 MG/DL (ref 15–325)
ESTIMATED AVG GLUCOSE: 194 MG/DL (ref 68–131)
HBA1C MFR BLD HPLC: 8.4 % (ref 4–5.6)
MICROALBUMIN UR DL<=1MG/L-MCNC: 46 UG/ML
PTH-INTACT SERPL-MCNC: 28 PG/ML (ref 9–77)

## 2019-12-13 RX ORDER — METFORMIN HYDROCHLORIDE 500 MG/1
1000 TABLET, EXTENDED RELEASE ORAL 2 TIMES DAILY WITH MEALS
Qty: 360 TABLET | Refills: 3 | Status: SHIPPED | OUTPATIENT
Start: 2019-12-13 | End: 2020-12-28

## 2019-12-13 NOTE — TELEPHONE ENCOUNTER
----- Message from Kayla Gifford sent at 12/13/2019  3:47 PM CST -----  Contact: PT  Type: Needs Medical Advice    Who Called:  PT  Best Call Back Number: [phone]  Additional Information: Requesting a call back regarding pt medication    Please Advise ---Thank you

## 2019-12-13 NOTE — TELEPHONE ENCOUNTER
Spoke with pt who was requesting a medication verification.  She was taking Metformin (XR, looked back in history unable to verify XR) 1000mg Twice daily and now the order is Metformin XR 1000mg once daily.  Would you please verifiy.

## 2019-12-15 LAB — BACTERIA UR CULT: ABNORMAL

## 2019-12-16 RX ORDER — SULFAMETHOXAZOLE AND TRIMETHOPRIM 800; 160 MG/1; MG/1
1 TABLET ORAL 2 TIMES DAILY
Qty: 6 TABLET | Refills: 0 | Status: SHIPPED | OUTPATIENT
Start: 2019-12-16 | End: 2020-01-06 | Stop reason: ALTCHOICE

## 2019-12-16 NOTE — PROGRESS NOTES
ucx returned with klebisella resistant to macrobid, sending in bactrim to pharmacy. Pt with lot of rxn to antibiotics.     Petra Broussard MD  Internal Medicine- Geriatrics  [facility] [place]

## 2019-12-17 ENCOUNTER — TELEPHONE (OUTPATIENT)
Dept: FAMILY MEDICINE | Facility: CLINIC | Age: 69
End: 2019-12-17

## 2019-12-17 NOTE — TELEPHONE ENCOUNTER
Received fax from [employer] stating that voltaren gel is set to be discontinued by the .  And she will need to be prescribed something else.  Please send in a new alternative to [employer].

## 2019-12-18 RX ORDER — DICLOFENAC SODIUM 30 MG/G
GEL TOPICAL 2 TIMES DAILY PRN
Qty: 100 G | Refills: 3 | Status: SHIPPED | OUTPATIENT
Start: 2019-12-18 | End: 2020-01-06 | Stop reason: CLARIF

## 2019-12-24 ENCOUNTER — TELEPHONE (OUTPATIENT)
Dept: FAMILY MEDICINE | Facility: CLINIC | Age: 69
End: 2019-12-24

## 2019-12-27 DIAGNOSIS — E11.9 TYPE 2 DIABETES MELLITUS WITHOUT COMPLICATION: ICD-10-CM

## 2020-01-06 ENCOUNTER — OFFICE VISIT (OUTPATIENT)
Dept: PAIN MEDICINE | Facility: CLINIC | Age: 70
End: 2020-01-06
Payer: MEDICARE

## 2020-01-06 ENCOUNTER — HOSPITAL ENCOUNTER (OUTPATIENT)
Dept: RADIOLOGY | Facility: HOSPITAL | Age: 70
Discharge: HOME | End: 2020-01-06
Attending: INTERNAL MEDICINE
Payer: MEDICARE

## 2020-01-06 ENCOUNTER — OFFICE VISIT (OUTPATIENT)
Dept: FAMILY MEDICINE | Facility: CLINIC | Age: 70
End: 2020-01-06
Payer: MEDICARE

## 2020-01-06 VITALS
WEIGHT: 175.94 LBS | HEART RATE: 79 BPM | HEIGHT: 66 IN | OXYGEN SATURATION: 99 % | BODY MASS INDEX: 28.27 KG/M2 | TEMPERATURE: 98 F | SYSTOLIC BLOOD PRESSURE: 122 MMHG | DIASTOLIC BLOOD PRESSURE: 66 MMHG

## 2020-01-06 VITALS
WEIGHT: 175 LBS | SYSTOLIC BLOOD PRESSURE: 123 MMHG | HEART RATE: 73 BPM | BODY MASS INDEX: 28.12 KG/M2 | HEIGHT: 66 IN | DIASTOLIC BLOOD PRESSURE: 80 MMHG

## 2020-01-06 DIAGNOSIS — F33.41 MDD (MAJOR DEPRESSIVE DISORDER), RECURRENT, IN PARTIAL REMISSION: ICD-10-CM

## 2020-01-06 DIAGNOSIS — E78.5 HYPERLIPIDEMIA ASSOCIATED WITH TYPE 2 DIABETES MELLITUS: ICD-10-CM

## 2020-01-06 DIAGNOSIS — R29.6 REPEATED FALLS: ICD-10-CM

## 2020-01-06 DIAGNOSIS — E11.65 UNCONTROLLED TYPE 2 DIABETES MELLITUS WITH HYPERGLYCEMIA: ICD-10-CM

## 2020-01-06 DIAGNOSIS — M79.671 RIGHT FOOT PAIN: ICD-10-CM

## 2020-01-06 DIAGNOSIS — G89.4 CHRONIC PAIN SYNDROME: Primary | ICD-10-CM

## 2020-01-06 DIAGNOSIS — M51.36 DDD (DEGENERATIVE DISC DISEASE), LUMBAR: ICD-10-CM

## 2020-01-06 DIAGNOSIS — M70.60 GREATER TROCHANTERIC BURSITIS, UNSPECIFIED LATERALITY: Primary | ICD-10-CM

## 2020-01-06 DIAGNOSIS — E03.4 HYPOTHYROIDISM DUE TO ACQUIRED ATROPHY OF THYROID: ICD-10-CM

## 2020-01-06 DIAGNOSIS — N39.41 URGE INCONTINENCE: ICD-10-CM

## 2020-01-06 DIAGNOSIS — I73.9 PVD (PERIPHERAL VASCULAR DISEASE): ICD-10-CM

## 2020-01-06 DIAGNOSIS — M43.17 SPONDYLOLISTHESIS OF LUMBOSACRAL REGION: ICD-10-CM

## 2020-01-06 DIAGNOSIS — E11.69 HYPERLIPIDEMIA ASSOCIATED WITH TYPE 2 DIABETES MELLITUS: ICD-10-CM

## 2020-01-06 DIAGNOSIS — I15.2 HYPERTENSION ASSOCIATED WITH DIABETES: ICD-10-CM

## 2020-01-06 DIAGNOSIS — Z79.4 ENCOUNTER FOR LONG-TERM (CURRENT) USE OF INSULIN: ICD-10-CM

## 2020-01-06 DIAGNOSIS — E11.59 HYPERTENSION ASSOCIATED WITH DIABETES: ICD-10-CM

## 2020-01-06 DIAGNOSIS — D86.0 SARCOIDOSIS OF LUNG: ICD-10-CM

## 2020-01-06 DIAGNOSIS — E87.6 HYPOKALEMIA: ICD-10-CM

## 2020-01-06 DIAGNOSIS — J44.89 CHRONIC OBSTRUCTIVE ASTHMA: ICD-10-CM

## 2020-01-06 DIAGNOSIS — M47.896 OTHER SPONDYLOSIS, LUMBAR REGION: ICD-10-CM

## 2020-01-06 DIAGNOSIS — R26.89 BALANCE DISORDER: ICD-10-CM

## 2020-01-06 DIAGNOSIS — G89.4 CHRONIC PAIN DISORDER: Primary | ICD-10-CM

## 2020-01-06 PROBLEM — N30.00 ACUTE CYSTITIS WITHOUT HEMATURIA: Status: RESOLVED | Noted: 2017-03-28 | Resolved: 2020-01-06

## 2020-01-06 PROBLEM — D64.9 NORMOCHROMIC ANEMIA: Status: RESOLVED | Noted: 2017-03-28 | Resolved: 2020-01-06

## 2020-01-06 LAB — GLUCOSE SERPL-MCNC: 292 MG/DL (ref 70–110)

## 2020-01-06 PROCEDURE — 73630 X-RAY EXAM OF FOOT: CPT | Mod: TC,HCNC,FY,RT

## 2020-01-06 PROCEDURE — 99999 PR PBB SHADOW E&M-EST. PATIENT-LVL V: ICD-10-PCS | Mod: PBBFAC,HCNC,, | Performed by: INTERNAL MEDICINE

## 2020-01-06 PROCEDURE — 99999 PR PBB SHADOW E&M-EST. PATIENT-LVL IV: ICD-10-PCS | Mod: PBBFAC,HCNC,, | Performed by: PHYSICIAN ASSISTANT

## 2020-01-06 PROCEDURE — 99499 RISK ADDL DX/OHS AUDIT: ICD-10-PCS | Mod: HCNC,S$GLB,, | Performed by: INTERNAL MEDICINE

## 2020-01-06 PROCEDURE — 99499 UNLISTED E&M SERVICE: CPT | Mod: HCNC,S$GLB,, | Performed by: INTERNAL MEDICINE

## 2020-01-06 PROCEDURE — 99999 PR PBB SHADOW E&M-EST. PATIENT-LVL V: CPT | Mod: PBBFAC,HCNC,, | Performed by: INTERNAL MEDICINE

## 2020-01-06 PROCEDURE — 73630 X-RAY EXAM OF FOOT: CPT | Mod: 26,HCNC,RT, | Performed by: RADIOLOGY

## 2020-01-06 PROCEDURE — 99215 PR OFFICE/OUTPT VISIT, EST, LEVL V, 40-54 MIN: ICD-10-PCS | Mod: HCNC,S$GLB,, | Performed by: INTERNAL MEDICINE

## 2020-01-06 PROCEDURE — 1159F MED LIST DOCD IN RCRD: CPT | Mod: HCNC,S$GLB,, | Performed by: PHYSICIAN ASSISTANT

## 2020-01-06 PROCEDURE — 1101F PR PT FALLS ASSESS DOC 0-1 FALLS W/OUT INJ PAST YR: ICD-10-PCS | Mod: HCNC,CPTII,S$GLB, | Performed by: PHYSICIAN ASSISTANT

## 2020-01-06 PROCEDURE — 1101F PT FALLS ASSESS-DOCD LE1/YR: CPT | Mod: HCNC,CPTII,S$GLB, | Performed by: PHYSICIAN ASSISTANT

## 2020-01-06 PROCEDURE — 99214 PR OFFICE/OUTPT VISIT, EST, LEVL IV, 30-39 MIN: ICD-10-PCS | Mod: HCNC,S$GLB,, | Performed by: PHYSICIAN ASSISTANT

## 2020-01-06 PROCEDURE — 73630 XR FOOT COMPLETE 3 VIEW RIGHT: ICD-10-PCS | Mod: 26,HCNC,RT, | Performed by: RADIOLOGY

## 2020-01-06 PROCEDURE — 3074F SYST BP LT 130 MM HG: CPT | Mod: HCNC,CPTII,S$GLB, | Performed by: PHYSICIAN ASSISTANT

## 2020-01-06 PROCEDURE — 99999 PR PBB SHADOW E&M-EST. PATIENT-LVL IV: CPT | Mod: PBBFAC,HCNC,, | Performed by: PHYSICIAN ASSISTANT

## 2020-01-06 PROCEDURE — 3079F PR MOST RECENT DIASTOLIC BLOOD PRESSURE 80-89 MM HG: ICD-10-PCS | Mod: HCNC,CPTII,S$GLB, | Performed by: PHYSICIAN ASSISTANT

## 2020-01-06 PROCEDURE — 99214 OFFICE O/P EST MOD 30 MIN: CPT | Mod: HCNC,S$GLB,, | Performed by: PHYSICIAN ASSISTANT

## 2020-01-06 PROCEDURE — 82962 GLUCOSE BLOOD TEST: CPT | Mod: HCNC,S$GLB,, | Performed by: INTERNAL MEDICINE

## 2020-01-06 PROCEDURE — 3079F DIAST BP 80-89 MM HG: CPT | Mod: HCNC,CPTII,S$GLB, | Performed by: PHYSICIAN ASSISTANT

## 2020-01-06 PROCEDURE — 99215 OFFICE O/P EST HI 40 MIN: CPT | Mod: HCNC,S$GLB,, | Performed by: INTERNAL MEDICINE

## 2020-01-06 PROCEDURE — 3074F PR MOST RECENT SYSTOLIC BLOOD PRESSURE < 130 MM HG: ICD-10-PCS | Mod: HCNC,CPTII,S$GLB, | Performed by: PHYSICIAN ASSISTANT

## 2020-01-06 PROCEDURE — 1125F PR PAIN SEVERITY QUANTIFIED, PAIN PRESENT: ICD-10-PCS | Mod: HCNC,S$GLB,, | Performed by: PHYSICIAN ASSISTANT

## 2020-01-06 PROCEDURE — 1159F PR MEDICATION LIST DOCUMENTED IN MEDICAL RECORD: ICD-10-PCS | Mod: HCNC,S$GLB,, | Performed by: PHYSICIAN ASSISTANT

## 2020-01-06 PROCEDURE — 82962 POCT GLUCOSE, HAND-HELD DEVICE: ICD-10-PCS | Mod: HCNC,S$GLB,, | Performed by: INTERNAL MEDICINE

## 2020-01-06 PROCEDURE — 1125F AMNT PAIN NOTED PAIN PRSNT: CPT | Mod: HCNC,S$GLB,, | Performed by: PHYSICIAN ASSISTANT

## 2020-01-06 RX ORDER — OXYCODONE AND ACETAMINOPHEN 10; 325 MG/1; MG/1
1 TABLET ORAL EVERY 8 HOURS PRN
Qty: 90 TABLET | Refills: 0 | Status: SHIPPED | OUTPATIENT
Start: 2020-02-29 | End: 2020-03-29

## 2020-01-06 RX ORDER — OXYCODONE AND ACETAMINOPHEN 10; 325 MG/1; MG/1
1 TABLET ORAL EVERY 8 HOURS PRN
Qty: 90 TABLET | Refills: 0 | Status: SHIPPED | OUTPATIENT
Start: 2020-01-31 | End: 2020-02-29

## 2020-01-06 RX ORDER — DICLOFENAC SODIUM 30 MG/G
GEL TOPICAL 2 TIMES DAILY PRN
Qty: 100 G | Refills: 3 | Status: SHIPPED | OUTPATIENT
Start: 2020-01-06 | End: 2020-08-10 | Stop reason: SDUPTHER

## 2020-01-06 RX ORDER — GABAPENTIN 300 MG/1
600 CAPSULE ORAL 2 TIMES DAILY
Qty: 360 CAPSULE | Refills: 3 | Status: SHIPPED | OUTPATIENT
Start: 2020-01-06 | End: 2020-02-20

## 2020-01-06 RX ORDER — OXYCODONE AND ACETAMINOPHEN 10; 325 MG/1; MG/1
1 TABLET ORAL EVERY 8 HOURS PRN
Qty: 90 TABLET | Refills: 0 | Status: SHIPPED | OUTPATIENT
Start: 2020-01-02 | End: 2020-01-06 | Stop reason: SDUPTHER

## 2020-01-06 NOTE — PROGRESS NOTES
CC: left sided low back and left hip pain    Interval History: Ms. Santiago is a 69 y.o. female with chronic low back and hip pain who presents  for her follow up.   She continues to have lower back, left greater than right lower back pain.  Left GTB and right knee pain has worsened. Her last left GTB injection provided minimal relief. She had a series of Euflexxa injections in 2014. She had a fall over her 3 year old granddaughter 1 week ago. She is having a xray of her right foot today.   She continues to take percocet 10mg q8hrs as needed for pain with moderate benefits.  No side effects reported.  Able to perform daily activities with the medications. Flexeril 10 mg and Gabapentin 600 mg BID has been helpful.  Pain today is rated 6/10.     Prior HPI: The patient is a 64-year-old woman with a history of sarcoidosis, breast CA, diabetes, anxiety and depression who presents in referral from Dr. Nguyen to Dr. Perez for multiple pain complaints including back pain, bilateral hip pain and right shoulder pain.  She is following up with me since I am much closer to her.   She presents today for multiple pain complaints.  She recently was hospitalized for pneumonia, UTI and sarcoidosis exacerbation per patient.  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  She has had past GTB injections with moderate benefit and had an exacerbation of her pain recently.  She was able to receive left GTB with Dr. Perez on 10/3/2014 that provided >50% relief of her left hip and leg pain.     She continues to take Prednisone 5mg daily. She continues Percoet 7.5mg about 3x/day as needed with moderate benefit.  She presents today with worsening right knee pain.  She states having history of right knee osteoarthritis, but has not had any treatment for her knee pain.  Continues to have improvement in her knee pain following completion of visco supplementation injection in her right knee.   "Her back and hip pain remain tolerable with her medications.  She continues to take Percoct 7.5mg q8hrs as needed with moderate benefit.  No reported side effects.       Pain intervention history: She takes hydrocodone 7-325 2-4 times a day.  Also has undergone epidural steroid injection at L1/2 without relief.  She has been doing cervical traction with good relief of her neck pain.  Previous trochanteric bursa injections with good relief.    ROS:She reports fatigability, itching, headaches, swollen glands, chest pain and shortness of breath, nausea vomiting, easy bruising, back pain, joint stiffness, dizziness, difficulty sleeping, anxiety, depression and loss of balance.  Balance of review of systems is negative.    Medical, surgical, family and social history reviewed elsewhere in record.    Medications/Allergies: See med card    Vitals:    01/06/20 1442   BP: 123/80   Pulse: 73   Weight: 79.4 kg (175 lb)   Height: 5' 6" (1.676 m)   PainSc:   6   PainLoc: Back       Physical exam:  Gen: A and O x3, pleasant, well-groomed  Skin: No rashes or obvious lesions  HEENT: PERRLA, no obvious deformities on ears or in canals. No thyroid masses, trachea midline, no palpable lymph nodes in neck, axilla.  CVS: RRR  Resp: non labored breathing  Abdomen: Soft, NT/ND, normal bowel sounds present.  Neuro:  Lower extremities:   +right knee crepitus. LE erythema and swelling. TTP right 2 and 3rd metatarsals.   Reflexes: Patellar 2+, Achilles 2+ bilaterally.  Sensory:  Intact   Lumbar spine:  Lumbar spine: ROM is moderately reduced with flexion extension and oblique extension with increased pain in all directions.    Orlando's test causes pain on both sides.    Supine straight leg raise is negative bilaterally.    Internal and external rotation of the hip causes increased pain in left groin.  Myofascial exam: Tenderness to palpation over both GTB.      Imaging:  Cervical spine MRI report 10/5/12: There is dextroscoliosis in the " lower cervical/upper thoracic region.  There is very mild foraminal stenosis on the left at C6/7.    MRI thoracic spine 11/21/2008: No significant degenerative disc disease or central stenosis.    MRI lumbar spine 8/14/08: L1/2 small broad-based central protrusion with mild effacement of ventral thecal sac but not distorting exiting roots.  L2/3 and L3/4 unremarkable.  At L4/5 there is mild central stenosis with bulging annulus anteriorly and facet hypertrophic degenerative change posteriorly.  No focal disc herniation and neural foramina are patent.  At L5/S1 there is minimal bulging of the annulus with no thecal sac compression.  The small annular fissure within the posterior annulus.  Foramina are patent bilaterally, no stenosis mild, degenerative changes in the facets.    Xray Right Knee 10/13/14  Mild tricompartmental degenerative changes present. No joint effusion. The soft tissues are unremarkable.    Assessment:  Ms. Santiago is a 65-year-old woman with a history of sarcoidosis, breast CA, diabetes, anxiety and depression who presents in referral from Dr. Nguyen for multiple pain complaints including back pain, bilateral hip pain and knee pain   1. Greater trochanteric bursitis, unspecified laterality    2. Other spondylosis, lumbar region    3. DDD (degenerative disc disease), lumbar    4. Spondylolisthesis of lumbosacral region        PLAN:  1. I have stressed the importance of physical activity and exercise to improve overall health.    2.  Percocet 10mg q8hrs as needed.  Script given for three months.  reviewed. Previous UDS consistent  3.  Continue Flexeril 10 mg q 8 h. Call for refills  4.  I believe her low back pain maybe due to facet arthropathy and have recommended lumbar medial branch blocks as a diagnostic procedure.  If successful, would proceed with radiofrequency ablation.  May consider this procedure in the future  5.  Gabapentin 600mg BID for radicular pain.   6. Follow up in three  months

## 2020-01-06 NOTE — PROGRESS NOTES
"Primary Care Provider Appointment    Subjective:      Patient ID: Nathalie Santiago is a 69 y.o. female. sarcoidosis, hx of breast CA/uterine/ovarian cancer, diabetes complicated by gastroparesis, HLD, HTN,  anxiety and depression, hx of CVA (brain stem stroke), DJD, JOSE LUIS, chronic low back and hip pain     Chief Complaint: Follow-up (for routine care, advanced care directives, HM . ) and Foot Pain (right)    Right foot pain, reports her grandchild grabbed her ankle while walking and she tripped and fell. Pain has not improved. She thinks she broke her toe, her middle toe was "crooked" and she pulled in place, heard a "crunch".  Did not go to ER due to $95 copay.  Discolorated and in constant pain, is worse with walking.    Has been using ice.   Percocet helps but still has pain, depending on position pan can be severe.     Her brother in law in hospital currently, not expected to live. Another family member also got killed before Gregory.     MRI for falls and balance evaluation - about the same, no other recent falls besides the one menitoned above. Denies dizziness, has issues with balance with stepping over, bend over. Did try balance exercises but one should could not due to shoulder pain. Reports balance has worsened over the past 5-6 months but has not worsened recently.     myrbetriq was $300 a pill .   Wears depends at nighttime     Asthma-  No issues, takes inhalers daily and has not had any issues breathing      Diabetes 8.4 - Reports she is checking her glucose levels better, levels are   usually. accheck 292 here, did not eat anything yet, is not hungry, drinking soda today. Has not taken any insulin today.       Sarcoidosis: Sometimes she feels lymph node enlargement in her neck, uses ice pack on her neck and shoulder and reports in a few days, this will resolve.       Flu shot- she was told not to take flu or shingles shot due to her sarcoidiosis.       Other Providers:   Dr. Pat, psychiatry, " last seen in Sept 2019  Dr. Temple, pain management , last seen 9/30/2019   Dr. Steele, pulm - last seen in 10/2018     Past Surgical History:   Procedure Laterality Date    APPENDECTOMY      BREAST SURGERY      CHOLECYSTECTOMY      CYSTOCELE REPAIR      FRACTURE SURGERY Right     foot    HYSTERECTOMY      SAADIA/BSO    MASTECTOMY Bilateral     b/l mastectomy    OOPHORECTOMY      PLEURA BIOPSY      RECTAL SURGERY      rectocele      TONSILLECTOMY         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    CVA (cerebral vascular accident)- Confirmed by neurology 9/6/2016    Degenerative joint disease of cervical and lumbar spine 8/1/2014    Depression     Diabetes mellitus     Diabetes mellitus, type II     Gastroenteritis 12/19/2016    History of malignant phylloides tumor of breast 2/15/2013    Hyperlipidemia     Hyperopia with presbyopia 2/18/2013    Hypertension     Hypertension     Hypothyroidism     Immunosuppressed status 9/3/2015    Leukocytosis 11/17/2016    Migraine     Mitral valve regurgitation     Normochromic anemia 3/28/2017    Nuclear sclerosis 2/18/2013    Obesity     JOSE LUIS (obstructive sleep apnea)     Ovarian cancer     Pneumonia     Polyneuropathy     PVD (posterior vitreous detachment) 2/18/2013    Restless leg syndrome     Ruptured disk     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    Sarcoidosis 2006    Sarcoidosis of lung     Squamous  cell carcinoma     Trouble in sleeping     Uterine cancer     Venous insufficiency        Social History     Socioeconomic History    Marital status: Single     Spouse name: Not on file    Number of children: 1    Years of education: Not on file    Highest education level: Bachelor's degree (e.g., BA, AB, BS)   Occupational History    Occupation: retired    Social Needs    Financial resource strain: Not hard at all    Food insecurity:     Worry: Never true     Inability: Never true    Transportation needs:     Medical: No     Non-medical: No   Tobacco Use    Smoking status: Never Smoker    Smokeless tobacco: Never Used   Substance and Sexual Activity    Alcohol use: No    Drug use: No     Types: Oxycodone     Comment: prescription Oycodone    Sexual activity: Never   Lifestyle    Physical activity:     Days per week: 0 days     Minutes per session: 0 min    Stress: Not at all   Relationships    Social connections:     Talks on phone: More than three times a week     Gets together: More than three times a week     Attends Buddhism service: Never     Active member of club or organization: No     Attends meetings of clubs or organizations: Never     Relationship status: Never    Other Topics Concern    Not on file   Social History Narrative    Raised in [place]     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   Respiratory: Negative for cough and shortness of breath.    Cardiovascular: Negative for chest pain and palpitations.   Gastrointestinal: Negative for abdominal pain.   Musculoskeletal: Positive for arthralgias, back pain and gait problem.       Objective:   /66 (BP Location: Right arm, Patient Position: Sitting, BP Method: Large (Manual))   Pulse 79   Temp 97.9 °F  "(36.6 °C) (Oral)   Ht 5' 6" (1.676 m)   Wt 79.8 kg (175 lb 14.8 oz)   LMP  (LMP Unknown)   SpO2 99%   BMI 28.40 kg/m²     Physical Exam   Constitutional: She is oriented to person, place, and time. She appears well-developed and well-nourished.   Musculoskeletal:   Right foot with swollen 2nd toe, no discoloration noted.   Able to wiggle all toes  Tender with palpation near the 2nd-3rd metatarsal area   Neurological: She is alert and oriented to person, place, and time.   Psychiatric: She has a normal mood and affect.   Vitals reviewed.      Lab Results   Component Value Date    WBC 6.51 12/12/2019    HGB 12.7 12/12/2019    HCT 38.5 12/12/2019     12/12/2019    CHOL 149 08/22/2018    TRIG 213 (H) 08/22/2018    HDL 44 08/22/2018    ALT 19 12/12/2019    AST 21 12/12/2019     12/12/2019    K 3.4 (L) 12/12/2019     12/12/2019    CREATININE 0.8 12/12/2019    BUN 15 12/12/2019    CO2 28 12/12/2019    TSH 3.533 12/12/2019    INR 1.0 06/25/2015    HGBA1C 8.4 (H) 12/12/2019       RESULTS: Reviewed labs and images today    Assessment:   69 y.o. female with multiple co-morbid illnesses here to continue work-up of chronic issues notably sarcoidosis, hx of breast CA/uterine/ovarian cancer, diabetes complicated by gastroparesis, HLD, HTN,  anxiety and depression, hx of CVA (brain stem stroke), DJD, JOSE LUIS, chronic low back and hip pain    Plan:     Problem List Items Addressed This Visit        Neuro    Chronic pain - Primary    Relevant Medications    gabapentin (NEURONTIN) 300 MG capsule       Psychiatric    MDD (major depressive disorder), recurrent, in partial remission     Followed by psychiatry  Sx stable  Cont regimen  Call if worsening depression             Pulmonary    Chronic obstructive asthma     Sx stable  Cont inhalers as directed               Cardiac/Vascular    Hypertension associated with diabetes     BP at goal          Hyperlipidemia associated with type 2 diabetes mellitus    PVD " (peripheral vascular disease)     On statin and plavix            Renal/    Hypokalemia     Cont supplement, last value of 3.4          Urge incontinence     Could not afford mybretiq   Pt wants to use oxybuytnin, advised on risk of side effect with falls and cognition             Immunology/Multi System    Sarcoidosis of lung     Currently stable   Will need to research immunizations and sarcoidosis for contraindications             Endocrine    Hypothyroidism due to acquired atrophy of thyroid     Thyroid function last wnl  Continue levothyroxine 25 mcg         Encounter for long-term (current) use of insulin     DM not controlled   Cont insulin          Relevant Orders    POCT Glucose, Hand-Held Device (Completed)    Type 2 diabetes mellitus, uncontrolled     Instructed to take insulin once home  Instructed on importance of eating meals lyndsey with medications             Orthopedic    Right foot pain     Obtain Xray with recent trauma          Relevant Orders    X-Ray Foot Complete Right       Other    Repeated falls     Referred to PT          Relevant Orders    MRI Brain W WO Contrast    MRA Brain    Balance disorder     Concerned with basiliar artery insufficiency and/or another CVA causing worsening sx vs age vs diabetic neuropathy vs polypharmacy ( anticholinergics, gabapentin and narcotics)  Obtain MRI/MRA Brain  Referral to PT in [facility]  Advised pt to use a cane during bad days          Relevant Orders    Ambulatory Referral to Physical/Occupational Therapy          Health Maintenance       Date Due Completion Date    TETANUS VACCINE 07/22/1967 ---    Naloxone Prescription 07/22/1967 ---    Shingles Vaccine (1 of 2) 07/22/1999 ---    DEXA SCAN 09/16/2017 9/16/2014    Lipid Panel 08/22/2019 8/22/2018    Influenza Vaccine (1) 09/01/2019 11/28/2016 (Declined)    Override on 11/28/2016: Declined    Override on 9/3/2015: Declined    Foot Exam 06/07/2020 6/7/2019    Override on 3/3/2016: Done    Override on  11/30/2015: Done (AMINA)    Override on 3/3/2015: Done    Urine Drug Screen 06/12/2020 12/12/2019    Hemoglobin A1c 06/12/2020 12/12/2019    Eye Exam 09/06/2020 9/6/2019    Urine Microalbumin 12/12/2020 12/12/2019    Colonoscopy 04/29/2025 4/29/2015          Follow up in about 3 months (around 4/6/2020) for balance instability, falls, routine care .  Total face-to-face time was 60 min, 50% of this was spent on counseling and coordination of care. The following issues were discussed:sarcoidosis, hx of breast CA/uterine/ovarian cancer, diabetes complicated by gastroparesis, HLD, HTN,  anxiety and depression, hx of CVA (brain stem stroke), DJD, JOSE LUIS, chronic low back and hip pain    Petra Broussard MD  Internal Medicine- Geriatrics  [facility] [place]

## 2020-01-06 NOTE — ASSESSMENT & PLAN NOTE
Could not afford mybretiq   Pt wants to use oxybuytnin, advised on risk of side effect with falls and cognition    Right UE decreased ROM Right UE with decreased ROM and pain with movement

## 2020-01-06 NOTE — ASSESSMENT & PLAN NOTE
Concerned with basiliar artery insufficiency and/or another CVA causing worsening sx vs age vs diabetic neuropathy vs polypharmacy ( anticholinergics, gabapentin and narcotics)  Obtain MRI/MRA Brain  Referral to PT in [facility]  Advised pt to use a cane during bad days

## 2020-01-06 NOTE — PATIENT INSTRUCTIONS
- MRI Brain for balance evaluation  - Foot xray today  - Physical therapy referral to [facility]

## 2020-01-06 NOTE — ASSESSMENT & PLAN NOTE
Instructed to take insulin once home  Instructed on importance of eating meals lyndsey with medications

## 2020-01-08 ENCOUNTER — HOSPITAL ENCOUNTER (OUTPATIENT)
Dept: RADIOLOGY | Facility: HOSPITAL | Age: 70
Discharge: HOME | End: 2020-01-08
Attending: INTERNAL MEDICINE
Payer: MEDICARE

## 2020-01-08 DIAGNOSIS — R29.6 REPEATED FALLS: ICD-10-CM

## 2020-01-08 PROCEDURE — 25500020 PHARM REV CODE 255: Mod: HCNC | Performed by: INTERNAL MEDICINE

## 2020-01-08 PROCEDURE — 70553 MRI BRAIN STEM W/O & W/DYE: CPT | Mod: 26,HCNC,, | Performed by: RADIOLOGY

## 2020-01-08 PROCEDURE — 70553 MRI BRAIN STEM W/O & W/DYE: CPT | Mod: TC,HCNC

## 2020-01-08 PROCEDURE — 70553 MRI BRAIN W WO CONTRAST: ICD-10-PCS | Mod: 26,HCNC,, | Performed by: RADIOLOGY

## 2020-01-08 PROCEDURE — 70544 MR ANGIOGRAPHY HEAD W/O DYE: CPT | Mod: 59,TC,HCNC

## 2020-01-08 PROCEDURE — 70544 MRA BRAIN WITHOUT CONTRAST: ICD-10-PCS | Mod: 26,59,HCNC, | Performed by: RADIOLOGY

## 2020-01-08 PROCEDURE — A9585 GADOBUTROL INJECTION: HCPCS | Mod: HCNC | Performed by: INTERNAL MEDICINE

## 2020-01-08 PROCEDURE — 70544 MR ANGIOGRAPHY HEAD W/O DYE: CPT | Mod: 26,59,HCNC, | Performed by: RADIOLOGY

## 2020-01-08 RX ORDER — GADOBUTROL 604.72 MG/ML
8 INJECTION INTRAVENOUS
Status: COMPLETED | OUTPATIENT
Start: 2020-01-08 | End: 2020-01-08

## 2020-01-08 RX ADMIN — GADOBUTROL 8 ML: 604.72 INJECTION INTRAVENOUS at 05:01

## 2020-01-13 ENCOUNTER — TELEPHONE (OUTPATIENT)
Dept: PAIN MEDICINE | Facility: CLINIC | Age: 70
End: 2020-01-13

## 2020-01-15 ENCOUNTER — TELEPHONE (OUTPATIENT)
Dept: NEUROLOGY | Facility: CLINIC | Age: 70
End: 2020-01-15

## 2020-01-15 NOTE — TELEPHONE ENCOUNTER
----- Message from Petra Mtz sent at 1/15/2020  4:00 PM CST -----  Contact: pt [phone]  Call placed to pod. Patient is returning a call back from the office. Please call back.

## 2020-01-15 NOTE — TELEPHONE ENCOUNTER
----- Message from Jamie Trujillo sent at 1/15/2020  2:53 PM CST -----  Contact: pt  Type: Needs Medical Advice    Who Called:  pt    Best Call Back Number: [phone]  Additional Information: pt would like to make an appointment with Dr. Harris. Pt last saw Dr. Harris back in 2016. Please call to advise.

## 2020-01-21 ENCOUNTER — TELEPHONE (OUTPATIENT)
Dept: PODIATRY | Facility: CLINIC | Age: 70
End: 2020-01-21

## 2020-01-21 NOTE — TELEPHONE ENCOUNTER
----- Message from Dominga Alcazar sent at 1/21/2020 11:25 AM CST -----  Contact: self  Type:  Needs Medical Advice    Who Called: self  Would the patient rather a call back or a response via [facility]? Call back  Best Call Back Number: [phone]  Additional Information: Pt would like to review the x-ray, right foot still in pain

## 2020-01-21 NOTE — TELEPHONE ENCOUNTER
Spoke with patient regarding Right foot pain, Patient refused an appointment that was offered and stated that she would call back at a later time.

## 2020-01-31 ENCOUNTER — TELEPHONE (OUTPATIENT)
Dept: PSYCHIATRY | Facility: CLINIC | Age: 70
End: 2020-01-31

## 2020-02-12 DIAGNOSIS — E55.9 VITAMIN D DEFICIENCY: Primary | ICD-10-CM

## 2020-02-12 RX ORDER — ASPIRIN 325 MG
TABLET, DELAYED RELEASE (ENTERIC COATED) ORAL
Qty: 12 CAPSULE | Refills: 0 | OUTPATIENT
Start: 2020-02-12

## 2020-02-13 ENCOUNTER — TELEPHONE (OUTPATIENT)
Dept: PRIMARY CARE CLINIC | Facility: CLINIC | Age: 70
End: 2020-02-13

## 2020-02-13 NOTE — TELEPHONE ENCOUNTER
----- Message from Petra Broussard MD sent at 2/12/2020 11:31 PM CST -----  Received request for refill of vitamin D prescription, she has completed course, she should not need this high amount of vitamin D anymore. I placed an order for vitamin D lab, can  she can get lab next week when she sees Dr. Pat?

## 2020-02-20 ENCOUNTER — OFFICE VISIT (OUTPATIENT)
Dept: PSYCHIATRY | Facility: CLINIC | Age: 70
End: 2020-02-20
Payer: MEDICARE

## 2020-02-20 VITALS
BODY MASS INDEX: 29.51 KG/M2 | DIASTOLIC BLOOD PRESSURE: 74 MMHG | WEIGHT: 183.63 LBS | HEART RATE: 78 BPM | HEIGHT: 66 IN | SYSTOLIC BLOOD PRESSURE: 139 MMHG

## 2020-02-20 DIAGNOSIS — F33.41 MDD (MAJOR DEPRESSIVE DISORDER), RECURRENT, IN PARTIAL REMISSION: ICD-10-CM

## 2020-02-20 DIAGNOSIS — G89.4 CHRONIC PAIN SYNDROME: ICD-10-CM

## 2020-02-20 DIAGNOSIS — Z63.4 BEREAVEMENT: ICD-10-CM

## 2020-02-20 DIAGNOSIS — F41.1 GAD (GENERALIZED ANXIETY DISORDER): ICD-10-CM

## 2020-02-20 PROCEDURE — 99999 PR PBB SHADOW E&M-EST. PATIENT-LVL III: ICD-10-PCS | Mod: PBBFAC,HCNC,, | Performed by: PSYCHIATRY & NEUROLOGY

## 2020-02-20 PROCEDURE — 99499 RISK ADDL DX/OHS AUDIT: ICD-10-PCS | Mod: HCNC,S$GLB,, | Performed by: PSYCHIATRY & NEUROLOGY

## 2020-02-20 PROCEDURE — 3078F PR MOST RECENT DIASTOLIC BLOOD PRESSURE < 80 MM HG: ICD-10-PCS | Mod: HCNC,CPTII,S$GLB, | Performed by: PSYCHIATRY & NEUROLOGY

## 2020-02-20 PROCEDURE — 3075F PR MOST RECENT SYSTOLIC BLOOD PRESS GE 130-139MM HG: ICD-10-PCS | Mod: HCNC,CPTII,S$GLB, | Performed by: PSYCHIATRY & NEUROLOGY

## 2020-02-20 PROCEDURE — 3075F SYST BP GE 130 - 139MM HG: CPT | Mod: HCNC,CPTII,S$GLB, | Performed by: PSYCHIATRY & NEUROLOGY

## 2020-02-20 PROCEDURE — 99499 UNLISTED E&M SERVICE: CPT | Mod: HCNC,S$GLB,, | Performed by: PSYCHIATRY & NEUROLOGY

## 2020-02-20 PROCEDURE — 1125F PR PAIN SEVERITY QUANTIFIED, PAIN PRESENT: ICD-10-PCS | Mod: HCNC,S$GLB,, | Performed by: PSYCHIATRY & NEUROLOGY

## 2020-02-20 PROCEDURE — 99214 OFFICE O/P EST MOD 30 MIN: CPT | Mod: HCNC,S$GLB,, | Performed by: PSYCHIATRY & NEUROLOGY

## 2020-02-20 PROCEDURE — 3078F DIAST BP <80 MM HG: CPT | Mod: HCNC,CPTII,S$GLB, | Performed by: PSYCHIATRY & NEUROLOGY

## 2020-02-20 PROCEDURE — 1159F MED LIST DOCD IN RCRD: CPT | Mod: HCNC,S$GLB,, | Performed by: PSYCHIATRY & NEUROLOGY

## 2020-02-20 PROCEDURE — 1159F PR MEDICATION LIST DOCUMENTED IN MEDICAL RECORD: ICD-10-PCS | Mod: HCNC,S$GLB,, | Performed by: PSYCHIATRY & NEUROLOGY

## 2020-02-20 PROCEDURE — 1101F PR PT FALLS ASSESS DOC 0-1 FALLS W/OUT INJ PAST YR: ICD-10-PCS | Mod: HCNC,CPTII,S$GLB, | Performed by: PSYCHIATRY & NEUROLOGY

## 2020-02-20 PROCEDURE — 99999 PR PBB SHADOW E&M-EST. PATIENT-LVL III: CPT | Mod: PBBFAC,HCNC,, | Performed by: PSYCHIATRY & NEUROLOGY

## 2020-02-20 PROCEDURE — 1101F PT FALLS ASSESS-DOCD LE1/YR: CPT | Mod: HCNC,CPTII,S$GLB, | Performed by: PSYCHIATRY & NEUROLOGY

## 2020-02-20 PROCEDURE — 99214 PR OFFICE/OUTPT VISIT, EST, LEVL IV, 30-39 MIN: ICD-10-PCS | Mod: HCNC,S$GLB,, | Performed by: PSYCHIATRY & NEUROLOGY

## 2020-02-20 PROCEDURE — 1125F AMNT PAIN NOTED PAIN PRSNT: CPT | Mod: HCNC,S$GLB,, | Performed by: PSYCHIATRY & NEUROLOGY

## 2020-02-20 RX ORDER — DULOXETIN HYDROCHLORIDE 60 MG/1
60 CAPSULE, DELAYED RELEASE ORAL NIGHTLY
COMMUNITY
Start: 2020-02-06 | End: 2020-02-20 | Stop reason: SDUPTHER

## 2020-02-20 RX ORDER — DULOXETIN HYDROCHLORIDE 60 MG/1
60 CAPSULE, DELAYED RELEASE ORAL NIGHTLY
Qty: 90 CAPSULE | Refills: 2 | Status: SHIPPED | OUTPATIENT
Start: 2020-02-20 | End: 2020-07-23

## 2020-02-20 RX ORDER — SERTRALINE HYDROCHLORIDE 100 MG/1
TABLET, FILM COATED ORAL
Qty: 180 TABLET | Refills: 2 | Status: SHIPPED | OUTPATIENT
Start: 2020-02-20 | End: 2020-09-14

## 2020-02-20 RX ORDER — GABAPENTIN 300 MG/1
CAPSULE ORAL
Qty: 1 CAPSULE | Refills: 0
Start: 2020-02-20 | End: 2021-08-03 | Stop reason: SDUPTHER

## 2020-02-20 RX ORDER — CLOBETASOL PROPIONATE 0.5 MG/G
OINTMENT TOPICAL
COMMUNITY
Start: 2020-01-16 | End: 2021-07-30 | Stop reason: SDUPTHER

## 2020-02-20 SDOH — SOCIAL DETERMINANTS OF HEALTH (SDOH): DISSAPEARANCE AND DEATH OF FAMILY MEMBER: Z63.4
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
## 2020-02-27 ENCOUNTER — PATIENT MESSAGE (OUTPATIENT)
Dept: PRIMARY CARE CLINIC | Facility: CLINIC | Age: 70
End: 2020-02-27

## 2020-02-27 DIAGNOSIS — M79.671 RIGHT FOOT PAIN: Primary | ICD-10-CM

## 2020-02-27 NOTE — PROGRESS NOTES
Pt with swollen right toe and difficulty walking. Obtain uric acid level to rule out gout.     Petra Broussard MD  Internal Medicine- Geriatrics  [facility] [place]

## 2020-03-09 ENCOUNTER — PES CALL (OUTPATIENT)
Dept: ADMINISTRATIVE | Facility: CLINIC | Age: 70
End: 2020-03-09

## 2020-03-18 DIAGNOSIS — E03.4 HYPOTHYROIDISM DUE TO ACQUIRED ATROPHY OF THYROID: ICD-10-CM

## 2020-03-19 RX ORDER — LEVOTHYROXINE SODIUM 25 UG/1
TABLET ORAL
Qty: 90 TABLET | Refills: 3 | Status: SHIPPED | OUTPATIENT
Start: 2020-03-19 | End: 2021-02-01

## 2020-03-20 ENCOUNTER — PATIENT MESSAGE (OUTPATIENT)
Dept: PAIN MEDICINE | Facility: CLINIC | Age: 70
End: 2020-03-20

## 2020-03-20 ENCOUNTER — PATIENT MESSAGE (OUTPATIENT)
Dept: PRIMARY CARE CLINIC | Facility: CLINIC | Age: 70
End: 2020-03-20

## 2020-03-25 ENCOUNTER — PATIENT MESSAGE (OUTPATIENT)
Dept: ADMINISTRATIVE | Facility: OTHER | Age: 70
End: 2020-03-25

## 2020-03-25 DIAGNOSIS — E11.9 TYPE 2 DIABETES MELLITUS: ICD-10-CM

## 2020-04-01 ENCOUNTER — TELEPHONE (OUTPATIENT)
Dept: PRIMARY CARE CLINIC | Facility: CLINIC | Age: 70
End: 2020-04-01

## 2020-04-01 NOTE — TELEPHONE ENCOUNTER
Spoke with pt, doing well, appt cancelled pt did not think she needed th have a phone appt with NP.  Pt is practicing social distancing and has assisitance as needed.  Pt asked to call ofice if the need arises

## 2020-04-03 ENCOUNTER — PATIENT OUTREACH (OUTPATIENT)
Dept: ADMINISTRATIVE | Facility: OTHER | Age: 70
End: 2020-04-03

## 2020-04-06 ENCOUNTER — OFFICE VISIT (OUTPATIENT)
Dept: PAIN MEDICINE | Facility: CLINIC | Age: 70
End: 2020-04-06
Payer: MEDICARE

## 2020-04-06 DIAGNOSIS — F11.90 CHRONIC, CONTINUOUS USE OF OPIOIDS: ICD-10-CM

## 2020-04-06 DIAGNOSIS — M70.60 GREATER TROCHANTERIC BURSITIS, UNSPECIFIED LATERALITY: Primary | ICD-10-CM

## 2020-04-06 DIAGNOSIS — M51.36 DDD (DEGENERATIVE DISC DISEASE), LUMBAR: ICD-10-CM

## 2020-04-06 DIAGNOSIS — M47.896 OTHER SPONDYLOSIS, LUMBAR REGION: ICD-10-CM

## 2020-04-06 PROCEDURE — 1159F PR MEDICATION LIST DOCUMENTED IN MEDICAL RECORD: ICD-10-PCS | Mod: HCNC,95,, | Performed by: PHYSICIAN ASSISTANT

## 2020-04-06 PROCEDURE — 1159F MED LIST DOCD IN RCRD: CPT | Mod: HCNC,95,, | Performed by: PHYSICIAN ASSISTANT

## 2020-04-06 PROCEDURE — 99214 PR OFFICE/OUTPT VISIT, EST, LEVL IV, 30-39 MIN: ICD-10-PCS | Mod: HCNC,95,, | Performed by: PHYSICIAN ASSISTANT

## 2020-04-06 PROCEDURE — 1101F PR PT FALLS ASSESS DOC 0-1 FALLS W/OUT INJ PAST YR: ICD-10-PCS | Mod: HCNC,CPTII,95, | Performed by: PHYSICIAN ASSISTANT

## 2020-04-06 PROCEDURE — 1101F PT FALLS ASSESS-DOCD LE1/YR: CPT | Mod: HCNC,CPTII,95, | Performed by: PHYSICIAN ASSISTANT

## 2020-04-06 PROCEDURE — 99214 OFFICE O/P EST MOD 30 MIN: CPT | Mod: HCNC,95,, | Performed by: PHYSICIAN ASSISTANT

## 2020-04-06 RX ORDER — OXYCODONE AND ACETAMINOPHEN 10; 325 MG/1; MG/1
1 TABLET ORAL EVERY 4 HOURS PRN
Qty: 90 TABLET | Refills: 0 | Status: SHIPPED | OUTPATIENT
Start: 2020-04-06 | End: 2020-05-06 | Stop reason: SDUPTHER

## 2020-04-06 NOTE — PROGRESS NOTES
CC: left sided low back and left hip pain  Visit type: Virtual visit with synchronous audio and video  Total time spent with patient: 25 minutes  Each patient to whom he or she provides medical services by telemedicine is:  (1) informed of the relationship between the physician and patient and the respective role of any other health care provider with respect to management of the patient; and (2) notified that he or she may decline to receive medical services by telemedicine and may withdraw from such care at any time.  Patient is safe at home in [place].   Interval History: Ms. Santiago is a 69 y.o. female with chronic low back and hip pain who presents for her follow up.   She continues to have lower back, left greater than right lower back pain.  Left GTB and right knee pain has worsened. Her last left GTB injection provided minimal relief. She had a series of Euflexxa injections in 2014. She continues to take percocet 10mg q 8 hrs as needed for pain with moderate benefits.  No side effects reported.  Able to perform daily activities with the medications. Flexeril 10 mg and Gabapentin 600 mg BID has been helpful.  Pain today is rated 6/10.     Prior HPI: The patient is a 64-year-old woman with a history of sarcoidosis, breast CA, diabetes, anxiety and depression who presents in referral from Dr. Nguyen to Dr. Perez for multiple pain complaints including back pain, bilateral hip pain and right shoulder pain.  She is following up with me since I am much closer to her.   She presents today for multiple pain complaints.  She recently was hospitalized for pneumonia, UTI and sarcoidosis exacerbation per patient.  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  She has had past GTB injections with moderate benefit and had an exacerbation of her pain recently.  She was able to receive left GTB with Dr. Perez on 10/3/2014 that provided >50% relief of her left hip and  leg pain.     She continues to take Prednisone 5mg daily. She continues Percoet 7.5mg about 3x/day as needed with moderate benefit.  She presents today with worsening right knee pain.  She states having history of right knee osteoarthritis, but has not had any treatment for her knee pain.  Continues to have improvement in her knee pain following completion of visco supplementation injection in her right knee.  Her back and hip pain remain tolerable with her medications.  She continues to take Percoct 7.5mg q8hrs as needed with moderate benefit.  No reported side effects.       Pain intervention history: She takes hydrocodone 7-325 2-4 times a day.  Also has undergone epidural steroid injection at L1/2 without relief.  She has been doing cervical traction with good relief of her neck pain.  Previous trochanteric bursa injections with good relief.    ROS:She reports fatigability, itching, headaches, swollen glands, chest pain and shortness of breath, nausea vomiting, easy bruising, back pain, joint stiffness, dizziness, difficulty sleeping, anxiety, depression and loss of balance.  Balance of review of systems is negative.    Medical, surgical, family and social history reviewed elsewhere in record.    Medications/Allergies: See med card    GENERAL: A and O x3, the patient appears well groomed and is in no acute distress.  Skin: No rashes or obvious lesions  HEENT: normocephalic, atraumatic  LUNGS: non labored breathing  ABDOMEN: non distended  UPPER EXTREMITIES and lower extremities. Normal ROM  CERVICAL SPINE:  Cervical spine: ROM is full in flexion, extension and lateral rotation without increased pain.    LUMBAR SPINE  Lumbar spine: ROM is limited with flexion extension and oblique extension with mild-to-moderate increased pain.      MENTAL STATUS: normal orientation, speech, language, and fund of knowledge for social situation.  Emotional state appropriate.    GAIT: normal rise, base, steps, and arm swing.       Imaging:  Cervical spine MRI report 10/5/12: There is dextroscoliosis in the lower cervical/upper thoracic region.  There is very mild foraminal stenosis on the left at C6/7.    MRI thoracic spine 11/21/2008: No significant degenerative disc disease or central stenosis.    MRI lumbar spine 8/14/08: L1/2 small broad-based central protrusion with mild effacement of ventral thecal sac but not distorting exiting roots.  L2/3 and L3/4 unremarkable.  At L4/5 there is mild central stenosis with bulging annulus anteriorly and facet hypertrophic degenerative change posteriorly.  No focal disc herniation and neural foramina are patent.  At L5/S1 there is minimal bulging of the annulus with no thecal sac compression.  The small annular fissure within the posterior annulus.  Foramina are patent bilaterally, no stenosis mild, degenerative changes in the facets.    Xray Right Knee 10/13/14  Mild tricompartmental degenerative changes present. No joint effusion. The soft tissues are unremarkable.    Assessment:  Ms. Santiago is a 65-year-old woman with a history of sarcoidosis, breast CA, diabetes, anxiety and depression who presents in referral from Dr. Nguyen for multiple pain complaints including back pain, bilateral hip pain and knee pain   1. Greater trochanteric bursitis, unspecified laterality    2. Other spondylosis, lumbar region    3. DDD (degenerative disc disease), lumbar    4. Chronic, continuous use of opioids      PLAN:  1. I have stressed the importance of physical activity and exercise to improve overall health.    2.  Percocet 10mg q8hrs as needed.  Script given for three months.  reviewed. Previous UDS consistent  3.  Continue Flexeril 10 mg q 8 h. Call for refills  4.  I believe her low back pain maybe due to facet arthropathy and have recommended lumbar medial branch blocks as a diagnostic procedure.  If successful, would proceed with radiofrequency ablation.  May consider this procedure in the  future  5.  Gabapentin 600mg BID for radicular pain. Call for refills  6. Follow up in three months   All medication management was performed by Dr. Lincoln Temple        Exercises To Strengthen Your Lower Back  Strong lower-back and abdominal muscles work together to support your spine. These exercises will help strengthen the muscles of the lower back. It is important that you begin exercising slowly and increase levels gradually.  Always begin any exercise program with stretching. If you feel pain while doing any of these exercises, stop and talk to your doctor about a more specific exercise program that suits your condition better.  Low Back Stretch  · Lie on your back with your knees bent and both feet on the ground.  ·   Slowly raise your left knee to your chest as you flatten your lower back against the floor. Hold for 5 seconds.  · Relax and repeat the exercise with your right knee.  · Do 10 of these exercises for each leg.  · Repeat hugging both knees to your chest at the same time.  Building Lower Back Strength  1. Kneeling Lumbar Extension: Begin on your hands and knees. Simultaneously raise and straighten your right arm and left leg until they are parallel to the ground. Hold for 2 seconds and come back slowly to a starting position. Repeat with left arm and right leg, alternating 10 times.  2. Prone Lumbar Extension: Lie face down, arms extended overhead, palms on the floor. Simultaneously raise your right arm and left leg as high as comfortably possible. Hold for 10 seconds and slowly return to start. Repeat with left arm and right leg, alternating 10 times. Gradually build up to 20 times. (Advanced: Repeat this exercise raising both arms and both legs a few inches off the floor at the same time. Hold for 5 seconds and release.)  3. Pelvic Tilt: Lie on the floor on your back with your knees bent at 90 degrees. Your feet should be flat on the floor. Inhale, exhale, then slowly contract your abdominal muscles  bringing your navel toward your spine. Let your pelvis rock back until your lower back is flat on the floor. Hold for 10 seconds while breathing smoothly.  4. Abdominal Crunch: Perform a Pelvic Tilt (above) flattening your lower back against the floor. Holding the tension in your abdominal muscles, take another breath and raise your shoulder blades off the ground (this is not a full sit-up). Keep your head in line with your body (dont bend your neck forward). Hold for 2 seconds, then slowly lower.      Back Exercises: Abdominal Lift  The Abdominal Lift strengthens your lower abdominal muscles, helping you keep your pelvis and back stable.  · Lie on the floor with both knees bent. Put your feet flat on the floor and your arms by your sides. Tighten your abdominal muscles.  · Lift one bent knee and move it toward your upper body. Keep your abdominal muscles tight and your back flat on the floor. Hold for 10 seconds.  · Repeat 3 times. Then, switch legs.         Back Exercises: Bridge  The Bridge exercise strengthens your abdominal, buttocks, and hamstring muscles. This helps keep your back stable and aligned when you walk.  · Lie on the floor with your back and palms flat. Bend your knees. Keep your feet flat on the floor.  · Contract your abdominal and buttocks muscles. Slowly lift your buttocks off the floor until there is a straight line from your knees to your shoulders.  · Hold for 5  seconds. Repeat 10 times.          Back Exercises: Hip Lift        To start, lie on your back with your knees bent and feet flat on the floor. Dont press your neck or lower back to the floor. Breathe deeply. You should feel comfortable and relaxed in this position.  · Slowly raise your hips upward.  · Tighten your abdomen and buttocks. Be careful not to arch your back.  · Hold for 5 seconds. Lower your hips to the floor.  · Repeat 10 times.  For your safety, check with your healthcare provider before starting an exercise program.        Back Exercises: Hip Rotator Stretch        To start, lie on your back with your knees bent and feet flat on the floor. Dont press your neck or lower back to the floor. Breathe deeply. You should feel comfortable and relaxed in this position.  · Rest your right ankle on your left knee.  · Place a towel behind your left thigh and use it to pull the knee toward your chest. Feel the stretch in your buttocks.  · Hold for 30-60 seconds. Release.  · Repeat 2 times.  · Switch legs.   For your safety, check with your healthcare provider before starting an exercise program.       Back Exercises: Knee Lift        To start, lie on your back with your knees bent and feet flat on the floor. Dont press your neck or lower back to the floor. Breathe deeply. You should feel comfortable and relaxed in this position.  · Lift one bent knee and move it toward your upper body. Keep your abdominal muscles tight and your back flat on the floor.  · Hold for 10 seconds. Return to start position.  · Repeat 3 times.  · Switch legs.        Back Exercises: Leg Pull        To start, lie on your back with your knees bent and feet flat on the floor. Dont press your neck or lower back to the floor. Breathe deeply. You should feel comfortable and relaxed in this position.  · Pull one knee to your chest.  · Hold for 30-60 seconds. Return to starting position.  · Repeat 2 times.  · Switch legs.  · For a double leg pull, pull both legs to your chest at the same time. Repeat 2 times.  For your safety, check with your healthcare provider before starting an exercise program.       Back Exercises: Leg Reach        Do this exercise on your hands and knees. Keep your knees under your hips and your hands under your shoulders. Keep your spine in a neutral position (not arched or sagging). Be sure to maintain your necks natural curve.  · Extend one leg straight back. Dont arch your back or let your head or body sag.  · Hold for 5 seconds. Return to  starting position.  · Repeat 5 times.  · Switch legs.       Back Exercises: Lower Back Rotation  To start, lie on your back with your knees bent and feet flat on the floor. Dont press your neck or lower back to the floor. Breathe deeply. You should feel comfortable and relaxed in this position.  · Drop both knees to one side. Turn your head to the other side. Keep your shoulders flat on the floor.  · Hold for 20 seconds.  · Slowly switch sides.  · Repeat 2 times.                                   Back Exercises: Lower Back Stretch                        To start, sit in a chair with your feet flat on the floor. Shift your weight slightly forward to avoid rounding your back. Relax, and keep your ears, shoulders, and hips aligned.  · Sit with your feet well apart.  · Bend forward and touch the floor with the backs of your hands. Relax and let your body drop.  · Hold for 20 seconds. Return to starting position.  · Repeat 2 times.       Back Exercises: Partial Curl-Ups        To start, lie on your back with your knees bent and feet flat on the floor. Dont press your neck or lower back to the floor. Breathe deeply. You should feel comfortable and relaxed in this position.  · Cross your arms loosely.  · Tighten your abdomen and curl assisted up, keeping your head in line with your shoulders.  · Hold for 5 seconds. Uncurl to lie down.  · Repeat 5 times.       Back Exercises: Pelvic Tilt  To start, lie on your back with your knees bent and feet flat on the floor. Dont press your neck or lower back to the floor. Breathe deeply. You should feel comfortable and relaxed in this position.  · Tighten your abdomen and buttocks, and press your lower back toward the floor. This should be a small, subtle movement.  · Hold for 5 seconds. Release.  · Repeat 5 times.                           Back Exercises: Seated Rotation      To start, sit in a chair with your feet flat on the floor. Shift your weight slightly forward to avoid  rounding your back. Relax, and keep your ears, shoulders, and hips aligned.  · Fold your arms, elbows just below shoulder height.  · Turn from the waist with hips forward. Turn your head last.  · Hold for a count of 5. Return to starting position.  · Repeat 5 times on one side. Then switch sides.          Back Exercises: Side Stretch  To start, sit in a chair with your feet flat on the floor. Shift your weight slightly forward to avoid rounding your back. Relax. Keep your ears, shoulders, and hips aligned.  · Stretch your right arm overhead.  · Slowly bend to the left. Dont twist your torso.  · Hold for 20 seconds. Return to starting position.  · Repeat 2 times. Then, switch to the other side.      © 5522-8926 [employer]. [address] [place] [address], [place] [address]. All rights reserved. This information is not intended as a substitute for professional medical care. Always follow your healthcare professional's instructions.

## 2020-04-09 DIAGNOSIS — E11.59 HYPERTENSION ASSOCIATED WITH DIABETES: ICD-10-CM

## 2020-04-09 DIAGNOSIS — I15.2 HYPERTENSION ASSOCIATED WITH DIABETES: ICD-10-CM

## 2020-04-09 DIAGNOSIS — E87.6 HYPOKALEMIA: ICD-10-CM

## 2020-04-09 RX ORDER — METOPROLOL SUCCINATE 25 MG/1
25 TABLET, EXTENDED RELEASE ORAL DAILY
Qty: 90 TABLET | Refills: 3 | Status: SHIPPED | OUTPATIENT
Start: 2020-04-09 | End: 2021-02-26

## 2020-04-09 RX ORDER — POTASSIUM CHLORIDE 750 MG/1
10 CAPSULE, EXTENDED RELEASE ORAL 3 TIMES DAILY
Qty: 270 CAPSULE | Refills: 3 | Status: SHIPPED | OUTPATIENT
Start: 2020-04-09 | End: 2021-02-26

## 2020-04-24 ENCOUNTER — TELEPHONE (OUTPATIENT)
Dept: PSYCHIATRY | Facility: CLINIC | Age: 70
End: 2020-04-24

## 2020-04-24 RX ORDER — AMITRIPTYLINE HYDROCHLORIDE 25 MG/1
25 TABLET, FILM COATED ORAL NIGHTLY PRN
Qty: 30 TABLET | Refills: 0 | Status: SHIPPED | OUTPATIENT
Start: 2020-04-24 | End: 2020-05-06

## 2020-04-24 NOTE — TELEPHONE ENCOUNTER
Called pt regarding below message. Pt reports difficulty initiating sleep. Pt confirmed current medications. Pt states she tried to increase gabapentin to 1200 mg but felt no difference so remained at 900 mg. Pt reports taking a magnesium supplement Calm to help sleep but with no relief. Pt states she does not use electronics at night, no tv in bedroom. Pt states she doesn't fall asleep until carl. Pt states if she does manage to wake up early she does not have a nap during the day. Advised pt I will send her concerns to Dr. Pat for review and recommendation. Pt verbalized understanding with no further questions.

## 2020-04-24 NOTE — TELEPHONE ENCOUNTER
"Spoke to the patient in response to message. Pt reports significant insomnia.  See message below.   Family doing pretty well.  Making sure she is clear to go to her daughter's home.  Not getting any sleep.  In misery.  4-5 am sleep onset.   In bed at 10:30-11 pm. Staying with sister. Very tired. Mood is nonchalant, not sleeping at night is "ridiculous." no acute safety concerns.  Practicing sleep hygiene.  One coke at lunch.   Multiple failed med trials.   Trazodone - ineffective   Seroquel - not tolerated   Benadryl, vistaril not helpful.  Melatonin - ineffective, caused headache.   Benzodiazepines, Ambien - possible black outs.  Pt asked for lorazepam or Halcion - Declined to fill benzodiazepine - pt is on daily opioid and also hx of falls and possible black outs on this class.   Mirtazapine - not helpful in long term.   Doxepin - ? Issues with RLS.  Gabapentin - takes 900 mg nightly.  No benefit to 120 mg.   Pt is tearful - wants rest.  Thinks Amitriptyline may have helped in past.  Has rx at home questions if she can try it - I think it is 4 yrs old, advised against this.  Will send new Rx Amitriptyline 25 mg nightly prn insomnia to pharmacy.  Discussed risks of dry mouth, anticholinergic, cardiac effects.  Will follow up with patient on Monday to see if helpful.   Monitor closely for risks of adverse effects, already on SSRI, SNRI combo.        "

## 2020-04-27 ENCOUNTER — PATIENT MESSAGE (OUTPATIENT)
Dept: PSYCHIATRY | Facility: CLINIC | Age: 70
End: 2020-04-27

## 2020-04-27 NOTE — TELEPHONE ENCOUNTER
Called pt regarding below message. Pt states her brother was unable to get to the pharmacy in time. Pt states she is currently picking up prescription and headed to her daughters in [place]. Pt states she will call us Friday. Pt states no further needs.

## 2020-05-05 ENCOUNTER — PATIENT OUTREACH (OUTPATIENT)
Dept: ADMINISTRATIVE | Facility: OTHER | Age: 70
End: 2020-05-05

## 2020-05-05 ENCOUNTER — PATIENT MESSAGE (OUTPATIENT)
Dept: ADMINISTRATIVE | Facility: HOSPITAL | Age: 70
End: 2020-05-05

## 2020-05-06 ENCOUNTER — OFFICE VISIT (OUTPATIENT)
Dept: PRIMARY CARE CLINIC | Facility: CLINIC | Age: 70
End: 2020-05-06
Payer: MEDICARE

## 2020-05-06 ENCOUNTER — OFFICE VISIT (OUTPATIENT)
Dept: PAIN MEDICINE | Facility: CLINIC | Age: 70
End: 2020-05-06
Payer: MEDICARE

## 2020-05-06 DIAGNOSIS — E78.5 HYPERLIPIDEMIA ASSOCIATED WITH TYPE 2 DIABETES MELLITUS: Primary | ICD-10-CM

## 2020-05-06 DIAGNOSIS — G89.4 CHRONIC PAIN DISORDER: ICD-10-CM

## 2020-05-06 DIAGNOSIS — E83.42 HYPOMAGNESEMIA: ICD-10-CM

## 2020-05-06 DIAGNOSIS — J44.89 CHRONIC OBSTRUCTIVE ASTHMA: ICD-10-CM

## 2020-05-06 DIAGNOSIS — E11.69 HYPERLIPIDEMIA ASSOCIATED WITH TYPE 2 DIABETES MELLITUS: Primary | ICD-10-CM

## 2020-05-06 DIAGNOSIS — E55.9 VITAMIN D DEFICIENCY: ICD-10-CM

## 2020-05-06 DIAGNOSIS — M47.896 OTHER SPONDYLOSIS, LUMBAR REGION: ICD-10-CM

## 2020-05-06 DIAGNOSIS — G25.81 RESTLESS LEGS SYNDROME (RLS): ICD-10-CM

## 2020-05-06 DIAGNOSIS — M51.36 DDD (DEGENERATIVE DISC DISEASE), LUMBAR: ICD-10-CM

## 2020-05-06 DIAGNOSIS — R41.3 MEMORY CHANGE: ICD-10-CM

## 2020-05-06 DIAGNOSIS — E11.59 HYPERTENSION ASSOCIATED WITH DIABETES: ICD-10-CM

## 2020-05-06 DIAGNOSIS — M70.60 GREATER TROCHANTERIC BURSITIS, UNSPECIFIED LATERALITY: Primary | ICD-10-CM

## 2020-05-06 DIAGNOSIS — F33.41 MDD (MAJOR DEPRESSIVE DISORDER), RECURRENT, IN PARTIAL REMISSION: ICD-10-CM

## 2020-05-06 DIAGNOSIS — Z79.4 ENCOUNTER FOR LONG-TERM (CURRENT) USE OF INSULIN: ICD-10-CM

## 2020-05-06 DIAGNOSIS — G89.29 OTHER CHRONIC PAIN: ICD-10-CM

## 2020-05-06 DIAGNOSIS — E87.6 HYPOKALEMIA: ICD-10-CM

## 2020-05-06 DIAGNOSIS — R26.89 BALANCE DISORDER: ICD-10-CM

## 2020-05-06 DIAGNOSIS — I15.2 HYPERTENSION ASSOCIATED WITH DIABETES: ICD-10-CM

## 2020-05-06 DIAGNOSIS — E03.4 HYPOTHYROIDISM DUE TO ACQUIRED ATROPHY OF THYROID: ICD-10-CM

## 2020-05-06 PROCEDURE — 3052F HG A1C>EQUAL 8.0%<EQUAL 9.0%: CPT | Mod: HCNC,CPTII,95, | Performed by: INTERNAL MEDICINE

## 2020-05-06 PROCEDURE — 99214 PR OFFICE/OUTPT VISIT, EST, LEVL IV, 30-39 MIN: ICD-10-PCS | Mod: HCNC,95,, | Performed by: INTERNAL MEDICINE

## 2020-05-06 PROCEDURE — 1159F PR MEDICATION LIST DOCUMENTED IN MEDICAL RECORD: ICD-10-PCS | Mod: HCNC,95,, | Performed by: ANESTHESIOLOGY

## 2020-05-06 PROCEDURE — 99214 OFFICE O/P EST MOD 30 MIN: CPT | Mod: HCNC,95,, | Performed by: INTERNAL MEDICINE

## 2020-05-06 PROCEDURE — 1159F MED LIST DOCD IN RCRD: CPT | Mod: HCNC,95,, | Performed by: ANESTHESIOLOGY

## 2020-05-06 PROCEDURE — 3052F PR MOST RECENT HEMOGLOBIN A1C LEVEL 8.0 - < 9.0%: ICD-10-PCS | Mod: HCNC,CPTII,95, | Performed by: INTERNAL MEDICINE

## 2020-05-06 PROCEDURE — 99213 PR OFFICE/OUTPT VISIT, EST, LEVL III, 20-29 MIN: ICD-10-PCS | Mod: HCNC,95,, | Performed by: ANESTHESIOLOGY

## 2020-05-06 PROCEDURE — 1101F PT FALLS ASSESS-DOCD LE1/YR: CPT | Mod: HCNC,CPTII,95, | Performed by: ANESTHESIOLOGY

## 2020-05-06 PROCEDURE — 99213 OFFICE O/P EST LOW 20 MIN: CPT | Mod: HCNC,95,, | Performed by: ANESTHESIOLOGY

## 2020-05-06 PROCEDURE — 1101F PR PT FALLS ASSESS DOC 0-1 FALLS W/OUT INJ PAST YR: ICD-10-PCS | Mod: HCNC,CPTII,95, | Performed by: ANESTHESIOLOGY

## 2020-05-06 RX ORDER — OXYCODONE AND ACETAMINOPHEN 10; 325 MG/1; MG/1
1 TABLET ORAL EVERY 8 HOURS PRN
Qty: 90 TABLET | Refills: 0 | Status: SHIPPED | OUTPATIENT
Start: 2020-05-06 | End: 2020-06-08 | Stop reason: SDUPTHER

## 2020-05-06 NOTE — PROGRESS NOTES
Primary Care Provider Telemedicine Appointment      The patient location is:  Patient Home   The chief complaint leading to consultation is: routine follow up   Total time spent with patient: 30 mins     Visit type: Virtual visit with synchronous audio only and video  Each patient to whom he or she provides medical services by telemedicine is:  (1) informed of the relationship between the physician and patient and the respective role of any other health care provider with respect to management of the patient; and (2) notified that he or she may decline to receive medical services by telemedicine and may withdraw from such care at any time.      Subjective:      Patient ID: Nathalie Santiago is a 69 y.o. female. sarcoidosis, hx of breast CA/uterine/ovarian cancer, diabetes complicated by gastroparesis, HLD, HTN,  anxiety and depression, hx of CVA (brain stem stroke), DJD, JOSE LUIS, chronic low back and hip pain        Chief Complaint: No chief complaint on file.    Pt reports overall she is doing better, had a lot of death in her family, her sister's son ( nephew)  passed away and as well as her sister's ex  and boyfriend all within a few months of each other.     She did not do PT due to cost -  $25 a visit, could not afford.       Asthma - has not been an issue, has not had to use nebulizers     Balance disorder - still has balance issues, tries to position herself to avoid balance instability. Not really doing much exercising due to leg and back pain.     She does report some  short term memory loss - Forgetting to do things that she is supposed to do, forgot which to put medicine in her dog bowl.   Does forget to take medication but will remember that she will take medication late at night.   Has a med pill box to help box, family has voiced concern about memory.   Dr. Pat has removed some medicaitons that may be involved with memory. Has had normal moca previously.   Per kike, over the past couple of  "years but to patient it has been worse over the past year.      DM:  Doing better, has cut down soda but dtr says that pt is still drinking 1 liter of soda every few days down from 2 L.   Glucose  in the am, Evening 150-212 in the evening.   Pt reports she knows that is important for better DM control but she is "stubborn" and loves cokes.     Legs hurt at night, has been playing with granddaugther. Has pain throughout the day but hard for her to sleep due to restless legs. She has not taken requip in a while and med has been d/c.   Digital medicine equipment not received     Advanced care directives - does not have but she only has 1 daughter and she will discuss with her health care wishes. Will send paperwork to her home for her to fill out and send back.       Other Providers:   Dr. Pat, psychiatry, last seen in Sept 2019  Dr. Tmeple, pain management , last seen 9/30/2019   Dr. Steele, pulm - last seen in 10/2018     Social History  Components    Tobacco (-)     Alcohol (+) rare    Ililcit drugs (-)    Sex (+) , exclusive, one male partner    Employment (-) Disability         Past Surgical History:   Procedure Laterality Date    APPENDECTOMY      BREAST SURGERY      CHOLECYSTECTOMY      CYSTOCELE REPAIR      FRACTURE SURGERY Right     foot    HYSTERECTOMY      SAADIA/BSO    MASTECTOMY Bilateral     b/l mastectomy    OOPHORECTOMY      PLEURA BIOPSY      RECTAL SURGERY      rectocele      TONSILLECTOMY         Past Medical History:   Diagnosis Date    Abnormal Pap smear 1972    cervical cancer    Acute cystitis without hematuria 3/28/2017    Anxiety     Arthritis     Asthma     Ataxia     Breast cancer     Cancer     bilateral mastectomy, uterine cancer, ovarian cancer    Cataract     Cervical back pain with evidence of disc disease     Cervical cancer     Chronic bilateral low back pain without sciatica 9/6/2016    Chronic bronchitis     Cortical cataract 2/18/2013    Current " chronic use of systemic steroids 10/18/2018    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    CVA (cerebral vascular accident)- Confirmed by neurology 9/6/2016    Degenerative joint disease of cervical and lumbar spine 8/1/2014    Depression     Diabetes mellitus     Diabetes mellitus, type II     Gastroenteritis 12/19/2016    History of malignant phylloides tumor of breast 2/15/2013    Hyperlipidemia     Hyperopia with presbyopia 2/18/2013    Hypertension     Hypertension     Hypothyroidism     Immunosuppressed status 9/3/2015    Leukocytosis 11/17/2016    Migraine     Mitral valve regurgitation     Normochromic anemia 3/28/2017    Nuclear sclerosis 2/18/2013    Obesity     JOSE LUIS (obstructive sleep apnea)     Ovarian cancer     Pneumonia     Polyneuropathy     PVD (posterior vitreous detachment) 2/18/2013    Restless leg syndrome     Ruptured disk     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 Negative for unexpected weight change.   HENT: Negative for hearing loss, rhinorrhea and trouble swallowing.    Eyes: Negative for discharge and visual disturbance.   Respiratory: Negative for chest tightness and wheezing.    Cardiovascular: Negative for chest pain and palpitations.   Gastrointestinal: Negative for blood in stool, constipation, diarrhea and vomiting.   Endocrine: Negative for polydipsia and polyuria.   Genitourinary: Negative for difficulty urinating, dysuria, hematuria and menstrual problem.   Musculoskeletal: Positive for neck pain. Negative for arthralgias and joint swelling.   Neurological: Positive for headaches. Negative for weakness.   Psychiatric/Behavioral: Negative for confusion and dysphoric mood.       Objective:   LMP  (LMP Unknown)     Physical Exam    Constitutional: She is oriented to person, place, and time. She appears well-developed and well-nourished.   HENT:   Head: Normocephalic.   Eyes: Conjunctivae and EOM are normal.   Pulmonary/Chest: Breath sounds normal. No respiratory distress.   Neurological: She is alert and oriented to person, place, and time.   Psychiatric: She has a normal mood and affect. Her behavior is normal. Judgment and thought content normal.       Lab Results   Component Value Date    WBC 6.51 12/12/2019    HGB 12.7 12/12/2019    HCT 38.5 12/12/2019     12/12/2019    CHOL 149 08/22/2018    TRIG 213 (H) 08/22/2018    HDL 44 08/22/2018    ALT 19 12/12/2019    AST 21 12/12/2019     12/12/2019    K 3.4 (L) 12/12/2019     12/12/2019    CREATININE 0.8 12/12/2019    BUN 15 12/12/2019    CO2 28 12/12/2019    TSH 3.533 12/12/2019    INR 1.0 06/25/2015    HGBA1C 8.4 (H) 12/12/2019         Assessment:   69 y.o. female with multiple co-morbid illnesses here to continue work-up of chronic issues notably sarcoidosis, hx of breast CA/uterine/ovarian cancer, diabetes complicated by gastroparesis, HLD, HTN,  anxiety and depression, hx of CVA (brain stem stroke), DJD, JOSE LUIS, chronic low back and hip pain    Plan:     Problem List Items Addressed This Visit        Neuro    Chronic pain     Chronic, pt sees pain mgmt. Advised to slowly increase endurance and exercise to help with pain and reduce stiffness          Restless legs syndrome (RLS)     Reordered requip, advised pt to increase activity slowly          Memory change    Relevant Orders    Lipid Panel    Hemoglobin A1C    Comprehensive metabolic panel    Microalbumin/creatinine urine ratio    T4, free    TSH    Vitamin D    CBC auto differential    Vitamin B12       Psychiatric    MDD (major depressive disorder), recurrent, in partial remission     Stable, follow up with psychiatry   Pt appears to be coping well, has good family support             Pulmonary    Chronic obstructive  asthma     Sx stable  Cont inhalers as directed               Cardiac/Vascular    Hyperlipidemia associated with type 2 diabetes mellitus - Primary     Advised to have better DM control with evidence of small vessel disease that appears to be progressing.   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   Pt is not at a high stage of confidence and willingness to make necessary changes to better improve DM, will continue to    Labs are due          Relevant Orders    Lipid Panel    Hemoglobin A1C    Comprehensive metabolic panel    Microalbumin/creatinine urine ratio    T4, free    TSH    Vitamin D    CBC auto differential    Hypertension associated with diabetes    Relevant Orders    Lipid Panel    Hemoglobin A1C    Comprehensive metabolic panel    Microalbumin/creatinine urine ratio    T4, free    TSH    Vitamin D    CBC auto differential       Renal/    Hypokalemia     Labs due          Relevant Orders    Lipid Panel    Hemoglobin A1C    Comprehensive metabolic panel    Microalbumin/creatinine urine ratio    T4, free    TSH    Vitamin D    CBC auto differential    Hypomagnesemia     Labs due          Relevant Orders    Lipid Panel    Hemoglobin A1C    Comprehensive metabolic panel    Microalbumin/creatinine urine ratio    T4, free    TSH    Vitamin D    CBC auto differential    Magnesium       Endocrine    Hypothyroidism due to acquired atrophy of thyroid     Labs due          Relevant Orders    Lipid Panel    Hemoglobin A1C    Comprehensive metabolic panel    Microalbumin/creatinine urine ratio    T4, free    TSH    Vitamin D    CBC auto differential    Vitamin B12    Encounter for long-term (current) use of insulin    Relevant Orders    Lipid Panel    Hemoglobin A1C    Comprehensive metabolic panel    Microalbumin/creatinine urine ratio    T4, free    TSH    Vitamin D    CBC auto  differential       Other    Balance disorder     Unfortunately not much else can be offered for patient besides continuing to encourage targeted exercises to help improve balance   Stressed importance of better DM control to prevent further small vessel disease that could worsen balance and help prevent worsenign neuropathy   Reviewed medication- BP in good range, antidepressant/opiate/requip can contribute to  dizziness/balance instablity however pt requires these medications for quality of life           Other Visit Diagnoses     Vitamin D deficiency        Relevant Orders    Vitamin D          Health Maintenance       Date Due Completion Date    TETANUS VACCINE 07/22/1967 ---    Naloxone Prescription 07/22/1967 ---    Shingles Vaccine (1 of 2) 07/22/1999 ---    DEXA SCAN 09/16/2017 9/16/2014    Lipid Panel 08/22/2019 8/22/2018    Foot Exam 06/07/2020 6/7/2019    Override on 3/3/2016: Done    Override on 11/30/2015: Done (LUPER)    Override on 3/3/2015: Done    Urine Drug Screen 06/12/2020 12/12/2019    Hemoglobin A1c 06/12/2020 12/12/2019    Influenza Vaccine (Season Ended) 09/01/2020 11/28/2016 (Declined)    Override on 11/28/2016: Declined    Override on 9/3/2015: Declined    Eye Exam 09/06/2020 9/6/2019    Urine Microalbumin 12/12/2020 12/12/2019    Colonoscopy 04/29/2025 4/29/2015          Follow up in about 3 months (around 8/6/2020) for routine followup . . Thirty minutes  spent with this patient today, half of that in counseling.    Petra Broussard MD  Internal Medicine- Geriatrics  [facility] [place]

## 2020-05-06 NOTE — Clinical Note
Pt needs to have labs done in one month please schedule fasting labs. She has paid for digital diabetes equipment but has not received any word from the program, please assist if able. Please send advanced care directive paperwork and lapost form for pt to complete and send back

## 2020-05-06 NOTE — PROGRESS NOTES
Visit type: Virtual visit with synchronous audio and video  Total time spent with patient: 25 minutes  Each patient to whom he or she provides medical services by telemedicine is:  (1) informed of the relationship between the physician and patient and the respective role of any other health care provider with respect to management of the patient; and (2) notified that he or she may decline to receive medical services by telemedicine and may withdraw from such care at any time.  Patient is safe at home in [place].     CC: left sided low back and left hip pain    Interval History: Ms. Santiago is a 69 y.o. female with chronic low back and hip pain who presents for her follow up via virtual visit.   She continues to have lower back, left greater than right lower back pain.  Left GTB and right knee pain has worsened. Her last left GTB injection provided some short-lived relief.  She has consider repeat procedure once she feel safe coming to the clinic.  She had a series of Euflexxa injections in 2014. She continues to take percocet 10mg q 8 hrs as needed for pain with moderate benefits.  No side effects reported.  Able to perform daily activities with the medications. Flexeril 10 mg and Gabapentin 600 mg BID has been helpful.  Pain today is rated 6/10.     Prior HPI: The patient is a 64-year-old woman with a history of sarcoidosis, breast CA, diabetes, anxiety and depression who presents in referral from Dr. Nguyen to Dr. Perez for multiple pain complaints including back pain, bilateral hip pain and right shoulder pain.  She is following up with me since I am much closer to her.   She presents today for multiple pain complaints.  She recently was hospitalized for pneumonia, UTI and sarcoidosis exacerbation per patient.  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  She has had past GTB injections with moderate benefit and had an exacerbation of her pain recently.   She was able to receive left GTB with Dr. Perez on 10/3/2014 that provided >50% relief of her left hip and leg pain.     She continues to take Prednisone 5mg daily. She continues Percoet 7.5mg about 3x/day as needed with moderate benefit.  She presents today with worsening right knee pain.  She states having history of right knee osteoarthritis, but has not had any treatment for her knee pain.  Continues to have improvement in her knee pain following completion of visco supplementation injection in her right knee.  Her back and hip pain remain tolerable with her medications.  She continues to take Percoct 7.5mg q8hrs as needed with moderate benefit.  No reported side effects.       Pain intervention history: She takes hydrocodone 7-325 2-4 times a day.  Also has undergone epidural steroid injection at L1/2 without relief.  She has been doing cervical traction with good relief of her neck pain.  Previous trochanteric bursa injections with good relief.    ROS:She reports fatigability, itching, headaches, swollen glands, chest pain and shortness of breath, nausea vomiting, easy bruising, back pain, joint stiffness, dizziness, difficulty sleeping, anxiety, depression and loss of balance.  Balance of review of systems is negative.    Medical, surgical, family and social history reviewed elsewhere in record.    Medications/Allergies: See med card    GENERAL: A and O x3, the patient appears well groomed and is in no acute distress.  Skin: No rashes or obvious lesions  HEENT: normocephalic, atraumatic  LUNGS: non labored breathing  ABDOMEN: non distended  UPPER EXTREMITIES and lower extremities. Normal ROM  CERVICAL SPINE:  Cervical spine: ROM is full in flexion, extension and lateral rotation without increased pain.    LUMBAR SPINE  Lumbar spine: ROM is limited with flexion extension and oblique extension with mild-to-moderate increased pain.      MENTAL STATUS: normal orientation, speech, language, and fund of  knowledge for social situation.  Emotional state appropriate.    GAIT: normal rise, base, steps, and arm swing.      Imaging:  Cervical spine MRI report 10/5/12: There is dextroscoliosis in the lower cervical/upper thoracic region.  There is very mild foraminal stenosis on the left at C6/7.    MRI thoracic spine 11/21/2008: No significant degenerative disc disease or central stenosis.    MRI lumbar spine 8/14/08: L1/2 small broad-based central protrusion with mild effacement of ventral thecal sac but not distorting exiting roots.  L2/3 and L3/4 unremarkable.  At L4/5 there is mild central stenosis with bulging annulus anteriorly and facet hypertrophic degenerative change posteriorly.  No focal disc herniation and neural foramina are patent.  At L5/S1 there is minimal bulging of the annulus with no thecal sac compression.  The small annular fissure within the posterior annulus.  Foramina are patent bilaterally, no stenosis mild, degenerative changes in the facets.    Xray Right Knee 10/13/14  Mild tricompartmental degenerative changes present. No joint effusion. The soft tissues are unremarkable.    Assessment:  Ms. Santiago is a 65-year-old woman with a history of sarcoidosis, breast CA, diabetes, anxiety and depression who presents in referral from Dr. Nguyen for multiple pain complaints including back pain, bilateral hip pain and knee pain   1. Greater trochanteric bursitis, unspecified laterality    2. Other spondylosis, lumbar region    3. DDD (degenerative disc disease), lumbar    4. Chronic pain disorder      PLAN:  1. I have stressed the importance of physical activity and exercise to improve overall health.    2.  Percocet 10mg q8hrs as needed.  Script given for three months.  reviewed. Previous UDS consistent  3. May also consider repeat greater trochanteric bursa injection in the future possible  4.  I believe her low back pain maybe due to facet arthropathy and have recommended lumbar medial branch  blocks as a diagnostic procedure.  If successful, would proceed with radiofrequency ablation.  May consider this procedure in the future  5.  Gabapentin 600mg BID for radicular pain. Call for refills  6. Follow up in 1month

## 2020-05-07 RX ORDER — ROPINIROLE 5 MG/1
5 TABLET, FILM COATED ORAL NIGHTLY PRN
Qty: 90 TABLET | Refills: 3 | Status: SHIPPED | OUTPATIENT
Start: 2020-05-07 | End: 2020-05-07

## 2020-05-07 RX ORDER — ROPINIROLE 5 MG/1
5 TABLET, FILM COATED ORAL NIGHTLY PRN
Qty: 90 TABLET | Refills: 3 | Status: SHIPPED | OUTPATIENT
Start: 2020-05-07 | End: 2020-08-06

## 2020-05-07 NOTE — PROGRESS NOTES
Spoke with patient, labs scheduled for 6/16, office visit for 8/6.  Also advised that someone will call her from digital diabetes program.

## 2020-05-07 NOTE — ASSESSMENT & PLAN NOTE
Unfortunately not much else can be offered for patient besides continuing to encourage targeted exercises to help improve balance   Stressed importance of better DM control to prevent further small vessel disease that could worsen balance and help prevent worsenign neuropathy   Reviewed medication- BP in good range, antidepressant/opiate/requip can contribute to  dizziness/balance instablity however pt requires these medications for quality of life

## 2020-05-07 NOTE — ASSESSMENT & PLAN NOTE
Advised to have better DM control with evidence of small vessel disease that appears to be progressing.   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

## 2020-05-08 DIAGNOSIS — E78.5 HYPERLIPIDEMIA ASSOCIATED WITH TYPE 2 DIABETES MELLITUS: ICD-10-CM

## 2020-05-08 DIAGNOSIS — E11.69 HYPERLIPIDEMIA ASSOCIATED WITH TYPE 2 DIABETES MELLITUS: ICD-10-CM

## 2020-05-08 RX ORDER — CLOPIDOGREL BISULFATE 75 MG/1
75 TABLET ORAL DAILY
Qty: 90 TABLET | Refills: 3 | Status: SHIPPED | OUTPATIENT
Start: 2020-05-08 | End: 2020-06-17 | Stop reason: SDUPTHER

## 2020-05-08 RX ORDER — ROSUVASTATIN CALCIUM 40 MG/1
40 TABLET, COATED ORAL DAILY
Qty: 90 TABLET | Refills: 3 | Status: SHIPPED | OUTPATIENT
Start: 2020-05-08 | End: 2020-06-17 | Stop reason: SDUPTHER

## 2020-05-09 ENCOUNTER — PATIENT MESSAGE (OUTPATIENT)
Dept: PRIMARY CARE CLINIC | Facility: CLINIC | Age: 70
End: 2020-05-09

## 2020-05-09 ENCOUNTER — PATIENT MESSAGE (OUTPATIENT)
Dept: PSYCHIATRY | Facility: CLINIC | Age: 70
End: 2020-05-09

## 2020-05-11 ENCOUNTER — TELEPHONE (OUTPATIENT)
Dept: PSYCHIATRY | Facility: CLINIC | Age: 70
End: 2020-05-11

## 2020-05-11 RX ORDER — TRAZODONE HYDROCHLORIDE 50 MG/1
TABLET ORAL
Qty: 60 TABLET | Refills: 0 | Status: SHIPPED | OUTPATIENT
Start: 2020-05-11 | End: 2020-06-14 | Stop reason: SDUPTHER

## 2020-05-11 NOTE — TELEPHONE ENCOUNTER
"I called the patient in response to My Chart message.   She is now living with her daughter in [place], [place].  She has not been able to  her Amitriptyline, insurance required [place].  Was delayed in picking up Rx, then had to go to [place] to stay with her daughter.   Sleep, fatigue, granddaughter also wakes her up between 8-9 am.   Pt is falling asleep 2-3 am. 2 days ago, could not sleep at all.  Brain not shutting off, joints are hurting, overall restless and miserable night. 2-3 nights a week, she cannot sleep at all.     After next telephone visit, she plans to follow up in clinic with pain management.   She misses reading, word games, not having much down time watching her granddaughter.  Avoiding caffeine after 5 pm.  Avoiding iPad, avoiding screens in late evening.   Does not feel significantly depressed, enjoys the day and playing with granddaughter.  Basically in her same routine.  No SI, "not even remote."   Working to curb her appetite.  Was with her sister who loved to cook and put on some weight.   She is somewhat hopeful.  Just does not know in her mind if she will feel better.  Cannot even go to local gym due to COVID.   She is getting closer to 5000K daily. Daughter gave her Fitbbit and monitoring it again.   Halcion, xanax, ativan worked in past.   No sense of balance - has issues at baseline due to prior CVA. Last fall the other night, no injury.     Pt agreeable to retrial of Trazodone  mg nightly prn insomnia - advised to start lowest dose, do not titrate unless not effective at 50 mg. Pt previously took in [id number].  Monitor for adverse effects. Pt is fall risk which she is aware of.  Follow up end of week - pt to update me on efficacy.   Has an appt 5/19.       "

## 2020-05-15 ENCOUNTER — OFFICE VISIT (OUTPATIENT)
Dept: PRIMARY CARE CLINIC | Facility: CLINIC | Age: 70
End: 2020-05-15
Payer: MEDICARE

## 2020-05-15 DIAGNOSIS — F33.41 MDD (MAJOR DEPRESSIVE DISORDER), RECURRENT, IN PARTIAL REMISSION: ICD-10-CM

## 2020-05-15 DIAGNOSIS — G47.00 INSOMNIA, UNSPECIFIED TYPE: Primary | ICD-10-CM

## 2020-05-15 PROCEDURE — 99442 PR PHYSICIAN TELEPHONE EVALUATION 11-20 MIN: CPT | Mod: HCNC,95,, | Performed by: NURSE PRACTITIONER

## 2020-05-15 PROCEDURE — 99442 PR PHYSICIAN TELEPHONE EVALUATION 11-20 MIN: ICD-10-PCS | Mod: HCNC,95,, | Performed by: NURSE PRACTITIONER

## 2020-05-28 ENCOUNTER — TELEPHONE (OUTPATIENT)
Dept: PSYCHIATRY | Facility: CLINIC | Age: 70
End: 2020-05-28

## 2020-05-28 DIAGNOSIS — K31.84 GASTROPARESIS: ICD-10-CM

## 2020-05-28 NOTE — TELEPHONE ENCOUNTER
Patient called and left message on voice mail @ 1008 AM.  Stated she was sorry to have missed her appointment. She states the medication seems to be working. She is taking them once or twice a week.Also daughter has arranged for her CPAP and she has been sleeping 4 to 6 hrs. She feels symptoms have improved.  She stated she just wanted Dr Pat to know this.

## 2020-05-28 NOTE — TELEPHONE ENCOUNTER
Called patient to set up follow up and no answer left message for her to call office [phone]

## 2020-05-28 NOTE — TELEPHONE ENCOUNTER
Noted.  Chart reviewed. Pt now taking Trazodone.  Pt missed appt 5/19.  Will offer to schedule follow up appt.

## 2020-05-29 RX ORDER — METOCLOPRAMIDE 10 MG/1
TABLET ORAL
Qty: 270 TABLET | Refills: 1 | Status: SHIPPED | OUTPATIENT
Start: 2020-05-29

## 2020-05-29 NOTE — TELEPHONE ENCOUNTER
Called pt regarding below message. Advised pt of below per Dr. Pat. Pt agreed to schedule on 6/10 at 2 pm. Pt verbalized understanding with no further questions.

## 2020-05-29 NOTE — TELEPHONE ENCOUNTER
CALLED AND PATIENT WOULD LIKE TO DO A VIRTUAL VISIT on afternoon of 6/10 Wednesday .  Please let me know if this afternoon will be ok?     Event Note

## 2020-05-31 ENCOUNTER — PATIENT MESSAGE (OUTPATIENT)
Dept: PSYCHIATRY | Facility: CLINIC | Age: 70
End: 2020-05-31

## 2020-06-04 ENCOUNTER — PATIENT OUTREACH (OUTPATIENT)
Dept: ADMINISTRATIVE | Facility: OTHER | Age: 70
End: 2020-06-04

## 2020-06-08 ENCOUNTER — OFFICE VISIT (OUTPATIENT)
Dept: PAIN MEDICINE | Facility: CLINIC | Age: 70
End: 2020-06-08
Payer: MEDICARE

## 2020-06-08 DIAGNOSIS — M47.896 OTHER SPONDYLOSIS, LUMBAR REGION: ICD-10-CM

## 2020-06-08 DIAGNOSIS — M70.60 GREATER TROCHANTERIC BURSITIS, UNSPECIFIED LATERALITY: Primary | ICD-10-CM

## 2020-06-08 DIAGNOSIS — G89.4 CHRONIC PAIN DISORDER: ICD-10-CM

## 2020-06-08 DIAGNOSIS — M51.36 DDD (DEGENERATIVE DISC DISEASE), LUMBAR: ICD-10-CM

## 2020-06-08 PROCEDURE — 1159F MED LIST DOCD IN RCRD: CPT | Mod: HCNC,95,, | Performed by: ANESTHESIOLOGY

## 2020-06-08 PROCEDURE — 1159F PR MEDICATION LIST DOCUMENTED IN MEDICAL RECORD: ICD-10-PCS | Mod: HCNC,95,, | Performed by: ANESTHESIOLOGY

## 2020-06-08 PROCEDURE — 1101F PT FALLS ASSESS-DOCD LE1/YR: CPT | Mod: HCNC,CPTII,95, | Performed by: ANESTHESIOLOGY

## 2020-06-08 PROCEDURE — 99213 PR OFFICE/OUTPT VISIT, EST, LEVL III, 20-29 MIN: ICD-10-PCS | Mod: HCNC,95,, | Performed by: ANESTHESIOLOGY

## 2020-06-08 PROCEDURE — 1101F PR PT FALLS ASSESS DOC 0-1 FALLS W/OUT INJ PAST YR: ICD-10-PCS | Mod: HCNC,CPTII,95, | Performed by: ANESTHESIOLOGY

## 2020-06-08 PROCEDURE — 99213 OFFICE O/P EST LOW 20 MIN: CPT | Mod: HCNC,95,, | Performed by: ANESTHESIOLOGY

## 2020-06-08 RX ORDER — OXYCODONE AND ACETAMINOPHEN 10; 325 MG/1; MG/1
1 TABLET ORAL EVERY 8 HOURS PRN
Qty: 90 TABLET | Refills: 0 | Status: SHIPPED | OUTPATIENT
Start: 2020-06-08 | End: 2020-07-09 | Stop reason: SDUPTHER

## 2020-06-08 NOTE — PROGRESS NOTES
Visit type: Virtual visit with synchronous audio and video  Total time spent with patient: 25 minutes  Each patient to whom he or she provides medical services by telemedicine is:  (1) informed of the relationship between the physician and patient and the respective role of any other health care provider with respect to management of the patient; and (2) notified that he or she may decline to receive medical services by telemedicine and may withdraw from such care at any time.  Patient is safe at home in [place].     CC: left sided low back and left hip pain    Interval History: Ms. Santiago is a 69 y.o. female with chronic low back and hip pain who presents for her follow up via virtual visit.   She continues to have lower back, left greater than right lower back pain.  Left GTB and right knee pain has worsened. Her last left GTB injection provided some short-lived relief.  She has consider repeat procedure and wishes to have repeat procedure next month when she comes in for her clinic visit.   She had a series of Euflexxa injections in 2014. She continues to take percocet 10mg q 8 hrs as needed for pain with moderate benefits.  No side effects reported.  Able to perform daily activities with the medications. Flexeril 10 mg and Gabapentin 600 mg BID has been helpful.       Prior HPI: The patient is a 64-year-old woman with a history of sarcoidosis, breast CA, diabetes, anxiety and depression who presents in referral from Dr. Nguyen to Dr. Perez for multiple pain complaints including back pain, bilateral hip pain and right shoulder pain.  She is following up with me since I am much closer to her.   She presents today for multiple pain complaints.  She recently was hospitalized for pneumonia, UTI and sarcoidosis exacerbation per patient.  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  She has had past GTB injections with moderate benefit and had an exacerbation of  her pain recently.  She was able to receive left GTB with Dr. Perez on 10/3/2014 that provided >50% relief of her left hip and leg pain.     She continues to take Prednisone 5mg daily. She continues Percoet 7.5mg about 3x/day as needed with moderate benefit.  She presents today with worsening right knee pain.  She states having history of right knee osteoarthritis, but has not had any treatment for her knee pain.  Continues to have improvement in her knee pain following completion of visco supplementation injection in her right knee.  Her back and hip pain remain tolerable with her medications.  She continues to take Percoct 7.5mg q8hrs as needed with moderate benefit.  No reported side effects.       Pain intervention history: She takes hydrocodone 7-325 2-4 times a day.  Also has undergone epidural steroid injection at L1/2 without relief.  She has been doing cervical traction with good relief of her neck pain.  Previous trochanteric bursa injections with good relief.    ROS:She reports fatigability, itching, headaches, swollen glands, chest pain and shortness of breath, nausea vomiting, easy bruising, back pain, joint stiffness, dizziness, difficulty sleeping, anxiety, depression and loss of balance.  Balance of review of systems is negative.    Medical, surgical, family and social history reviewed elsewhere in record.    Medications/Allergies: See med card    GENERAL: A and O x3, the patient appears well groomed and is in no acute distress.  Skin: No rashes or obvious lesions  HEENT: normocephalic, atraumatic  LUNGS: non labored breathing  ABDOMEN: non distended  UPPER EXTREMITIES and lower extremities. Normal ROM  CERVICAL SPINE:  Cervical spine: ROM is full in flexion, extension and lateral rotation without increased pain.    LUMBAR SPINE  Lumbar spine: ROM is limited with flexion extension and oblique extension with mild-to-moderate increased pain.      MENTAL STATUS: normal orientation, speech, language,  and fund of knowledge for social situation.  Emotional state appropriate.    GAIT: normal rise, base, steps, and arm swing.      Imaging:  Cervical spine MRI report 10/5/12: There is dextroscoliosis in the lower cervical/upper thoracic region.  There is very mild foraminal stenosis on the left at C6/7.    MRI thoracic spine 11/21/2008: No significant degenerative disc disease or central stenosis.    MRI lumbar spine 8/14/08: L1/2 small broad-based central protrusion with mild effacement of ventral thecal sac but not distorting exiting roots.  L2/3 and L3/4 unremarkable.  At L4/5 there is mild central stenosis with bulging annulus anteriorly and facet hypertrophic degenerative change posteriorly.  No focal disc herniation and neural foramina are patent.  At L5/S1 there is minimal bulging of the annulus with no thecal sac compression.  The small annular fissure within the posterior annulus.  Foramina are patent bilaterally, no stenosis mild, degenerative changes in the facets.    Xray Right Knee 10/13/14  Mild tricompartmental degenerative changes present. No joint effusion. The soft tissues are unremarkable.    Assessment:  Ms. Santiago is a 65-year-old woman with a history of sarcoidosis, breast CA, diabetes, anxiety and depression who presents in referral from Dr. Nguyen for multiple pain complaints including back pain, bilateral hip pain and knee pain   1. Greater trochanteric bursitis, unspecified laterality    2. Other spondylosis, lumbar region    3. DDD (degenerative disc disease), lumbar    4. Chronic pain disorder      PLAN:  1. I have stressed the importance of physical activity and exercise to improve overall health.    2.  Percocet 10mg q8hrs as needed.  Script given this month.  3. May also consider repeat greater trochanteric bursa injection during her next appointment  4.  I believe her low back pain maybe due to facet arthropathy and have recommended lumbar medial branch blocks as a diagnostic  procedure.  If successful, would proceed with radiofrequency ablation.  May consider this procedure in the future  5.  Gabapentin 600mg BID for radicular pain. Call for refills  6. Follow up in 1month

## 2020-06-10 ENCOUNTER — OFFICE VISIT (OUTPATIENT)
Dept: PSYCHIATRY | Facility: CLINIC | Age: 70
End: 2020-06-10
Payer: MEDICARE

## 2020-06-10 DIAGNOSIS — Z63.4 BEREAVEMENT: ICD-10-CM

## 2020-06-10 DIAGNOSIS — G47.00 INSOMNIA, UNSPECIFIED TYPE: ICD-10-CM

## 2020-06-10 DIAGNOSIS — F41.1 GAD (GENERALIZED ANXIETY DISORDER): ICD-10-CM

## 2020-06-10 DIAGNOSIS — F33.41 MDD (MAJOR DEPRESSIVE DISORDER), RECURRENT, IN PARTIAL REMISSION: ICD-10-CM

## 2020-06-10 PROCEDURE — 99499 UNLISTED E&M SERVICE: CPT | Mod: HCNC,95,, | Performed by: PSYCHIATRY & NEUROLOGY

## 2020-06-10 PROCEDURE — 1159F PR MEDICATION LIST DOCUMENTED IN MEDICAL RECORD: ICD-10-PCS | Mod: HCNC,95,, | Performed by: PSYCHIATRY & NEUROLOGY

## 2020-06-10 PROCEDURE — 1101F PT FALLS ASSESS-DOCD LE1/YR: CPT | Mod: HCNC,CPTII,95, | Performed by: PSYCHIATRY & NEUROLOGY

## 2020-06-10 PROCEDURE — 99499 RISK ADDL DX/OHS AUDIT: ICD-10-PCS | Mod: HCNC,95,, | Performed by: PSYCHIATRY & NEUROLOGY

## 2020-06-10 PROCEDURE — 1159F MED LIST DOCD IN RCRD: CPT | Mod: HCNC,95,, | Performed by: PSYCHIATRY & NEUROLOGY

## 2020-06-10 PROCEDURE — 99214 OFFICE O/P EST MOD 30 MIN: CPT | Mod: HCNC,95,, | Performed by: PSYCHIATRY & NEUROLOGY

## 2020-06-10 PROCEDURE — 1101F PR PT FALLS ASSESS DOC 0-1 FALLS W/OUT INJ PAST YR: ICD-10-PCS | Mod: HCNC,CPTII,95, | Performed by: PSYCHIATRY & NEUROLOGY

## 2020-06-10 PROCEDURE — 99214 PR OFFICE/OUTPT VISIT, EST, LEVL IV, 30-39 MIN: ICD-10-PCS | Mod: HCNC,95,, | Performed by: PSYCHIATRY & NEUROLOGY

## 2020-06-10 SDOH — SOCIAL DETERMINANTS OF HEALTH (SDOH): DISSAPEARANCE AND DEATH OF FAMILY MEMBER: Z63.4

## 2020-06-10 NOTE — PROGRESS NOTES
Outpatient Psychiatry Follow-Up Visit (MD/NP)  The patient location is:  At her daughter's home  [address], [place], [place]  The chief complaint leading to consultation is:  30 min    Visit type: audiovisual    Face to Face time with patient: 30 mins  30 minutes of total time spent on the encounter, which includes face to face time and non-face to face time preparing to see the patient (eg, review of tests), Obtaining and/or reviewing separately obtained history, Documenting clinical information in the electronic or other health record, Independently interpreting results (not separately reported) and communicating results to the patient/family/caregiver, or Care coordination (not separately reported).     Each patient to whom he or she provides medical services by telemedicine is:  (1) informed of the relationship between the physician and patient and the respective role of any other health care provider with respect to management of the patient; and (2) notified that he or she may decline to receive medical services by telemedicine and may withdraw from such care at any time.    6/10/2020    Clinical Status of Patient:  Outpatient (Ambulatory)    Chief Complaint:  Nathalie Santiago is a 69 y.o. female who presents today for follow-up of depression, anxiety, adjustment.  Met with patient, her great granddaughter is present.    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   Pt reports depression and anxiety began in context of significant psychosocial stressors. Her mother passed away in 2000, her father in 2004. She was diagnosed with breast cancer in 2005, had bilateral mastectomies and then a failed reconstruction. She also has sarcoidosis of the lungs. She sold her home in [place] and moved to [place] because she could no longer care for herself due to medical issues. She  "has become increasingly dependent on others. She moved back here several years ago and is currently living with her brother and his significant other. The relationship with the latter is strained; her niece who has battled addiction is currently serving time in half-way.   Pt has had significant health issues with worsening depression/anxiety in the last several months.  Intake 2013.     Past Psychiatric History:  Tx of depression, anxiety and panic attacks   First saw a psychiatrist 2007   No prior hospitalizations  No suicide attempts or self injury   PCP most recently treating her in [place]   Previous meds: Restoril (no effect), Dalmane (no effect), Klonopin, Ativan, could not afford Cymbalta but helped pain and depression, Wellbutrin XL (3rd day fell out of bed x 4, bruised, amnestic), Effexor (raised her BP), no antipsychotics or mood stabilizers, Citalopram (some benefit).   Trazodone (no benefit), mirtazapine, melatonin (3 mg ineffective, higher dose HA), ativan, clonazepam, amitriptyline, Seroquel (over sedating)  No prior counseling         Past medical history:  DM2  RLS  Sarcoidosis of lung   Hx ovarian CA  HTN  Hx of malignant phylloides tumor of breast   Bilateral cataract surgery   Chronic neck, back and hip pain   Arthritis   MVR  Hx CVA  Hx DJD  No cardiac hx   No hx seizures or head injury      Social History:  Former RN x 35 years, SSID since 2008  Lives with her brother and his SO ex wife heather [place], [place]   3 prior marriages, , 1 daughter   Raised in [place] in intact home - 3 brothers and 2 sisters - "stable"  No  or legal hx   No hx abuse   Hobbies: sewing, beading, reading   Education: Some college   Advent: [employer]     Substance History:  Never smoked  Rare alcohol: 2 times a year   No illicit drug. Denies misuse of opiates, benzodiazepines   Moderate caffeine: coffee, tea, cokes     Interim hx:   Pt has been taking Sertraline 200 mg daily, and Cymbalta 30 mg daily. she previously " stopped Seroquel - did not tolerate.   She is taking Gabapentin 1200 mg at night - this has helped with sleep.  Trazodone  mg nightly has been helpful.  She is sleeping better.  The patient has been staying with her daughter to help watch her great granddaughter.  Her daughter tends to be emotionally distant.  Her grandson will soon be sentenced, is currently incarcerated.  Patient reports she is grieving, her dog of 12 years recently passed away.  She also fractured her 4th right toe in the interim.  It occurred a month ago and is still swollen, she plans to follow up with the provider when she returns to her home later this week.  I have been off other sleep schedule, sleeping until noon, her great granddaughter does not go to sleep until after midnight.  This is her daughter's schedule they are following.  She has chronic back pain, 7/10 in intensity today.  She has not been taking pain medication recently.  No recent falls.  This has been a very difficult year, with multiple losses in her family and the loss of her dog.  She has been eating less healthy  Daughter tends to put her down for taking care of her mental health needs, and seeing a pain provider.  Patient reports she gets her feelings hurt.  Motivation and energy tends to be low.  She has been very active with her great granddaughter however.  Denies suicidal/homicidal ideations.  No self harm or violence.   Denies symptoms of arnel/psychosis.     Pt had LHC earlier in year due to chest tightness.  Her EF was 78% and she had non obstructive disease (20%) per pt report, MD is Dr Bal.   CPAP non-compliant.    No tobacco.   1-2 times a week, Mixed drink alcohol, no drug use.     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  Does not maintain nursing license.   no hx suicide attempts, no self harm.  Guns in home are locked up.      Normal clock drawing and MOCA 30/30 previous visit.     Review of Systems   PSYCHIATRIC: see  "above  CONSTITUTIONAL: weight gain   MUSCULOSKELETAL: 7/10 back pain today.  CV: occasional exertional SOB, no chest pain     Past Medical, Family and Social History: The patient's past medical, family and social history have been reviewed and updated as appropriate within the electronic medical record - see encounter notes.    Medications:  Sertraline 200 mg daily   Cymbalta 60 mg daily   Gabapentin 900-1200 mg nightly  Trazodone  mg nightly p.r.n. insomnia   Percoet - pain management, has not been taking it    Compliance:  See above    Side effects:  None currently   Amnesia, complex sleep related symptoms on Xanax, Ambien   headaches when Ambien taken too often; dry mouth on amitriptyline, elevated BP on Effexor XR, falls    Risk Parameters:  Patient reports no suicidal ideation  Patient reports no homicidal ideation  Patient reports no self-injurious behavior  Patient reports no violent behavior    Exam (detailed: at least 9 elements; comprehensive: all 15 elements)   Constitutional  Vitals:  Most recent vital signs, dated more than 90 days prior to this appointment, were reviewed.         General:  age appropriate, casually dressed,  calm, adequate grooming         Musculoskeletal  Muscle Strength/Tone:  no tremor   Gait & Station:  No ataxia      Psychiatric  Speech:  no latency; no press, spontaneous   Mood & Affect:  "sad"  Restricted    Thought Process:  Linear    Associations:  intact   Thought Content:  normal, no suicidality, no homicidality, delusions, or paranoia, hallucinations: (auditory: no, visual: no)   Insight:  has awareness of illness   Judgement: behavior is adequate to circumstances   Orientation:  grossly intact; alert and oriented x 4    Memory: intact for content of interview,    Language: grossly intact no fluency issues    Attention Span & Concentration:  able to focus grossly intact    Fund of Knowledge:  intact and appropriate to age and level of education     Assessment and " Diagnosis   Status/Progress: Based on the examination today, the patient's problem(s) is/are under fair control.   New problems have been presented today.   Comorbidities are complicating management of the primary condition.  (multiple medical problems)     Impression: 70 yo female with multiple medical problems and hx of depression, insomnia, and anxiety presents for follow up. Significant family stressors and health issues. Pt has required multiple sleep aides in past. She has required chronic benzodiazepine for stabilization, has failed multiple sleep aides;  Sedative-hyponotics were discontinued in interim due to health issues, reports of falls.  She contacted me previously with worsening depression/anxiety - low dose clonazepam restarted, but is showing signs of stabilization both physically and mentally.  She reported that clonazepam is not helping and requested to restart Xanax/Ambien (previous meds).  Pt reports fall in interim, and also interaction/amnesia with Nyquil + Xanax which could have been dangerous. + Chronic opioid use.  She ended up staying on Sertraline and this medication has been helping with her maintaining stable mood following her niece's death.   Pt mixed Ambien and Xanax and had amnesia, complex sleep related behaviors.  I have discussed with her and daughter that sedatives are not safe medications for her.  Scored perfect MOCA last visit.  Did not tolerate Seroquel, but pt was becoming more active, living with daughter, less isolation - pt was improving.   At last visit, she reported pain has worsened, which limited her activity and leading to increased anxiety, depression, motivation issues   Pt's daughter sustained head injury in interim - pt is primary caregiver to her and great granddaughter.  She is managing, but overwhelmed at times.  Cymbalta is helping in addition to Sertraline.  Trazodone has helped insomnia.    MDD,recurrent episode, in partial remission   GARFIELD  Insomnia  disorder   Bereavement     sarcoidosis, hx phyloides tumor of breast, .DM2, RLS, arthritis, chronic neck, hip, and back pain, hx avulsion fracture, JOSE LUIS, hx CVA    GAF: 59    Strengths and Liabilities: Strength: Patient accepts guidance/feedback, Strength: Patient is expressive/articulate., Strength: Patient has reasonable judgment.   Treatment Goals: Specify outcomes written in observable, behavioral terms:   Anxiety: acquiring relapse prevention skills and reducing physical symptoms of anxiety   Depression: acquiring relapse prevention skills, increasing energy, increasing motivation, reducing excessive guilt and reducing negative automatic thoughts     Treatment Plan/Recommendations:   Medication Management: The risks and benefits of medication were discussed with the patient.    1. Continue Sertraline 200 mg daily  2. Call to report any worsening of symptoms or problems with the medication  3. Pt instructed to go to ER with thoughts of harming self, others   4. Continue Cymbalta 60 mg nightly (it does seem to help her sleep too). Target depression, anxiety, pain. Discussed potential for med interaction, serotonin syndrome, hepatic impairment.   5. For sleep,  Continue Gabapentin 900-1200 mg at bedtime   6. Consider psychotherapy - pt declines for now  7.  Continue trazodone  mg nightly p.r.n. insomnia    -Spent 30 min face to face with the pt; >50% time spent in counseling   -Supportive therapy and psychoeducation provided  -R/B/SE's of medications discussed with the pt who expresses understanding and chooses to take medications as prescribed.   -Pt instructed to call clinic, 911 or go to nearest emergency room if sxs worsen or pt is in   crisis. The pt expresses understanding.       Return to Clinic:  3 months or sooner PRN

## 2020-06-14 RX ORDER — TRAZODONE HYDROCHLORIDE 50 MG/1
TABLET ORAL
Qty: 60 TABLET | Refills: 0 | Status: SHIPPED | OUTPATIENT
Start: 2020-06-14 | End: 2021-02-01

## 2020-06-17 DIAGNOSIS — E78.5 HYPERLIPIDEMIA ASSOCIATED WITH TYPE 2 DIABETES MELLITUS: ICD-10-CM

## 2020-06-17 DIAGNOSIS — E11.69 HYPERLIPIDEMIA ASSOCIATED WITH TYPE 2 DIABETES MELLITUS: ICD-10-CM

## 2020-06-17 RX ORDER — ROSUVASTATIN CALCIUM 40 MG/1
40 TABLET, COATED ORAL DAILY
Qty: 90 TABLET | Refills: 3 | Status: SHIPPED | OUTPATIENT
Start: 2020-06-17 | End: 2021-02-25

## 2020-06-17 RX ORDER — CLOPIDOGREL BISULFATE 75 MG/1
75 TABLET ORAL DAILY
Qty: 90 TABLET | Refills: 3 | Status: SHIPPED | OUTPATIENT
Start: 2020-06-17 | End: 2021-06-21

## 2020-07-06 ENCOUNTER — PATIENT OUTREACH (OUTPATIENT)
Dept: ADMINISTRATIVE | Facility: OTHER | Age: 70
End: 2020-07-06

## 2020-07-06 NOTE — PROGRESS NOTES
Chart was reviewed for overdue Proactive [facility] Encounters (BERNARDA)  topics  Updates were requested from care everywhere  Health Maintenance has been updated

## 2020-07-07 ENCOUNTER — PATIENT OUTREACH (OUTPATIENT)
Dept: OTHER | Facility: OTHER | Age: 70
End: 2020-07-07

## 2020-07-07 NOTE — PROGRESS NOTES
Digital Medicine: Health  Introduction    Introduced Nathalie Santiago to Digital Medicine. Discussed health  role and recommended lifestyle modifications.    Patient states that she would like to join the Digital Medicine Diabetes Program. She has already received the glucometer device but needs to complete the questionnaires. She states that she thinks the time for her to join has . PCP or Clinician may need to place a new order.     Patient enrollment complete. Patient verbally understands program details and blood pressure technique. Patient denies questions or concerns at this time. Will follow-up in 1-2 weeks to discuss lifestyle modifications.     The history is provided by the patient.     HYPERTENSION  Our goal is to get BP to consistently below 130/80mmHg and make the process convenient so patient can avoid extra trips to the office. Getting your blood pressure below 130/80mmHg (definition of control) will reduce your risk for heart attack, kidney failure, stroke and death (as well as kidney failure, eye disease, & dementia)      Reviewed that the Digital Medicine care team - consisting of a clinician and a health  - will follow the most current evidence-based national guidelines for treating your condition.  The health  will focus on lifestyle modifications and motivation while the clinician will focus on medication therapy.  The care team will review all data on a regular basis and reach out as needed.      Explained that one of the key parts of the program is communication with the care team.  Asked patient to respond to outreach attempts and complete questionnaires.  Stressed importance of medication adherence.    Reviewed non-pharmacologic therapies and impact on BP.      Explained that we expect patient to obtain several blood pressures per week at random times of day.  Instructed patient not to allow anyone else to use phone and monitoring device.  Confirmed appropriate BP  monitoring technique.      Explained to patient that the digital medicine team is not available for emergencies.  Patient will call [facility] on-call ([phone] or [phone]) or [phone] if needed.      Patient's BP goal is 130/80.Patient's BP average is 155/63 mmHg, which is above goal, per 2017 ACC/AHA Hypertension Guidelines.          Last 5 Patient Entered Readings                                      Current 30 Day Average: 155/63     Recent Readings 7/7/2020    SBP (mmHg) 155    DBP (mmHg) 63    Pulse 60            INTERVENTION(S)  encouragement/support, denied resources and denied questions    PLAN  patient verbalizes understanding and patient amenable to changes    Will f/u on [facility] Diabetes Program and lifestyle modifications on next outreach.   Encouraged patient to save contact information and to reach out if any question or concerns.           Topic    Lipid (Cholesterol) Test     Hemoglobin A1C        Reviewed the importance of self-monitoring, medication adherence, and that the health  can be used as a resource for lifestyle modifications to help reduce or maintain a healthy lifestyle.    Sent link to [facility]'s Digital Medicine webpages and my contact information via [employer] ([email or web address]) for future questions. Follow up scheduled.         Screenings    SDOH

## 2020-07-07 NOTE — LETTER
July 7, 2020     Nathalie Santiago  [address] [place] [address]       Dear Nathalie,    Welcome to [facility]! Our goal is to make care effective, proactive and convenient by using data you send us from home to better treat your chronic conditions.              My name is Sabina Whalen, and I am your dedicated Digital Medicine clinician. As an expert in medication management, I will help ensure that the medications you are taking continue to provide the intended benefits and help you reach your goals. You can reach me directly at [phone] or by sending me a message directly through your [facility] account.      I am Skye Colin and I will be your health . My job is to help you identify lifestyle changes to improve your disease control. We will talk about nutrition, exercise, and other ways you may be able to adjust your current habits to better your health. Additionally, we will help ensure you are completing the tests and screenings that are necessary to help manage your conditions. You can reach me directly at [phone] or by sending me a message directly through your [facility] account.    Most importantly, YOU are at the center of this team. Together, we will work to improve your overall health and encourage you to meet your goals for a healthier lifestyle.     What we expect from YOU:  · Please take frequent home blood pressure measurements. We ask that you take at least 1 blood pressure reading per week, but more information will better help us get you know you. Be sure you rest for a few minutes before taking the reading in a quiet, comfortable place.     Be available to receive phone calls or [employer] messages, when appropriate, from your care team. Please let us know if there are any specific days or times that work best for us to reach you via phone.     Complete routine tests and screenings. Dont worry, we will help keep you on track!           What you should  expect from your Digital Medicine Care Team:   We will work with you to create a personalized plan of care and provide you with encouragement and education, including regarding lifestyle changes, that could help you manage your disease states.     We will adjust your current medications, if needed, and continue to monitor your long-term progress.     We will provide you and your physician with monthly progress reports after you have been in the program for more than 30 days.     We will send you reminders through [facility] [email or web address] and text messages to help ensure you do not miss any testing deadlines to help manage your disease states.    You will be able to reach us by phone or through your [email or web address] account by clicking our names under Care Team on the right side of the home screen.    I look forward to working with you to achieve your blood pressure goals!    We look forward to working with you to help manage your health,    Sincerely,    Your Digital Medicine Team    Please visit our websites to learn more:   · Hypertension: [email or web address]      Remember, we are not available for emergencies. If you have an emergency, please contact your doctors office directly or call [facility] on-[facility] ([phone] or [phone]) or [phone].

## 2020-07-09 DIAGNOSIS — G89.4 CHRONIC PAIN DISORDER: Primary | ICD-10-CM

## 2020-07-09 RX ORDER — OXYCODONE AND ACETAMINOPHEN 10; 325 MG/1; MG/1
1 TABLET ORAL EVERY 8 HOURS PRN
Qty: 90 TABLET | Refills: 0 | Status: SHIPPED | OUTPATIENT
Start: 2020-08-07 | End: 2020-09-05

## 2020-07-09 RX ORDER — OXYCODONE AND ACETAMINOPHEN 10; 325 MG/1; MG/1
1 TABLET ORAL EVERY 8 HOURS PRN
Qty: 90 TABLET | Refills: 0 | Status: SHIPPED | OUTPATIENT
Start: 2020-09-05 | End: 2020-09-21

## 2020-07-09 RX ORDER — OXYCODONE AND ACETAMINOPHEN 10; 325 MG/1; MG/1
1 TABLET ORAL EVERY 8 HOURS PRN
Qty: 90 TABLET | Refills: 0 | Status: SHIPPED | OUTPATIENT
Start: 2020-07-09 | End: 2020-08-07

## 2020-07-13 ENCOUNTER — OFFICE VISIT (OUTPATIENT)
Dept: PAIN MEDICINE | Facility: CLINIC | Age: 70
End: 2020-07-13
Payer: MEDICARE

## 2020-07-13 VITALS
WEIGHT: 183 LBS | HEART RATE: 71 BPM | DIASTOLIC BLOOD PRESSURE: 79 MMHG | BODY MASS INDEX: 29.41 KG/M2 | HEIGHT: 66 IN | SYSTOLIC BLOOD PRESSURE: 136 MMHG

## 2020-07-13 DIAGNOSIS — M79.10 MYALGIA: ICD-10-CM

## 2020-07-13 DIAGNOSIS — M70.60 GREATER TROCHANTERIC BURSITIS, UNSPECIFIED LATERALITY: Primary | ICD-10-CM

## 2020-07-13 DIAGNOSIS — M51.36 DDD (DEGENERATIVE DISC DISEASE), LUMBAR: ICD-10-CM

## 2020-07-13 DIAGNOSIS — M47.896 OTHER SPONDYLOSIS, LUMBAR REGION: ICD-10-CM

## 2020-07-13 PROCEDURE — 3008F PR BODY MASS INDEX (BMI) DOCUMENTED: ICD-10-PCS | Mod: HCNC,CPTII,S$GLB, | Performed by: PHYSICIAN ASSISTANT

## 2020-07-13 PROCEDURE — 1159F MED LIST DOCD IN RCRD: CPT | Mod: HCNC,S$GLB,, | Performed by: PHYSICIAN ASSISTANT

## 2020-07-13 PROCEDURE — 1125F PR PAIN SEVERITY QUANTIFIED, PAIN PRESENT: ICD-10-PCS | Mod: HCNC,S$GLB,, | Performed by: PHYSICIAN ASSISTANT

## 2020-07-13 PROCEDURE — 3008F BODY MASS INDEX DOCD: CPT | Mod: HCNC,CPTII,S$GLB, | Performed by: PHYSICIAN ASSISTANT

## 2020-07-13 PROCEDURE — 1101F PR PT FALLS ASSESS DOC 0-1 FALLS W/OUT INJ PAST YR: ICD-10-PCS | Mod: HCNC,CPTII,S$GLB, | Performed by: PHYSICIAN ASSISTANT

## 2020-07-13 PROCEDURE — 99999 PR PBB SHADOW E&M-EST. PATIENT-LVL V: ICD-10-PCS | Mod: PBBFAC,HCNC,, | Performed by: PHYSICIAN ASSISTANT

## 2020-07-13 PROCEDURE — 1125F AMNT PAIN NOTED PAIN PRSNT: CPT | Mod: HCNC,S$GLB,, | Performed by: PHYSICIAN ASSISTANT

## 2020-07-13 PROCEDURE — 3075F SYST BP GE 130 - 139MM HG: CPT | Mod: HCNC,CPTII,S$GLB, | Performed by: PHYSICIAN ASSISTANT

## 2020-07-13 PROCEDURE — 3075F PR MOST RECENT SYSTOLIC BLOOD PRESS GE 130-139MM HG: ICD-10-PCS | Mod: HCNC,CPTII,S$GLB, | Performed by: PHYSICIAN ASSISTANT

## 2020-07-13 PROCEDURE — 3078F PR MOST RECENT DIASTOLIC BLOOD PRESSURE < 80 MM HG: ICD-10-PCS | Mod: HCNC,CPTII,S$GLB, | Performed by: PHYSICIAN ASSISTANT

## 2020-07-13 PROCEDURE — 1159F PR MEDICATION LIST DOCUMENTED IN MEDICAL RECORD: ICD-10-PCS | Mod: HCNC,S$GLB,, | Performed by: PHYSICIAN ASSISTANT

## 2020-07-13 PROCEDURE — 99214 OFFICE O/P EST MOD 30 MIN: CPT | Mod: HCNC,S$GLB,, | Performed by: PHYSICIAN ASSISTANT

## 2020-07-13 PROCEDURE — 99999 PR PBB SHADOW E&M-EST. PATIENT-LVL V: CPT | Mod: PBBFAC,HCNC,, | Performed by: PHYSICIAN ASSISTANT

## 2020-07-13 PROCEDURE — 99214 PR OFFICE/OUTPT VISIT, EST, LEVL IV, 30-39 MIN: ICD-10-PCS | Mod: HCNC,S$GLB,, | Performed by: PHYSICIAN ASSISTANT

## 2020-07-13 PROCEDURE — 1101F PT FALLS ASSESS-DOCD LE1/YR: CPT | Mod: HCNC,CPTII,S$GLB, | Performed by: PHYSICIAN ASSISTANT

## 2020-07-13 PROCEDURE — 3078F DIAST BP <80 MM HG: CPT | Mod: HCNC,CPTII,S$GLB, | Performed by: PHYSICIAN ASSISTANT

## 2020-07-13 RX ORDER — CYCLOBENZAPRINE HCL 10 MG
10 TABLET ORAL 3 TIMES DAILY PRN
Qty: 270 TABLET | Refills: 0 | Status: SHIPPED | OUTPATIENT
Start: 2020-07-13 | End: 2020-10-23 | Stop reason: SDUPTHER

## 2020-07-13 NOTE — PROGRESS NOTES
CC: left sided low back and left hip pain    Interval History: Ms. Santiago is a 69 y.o. female with chronic low back and hip pain who presents for her follow up.   She continues to have lower back, left greater than right lower back pain.  Left GTB and right knee pain has worsened. Her last left GTB injection provided some short-lived relief.  She has consider repeat procedure and wishes to have repeat procedure next month when she comes in for her clinic visit.   She had a series of Euflexxa injections in 2014. She continues to take percocet 10mg q 8 hrs as needed for pain with moderate benefits.  No side effects reported.  Able to perform daily activities with the medications. Flexeril 10 mg and Gabapentin 600 mg BID has been helpful.       Prior HPI: The patient is a 64-year-old woman with a history of sarcoidosis, breast CA, diabetes, anxiety and depression who presents in referral from Dr. Nguyen to Dr. Perez for multiple pain complaints including back pain, bilateral hip pain and right shoulder pain.  She is following up with me since I am much closer to her.   She presents today for multiple pain complaints.  She recently was hospitalized for pneumonia, UTI and sarcoidosis exacerbation per patient.  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  She has had past GTB injections with moderate benefit and had an exacerbation of her pain recently.  She was able to receive left GTB with Dr. Perez on 10/3/2014 that provided >50% relief of her left hip and leg pain.     She continues to take Prednisone 5mg daily. She continues Percoet 7.5mg about 3x/day as needed with moderate benefit.  She presents today with worsening right knee pain.  She states having history of right knee osteoarthritis, but has not had any treatment for her knee pain.  Continues to have improvement in her knee pain following completion of visco supplementation injection in her right knee.   "Her back and hip pain remain tolerable with her medications.  She continues to take Percoct 7.5mg q8hrs as needed with moderate benefit.  No reported side effects.       Pain intervention history: She takes hydrocodone 7-325 2-4 times a day.  Also has undergone epidural steroid injection at L1/2 without relief.  She has been doing cervical traction with good relief of her neck pain.  Previous trochanteric bursa injections with good relief.    ROS:She reports fatigability, itching, headaches, swollen glands, chest pain and shortness of breath, nausea vomiting, easy bruising, back pain, joint stiffness, dizziness, difficulty sleeping, anxiety, depression and loss of balance.  Balance of review of systems is negative.    Medical, surgical, family and social history reviewed elsewhere in record.    Medications/Allergies: See med card    Vitals:    07/13/20 1353   BP: 136/79   Pulse: 71   Weight: 83 kg (183 lb)   Height: 5' 6" (1.676 m)   PainSc:   6   PainLoc: Back       Physical exam:  Gen: A and O x3, pleasant, well-groomed  Skin: No rashes or obvious lesions  HEENT: PERRLA, no obvious deformities on ears or in canals. No thyroid masses, trachea midline, no palpable lymph nodes in neck, axilla.  CVS: RRR  Resp: non labored breathing  Abdomen: Soft, NT/ND, normal bowel sounds present.  Neuro:  Lower extremities:   +right knee crepitus. LE erythema and swelling. TTP right 2 and 3rd metatarsals.   Reflexes: Patellar 2+, Achilles 2+ bilaterally.  Sensory:  Intact   Lumbar spine:  Lumbar spine: ROM is moderately reduced with flexion extension and oblique extension with increased pain in all directions.    Orlando's test causes pain on both sides.    Supine straight leg raise is negative bilaterally.    Internal and external rotation of the hip causes increased pain in left groin.  Myofascial exam: Tenderness to palpation over both GTB.      Imaging:  Cervical spine MRI report 10/5/12: There is dextroscoliosis in the lower " cervical/upper thoracic region.  There is very mild foraminal stenosis on the left at C6/7.    MRI thoracic spine 11/21/2008: No significant degenerative disc disease or central stenosis.    MRI lumbar spine 8/14/08: L1/2 small broad-based central protrusion with mild effacement of ventral thecal sac but not distorting exiting roots.  L2/3 and L3/4 unremarkable.  At L4/5 there is mild central stenosis with bulging annulus anteriorly and facet hypertrophic degenerative change posteriorly.  No focal disc herniation and neural foramina are patent.  At L5/S1 there is minimal bulging of the annulus with no thecal sac compression.  The small annular fissure within the posterior annulus.  Foramina are patent bilaterally, no stenosis mild, degenerative changes in the facets.    Xray Right Knee 10/13/14  Mild tricompartmental degenerative changes present. No joint effusion. The soft tissues are unremarkable.    Assessment:  Ms. Santiago is a 65-year-old woman with a history of sarcoidosis, breast CA, diabetes, anxiety and depression who presents in referral from Dr. Nguyen for multiple pain complaints including back pain, bilateral hip pain and knee pain   1. Greater trochanteric bursitis, unspecified laterality    2. Other spondylosis, lumbar region    3. DDD (degenerative disc disease), lumbar    4. Myalgia        PLAN:  1. I have stressed the importance of physical activity and exercise to improve overall health.    2.  Percocet 10mg q8hrs as needed.  Script given for three months.  reviewed. Previous UDS consistent  3.  Continue Flexeril 10 mg q 8 h.   4.  I believe her low back pain maybe due to facet arthropathy and have recommended lumbar medial branch blocks as a diagnostic procedure.  If successful, would proceed with radiofrequency ablation.  May consider this procedure in the future  5. Recommend series of 3 Euflexxa injections. She wishes to do this in the future.   6. Follow up in three months   All  medication management was performed by Dr. Lincoln Temple

## 2020-07-14 ENCOUNTER — PATIENT OUTREACH (OUTPATIENT)
Dept: OTHER | Facility: OTHER | Age: 70
End: 2020-07-14

## 2020-07-16 DIAGNOSIS — E11.69 HYPERLIPIDEMIA ASSOCIATED WITH TYPE 2 DIABETES MELLITUS: ICD-10-CM

## 2020-07-16 DIAGNOSIS — I15.2 HYPERTENSION ASSOCIATED WITH DIABETES: ICD-10-CM

## 2020-07-16 DIAGNOSIS — E78.5 HYPERLIPIDEMIA ASSOCIATED WITH TYPE 2 DIABETES MELLITUS: ICD-10-CM

## 2020-07-16 DIAGNOSIS — E11.59 HYPERTENSION ASSOCIATED WITH DIABETES: ICD-10-CM

## 2020-07-16 RX ORDER — AMLODIPINE BESYLATE 5 MG/1
5 TABLET ORAL DAILY
Qty: 90 TABLET | Refills: 3 | Status: SHIPPED | OUTPATIENT
Start: 2020-07-16 | End: 2020-07-23

## 2020-07-16 RX ORDER — FENOFIBRATE 160 MG/1
160 TABLET ORAL DAILY
Qty: 90 TABLET | Refills: 3 | Status: SHIPPED | OUTPATIENT
Start: 2020-07-16 | End: 2021-07-08

## 2020-07-16 NOTE — TELEPHONE ENCOUNTER
----- Message from Perta Broussard MD sent at 7/15/2020  4:49 PM CDT -----  Received readings from pt blood pressure due to digital program - pt mostly averaging around 150 systolic which is above goal for her age range.     Her goal is to be less than 140/80.     Is she watching her salt intake - goal is less than 2000 mg of salt daily to help control blood pressure- I would focus on this first if she is not otherwise we may have to consider going up on one of her blood pressure medications.     She takes a lot of medications, less meds is better but having high blood pressure over time can start affecting her organs - heart, brain, eye, kidney...     Her next visit with me is in 2 weeks.

## 2020-07-20 NOTE — PROGRESS NOTES
"Digital Medicine: Health  Follow-Up    The history is provided by the patient.                   Patient needs assistance troubleshooting: Diabetes enrollment was placed, not recieving readings or questionnaires.    Additional Follow-up details: Patient called to discuss an elevated blood sugar reading. She states that she has been taking blood sugar readings and noticed a discrepancy between her old glucometer and her DigMed. She states that her previous BG monitor shows her BG being 194 after drinking some of her grand daughters "Koolaid". She states that her DigMed reading was 299. Patient plans to retest around 6pm.      Advised patient to check device with control solution. Patient understood and had not done it in the past.    She proceeded to take her NOVOLOG and Novolin after the 299d/gL reading.     She reports that she has filled out the questionnaires for BG but I am not able to see where she completed them. We have not received any BG readings either. Diabetes Order was placed.     Resent questionnaires via [employer]. Advised patient to complete consent and forms. Will follow-up in the next couple of days to confirm readings went through. Suggested making sure that device was set up to Ellie and along with [employer] diana. Patient understood what was advised.   She plans to complete task today.        Diet-Not assessed      Additional diet details:    Physical Activity-Not assessed    Medication Adherence-Medication Adherence not addressed.      Substance, Sleep, Stress-Not assessed    PLAN  Additional monitoring needed: blood sugar  Provided patient education: BG device operation    Patient verbalizes understanding. Patient did not express questions or concerns and patient has contact information if needed.    Explained the importance of self-monitoring and medication adherence. Encouraged the patient to communicate with their health  for lifestyle modifications to help improve or maintain a " healthy lifestyle.            Topic    Lipid (Cholesterol) Test     Hemoglobin A1C        Last 5 Patient Entered Readings                                      Current 30 Day Average: 147/76     Recent Readings 7/19/2020 7/12/2020 7/8/2020 7/7/2020    SBP (mmHg) 136 148 150 155    DBP (mmHg) 74 77 91 63    Pulse 82 65 74 60

## 2020-07-21 ENCOUNTER — PATIENT OUTREACH (OUTPATIENT)
Dept: OTHER | Facility: OTHER | Age: 70
End: 2020-07-21

## 2020-07-21 DIAGNOSIS — E11.9 TYPE 2 DIABETES MELLITUS: ICD-10-CM

## 2020-07-21 NOTE — PROGRESS NOTES
Digital Medicine: Health  Follow-Up    The history is provided by the patient.           Additional Enrollment Details: Completed BG DigMed enrollment. Patient completed questionnaires.    Patient states that she dislikes the lancet with our DigMed device. She reports using her other lancet from old device to prick her finger. She states that it is easier to use and has 6 needles.  Patient compared old glucometer with new glucometer. She reports her old glucometer reading 199 compared to her last reading of 204. Explained that there would be a difference due to different devices. She understood.     She thanked me for confirming that readings were processing. Moving forward, will follow-up with lifestyle modifications.       DIABETES  Explained the goal of the diabetes digital medicine program is to decrease A1c within patient-specific target levels. Reviewed benefits of A1c reduction including reducing risk for kidney, eye, and nerve disease.      Explained that we expect patient to submit blood sugar readings as prescribed. Instructed patient not to allow anyone else to use their glucometer and phone as data submitted is directly entered into their medical record. Reviewed and confirmed appropriate blood sugar testing technique.       Reviewed Device Techniques  Reviewed general Self-Monitoring of Blood Glucose (SMBG) goals:  · FP-130 mg/dL  · 2h PPG: < 180 mg/dL  · Bedtime: < 150 mg/dL  Patient reported SMBG schedule: Daily  Reviewed signs and symptoms of hypoglycemia (weakness, dizziness, hunger, shakiness, nausea, headache, heart palpitations, sweating, fatigue, anxiety, etc.).  Reviewed treatment of hypoglycemia (15/15 rule).            Diet-Assessed    Patient reports eating or drinking the following: She reports having 5 family members staying with her this past week. She has not been eating her normal meals. Admits BG readings being more elevated than usual.    Additional diet details:    Physical  Activity-Not assessed    Medication Adherence-Medication Adherence not addressed.      Substance, Sleep, Stress-Not assessed    PLAN  Reviewed Device Techniques  Patient verbalizes understanding. Patient did not express questions or concerns and patient has contact information if needed.    Explained the importance of self-monitoring and medication adherence. Encouraged the patient to communicate with their health  for lifestyle modifications to help improve or maintain a healthy lifestyle.            Topic    Lipid (Cholesterol) Test     Hemoglobin A1C        Last 5 Patient Entered Readings                                      Current 30 Day Average: 147/76     Recent Readings 7/19/2020 7/12/2020 7/8/2020 7/7/2020    SBP (mmHg) 136 148 150 155    DBP (mmHg) 74 77 91 63    Pulse 82 65 74 60        Last 6 Patient Entered Readings                                          Most Recent A1c:      Recent Readings 7/20/2020 7/20/2020 7/19/2020 7/19/2020 7/19/2020    Blood Glucose (mg/dL) 204 299 134 277 160

## 2020-07-22 ENCOUNTER — PATIENT OUTREACH (OUTPATIENT)
Dept: OTHER | Facility: OTHER | Age: 70
End: 2020-07-22

## 2020-07-22 ENCOUNTER — TELEPHONE (OUTPATIENT)
Dept: PRIMARY CARE CLINIC | Facility: CLINIC | Age: 70
End: 2020-07-22

## 2020-07-22 NOTE — TELEPHONE ENCOUNTER
----- Message from Petra Broussard MD sent at 7/15/2020  4:49 PM CDT -----  Received readings from pt blood pressure due to digital program - pt mostly averaging around 150 systolic which is above goal for her age range.     Her goal is to be less than 140/80.     Is she watching her salt intake - goal is less than 2000 mg of salt daily to help control blood pressure- I would focus on this first if she is not otherwise we may have to consider going up on one of her blood pressure medications.     She takes a lot of medications, less meds is better but having high blood pressure over time can start affecting her organs - heart, brain, eye, kidney...     Her next visit with me is in 2 weeks.

## 2020-07-22 NOTE — TELEPHONE ENCOUNTER
Spoke with pt, stated that she takes her medication anywhere between noon and 1800hrs.  Pt is taking care of her 4 yr old grandchild.  Stressed the need to take her meds at a consistent time.  Discussed dietary salt intake. Pt voiced understanding and will work on it.

## 2020-07-22 NOTE — PROGRESS NOTES
Digital Medicine: Health  Follow-Up    The history is provided by the patient.             Reason for review: Blood glucose not at goal and Blood pressure not at goal      Additional Follow-up details: Patient called with concerns about blood sugar reading discrepancy between her Accucheck glucometer compared to her DigMed glucometer. Patient states that there was a 100 pt difference between the two meters.  Encouraged patient to check Digital Medicine glucometer with control solution to see how accurate readings were. Patient understood request and plans to take a look at it today.     Encouraged patient to send [employer] message or reach out if any further questions or concerns.      Medication Adherence-Medication adherence was asssessed.        Patient reports not knowing when to take her insulin or how much to take. Advised patient to discuss with clinician.     She reports that her latest readings, today, of 248 after lunch was with no insulin today. The last time she took insulin was yesterday, early afternoon.   Substance, Sleep, Stress-Not assessed    PLAN  Additional monitoring needed: continue to monitor BG     Patient verbalizes understanding. Patient did not express questions or concerns and patient has contact information if needed.    Explained the importance of self-monitoring and medication adherence. Encouraged the patient to communicate with their health  for lifestyle modifications to help improve or maintain a healthy lifestyle.      Sent link to [facility] [employer] [facility] webpages and my contact information via [facility] for future questions.              Topic    Lipid (Cholesterol) Test     Hemoglobin A1C        Last 5 Patient Entered Readings                                      Current 30 Day Average: 145/77     Recent Readings 7/22/2020 7/19/2020 7/12/2020 7/8/2020 7/7/2020    SBP (mmHg) 138 136 148 150 155    DBP (mmHg) 80 74 77 91 63    Pulse 70 82 65 74 60        Last 6 Patient  Entered Readings                                          Most Recent A1c: 8.4% on 12/12/2019  (Goal: 8%)     Recent Readings 7/22/2020 7/20/2020 7/20/2020 7/19/2020 7/19/2020    Blood Glucose (mg/dL) 248 204 299 134 521

## 2020-07-23 ENCOUNTER — PATIENT OUTREACH (OUTPATIENT)
Dept: ADMINISTRATIVE | Facility: HOSPITAL | Age: 70
End: 2020-07-23

## 2020-07-23 RX ORDER — DULOXETIN HYDROCHLORIDE 60 MG/1
CAPSULE, DELAYED RELEASE ORAL
Qty: 90 CAPSULE | Refills: 0 | Status: SHIPPED | OUTPATIENT
Start: 2020-07-23 | End: 2020-09-14

## 2020-07-28 ENCOUNTER — PATIENT OUTREACH (OUTPATIENT)
Dept: OTHER | Facility: OTHER | Age: 70
End: 2020-07-28

## 2020-07-28 NOTE — PROGRESS NOTES
Digital Medicine: Clinician Introduction    Nathalie Santiago is a 70 y.o. female who is newly enrolled in the Digital Medicine Clinic. Pertinent PMH includes the following:    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    CVA (cerebral vascular accident)- Confirmed by neurology 9/6/2016    Degenerative joint disease of cervical and lumbar spine 8/1/2014    Depression     Diabetes mellitus     Diabetes mellitus, type II     Gastroenteritis 12/19/2016    History of malignant phylloides tumor of breast 2/15/2013    Hyperlipidemia     Hyperopia with presbyopia 2/18/2013    Hypertension     Hypertension     Hypothyroidism     Immunosuppressed status 9/3/2015    Leukocytosis 11/17/2016    Migraine     Mitral valve regurgitation     Normochromic anemia 3/28/2017    Nuclear sclerosis 2/18/2013    Obesity     JOSE LUIS (obstructive sleep apnea)     Ovarian cancer     Pneumonia     Polyneuropathy     PVD (posterior vitreous detachment) 2/18/2013    Restless leg syndrome     Ruptured disk     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 She reports adherence to amlodipine between 12 PM and 8 PM; hydrochlorothiazide 25 mg nightly. Patient reports taking Novolog on sliding scale,  but has not used any today. Not using the scale with reference to meals. She is using Novolin N 30 units nightly to sometimes twice daily. She admits that she is not consistent with medications because she forgets. Patient is assists with care for her 5 y/o granddaughter and tries to keep her medications in a safe place which often lead to her forgetting them.     The history is provided by the patient.      Review of patient's allergies indicates:   -- Keflex (cephalexin) -- Anaphylaxis   -- Penicillins -- Anaphylaxis   -- Vasotec (enalapril maleate) -- Other (See Comments)    --  Causes her to pass out   -- Bupropion -- Other (See Comments)    --  Loss of memory   -- Ciprofloxacin (bulk)     --  Bones ache, myalgia   -- Wellbutrin (bupropion hcl) -- Other (See Comments)    --  Loss of memory   -- Zithromax (azithromycin)     --  Patient states medication does not work on her-not             effective   -- Codeine -- Itching and Nausea Only  Completed Medication Reconciliation  Verified pharmacy information.  Patient is not on ACEI/ARB because Valsartan stopped due to syncope and change in renal function and never restarted..  Patient is on statin     HYPERTENSION    Explained non-pharmacologic therapies like low salt diet and physical activity can reduce blood pressure.       Explained that we expect patient to submit several blood pressure readings per week at random times of the day, but at least 30 minutes after taking blood pressure medications. Instructed patient not to allow anyone else to use their blood pressure monitor and phone as data submitted is directly entered into medical record. Reviewed and confirmed appropriate blood pressure monitoring technique.         Reviewed signs/symptoms of hypertension (headache, changes in vision, chest pain, shortness of breath)   Reviewed signs/symptoms of hypotension (lightheaded, dizziness, weakness)     Patient's BP goal is less than or equal to 130/80. Patients  BP average is 143/76 mmHg, which is above goal, per 2017 ACC/AHA Hypertension Guidelines. Patient attributes current blood pressure to: timing of medications and checking blood pressure prior to dose.       DIABETES  Explained the goal of the diabetes digital medicine program is to decrease A1c within patient-specific target levels. Reviewed benefits of A1c reduction including reducing risk for kidney, eye, and nerve disease.      Explained that we expect patient to submit blood sugar readings as prescribed. Instructed patient not to allow anyone else to use their glucometer and phone as data submitted is directly entered into their medical record. Reviewed and confirmed appropriate blood sugar testing technique.      Patient reported SMBG schedule: Patient unsure. Checks blood glucose randomly throughout the day when she rememebers. Often does not check with respect to meals or snacks.  Reviewed general Self-Monitoring of Blood Glucose (SMBG) goals:  · FP-130 mg/dL  · 2h PPG: <180 mg/dL  · Bedtime: <150 mg/dL    Reviewed signs or symptoms of hyperglycemia (headache, increased thirst, increased urination, fatigue, blurred vision, etc.).      Reviewed signs and symptoms of hypoglycemia (weakness, dizziness, hunger, shakiness, nausea, headache, heart palpitations, sweating, fatigue, anxiety, etc.).  Reviewed treatment of hypoglycemia (15/15 rule).      Patient does not have history of hypoglycemia.    Patient's A1C goal is less than or equal to 8. Patient's most recent A1C result is above goal. Lab Results     Component                Value               Date                     HGBA1C                   8.4 (H)             2019          . Patient attributes current A1C to: not taking insulin or metformin regularly. Usually compensates with Novolog during the day if BG's are >200 mg/dL.  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 She requested that medications be refilled with directions that allow her to self-titrate doses.         Last 5 Patient Entered Readings                                      Current 30 Day Average: 143/76     Recent Readings 7/26/2020 7/25/2020 7/23/2020 7/22/2020 7/19/2020    SBP (mmHg) 136 141 141 138 136    DBP (mmHg) 70 78 75 80 74    Pulse 72 68 66 70 82        Last 6 Patient Entered Readings   Most Recent A1c: 8.4% on 12/12/2019  (Goal: 8%)     Recent Readings 7/28/2020 7/26/2020 7/26/2020 7/26/2020 7/25/2020    Blood Glucose (mg/dL) 150 98 196 187 222          Depression Screening  Did not address depression screening.    Sleep Apnea Screening    Did not address sleep apnea screening.     Medication Affordability Screening  Did not address medication affordability screening.     Medication Adherence-Medication adherence was assessed.    Patient reported missing medication: more than once a week.    Patient identified the following reasons for non-adherence:  Patient forgets:    Adherence tools used: Medication list      ASSESSMENT(S)  Patients BP average is 143/76 mmHg, which is above goal. Patient's BP goal is less than or equal to 130/80 per 2017 ACC/AHA Hypertension Guidelines.   Patient's most recent A1C result is Lab Results    Component                Value               Date                     HGBA1C                   8.4 (H)             12/12/2019           and is above goal. Patient's A1C goal is less than or equal to 8.        PLAN  Labs ordered: A1C and CMP Expected Lab Date: 8/6/2020  Continue current therapy: Patient will begin taking NPH 15 units twice daily with metformin. Hold Novolog. No change in hypertension medications.   Provided patient education: Reviewed medication list in detail.     Patient verbalizes understanding. Patient did not express questions or concerns and patient has contact information if needed.      Sent link to [facility] Digital [facility] webpages  and my contact information via [employer] for future questions.      Explained to the patient that the Digital Medicine team is not available for emergencies. Advised patient call [facility] ([phone] or [phone]) or [phone] if needed.           Current Medication Regimen:  Hypertension Medications             amLODIPine (NORVASC) 5 MG tablet TAKE 1 TABLET EVERY DAY    hydroCHLOROthiazide (HYDRODIURIL) 25 MG tablet Take 1 tablet (25 mg total) by mouth once daily.    metoprolol succinate (TOPROL-XL) 25 MG 24 hr tablet Take 1 tablet (25 mg total) by mouth once daily.    nitroGLYCERIN (NITROSTAT) 0.4 MG SL tablet     spironolactone (ALDACTONE) 25 MG tablet Take 1 tablet (25 mg total) by mouth once daily.        Diabetes Medications             insulin aspart (NOVOLOG FLEXPEN) 100 unit/mL InPn pen Use per sliding scale    insulin  unit/mL injection Inject 15 Units into the skin 2 (two) times daily before meals.    metFORMIN (GLUCOPHAGE-XR) 500 MG 24 hr tablet Take 2 tablets (1,000 mg total) by mouth 2 (two) times daily with meals. For diabetes

## 2020-07-31 PROCEDURE — 99457 RPM TX MGMT 1ST 20 MIN: CPT | Mod: S$GLB,,, | Performed by: INTERNAL MEDICINE

## 2020-07-31 PROCEDURE — 99457 PR MONITORING, PHYSIOL PARAM, REMOTE, 1ST 20 MINS, PER MONTH: ICD-10-PCS | Mod: S$GLB,,, | Performed by: INTERNAL MEDICINE

## 2020-07-31 PROCEDURE — 99458 PR REMOTE PHYSIOL MONIT, EA ADDTL 20 MINS: ICD-10-PCS | Mod: S$GLB,,, | Performed by: INTERNAL MEDICINE

## 2020-07-31 PROCEDURE — 99458 RPM TX MGMT EA ADDL 20 MIN: CPT | Mod: S$GLB,,, | Performed by: INTERNAL MEDICINE

## 2020-08-04 ENCOUNTER — PATIENT OUTREACH (OUTPATIENT)
Dept: ADMINISTRATIVE | Facility: OTHER | Age: 70
End: 2020-08-04

## 2020-08-05 ENCOUNTER — PATIENT OUTREACH (OUTPATIENT)
Dept: OTHER | Facility: OTHER | Age: 70
End: 2020-08-05

## 2020-08-06 ENCOUNTER — OFFICE VISIT (OUTPATIENT)
Dept: PRIMARY CARE CLINIC | Facility: CLINIC | Age: 70
End: 2020-08-06
Payer: MEDICARE

## 2020-08-06 ENCOUNTER — LAB VISIT (OUTPATIENT)
Dept: LAB | Facility: HOSPITAL | Age: 70
End: 2020-08-06
Attending: INTERNAL MEDICINE
Payer: MEDICARE

## 2020-08-06 VITALS
BODY MASS INDEX: 30.22 KG/M2 | WEIGHT: 188.06 LBS | TEMPERATURE: 98 F | OXYGEN SATURATION: 96 % | HEIGHT: 66 IN | SYSTOLIC BLOOD PRESSURE: 130 MMHG | HEART RATE: 69 BPM | DIASTOLIC BLOOD PRESSURE: 62 MMHG

## 2020-08-06 DIAGNOSIS — I15.2 HYPERTENSION ASSOCIATED WITH DIABETES: ICD-10-CM

## 2020-08-06 DIAGNOSIS — R11.0 CHRONIC NAUSEA: ICD-10-CM

## 2020-08-06 DIAGNOSIS — G25.81 RESTLESS LEGS SYNDROME (RLS): ICD-10-CM

## 2020-08-06 DIAGNOSIS — R41.3 MEMORY CHANGE: ICD-10-CM

## 2020-08-06 DIAGNOSIS — M15.9 PRIMARY OSTEOARTHRITIS INVOLVING MULTIPLE JOINTS: Primary | ICD-10-CM

## 2020-08-06 DIAGNOSIS — E11.65 UNCONTROLLED TYPE 2 DIABETES MELLITUS WITH HYPERGLYCEMIA: ICD-10-CM

## 2020-08-06 DIAGNOSIS — E11.69 HYPERLIPIDEMIA ASSOCIATED WITH TYPE 2 DIABETES MELLITUS: ICD-10-CM

## 2020-08-06 DIAGNOSIS — R26.89 BALANCE DISORDER: ICD-10-CM

## 2020-08-06 DIAGNOSIS — E03.4 HYPOTHYROIDISM DUE TO ACQUIRED ATROPHY OF THYROID: ICD-10-CM

## 2020-08-06 DIAGNOSIS — E55.9 VITAMIN D DEFICIENCY: ICD-10-CM

## 2020-08-06 DIAGNOSIS — Z79.4 TYPE 2 DIABETES MELLITUS WITH STAGE 3 CHRONIC KIDNEY DISEASE, WITH LONG-TERM CURRENT USE OF INSULIN: ICD-10-CM

## 2020-08-06 DIAGNOSIS — Z79.4 ENCOUNTER FOR LONG-TERM (CURRENT) USE OF INSULIN: ICD-10-CM

## 2020-08-06 DIAGNOSIS — E78.5 HYPERLIPIDEMIA ASSOCIATED WITH TYPE 2 DIABETES MELLITUS: ICD-10-CM

## 2020-08-06 DIAGNOSIS — E83.42 HYPOMAGNESEMIA: ICD-10-CM

## 2020-08-06 DIAGNOSIS — E11.22 TYPE 2 DIABETES MELLITUS WITH STAGE 3 CHRONIC KIDNEY DISEASE, WITH LONG-TERM CURRENT USE OF INSULIN: ICD-10-CM

## 2020-08-06 DIAGNOSIS — N18.30 TYPE 2 DIABETES MELLITUS WITH STAGE 3 CHRONIC KIDNEY DISEASE, WITH LONG-TERM CURRENT USE OF INSULIN: ICD-10-CM

## 2020-08-06 DIAGNOSIS — J44.89 CHRONIC OBSTRUCTIVE ASTHMA: ICD-10-CM

## 2020-08-06 DIAGNOSIS — E11.59 HYPERTENSION ASSOCIATED WITH DIABETES: ICD-10-CM

## 2020-08-06 DIAGNOSIS — E87.6 HYPOKALEMIA: ICD-10-CM

## 2020-08-06 LAB
25(OH)D3+25(OH)D2 SERPL-MCNC: 69 NG/ML (ref 30–96)
ALBUMIN SERPL BCP-MCNC: 4.1 G/DL (ref 3.5–5.2)
ALP SERPL-CCNC: 59 U/L (ref 55–135)
ALT SERPL W/O P-5'-P-CCNC: 16 U/L (ref 10–44)
ANION GAP SERPL CALC-SCNC: 8 MMOL/L (ref 8–16)
AST SERPL-CCNC: 19 U/L (ref 10–40)
BASOPHILS # BLD AUTO: 0.04 K/UL (ref 0–0.2)
BASOPHILS NFR BLD: 0.6 % (ref 0–1.9)
BILIRUB SERPL-MCNC: 0.3 MG/DL (ref 0.1–1)
BUN SERPL-MCNC: 24 MG/DL (ref 8–23)
CALCIUM SERPL-MCNC: 9.9 MG/DL (ref 8.7–10.5)
CHLORIDE SERPL-SCNC: 105 MMOL/L (ref 95–110)
CHOLEST SERPL-MCNC: 125 MG/DL (ref 120–199)
CHOLEST/HDLC SERPL: 3.8 {RATIO} (ref 2–5)
CO2 SERPL-SCNC: 27 MMOL/L (ref 23–29)
CREAT SERPL-MCNC: 0.9 MG/DL (ref 0.5–1.4)
DIFFERENTIAL METHOD: ABNORMAL
EOSINOPHIL # BLD AUTO: 0.3 K/UL (ref 0–0.5)
EOSINOPHIL NFR BLD: 3.6 % (ref 0–8)
ERYTHROCYTE [DISTWIDTH] IN BLOOD BY AUTOMATED COUNT: 13.7 % (ref 11.5–14.5)
EST. GFR  (AFRICAN AMERICAN): >60 ML/MIN/1.73 M^2
EST. GFR  (NON AFRICAN AMERICAN): >60 ML/MIN/1.73 M^2
GLUCOSE SERPL-MCNC: 160 MG/DL (ref 70–110)
HCT VFR BLD AUTO: 40 % (ref 37–48.5)
HDLC SERPL-MCNC: 33 MG/DL (ref 40–75)
HDLC SERPL: 26.4 % (ref 20–50)
HGB BLD-MCNC: 12.8 G/DL (ref 12–16)
IMM GRANULOCYTES # BLD AUTO: 0.04 K/UL (ref 0–0.04)
IMM GRANULOCYTES NFR BLD AUTO: 0.6 % (ref 0–0.5)
LDLC SERPL CALC-MCNC: 52.4 MG/DL (ref 63–159)
LYMPHOCYTES # BLD AUTO: 1.6 K/UL (ref 1–4.8)
LYMPHOCYTES NFR BLD: 22.2 % (ref 18–48)
MAGNESIUM SERPL-MCNC: 1.5 MG/DL (ref 1.6–2.6)
MCH RBC QN AUTO: 29.1 PG (ref 27–31)
MCHC RBC AUTO-ENTMCNC: 32 G/DL (ref 32–36)
MCV RBC AUTO: 91 FL (ref 82–98)
MONOCYTES # BLD AUTO: 0.6 K/UL (ref 0.3–1)
MONOCYTES NFR BLD: 8.3 % (ref 4–15)
NEUTROPHILS # BLD AUTO: 4.7 K/UL (ref 1.8–7.7)
NEUTROPHILS NFR BLD: 64.7 % (ref 38–73)
NONHDLC SERPL-MCNC: 92 MG/DL
NRBC BLD-RTO: 0 /100 WBC
PLATELET # BLD AUTO: 290 K/UL (ref 150–350)
PMV BLD AUTO: 10 FL (ref 9.2–12.9)
POTASSIUM SERPL-SCNC: 4 MMOL/L (ref 3.5–5.1)
PROT SERPL-MCNC: 7.4 G/DL (ref 6–8.4)
RBC # BLD AUTO: 4.4 M/UL (ref 4–5.4)
SODIUM SERPL-SCNC: 140 MMOL/L (ref 136–145)
T4 FREE SERPL-MCNC: 1.04 NG/DL (ref 0.71–1.51)
TRIGL SERPL-MCNC: 198 MG/DL (ref 30–150)
TSH SERPL DL<=0.005 MIU/L-ACNC: 3.47 UIU/ML (ref 0.4–4)
VIT B12 SERPL-MCNC: 429 PG/ML (ref 210–950)
WBC # BLD AUTO: 7.21 K/UL (ref 3.9–12.7)

## 2020-08-06 PROCEDURE — 84439 ASSAY OF FREE THYROXINE: CPT | Mod: HCNC

## 2020-08-06 PROCEDURE — 3051F PR MOST RECENT HEMOGLOBIN A1C LEVEL 7.0 - < 8.0%: ICD-10-PCS | Mod: HCNC,CPTII,S$GLB, | Performed by: INTERNAL MEDICINE

## 2020-08-06 PROCEDURE — 80061 LIPID PANEL: CPT | Mod: HCNC

## 2020-08-06 PROCEDURE — 3051F HG A1C>EQUAL 7.0%<8.0%: CPT | Mod: HCNC,CPTII,S$GLB, | Performed by: INTERNAL MEDICINE

## 2020-08-06 PROCEDURE — 83735 ASSAY OF MAGNESIUM: CPT | Mod: HCNC

## 2020-08-06 PROCEDURE — 84443 ASSAY THYROID STIM HORMONE: CPT | Mod: HCNC

## 2020-08-06 PROCEDURE — 20610 DRAIN/INJ JOINT/BURSA W/O US: CPT | Mod: HCNC,RT,S$GLB, | Performed by: INTERNAL MEDICINE

## 2020-08-06 PROCEDURE — 99215 OFFICE O/P EST HI 40 MIN: CPT | Mod: HCNC,25,S$GLB, | Performed by: INTERNAL MEDICINE

## 2020-08-06 PROCEDURE — 20610 PR DRAIN/INJECT LARGE JOINT/BURSA: ICD-10-PCS | Mod: HCNC,RT,S$GLB, | Performed by: INTERNAL MEDICINE

## 2020-08-06 PROCEDURE — 83036 HEMOGLOBIN GLYCOSYLATED A1C: CPT | Mod: HCNC

## 2020-08-06 PROCEDURE — 85025 COMPLETE CBC W/AUTO DIFF WBC: CPT | Mod: HCNC

## 2020-08-06 PROCEDURE — 36415 COLL VENOUS BLD VENIPUNCTURE: CPT | Mod: HCNC,PO

## 2020-08-06 PROCEDURE — 99215 PR OFFICE/OUTPT VISIT, EST, LEVL V, 40-54 MIN: ICD-10-PCS | Mod: HCNC,25,S$GLB, | Performed by: INTERNAL MEDICINE

## 2020-08-06 PROCEDURE — 82306 VITAMIN D 25 HYDROXY: CPT | Mod: HCNC

## 2020-08-06 PROCEDURE — 80053 COMPREHEN METABOLIC PANEL: CPT | Mod: HCNC

## 2020-08-06 PROCEDURE — 82607 VITAMIN B-12: CPT | Mod: HCNC

## 2020-08-06 RX ORDER — TRIAMCINOLONE ACETONIDE 40 MG/ML
40 INJECTION, SUSPENSION INTRA-ARTICULAR; INTRAMUSCULAR
Status: COMPLETED | OUTPATIENT
Start: 2020-08-06 | End: 2020-08-06

## 2020-08-06 RX ORDER — INSULIN ASPART 100 [IU]/ML
INJECTION, SOLUTION INTRAVENOUS; SUBCUTANEOUS
Qty: 1 BOX | Refills: 5 | Status: SHIPPED | OUTPATIENT
Start: 2020-08-06 | End: 2021-03-23

## 2020-08-06 RX ORDER — BUPIVACAINE HYDROCHLORIDE 5 MG/ML
3 INJECTION, SOLUTION EPIDURAL; INTRACAUDAL
Status: COMPLETED | OUTPATIENT
Start: 2020-08-06 | End: 2020-08-06

## 2020-08-06 RX ORDER — TRIAMCINOLONE ACETONIDE 40 MG/ML
40 INJECTION, SUSPENSION INTRA-ARTICULAR; INTRAMUSCULAR ONCE
Status: COMPLETED | OUTPATIENT
Start: 2020-08-06 | End: 2020-08-06

## 2020-08-06 RX ORDER — PROMETHAZINE HYDROCHLORIDE 25 MG/1
25 TABLET ORAL EVERY 6 HOURS PRN
Qty: 90 TABLET | Refills: 3 | Status: SHIPPED | OUTPATIENT
Start: 2020-08-06 | End: 2020-10-23

## 2020-08-06 RX ORDER — PRAMIPEXOLE DIHYDROCHLORIDE 0.12 MG/1
TABLET ORAL
Qty: 75 TABLET | Refills: 0 | Status: SHIPPED | OUTPATIENT
Start: 2020-08-06 | End: 2020-09-16 | Stop reason: DRUGHIGH

## 2020-08-06 RX ORDER — BUPIVACAINE HYDROCHLORIDE 2.5 MG/ML
3 INJECTION, SOLUTION EPIDURAL; INFILTRATION; INTRACAUDAL
Status: DISCONTINUED | OUTPATIENT
Start: 2020-08-06 | End: 2020-08-06

## 2020-08-06 RX ORDER — LIDOCAINE HYDROCHLORIDE AND EPINEPHRINE 20; 10 MG/ML; UG/ML
3 INJECTION, SOLUTION INFILTRATION; PERINEURAL
Status: DISCONTINUED | OUTPATIENT
Start: 2020-08-06 | End: 2020-08-06

## 2020-08-06 RX ORDER — LIDOCAINE HYDROCHLORIDE AND EPINEPHRINE 10; 10 MG/ML; UG/ML
3 INJECTION, SOLUTION INFILTRATION; PERINEURAL
Status: COMPLETED | OUTPATIENT
Start: 2020-08-06 | End: 2020-08-06

## 2020-08-06 RX ADMIN — TRIAMCINOLONE ACETONIDE 40 MG: 40 INJECTION, SUSPENSION INTRA-ARTICULAR; INTRAMUSCULAR at 02:08

## 2020-08-06 RX ADMIN — LIDOCAINE HYDROCHLORIDE AND EPINEPHRINE 3 ML: 10; 10 INJECTION, SOLUTION INFILTRATION; PERINEURAL at 02:08

## 2020-08-06 RX ADMIN — BUPIVACAINE HYDROCHLORIDE 15 MG: 5 INJECTION, SOLUTION EPIDURAL; INTRACAUDAL at 02:08

## 2020-08-06 NOTE — PATIENT INSTRUCTIONS
Given injection in right knee and right shoulder - if no improvement will repeat imaging and send to orthopedics.    Requip break in 1/2 and take nightly for one week then stop, start mirapex 0.125 mg nightly for one week - can incrase to 2 tablets after one week.       Continue taking insulin 30 units bid for now, will discuss with pharmacist.

## 2020-08-06 NOTE — PROGRESS NOTES
Spoke with lab and they should be able to add other labs on.  If the encounter a problem they will let us know.

## 2020-08-06 NOTE — PROGRESS NOTES
Primary Care Provider Appointment    Subjective:      Patient ID: Nathalie Santiago is a 70 y.o. female. sarcoidosis, hx of breast CA/uterine/ovarian cancer, diabetes complicated by gastroparesis, HLD, HTN,  anxiety and depression, hx of CVA (brain stem stroke), DJD, JOSE LUIS, chronic low back and hip pain     Chief Complaint: Diabetes and Hypertension    Has moved in her daugther to help with her grandchildren but her daugther lives 2 hours away from [place].   Pt was 30 mins late to appt because she went to have lab work completed before her visit.     Pt recently started in diabetes digital program - see log below   Sabina Whalen is her pharmD   Pt reports that she was instructed to take her nph 15 units before meals but pt has not been able to adopt dietary changes that was recommended because her daugther is the cook so she went back to taking her former dose of insulin which was 30 units.     Recent Readings 8/3/2020 8/2/2020 8/1/2020 7/31/2020 7/31/2020   Blood Glucose (mg/dL) 234 179 219 270 180     Recent Readings 7/28/2020 7/26/2020 7/26/2020 7/26/2020   Blood Glucose (mg/dL) 150 98 196 187     Recent Readings 7/25/2020 7/23/2020 7/23/2020 7/22/2020   Blood Glucose (mg/dL) 222 180 225 248     Recent Readings 7/20/2020 7/20/2020 7/19/2020 7/19/2020   Blood Glucose (mg/dL) 204 299 134 277     Recent Readings 7/19/2020 7/7/2020   Blood Glucose (mg/dL) 160 92       Her right knee and her right shoulder has been painful for her. Her knee has given out on her, she would have fallen but was able to catch herself on her counter.     Loss of balance as well, stopped going to gym with covid.     Having  varicose vein pain     Requip is not working as well for her restless legs.     Daugther is concerned about her memory, pt will go into the kitchen and forget what she going to get.   Pt notices that she is stuttering more during conversation.   Pt denies issues with medications.     Her dog passed away since our last  visit and this has been difficult transition for her .       Past Surgical History:   Procedure Laterality Date    APPENDECTOMY      BREAST SURGERY      CHOLECYSTECTOMY      CYSTOCELE REPAIR      FRACTURE SURGERY Right     foot    HYSTERECTOMY      SAADIA/BSO    MASTECTOMY Bilateral     b/l mastectomy    OOPHORECTOMY      PLEURA BIOPSY      RECTAL SURGERY      rectocele      TONSILLECTOMY         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    CVA (cerebral vascular accident)- Confirmed by neurology 9/6/2016    Degenerative joint disease of cervical and lumbar spine 8/1/2014    Depression     Diabetes mellitus     Diabetes mellitus, type II     Gastroenteritis 12/19/2016    History of malignant phylloides tumor of breast 2/15/2013    Hyperlipidemia     Hyperopia with presbyopia 2/18/2013    Hypertension     Hypertension     Hypothyroidism     Immunosuppressed status 9/3/2015    Leukocytosis 11/17/2016    Migraine     Mitral valve regurgitation     Normochromic anemia 3/28/2017    Nuclear sclerosis 2/18/2013    Obesity     JOSE LUIS (obstructive sleep apnea)     Ovarian cancer     Pneumonia     Polyneuropathy     PVD (posterior vitreous detachment) 2/18/2013    Restless leg syndrome     Ruptured disk     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    Sarcoidosis 2006    Sarcoidosis of lung     Squamous cell carcinoma     Trouble in sleeping     Uterine  cancer     Venous insufficiency        Social History     Socioeconomic History    Marital status: Single     Spouse name: Not on file    Number of children: 1    Years of education: Not on file    Highest education level: Bachelor's degree (e.g., BA, AB, BS)   Occupational History    Occupation: retired    Social Needs    Financial resource strain: Not hard at all    Food insecurity     Worry: Never true     Inability: Never true    Transportation needs     Medical: No     Non-medical: No   Tobacco Use    Smoking status: Never Smoker    Smokeless tobacco: Never Used   Substance and Sexual Activity    Alcohol use: No     Frequency: Monthly or less     Drinks per session: 1 or 2     Binge frequency: Never    Drug use: No     Types: Oxycodone     Comment: prescription Oycodone    Sexual activity: Never   Lifestyle    Physical activity     Days per week: 0 days     Minutes per session: 0 min    Stress: Not at all   Relationships    Social connections     Talks on phone: More than three times a week     Gets together: More than three times a week     Attends [employer] service: Never     Active member of club or organization: No     Attends meetings of clubs or organizations: Never     Relationship status: Never    Other Topics Concern    Not on file   Social History Narrative    Raised in [place]     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 Negative for appetite change.   Respiratory: Negative for cough and shortness of breath.    Cardiovascular: Negative for chest pain.   Musculoskeletal: Positive for arthralgias.   Neurological: Positive for light-headedness.       Objective:   /62 (BP Location: Left arm, Patient Position: Sitting, BP Method: Large (Manual))   Pulse 69  "  Temp 97.7 °F (36.5 °C) (Temporal)   Ht 5' 6" (1.676 m)   Wt 85.3 kg (188 lb 0.8 oz)   LMP  (LMP Unknown)   SpO2 96%   BMI 30.35 kg/m²     Physical Exam  Vitals signs reviewed.   Constitutional:       Appearance: She is obese.   HENT:      Head: Normocephalic.   Eyes:      Extraocular Movements: Extraocular movements intact.      Conjunctiva/sclera: Conjunctivae normal.   Cardiovascular:      Rate and Rhythm: Normal rate and regular rhythm.   Pulmonary:      Effort: Pulmonary effort is normal.      Breath sounds: Normal breath sounds.   Musculoskeletal:      Comments: Mild crepitus in right knee, no warmth or swelling noted. Good rom     Right shoulder with tenderness to palpation, able to lift arm overhead, right arm strength 5/5    Neurological:      General: No focal deficit present.      Mental Status: She is alert and oriented to person, place, and time. Mental status is at baseline.         Lab Results   Component Value Date    WBC 7.21 08/06/2020    HGB 12.8 08/06/2020    HCT 40.0 08/06/2020     08/06/2020    CHOL 125 08/06/2020    TRIG 198 (H) 08/06/2020    HDL 33 (L) 08/06/2020    ALT 16 08/06/2020    AST 19 08/06/2020     08/06/2020    K 4.0 08/06/2020     08/06/2020    CREATININE 0.9 08/06/2020    BUN 24 (H) 08/06/2020    CO2 27 08/06/2020    TSH 3.467 08/06/2020    INR 1.0 06/25/2015    HGBA1C 7.3 (H) 08/06/2020       RESULTS: Reviewed labs and images today    Assessment:   70 y.o. female with multiple co-morbid illnesses here to continue work-up of chronic issues notably sarcoidosis, hx of breast CA/uterine/ovarian cancer, diabetes complicated by gastroparesis, HLD, HTN,  anxiety and depression, hx of CVA (brain stem stroke), DJD, JOSE LUIS, chronic low back and hip pain     Plan:     Problem List Items Addressed This Visit        Neuro    Restless legs syndrome (RLS)     Try mirapex, start at lowest dose with plans to titrate if needed.          Memory change     Due to time " constraint will f/u in 2 weeks with virtual visit for testing             Pulmonary    Chronic obstructive asthma     Stable  Cont regimen             Cardiac/Vascular    Hypertension associated with diabetes     bp better controlled   Cont current regimen          Relevant Medications    insulin  unit/mL injection    insulin aspart U-100 (NOVOLOG FLEXPEN U-100 INSULIN) 100 unit/mL (3 mL) InPn pen       Endocrine    Type 2 diabetes mellitus, uncontrolled     Will message Ms. Whalen due to pt's medicaiton changes   Labs pending            Relevant Medications    insulin  unit/mL injection    insulin aspart U-100 (NOVOLOG FLEXPEN U-100 INSULIN) 100 unit/mL (3 mL) InPn pen    Type 2 diabetes mellitus with diabetic chronic kidney disease     Labs pending   Try to get pt next appt instead in person for completing of care gaps - diabetic foot exam and dexa scan.          Relevant Medications    insulin  unit/mL injection    insulin aspart U-100 (NOVOLOG FLEXPEN U-100 INSULIN) 100 unit/mL (3 mL) InPn pen    Encounter for long-term (current) use of insulin    Relevant Medications    insulin  unit/mL injection       GI    Chronic nausea     zofran not helpful, try phenergan              Orthopedic    Primary osteoarthritis involving multiple joints - Primary     Given steroid injection in right shoulder and right knee   discussed need to monitor her diabetes             Other    Balance disorder     Did not address today, will address at next visit                Health Maintenance       Date Due Completion Date    TETANUS VACCINE 07/22/1967 ---    Shingles Vaccine (1 of 2) 07/22/1999 ---    DEXA SCAN 09/16/2017 9/16/2014    Lipid Panel 08/22/2019 8/22/2018    Hemoglobin A1c 03/12/2020 12/12/2019    Foot Exam 06/07/2020 6/7/2019    Override on 3/3/2016: Done    Override on 11/30/2015: Done (AMINA)    Override on 3/3/2015: Done    Eye Exam 09/06/2020 9/6/2019    Influenza Vaccine (1) 09/01/2020  11/28/2016 (Declined)    Override on 11/28/2016: Declined    Override on 9/3/2015: Declined    Urine Microalbumin 12/12/2020 12/12/2019    Colorectal Cancer Screening 04/29/2025 4/29/2015              Follow up in about 4 weeks (around 9/3/2020) for schedule virtual visit in one month, HM, balance instability  .  Total face-to-face time was 60 min, greater than 50% of this was spent on counseling and coordination of care. The following issues were discussed: diabetes, htn, osteoarthritis, restless legs syndrome     Petra Broussard MD  Internal Medicine- Geriatrics  [facility] [place]          Knee Injection Procedure Note       Pre-operative Diagnosis: right OA     Post-operative Diagnosis: same    Indications: Symptom relief from osteoarthritis    Anesthesia: Lidocaine 1% with epinephrine without added sodium bicarbonate    Procedure Details     Verbal consent was obtained for the procedure. The joint was prepped with Chloroprep and a small wheel of anesthetic was injected into the subcutaneous tissue. A 22 gauge needle was inserted into the superior aspect of the joint from a lateral approach. 3 ml 1% lidocaine and 1 ml of triamcinolone (KENALOG) 40mg/ml was then injected into the joint through the same needle. The needle was removed and the area cleansed and dressed.    Complications:  None; patient tolerated the procedure well.     Shoulder Injection Procedure Note    Pre-operative Diagnosis: right OA    Post-operative Diagnosis: same    Indications: Symptom relief from osteoarthritis    Anesthesia: Bupivacaine 0.5% without epinephrine without added sodium bicarbonate    Procedure Details     Verbal consent was obtained for the procedure. The shoulder was prepped with chloroprep and the skin was anesthetized. Using a 22 gauge needle the glenohumeral joint is injected with 3  mL 1% lidocaine and 1 mL of triamcinolone (KENALOG) 40mg/ml under the posterior aspect of the acromion. The injection site  was cleansed with topical isopropyl alcohol and a dressing was applied.    Complications:  None; patient tolerated the procedure well.

## 2020-08-06 NOTE — Clinical Note
Pt had labs ordered in May - see if any of these  lab can add on any of these labs to her labs that she was drawn today, if not will obtain with next labs

## 2020-08-07 LAB
ESTIMATED AVG GLUCOSE: 163 MG/DL (ref 68–131)
HBA1C MFR BLD HPLC: 7.3 % (ref 4–5.6)

## 2020-08-09 PROBLEM — M15.9 PRIMARY OSTEOARTHRITIS INVOLVING MULTIPLE JOINTS: Status: ACTIVE | Noted: 2020-08-09

## 2020-08-09 PROBLEM — M15.0 PRIMARY OSTEOARTHRITIS INVOLVING MULTIPLE JOINTS: Status: ACTIVE | Noted: 2020-08-09

## 2020-08-09 PROBLEM — R11.0 CHRONIC NAUSEA: Status: ACTIVE | Noted: 2020-08-09

## 2020-08-10 ENCOUNTER — TELEPHONE (OUTPATIENT)
Dept: PRIMARY CARE CLINIC | Facility: CLINIC | Age: 70
End: 2020-08-10

## 2020-08-10 DIAGNOSIS — N95.9 POST MENOPAUSAL PROBLEMS: ICD-10-CM

## 2020-08-10 DIAGNOSIS — M15.9 PRIMARY OSTEOARTHRITIS INVOLVING MULTIPLE JOINTS: Primary | ICD-10-CM

## 2020-08-10 RX ORDER — DICLOFENAC SODIUM 30 MG/G
GEL TOPICAL 2 TIMES DAILY PRN
Qty: 100 G | Refills: 3 | Status: SHIPPED | OUTPATIENT
Start: 2020-08-10 | End: 2020-09-14 | Stop reason: CLARIF

## 2020-08-10 NOTE — ASSESSMENT & PLAN NOTE
Given steroid injection in right shoulder and right knee   discussed need to monitor her diabetes

## 2020-08-10 NOTE — TELEPHONE ENCOUNTER
Spoke with patient appointment with DR. Broussard for 8/24/2020 at 1 pm.  dexa for 8/24 at 2: 40 pm

## 2020-08-10 NOTE — ASSESSMENT & PLAN NOTE
Labs pending   Try to get pt next appt instead in person for completing of care gaps - diabetic foot exam and dexa scan.

## 2020-08-10 NOTE — TELEPHONE ENCOUNTER
[facility] called wanting to clarify the insulin NPH.  Do you want to dispense Novolin or Humulin?

## 2020-08-10 NOTE — TELEPHONE ENCOUNTER
----- Message from Charu Nelson sent at 8/10/2020 11:21 AM CDT -----  Contact: madyson jude  with [email or web address] [phone]   Type:  Pharmacy Calling to Clarify an RX    Name of Caller:  madyson roque  with [email or web address] [phone]  Pharmacy Name:    [facility]   pharmacy [phone]  What do they need to clarify?:   refill  diclosenac  gel 1%  Best Call Back Number: madyson roque  with [email or web address] [phone]  Additional Information:    please call

## 2020-08-10 NOTE — TELEPHONE ENCOUNTER
----- Message from Petra Broussard MD sent at 8/9/2020 10:35 PM CDT -----  Pt is due for her bone density scan and she is also due for her diabetic foot exam which I will do.     She has an appt on 8/24 with pain management at 2 pm - is she willing to either see me at 1 or after her appt with benito and I can do her repeat cognitive testing and diabetic foot exam. We can also see if she can get her dexa that way so she can get a lot of things done since she is living so far away for now.

## 2020-08-13 ENCOUNTER — DOCUMENTATION ONLY (OUTPATIENT)
Dept: PRIMARY CARE CLINIC | Facility: CLINIC | Age: 70
End: 2020-08-13

## 2020-08-21 ENCOUNTER — TELEPHONE (OUTPATIENT)
Dept: PRIMARY CARE CLINIC | Facility: CLINIC | Age: 70
End: 2020-08-21

## 2020-08-21 NOTE — TELEPHONE ENCOUNTER
----- Message from Sangeeta Bowles sent at 8/21/2020  9:46 AM CDT -----  Regarding: reschedule /pt. cancelled apt  Contact: patient  Type: Needs Medical Advice  Who Called:  patient  Best Call Back Number: [phone]  Additional Information: Please call to advise and reschedule 8/24/20 apts.  Thanks!

## 2020-08-26 NOTE — PROGRESS NOTES
"Digital Medicine: Health  Follow-Up    The history is provided by the patient.             Reason for review: Blood glucose not at goal and Blood pressure not at goal        Topics Covered on Call: physical activity and Diet    Additional Follow-up details: Routine f/u with patient today. Patient was not interested in lifestyle changes today.  AVG /79.  BG above goal.     Patient states that she has not made any changes with diet or exercise. She has been stays busy with her grandchildren. She states that one thing she has improved on is her consistency with taking her medication and insulin. She wakes up around 6:30pm every morning to bring granddaughter to pre-K. This allows her to have time to take medication and then trying to test in the afternoons.        Offered support as needed.   Patient did not want to work on anything today.        Diet-no change to diet    No change to diet.        Physical Activity-Change      Intervention(s): provided exercise ideas         Additional physical activity details: Discussed increasing exercise. She states "it is simply too hot to even walk out to the car." Discussed inside exercises such as Whim[employer] videos (10-20 minutes) low impact. Patient states " I do range of motion and steps with the baby". She reports being exhausted by the end of the day.  She states "youtube videos are not reasonable".       Medication Adherence-Medication Adherence not addressed.      Substance, Sleep, Stress-Not assessed      Continue current diet/physical activity routine.       Addressed any questions or concerns and patient has my contact information if needed prior to next outreach. Patient verbalizes understanding.      Explained the importance of self-monitoring and medication adherence. Encouraged the patient to communicate with their health  for lifestyle modifications to help improve or maintain a healthy lifestyle.                Topic    Eye Exam        Last 5 " Patient Entered Readings                                      Current 30 Day Average: 137/79     Recent Readings 8/21/2020 8/10/2020 8/7/2020 8/3/2020 8/3/2020    SBP (mmHg) 135 140 127 144 143    DBP (mmHg) 75 87 78 74 83    Pulse 85 64 81 68 64        Last 6 Patient Entered Readings                                          Most Recent A1c: 7.3% on 8/6/2020  (Goal: 8%)     Recent Readings 8/21/2020 8/21/2020 8/21/2020 8/20/2020 8/19/2020    Blood Glucose (mg/dL) 174 181 92 139 210

## 2020-09-10 RX ORDER — PRAMIPEXOLE DIHYDROCHLORIDE 0.12 MG/1
TABLET ORAL
Qty: 75 TABLET | Refills: 0 | OUTPATIENT
Start: 2020-09-10

## 2020-09-14 ENCOUNTER — OFFICE VISIT (OUTPATIENT)
Dept: PRIMARY CARE CLINIC | Facility: CLINIC | Age: 70
End: 2020-09-14
Payer: MEDICARE

## 2020-09-14 VITALS
DIASTOLIC BLOOD PRESSURE: 70 MMHG | HEIGHT: 66 IN | TEMPERATURE: 98 F | SYSTOLIC BLOOD PRESSURE: 134 MMHG | BODY MASS INDEX: 29.62 KG/M2 | OXYGEN SATURATION: 97 % | HEART RATE: 78 BPM | WEIGHT: 184.31 LBS

## 2020-09-14 DIAGNOSIS — R41.3 MEMORY CHANGE: ICD-10-CM

## 2020-09-14 DIAGNOSIS — M20.41 HAMMERTOES OF BOTH FEET: Primary | ICD-10-CM

## 2020-09-14 DIAGNOSIS — E11.69 HYPERLIPIDEMIA ASSOCIATED WITH TYPE 2 DIABETES MELLITUS: ICD-10-CM

## 2020-09-14 DIAGNOSIS — N18.30 TYPE 2 DIABETES MELLITUS WITH STAGE 3 CHRONIC KIDNEY DISEASE, WITH LONG-TERM CURRENT USE OF INSULIN: ICD-10-CM

## 2020-09-14 DIAGNOSIS — M20.42 HAMMERTOES OF BOTH FEET: Primary | ICD-10-CM

## 2020-09-14 DIAGNOSIS — Z79.4 TYPE 2 DIABETES MELLITUS WITH STAGE 3 CHRONIC KIDNEY DISEASE, WITH LONG-TERM CURRENT USE OF INSULIN: ICD-10-CM

## 2020-09-14 DIAGNOSIS — E78.5 HYPERLIPIDEMIA ASSOCIATED WITH TYPE 2 DIABETES MELLITUS: ICD-10-CM

## 2020-09-14 DIAGNOSIS — I83.93 VARICOSE VEINS OF BOTH LOWER EXTREMITIES, UNSPECIFIED WHETHER COMPLICATED: ICD-10-CM

## 2020-09-14 DIAGNOSIS — F33.41 MDD (MAJOR DEPRESSIVE DISORDER), RECURRENT, IN PARTIAL REMISSION: ICD-10-CM

## 2020-09-14 DIAGNOSIS — E11.22 TYPE 2 DIABETES MELLITUS WITH STAGE 3 CHRONIC KIDNEY DISEASE, WITH LONG-TERM CURRENT USE OF INSULIN: ICD-10-CM

## 2020-09-14 DIAGNOSIS — R06.02 SOB (SHORTNESS OF BREATH) ON EXERTION: ICD-10-CM

## 2020-09-14 DIAGNOSIS — E83.42 HYPOMAGNESEMIA: ICD-10-CM

## 2020-09-14 DIAGNOSIS — I25.10 CORONARY ARTERY DISEASE, ANGINA PRESENCE UNSPECIFIED, UNSPECIFIED VESSEL OR LESION TYPE, UNSPECIFIED WHETHER NATIVE OR TRANSPLANTED HEART: ICD-10-CM

## 2020-09-14 DIAGNOSIS — E55.9 VITAMIN D DEFICIENCY: ICD-10-CM

## 2020-09-14 PROCEDURE — 93010 EKG 12-LEAD: ICD-10-PCS | Mod: HCNC,S$GLB,, | Performed by: INTERNAL MEDICINE

## 2020-09-14 PROCEDURE — 99215 PR OFFICE/OUTPT VISIT, EST, LEVL V, 40-54 MIN: ICD-10-PCS | Mod: HCNC,S$GLB,, | Performed by: INTERNAL MEDICINE

## 2020-09-14 PROCEDURE — 93010 ELECTROCARDIOGRAM REPORT: CPT | Mod: HCNC,S$GLB,, | Performed by: INTERNAL MEDICINE

## 2020-09-14 PROCEDURE — 99215 OFFICE O/P EST HI 40 MIN: CPT | Mod: HCNC,S$GLB,, | Performed by: INTERNAL MEDICINE

## 2020-09-14 PROCEDURE — 3051F PR MOST RECENT HEMOGLOBIN A1C LEVEL 7.0 - < 8.0%: ICD-10-PCS | Mod: HCNC,CPTII,S$GLB, | Performed by: INTERNAL MEDICINE

## 2020-09-14 PROCEDURE — 93005 EKG 12-LEAD: ICD-10-PCS | Mod: HCNC,S$GLB,, | Performed by: INTERNAL MEDICINE

## 2020-09-14 PROCEDURE — 93005 ELECTROCARDIOGRAM TRACING: CPT | Mod: HCNC,S$GLB,, | Performed by: INTERNAL MEDICINE

## 2020-09-14 PROCEDURE — 3051F HG A1C>EQUAL 7.0%<8.0%: CPT | Mod: HCNC,CPTII,S$GLB, | Performed by: INTERNAL MEDICINE

## 2020-09-14 RX ORDER — DICLOFENAC SODIUM 10 MG/G
GEL TOPICAL
COMMUNITY
Start: 2020-08-10 | End: 2020-09-21 | Stop reason: SDUPTHER

## 2020-09-14 NOTE — Clinical Note
Request last note and cath reports  Dr. Al Ashton, cardiology in [facility]  Phone [phone]

## 2020-09-14 NOTE — PROGRESS NOTES
Primary Care Provider Appointment    Subjective:      Patient ID: Nathalie Santiago is a 70 y.o. female. hx of  sarcoidosis, hx of breast CA/uterine/ovarian cancer, diabetes complicated by gastroparesis, HLD, HTN,  anxiety and depression, hx of CVA (brain stem stroke), DJD, JOSE LUIS, chronic low back and hip pain     Chief Complaint: gait disturbance and Shortness of Breath    Could not do virtual visit as pt is a [place] resident       She stopped cymbalta and zoloft on August 1, she weaned off herself.  She did this because she felt that her depression was under control. She does not notice any difference in her mood off medications and others around her have not noticed a difference. She thinks that cymbalta may have helped her back pain but she will lay down and take a pain pill and pain will resolve. She is going to stop by Dr. Pat office on her way out to discuss with her.        She has moved back in with her brother, he does not have concerns about her memory and she does not either. She left her daughter's because of stress of taking care of 4 year old granddaughter who has ADHD and her daughter who has trouble managing her. Her daughter expressed concerns to her about her memory.      She forgets to input her glucose readings into her phone. She is buying insulin over the counter due to cost. She is taking 30 units of nph and sliding scale aspart prn   She is buying insulin from [facility] because she is almost close to the [facility] - her insulin is 25 per month. There has been a few times that she has not had to use novolog.     Her flexpen was 1000 for 3 boxes. She does have good rx.     Recent Readings 9/2/2020 8/21/2020 8/21/2020 8/21/2020 8/20/2020   Blood Glucose (mg/dL) 88 174 181 92 139     Recent Readings 8/19/2020 8/13/2020 8/10/2020 8/7/2020 8/3/2020   Blood Glucose (mg/dL) 210 298 205 192 234     She continues to drink soda but has reduced it to half regular and half diet soda.     Right knee  and right shoulder pain - reports her shoulder is much better, no longer having pain. Her right knee however has not gotten any better.     She reports having injections by Dr. Temple that worked well for her knee several years ago. She also thinks she has exacerbated her knee due while helping tending to her grandchild. She would like to repeat these injections.     Her varicose veins are becoming painful for her with tenderness and soreness. She has used compression stocking which help but she would like to discuss with vascular surgery.     New medication of mirapex does help with her restless legs    She has started doing legs exercises- she does squats most days of the week. She was worsening leg weakness.     Has muscle spasms that occur in her legs which is bothersome.       She cannot take her medications with liquid, she has to take with applesauce.   Has trouble with liquids.   Her last swallow study showed that she had aspiration of liquids but she has not used any thickeners.     Has more sob on exertion. Denies any wheezing. Has not been using cpap due to having a correct mask.   Notes that she has not been as conssitent with breo due to being around her grandchild.      Reports she had cath in 2017 with showed 20% blockage in LAD with Dr. Al Ashton, cardiology in [facility].  Phone [phone]               Past Surgical History:   Procedure Laterality Date    APPENDECTOMY      BREAST SURGERY      CHOLECYSTECTOMY      CYSTOCELE REPAIR      FRACTURE SURGERY Right     foot    HYSTERECTOMY      SAADIA/BSO    MASTECTOMY Bilateral     b/l mastectomy    OOPHORECTOMY      PLEURA BIOPSY      RECTAL SURGERY      rectocele      TONSILLECTOMY         Past Medical History:   Diagnosis Date    Abnormal Pap smear 1972    cervical cancer    Acute cystitis without hematuria 3/28/2017    Anxiety     Arthritis     Asthma     Ataxia     Breast cancer     Cancer     bilateral mastectomy, uterine cancer,  ovarian cancer    Cataract     Cervical back pain with evidence of disc disease     Cervical cancer     Chronic bilateral low back pain without sciatica 9/6/2016    Chronic bronchitis     Cortical cataract 2/18/2013    Current chronic use of systemic steroids 10/18/2018    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    CVA (cerebral vascular accident)- Confirmed by neurology 9/6/2016    Degenerative joint disease of cervical and lumbar spine 8/1/2014    Depression     Diabetes mellitus     Diabetes mellitus, type II     Gastroenteritis 12/19/2016    History of malignant phylloides tumor of breast 2/15/2013    Hyperlipidemia     Hyperopia with presbyopia 2/18/2013    Hypertension     Hypertension     Hypothyroidism     Immunosuppressed status 9/3/2015    Leukocytosis 11/17/2016    Migraine     Mitral valve regurgitation     Normochromic anemia 3/28/2017    Nuclear sclerosis 2/18/2013    Obesity     JOSE LUIS (obstructive sleep apnea)     Ovarian cancer     Pneumonia     Polyneuropathy     PVD (posterior vitreous detachment) 2/18/2013    Restless leg syndrome     Ruptured disk     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    Sarcoidosis 2006    Sarcoidosis of lung     Squamous cell carcinoma     Trouble in sleeping     Uterine cancer     Venous insufficiency        Social History     Socioeconomic History    Marital status: Single     Spouse name: Not on file    Number of children: 1    Years of education: Not on file    Highest education level: Bachelor's degree (e.g., BA, AB, BS)   Occupational History    Occupation: retired    Social Needs    Financial resource strain: Not hard at all    Food insecurity     Worry: Never true     Inability: Never true    Transportation needs     Medical: No     Non-medical: No   Tobacco Use    Smoking status: Never Smoker    Smokeless tobacco: Never Used   Substance and Sexual Activity    Alcohol use: No     Frequency: Monthly or less  "    Drinks per session: 1 or 2     Binge frequency: Never    Drug use: No     Types: Oxycodone     Comment: prescription Oycodone    Sexual activity: Never   Lifestyle    Physical activity     Days per week: 0 days     Minutes per session: 0 min    Stress: Not at all   Relationships    Social connections     Talks on phone: More than three times a week     Gets together: More than three times a week     Attends Yazdanism service: Never     Active member of club or organization: No     Attends meetings of clubs or organizations: Never     Relationship status: Never    Other Topics Concern    Not on file   Social History Narrative    Raised in [place]     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   HENT: Negative for congestion.    Eyes: Negative for visual disturbance.   Respiratory: Positive for shortness of breath. Negative for cough.    Cardiovascular: Negative for chest pain and palpitations.   Gastrointestinal: Negative for abdominal pain, constipation, diarrhea and nausea.   Musculoskeletal: Positive for arthralgias and back pain.   Skin: Negative for rash and wound.   Neurological: Negative for dizziness and light-headedness.   Psychiatric/Behavioral: Negative for confusion, decreased concentration and dysphoric mood. The patient is not nervous/anxious.        Objective:   /70 (BP Location: Right arm, Patient Position: Sitting, BP Method: Large (Manual))   Pulse 78   Temp 98 °F (36.7 °C) (Temporal)   Ht 5' 6" (1.676 m)   Wt 83.6 kg (184 lb 4.9 oz)   LMP  (LMP Unknown)   SpO2 97%   BMI 29.75 kg/m²     Physical Exam  Vitals signs reviewed.   Constitutional:       General: She is not in acute distress.     Appearance: Normal appearance.   HENT:    "   Head: Normocephalic.   Eyes:      Extraocular Movements: Extraocular movements intact.      Conjunctiva/sclera: Conjunctivae normal.   Cardiovascular:      Rate and Rhythm: Normal rate and regular rhythm.   Pulmonary:      Effort: Pulmonary effort is normal. No respiratory distress.      Breath sounds: Normal breath sounds. No wheezing, rhonchi or rales.   Abdominal:      General: Abdomen is flat. Bowel sounds are normal. There is no distension.      Palpations: Abdomen is soft.   Skin:     General: Skin is warm and dry.   Neurological:      General: No focal deficit present.      Mental Status: She is alert and oriented to person, place, and time. Mental status is at baseline.               Lab Results   Component Value Date    WBC 7.21 08/06/2020    HGB 12.8 08/06/2020    HCT 40.0 08/06/2020     08/06/2020    CHOL 125 08/06/2020    TRIG 198 (H) 08/06/2020    HDL 33 (L) 08/06/2020    ALT 16 08/06/2020    AST 19 08/06/2020     08/06/2020    K 4.0 08/06/2020     08/06/2020    CREATININE 0.9 08/06/2020    BUN 24 (H) 08/06/2020    CO2 27 08/06/2020    TSH 3.467 08/06/2020    INR 1.0 06/25/2015    HGBA1C 7.3 (H) 08/06/2020       RESULTS: Reviewed labs and images today    Assessment:   70 y.o. female with multiple co-morbid illnesses here to continue work-up of chronic issues notably sarcoidosis, hx of breast CA/uterine/ovarian cancer, diabetes complicated by gastroparesis, HLD, HTN,  anxiety and depression, hx of CVA (brain stem stroke), DJD, JOSE LUIS, chronic low back and hip pain         Plan:     Problem List Items Addressed This Visit        Neuro    Memory change     Scored 27 on moca exam (normal)   Reviewed ways that may help to prevent memory decline. Discussed importance of incorporating mental games that involve learning and or strategy  - crossword puzzles, playing card, learning a new hobby. Avoid isolation - hold phone conversations. Encourage physical activity. Try to aim for adequate  sleep. Discussed brain healthy diet and foods to limit.   Given handout on brain health  Repeat screen annually or sooner pending concerns             Psychiatric    MDD (major depressive disorder), recurrent, in partial remission     Pt is off her cymbalta and zoloft, she reports that she is doing well, encouraged follow up with psychiatry and stressed that she should discuss medication concerns with any of her providers and to  do any further medication titrations safely with direction            Cardiac/Vascular    Varicose veins of both lower extremities    Relevant Orders    Ambulatory referral/consult to Vascular Surgery    CAD (coronary artery disease)     Obtain past cardiology notes   Obtain EKG with worsening sob          Hyperlipidemia associated with type 2 diabetes mellitus    Relevant Orders    Hemoglobin A1C    Basic metabolic panel    MICROALBUMIN / CREATININE RATIO URINE       Renal/    Hypomagnesemia    Relevant Orders    Magnesium       Endocrine    Type 2 diabetes mellitus with diabetic chronic kidney disease     Repeat labs in 3 months   Last AIC under 8             Other    SOB (shortness of breath) on exertion     Lungs clear on exam   Weight is stable   Discussed whether this could be her sarcoidosis   EKG in clinic today - no change from prior study in 2017, NSR   Will obtain CXR today    Discussed for her to be consistent with her breo, if still short of breath next week, will obtain CT Chest for further evaluation of her sarcodosis          Relevant Orders    IN OFFICE EKG 12-LEAD (to Muse)      Other Visit Diagnoses     Hammertoes of both feet    -  Primary    Relevant Orders    Ambulatory referral/consult to Podiatry    Vitamin D deficiency        Relevant Orders    Vitamin D        Health Maintenance       Date Due Completion Date    TETANUS VACCINE 07/22/1967 ---    Shingles Vaccine (1 of 2) 07/22/1999 ---    DEXA SCAN 09/16/2017 9/16/2014    Foot Exam 06/07/2020 6/7/2019    Override  on 3/3/2016: Done    Override on 11/30/2015: Done (LUPER)    Override on 3/3/2015: Done    Influenza Vaccine (1) 08/01/2020 11/28/2016 (Declined)    Override on 11/28/2016: Declined    Override on 9/3/2015: Declined    Eye Exam 09/06/2020 9/6/2019    Hemoglobin A1c 11/06/2020 8/6/2020    Urine Microalbumin 12/12/2020 12/12/2019    Lipid Panel 08/06/2021 8/6/2020    Colorectal Cancer Screening 04/29/2025 4/29/2015          Follow up in about 3 months (around 12/14/2020) for type 2 diabetes, OA, spasms, review of labs, advanced care directives .  Total face-to-face time was 60 min, greater than 50% of this was spent on counseling and coordination of care. The following issues were discussed:  Sob on exertion, varicose veins, OA, cad, MDD, cognitive screen     Petra Broussard MD  Internal Medicine- Geriatrics  [facility] [place]

## 2020-09-14 NOTE — PATIENT INSTRUCTIONS
Referral to podiatry     Will send message to Dr. Temple and staff concerning to discuss euflexxa injections at next visit in 2 weeks     Look into ordering thickeners for liquids - check [employer] Over the counter brochure.     Searching for vascular within [facility] to address varicose veins     Given information to preserve and improve memory     Monitor your shortness of breath, will call you next week to follow up on this.

## 2020-09-15 ENCOUNTER — PATIENT OUTREACH (OUTPATIENT)
Dept: OTHER | Facility: OTHER | Age: 70
End: 2020-09-15

## 2020-09-15 PROBLEM — I25.10 CAD (CORONARY ARTERY DISEASE): Status: ACTIVE | Noted: 2020-09-15

## 2020-09-15 PROBLEM — I83.93 VARICOSE VEINS OF BOTH LOWER EXTREMITIES: Status: ACTIVE | Noted: 2020-09-15

## 2020-09-15 PROBLEM — R06.02 SOB (SHORTNESS OF BREATH) ON EXERTION: Status: ACTIVE | Noted: 2020-09-15

## 2020-09-15 NOTE — PROGRESS NOTES
"Digital Medicine: Health  Follow-Up    The history is provided by the patient.             Reason for review: Blood glucose not at goal and Blood pressure not at goal        Topics Covered on Call: physical activity and Diet    Additional Follow-up details: Patient states that she has been taking more blood sugar readings than it appears. She states that she did not know her BG was not transferring when she did not have her phone by her. Explained how with bluetooth ehr phone would need to be close by. Patient plans to keep that in mind next time she checks. She states "I am world's worst at not keeping my phone handy.     Patient moved back to [place]. She states that she is currently living out of boxes and still has trouble finding all of her equipment. She reports her last reading of 127 as going without insulin for 3 days.      She states that she has done much better with diet and exercise.            Diet-Change      Additional diet details:   Patient has started doing Optiva, approved by her PCP. She is starting the Midifast Shakes.         Physical Activity-Change      Additional physical activity details:   Patient states that she has been working out more as well. She struggles with squats because she has been getting muscle spasms. She states that she has been drinking magnesium drink and taking potasium pills.     She plans to meet with [employer]. She enjoyed doing Silver Sneakers and would like to get back in it. She also has developed SOB with not exercising as often.         Medication Adherence-Medication Adherence not addressed.      Substance, Sleep, Stress-Not assessed      Continue current diet/physical activity routine.       Addressed patient questions and patient has my contact information if needed prior to next outreach. Patient verbalizes understanding.      Explained the importance of self-monitoring and medication adherence. Encouraged the patient to communicate with " their health  for lifestyle modifications to help improve or maintain a healthy lifestyle.                Topic    Eye Exam        Last 5 Patient Entered Readings                                      Current 30 Day Average: 148/83     Recent Readings 9/15/2020 9/13/2020 9/5/2020 9/2/2020 8/21/2020    SBP (mmHg) 142 164 150 149 135    DBP (mmHg) 84 93 80 83 75    Pulse 91 78 73 60 85        Last 6 Patient Entered Readings                                          Most Recent A1c: 7.3% on 8/6/2020  (Goal: 8%)     Recent Readings 9/15/2020 9/2/2020 8/21/2020 8/21/2020 8/21/2020    Blood Glucose (mg/dL) 127 88 174 181 92

## 2020-09-15 NOTE — ASSESSMENT & PLAN NOTE
Pt is off her cymbalta and zoloft, she reports that she is doing well, encouraged follow up with psychiatry and stressed that she should discuss medication concerns with any of her providers and to  do any further medication titrations safely with direction

## 2020-09-15 NOTE — ASSESSMENT & PLAN NOTE
Lungs clear on exam   Weight is stable   Discussed whether this could be her sarcoidosis   EKG in clinic today - no change from prior study in 2017, NSR   Will obtain CXR today    Discussed for her to be consistent with her breo, if still short of breath next week, will obtain CT Chest for further evaluation of her sarcodosis

## 2020-09-15 NOTE — ASSESSMENT & PLAN NOTE
Scored 27 on moca exam (normal)   Reviewed ways that may help to prevent memory decline. Discussed importance of incorporating mental games that involve learning and or strategy  - crossword puzzles, playing card, learning a new hobby. Avoid isolation - hold phone conversations. Encourage physical activity. Try to aim for adequate sleep. Discussed brain healthy diet and foods to limit.   Given handout on brain health  Repeat screen annually or sooner pending concerns

## 2020-09-16 ENCOUNTER — PATIENT MESSAGE (OUTPATIENT)
Dept: PAIN MEDICINE | Facility: CLINIC | Age: 70
End: 2020-09-16

## 2020-09-16 ENCOUNTER — PATIENT OUTREACH (OUTPATIENT)
Dept: OTHER | Facility: OTHER | Age: 70
End: 2020-09-16

## 2020-09-16 ENCOUNTER — TELEPHONE (OUTPATIENT)
Dept: PAIN MEDICINE | Facility: CLINIC | Age: 70
End: 2020-09-16

## 2020-09-16 DIAGNOSIS — G25.81 RESTLESS LEGS SYNDROME (RLS): Primary | ICD-10-CM

## 2020-09-16 DIAGNOSIS — M70.60 GREATER TROCHANTERIC BURSITIS, UNSPECIFIED LATERALITY: Primary | ICD-10-CM

## 2020-09-16 RX ORDER — PRAMIPEXOLE DIHYDROCHLORIDE 0.12 MG/1
0.12 TABLET ORAL NIGHTLY
COMMUNITY
End: 2020-09-16 | Stop reason: DRUGHIGH

## 2020-09-16 RX ORDER — PRAMIPEXOLE DIHYDROCHLORIDE 0.12 MG/1
TABLET ORAL
Qty: 180 TABLET | Refills: 3 | Status: SHIPPED | OUTPATIENT
Start: 2020-09-16 | End: 2021-08-10 | Stop reason: SDUPTHER

## 2020-09-16 NOTE — TELEPHONE ENCOUNTER
Pt stated her knee pain is 9/10. Pt requested to have Euflexxa injections. Referral submitted. Pt wanted to know what to do about the pain today. Pt is taking percocet and using voltaren. I advised keep taking that until we can get you in for the injections. Pt agreed. Apt made for Injections and 3 mo f/u for med refill.

## 2020-09-16 NOTE — TELEPHONE ENCOUNTER
----- Message from Benita Diaz sent at 9/16/2020  6:49 AM CDT -----  Regarding: appt access  Contact: Pt  Type:  Same Day Appointment Request    Caller is requesting a same day appointment.  Caller declined first available appointment listed below.    Name of Caller:Pt  When is the first available appointment?09/24/2020  Symptoms:severe right knee pain  Best Call Back Number: [phone]  Additional Information: Pt states that she really needs to be seen today

## 2020-09-16 NOTE — PROGRESS NOTES
Left voicemail for follow up. Current average BP is not at goal. Consider restarting ARB per guidelines. Need more frequent BG readings and will address adherence to insulin after reviewing health 's note.     11/09/2020: Left message to follow up on lack of readings. Last BG from 10/21 and patient on insulin. Last BP from 10/18.

## 2020-09-17 ENCOUNTER — PATIENT OUTREACH (OUTPATIENT)
Dept: ADMINISTRATIVE | Facility: OTHER | Age: 70
End: 2020-09-17

## 2020-09-17 DIAGNOSIS — E11.65 UNCONTROLLED TYPE 2 DIABETES MELLITUS WITH HYPERGLYCEMIA: Primary | ICD-10-CM

## 2020-09-17 NOTE — PROGRESS NOTES
Chart was reviewed for overdue [facility] ([facility])  topics  Updates were requested from care everywhere  Health Maintenance has been updated  LINKS immunization registry triggered

## 2020-09-18 ENCOUNTER — TELEPHONE (OUTPATIENT)
Dept: PAIN MEDICINE | Facility: CLINIC | Age: 70
End: 2020-09-18

## 2020-09-18 DIAGNOSIS — M25.561 CHRONIC PAIN OF RIGHT KNEE: Primary | ICD-10-CM

## 2020-09-18 DIAGNOSIS — G89.29 CHRONIC PAIN OF RIGHT KNEE: Primary | ICD-10-CM

## 2020-09-21 ENCOUNTER — TELEPHONE (OUTPATIENT)
Dept: DERMATOLOGY | Facility: CLINIC | Age: 70
End: 2020-09-21

## 2020-09-21 ENCOUNTER — CLINICAL SUPPORT (OUTPATIENT)
Dept: PAIN MEDICINE | Facility: CLINIC | Age: 70
End: 2020-09-21
Payer: MEDICARE

## 2020-09-21 ENCOUNTER — TELEPHONE (OUTPATIENT)
Dept: PAIN MEDICINE | Facility: CLINIC | Age: 70
End: 2020-09-21

## 2020-09-21 VITALS
SYSTOLIC BLOOD PRESSURE: 131 MMHG | WEIGHT: 184.31 LBS | HEIGHT: 66 IN | HEART RATE: 78 BPM | BODY MASS INDEX: 29.62 KG/M2 | DIASTOLIC BLOOD PRESSURE: 77 MMHG

## 2020-09-21 DIAGNOSIS — M25.561 CHRONIC PAIN OF RIGHT KNEE: Primary | ICD-10-CM

## 2020-09-21 DIAGNOSIS — G89.4 CHRONIC PAIN DISORDER: ICD-10-CM

## 2020-09-21 DIAGNOSIS — M51.36 DDD (DEGENERATIVE DISC DISEASE), LUMBAR: ICD-10-CM

## 2020-09-21 DIAGNOSIS — M17.31 POST-TRAUMATIC OSTEOARTHRITIS OF ONE KNEE, RIGHT: ICD-10-CM

## 2020-09-21 DIAGNOSIS — G89.29 CHRONIC PAIN OF RIGHT KNEE: Primary | ICD-10-CM

## 2020-09-21 DIAGNOSIS — M47.896 OTHER SPONDYLOSIS, LUMBAR REGION: ICD-10-CM

## 2020-09-21 PROCEDURE — 99214 PR OFFICE/OUTPT VISIT, EST, LEVL IV, 30-39 MIN: ICD-10-PCS | Mod: 25,HCNC,S$GLB, | Performed by: ANESTHESIOLOGY

## 2020-09-21 PROCEDURE — 99214 OFFICE O/P EST MOD 30 MIN: CPT | Mod: 25,HCNC,S$GLB, | Performed by: ANESTHESIOLOGY

## 2020-09-21 PROCEDURE — 99999 PR PBB SHADOW E&M-EST. PATIENT-LVL V: ICD-10-PCS | Mod: PBBFAC,HCNC,, | Performed by: ANESTHESIOLOGY

## 2020-09-21 PROCEDURE — 20610 DRAIN/INJ JOINT/BURSA W/O US: CPT | Mod: HCNC,RT,S$GLB, | Performed by: ANESTHESIOLOGY

## 2020-09-21 PROCEDURE — 20610 LARGE JOINT ASPIRATION/INJECTION: R KNEE: ICD-10-PCS | Mod: HCNC,RT,S$GLB, | Performed by: ANESTHESIOLOGY

## 2020-09-21 PROCEDURE — 99999 PR PBB SHADOW E&M-EST. PATIENT-LVL V: CPT | Mod: PBBFAC,HCNC,, | Performed by: ANESTHESIOLOGY

## 2020-09-21 RX ORDER — OXYCODONE AND ACETAMINOPHEN 10; 325 MG/1; MG/1
1 TABLET ORAL EVERY 8 HOURS PRN
Qty: 90 TABLET | Refills: 0 | Status: SHIPPED | OUTPATIENT
Start: 2020-11-11 | End: 2020-12-01 | Stop reason: SDUPTHER

## 2020-09-21 RX ORDER — OXYCODONE AND ACETAMINOPHEN 10; 325 MG/1; MG/1
1 TABLET ORAL EVERY 8 HOURS PRN
Qty: 90 TABLET | Refills: 0 | Status: SHIPPED | OUTPATIENT
Start: 2020-10-13 | End: 2020-11-12

## 2020-09-21 RX ORDER — DICLOFENAC SODIUM 10 MG/G
GEL TOPICAL
Qty: 1 TUBE | Refills: 2 | Status: SHIPPED | OUTPATIENT
Start: 2020-09-21

## 2020-09-21 NOTE — TELEPHONE ENCOUNTER
----- Message from Keshia Mg sent at 9/18/2020  4:58 PM CDT -----  Regarding: appt access  Contact: TRAMAINE WALLACE [[id number]]  Type:  Sooner Appointment Request    Caller is requesting a sooner appointment.  Caller is requesting a message be sent to doctor.  Name of Caller:TRAMAINE WALLACE [[id number]]  When is the first available appointment? Oct 2020  Symptoms: possible melanoma   Would the patient rather a call back or a response via [facility]? Call back   Best Call Back Number: [phone]

## 2020-09-21 NOTE — TELEPHONE ENCOUNTER
----- Message from Terrance Kilgore sent at 9/21/2020  8:54 AM CDT -----  Regarding: Missed her 9 AM appmnt today over slept.  Type: Needs Medical Advice  / Same day appmnt.    Who Called:  Ptnt  [phone]    Symptoms (please be specific):  Severe back pain.    Additional Information:     Advised over slept this morning missed her 9 AM appmnt. Needs to see the Dr today for her condition, please call to reschedule.

## 2020-09-21 NOTE — PROGRESS NOTES
CC: left sided low back and left hip pain, right knee pain    Interval History: Ms. Santiago is a 69 y.o. female with chronic low back and hip pain who presents for her follow up.   She continues to have lower back, left greater than right lower back pain.  Left GTB and right knee pain has worsened. Her last left GTB injection provided some short-lived relief.  She has consider repeat procedure and wishes to have repeat procedure next month when she comes in for her clinic visit.   She had a series of Euflexxa injections in 2014.  She is here for updated of viscosupplementation injection (monovisc approved).  She continues to take percocet 10mg q 8 hrs as needed for pain with moderate benefits.  No side effects reported.  Able to perform daily activities with the medications. Flexeril 10 mg and Gabapentin 600 mg BID has been helpful.       Prior HPI: The patient is a 64-year-old woman with a history of sarcoidosis, breast CA, diabetes, anxiety and depression who presents in referral from Dr. Nguyen to Dr. Perez for multiple pain complaints including back pain, bilateral hip pain and right shoulder pain.  She is following up with me since I am much closer to her.   She presents today for multiple pain complaints.  She recently was hospitalized for pneumonia, UTI and sarcoidosis exacerbation per patient.  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  She has had past GTB injections with moderate benefit and had an exacerbation of her pain recently.  She was able to receive left GTB with Dr. Perez on 10/3/2014 that provided >50% relief of her left hip and leg pain.     She continues to take Prednisone 5mg daily. She continues Percoet 7.5mg about 3x/day as needed with moderate benefit.  She presents today with worsening right knee pain.  She states having history of right knee osteoarthritis, but has not had any treatment for her knee pain.  Continues to have  "improvement in her knee pain following completion of visco supplementation injection in her right knee.  Her back and hip pain remain tolerable with her medications.  She continues to take Percoct 7.5mg q8hrs as needed with moderate benefit.  No reported side effects.       Pain intervention history: She takes hydrocodone 7-325 2-4 times a day.  Also has undergone epidural steroid injection at L1/2 without relief.  She has been doing cervical traction with good relief of her neck pain.  Previous trochanteric bursa injections with good relief.    ROS:She reports fatigability, itching, headaches, swollen glands, chest pain and shortness of breath, nausea vomiting, easy bruising, back pain, joint stiffness, dizziness, difficulty sleeping, anxiety, depression and loss of balance.  Balance of review of systems is negative.    Medical, surgical, family and social history reviewed elsewhere in record.    Medications/Allergies: See med card    Vitals:    09/21/20 1058   BP: 131/77   Pulse: 78   Weight: 83.6 kg (184 lb 4.9 oz)   Height: 5' 6" (1.676 m)   PainSc:   8       Physical exam:  Gen: A and O x3, pleasant, well-groomed  Skin: No rashes or obvious lesions  HEENT: PERRLA, no obvious deformities on ears or in canals. No thyroid masses, trachea midline, no palpable lymph nodes in neck, axilla.  CVS: RRR  Resp: non labored breathing  Abdomen: Soft, NT/ND, normal bowel sounds present.  Neuro:  Lower extremities:   +right knee crepitus. LE erythema and swelling. TTP right 2 and 3rd metatarsals.   Reflexes: Patellar 2+, Achilles 2+ bilaterally.  Sensory:  Intact   Lumbar spine:  Lumbar spine: ROM is moderately reduced with flexion extension and oblique extension with increased pain in all directions.    Orlando's test causes pain on both sides.    Supine straight leg raise is negative bilaterally.    Internal and external rotation of the hip causes increased pain in left groin.  Myofascial exam: Tenderness to palpation over " both GTB.      Imaging:  Cervical spine MRI report 10/5/12: There is dextroscoliosis in the lower cervical/upper thoracic region.  There is very mild foraminal stenosis on the left at C6/7.    MRI thoracic spine 11/21/2008: No significant degenerative disc disease or central stenosis.    MRI lumbar spine 8/14/08: L1/2 small broad-based central protrusion with mild effacement of ventral thecal sac but not distorting exiting roots.  L2/3 and L3/4 unremarkable.  At L4/5 there is mild central stenosis with bulging annulus anteriorly and facet hypertrophic degenerative change posteriorly.  No focal disc herniation and neural foramina are patent.  At L5/S1 there is minimal bulging of the annulus with no thecal sac compression.  The small annular fissure within the posterior annulus.  Foramina are patent bilaterally, no stenosis mild, degenerative changes in the facets.    Xray Right Knee 10/13/14  Mild tricompartmental degenerative changes present. No joint effusion. The soft tissues are unremarkable.    Assessment:  Ms. Santiago is a 65-year-old woman with a history of sarcoidosis, breast CA, diabetes, anxiety and depression who presents in referral from Dr. Nguyen for multiple pain complaints including back pain, bilateral hip pain and knee pain   1. Chronic pain of right knee    2. Post-traumatic osteoarthritis of one knee, right    3. Other spondylosis, lumbar region    4. DDD (degenerative disc disease), lumbar    5. Chronic pain disorder        PLAN:  1. I have stressed the importance of physical activity and exercise to improve overall health.    2.  Percocet 10mg q8hrs as needed.  Script given for 2 more months.  reviewed. Previous UDS consistent  3.  Continue Flexeril 10 mg q 8 h.   4.  I believe her low back pain maybe due to facet arthropathy and have recommended lumbar medial branch blocks as a diagnostic procedure.  If successful, would proceed with radiofrequency ablation.  May consider this procedure  in the future  5. Perform monovisc injection today to right knee.  If no improvement,  Consider updated knee xray and ortho consult.   6. Follow up in three months

## 2020-09-21 NOTE — PROCEDURES
Large Joint Aspiration/Injection: R knee    Date/Time: 9/21/2020 9:00 AM  Performed by: Lincoln Temple MD  Authorized by: Lincoln Temple MD     Consent Done?:  Yes (Written)  Indications:  Arthritis  Site marked: the procedure site was marked    Timeout: prior to procedure the correct patient, procedure, and site was verified    Prep: patient was prepped and draped in usual sterile fashion      Details:  Needle Size:  22 G  Ultrasonic Guidance for needle placement?: No    Approach:  Anterolateral  Location:  Knee  Site:  R knee  Medications:  88 mg hyaluronate sodium, stabilized 88 mg/4 mL  Patient tolerance:  Patient tolerated the procedure well with no immediate complications

## 2020-09-22 ENCOUNTER — PATIENT MESSAGE (OUTPATIENT)
Dept: PAIN MEDICINE | Facility: CLINIC | Age: 70
End: 2020-09-22

## 2020-09-22 ENCOUNTER — TELEPHONE (OUTPATIENT)
Dept: PRIMARY CARE CLINIC | Facility: CLINIC | Age: 70
End: 2020-09-22

## 2020-09-22 DIAGNOSIS — G89.29 CHRONIC PAIN OF RIGHT KNEE: Primary | ICD-10-CM

## 2020-09-22 DIAGNOSIS — M25.561 CHRONIC PAIN OF RIGHT KNEE: Primary | ICD-10-CM

## 2020-09-22 NOTE — TELEPHONE ENCOUNTER
----- Message from Petra Broussard MD sent at 9/14/2020  4:36 PM CDT -----  Follow up with pt sob next week , if she is still sob - is she willing to repeat her CT chest to evaluate her sarcoidosis?

## 2020-09-22 NOTE — TELEPHONE ENCOUNTER
Pt stated that her SObreath is much better at the present.  Pt is currently limited in her activity due to right leg pain.  Seen by Dr. Temple and received an injection, waiting for better pain relief.

## 2020-09-23 ENCOUNTER — TELEPHONE (OUTPATIENT)
Dept: PAIN MEDICINE | Facility: CLINIC | Age: 70
End: 2020-09-23

## 2020-09-24 ENCOUNTER — HOSPITAL ENCOUNTER (OUTPATIENT)
Dept: RADIOLOGY | Facility: HOSPITAL | Age: 70
Discharge: HOME | End: 2020-09-24
Attending: ANESTHESIOLOGY
Payer: MEDICARE

## 2020-09-24 ENCOUNTER — OFFICE VISIT (OUTPATIENT)
Dept: PODIATRY | Facility: CLINIC | Age: 70
End: 2020-09-24
Payer: MEDICARE

## 2020-09-24 VITALS
HEART RATE: 80 BPM | HEIGHT: 66 IN | WEIGHT: 184 LBS | DIASTOLIC BLOOD PRESSURE: 77 MMHG | TEMPERATURE: 98 F | RESPIRATION RATE: 16 BRPM | BODY MASS INDEX: 29.57 KG/M2 | SYSTOLIC BLOOD PRESSURE: 131 MMHG

## 2020-09-24 DIAGNOSIS — M20.42 HAMMERTOES OF BOTH FEET: ICD-10-CM

## 2020-09-24 DIAGNOSIS — E11.69 HYPERLIPIDEMIA ASSOCIATED WITH TYPE 2 DIABETES MELLITUS: Primary | ICD-10-CM

## 2020-09-24 DIAGNOSIS — M20.41 HAMMERTOES OF BOTH FEET: ICD-10-CM

## 2020-09-24 DIAGNOSIS — M25.561 CHRONIC PAIN OF RIGHT KNEE: ICD-10-CM

## 2020-09-24 DIAGNOSIS — E78.5 HYPERLIPIDEMIA ASSOCIATED WITH TYPE 2 DIABETES MELLITUS: Primary | ICD-10-CM

## 2020-09-24 DIAGNOSIS — M79.671 PAIN IN BOTH FEET: ICD-10-CM

## 2020-09-24 DIAGNOSIS — M79.672 PAIN IN BOTH FEET: ICD-10-CM

## 2020-09-24 DIAGNOSIS — G89.29 CHRONIC PAIN OF RIGHT KNEE: ICD-10-CM

## 2020-09-24 PROCEDURE — 1101F PR PT FALLS ASSESS DOC 0-1 FALLS W/OUT INJ PAST YR: ICD-10-PCS | Mod: S$GLB,,, | Performed by: PODIATRIST

## 2020-09-24 PROCEDURE — 1126F AMNT PAIN NOTED NONE PRSNT: CPT | Mod: S$GLB,,, | Performed by: PODIATRIST

## 2020-09-24 PROCEDURE — 1101F PT FALLS ASSESS-DOCD LE1/YR: CPT | Mod: S$GLB,,, | Performed by: PODIATRIST

## 2020-09-24 PROCEDURE — 3075F SYST BP GE 130 - 139MM HG: CPT | Mod: S$GLB,,, | Performed by: PODIATRIST

## 2020-09-24 PROCEDURE — 1159F PR MEDICATION LIST DOCUMENTED IN MEDICAL RECORD: ICD-10-PCS | Mod: S$GLB,,, | Performed by: PODIATRIST

## 2020-09-24 PROCEDURE — 1159F MED LIST DOCD IN RCRD: CPT | Mod: S$GLB,,, | Performed by: PODIATRIST

## 2020-09-24 PROCEDURE — 3051F HG A1C>EQUAL 7.0%<8.0%: CPT | Mod: S$GLB,,, | Performed by: PODIATRIST

## 2020-09-24 PROCEDURE — 3075F PR MOST RECENT SYSTOLIC BLOOD PRESS GE 130-139MM HG: ICD-10-PCS | Mod: S$GLB,,, | Performed by: PODIATRIST

## 2020-09-24 PROCEDURE — 3008F PR BODY MASS INDEX (BMI) DOCUMENTED: ICD-10-PCS | Mod: S$GLB,,, | Performed by: PODIATRIST

## 2020-09-24 PROCEDURE — 73562 XR KNEE 3 VIEW RIGHT: ICD-10-PCS | Mod: 26,HCNC,RT, | Performed by: RADIOLOGY

## 2020-09-24 PROCEDURE — 73562 X-RAY EXAM OF KNEE 3: CPT | Mod: TC,HCNC,FY,RT

## 2020-09-24 PROCEDURE — 73562 X-RAY EXAM OF KNEE 3: CPT | Mod: 26,HCNC,RT, | Performed by: RADIOLOGY

## 2020-09-24 PROCEDURE — 3008F BODY MASS INDEX DOCD: CPT | Mod: S$GLB,,, | Performed by: PODIATRIST

## 2020-09-24 PROCEDURE — 1126F PR PAIN SEVERITY QUANTIFIED, NO PAIN PRESENT: ICD-10-PCS | Mod: S$GLB,,, | Performed by: PODIATRIST

## 2020-09-24 PROCEDURE — 3051F PR MOST RECENT HEMOGLOBIN A1C LEVEL 7.0 - < 8.0%: ICD-10-PCS | Mod: S$GLB,,, | Performed by: PODIATRIST

## 2020-09-24 PROCEDURE — 99203 OFFICE O/P NEW LOW 30 MIN: CPT | Mod: S$GLB,,, | Performed by: PODIATRIST

## 2020-09-24 PROCEDURE — 99203 PR OFFICE/OUTPT VISIT, NEW, LEVL III, 30-44 MIN: ICD-10-PCS | Mod: S$GLB,,, | Performed by: PODIATRIST

## 2020-09-24 PROCEDURE — 3078F DIAST BP <80 MM HG: CPT | Mod: S$GLB,,, | Performed by: PODIATRIST

## 2020-09-24 PROCEDURE — 3078F PR MOST RECENT DIASTOLIC BLOOD PRESSURE < 80 MM HG: ICD-10-PCS | Mod: S$GLB,,, | Performed by: PODIATRIST

## 2020-09-24 NOTE — PROGRESS NOTES
[address] [facility] [address]  [place] [address]  Phone: [phone]   [phone]    Patient's PCP:Petra Broussard MD  Referring Provider: Dr. Petra Broussard    Subjective:      Chief Complaint:: Foot Pain (bilatedral foot pain) and Diabetic Foot Exam (yearly foot exam)    HPI  Nathalie Santiago is a 70 y.o. female who presents with a complaint of bilateral foot pain related to hammer toes  lasting for years. Onset of the symptoms was pain.  Current symptoms include toes rising upward, pain. Patient states history of previous fractures  Aggravating factors are walking, weight bearing. Symptoms have remained.Treatment to date have included rest Patients rates pain 1/10 on pain scale.    Patient also who presents to the clinic for a diabetic foot exam.   Pt has seen Petra Broussard MD on 09/21/2020 who treats them for their diabetes.  Pt has been a diabetic for 14 years.  Taking Novolog, Novolin, and metformin to treat diabetes.  Notes right calf pain while walking.  Blood sugar: 118  Hemoglobin A1C: 7.3       Systemic Doctor:Petra Broussard MD  Date Last Seen:09/21/2020  Blood Sugar: 118  Hemoglobin A1c: 7.3    Vitals:    09/24/20 1433   BP: 131/77   Pulse: 80   Resp: 16   Temp: 98 °F (36.7 °C)     Shoe Size: 8    Past Surgical History:   Procedure Laterality Date    APPENDECTOMY      BREAST SURGERY      CHOLECYSTECTOMY      CYSTOCELE REPAIR      FRACTURE SURGERY Right     foot    HYSTERECTOMY      SAADIA/BSO    MASTECTOMY Bilateral     b/l mastectomy    OOPHORECTOMY      PLEURA BIOPSY      RECTAL SURGERY      rectocele      TONSILLECTOMY       Past Medical History:   Diagnosis Date    Abnormal Pap smear 1972    cervical cancer    Acute cystitis without hematuria 3/28/2017    Anxiety     Arthritis     Asthma     Ataxia     Breast cancer     Cancer     bilateral mastectomy, uterine cancer, ovarian cancer    Cataract     Cervical back pain with evidence of disc disease      Cervical cancer     Chronic bilateral low back pain without sciatica 9/6/2016    Chronic bronchitis     Cortical cataract 2/18/2013    Current chronic use of systemic steroids 10/18/2018    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    CVA (cerebral vascular accident)- Confirmed by neurology 9/6/2016    Degenerative joint disease of cervical and lumbar spine 8/1/2014    Depression     Diabetes mellitus     Diabetes mellitus, type II     Gastroenteritis 12/19/2016    History of malignant phylloides tumor of breast 2/15/2013    Hyperlipidemia     Hyperopia with presbyopia 2/18/2013    Hypertension     Hypertension     Hypothyroidism     Immunosuppressed status 9/3/2015    Leukocytosis 11/17/2016    Migraine     Mitral valve regurgitation     Normochromic anemia 3/28/2017    Nuclear sclerosis 2/18/2013    Obesity     JOSE LUIS (obstructive sleep apnea)     Ovarian cancer     Pneumonia     Polyneuropathy     PVD (posterior vitreous detachment) 2/18/2013    Restless leg syndrome     Ruptured disk     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    Sarcoidosis 2006    Sarcoidosis of lung     Squamous cell carcinoma     Trouble in sleeping     Uterine cancer     Venous insufficiency      Family History   Problem Relation Age of Onset    Heart disease Mother     Stroke Mother     Hyperlipidemia Mother     Cancer Father         small cell lung cancer    Hypertension Sister     Cataracts Sister     Thyroid disease Sister     Hyperlipidemia Sister     Heart disease Sister         CAD, stents in place    Hypertension Brother     Cataracts Brother     Hyperlipidemia Brother     Heart disease Brother         CAD    Hypertension Daughter     Hyperlipidemia Daughter     Anuerysm Daughter         splenic    Hyperlipidemia Sister     Hypertension Sister     Diabetes Sister     Cancer Sister         thyroid cancer    Hypertension Brother     Hyperlipidemia Brother      Hypertension Brother     Hyperlipidemia Brother     Amblyopia Neg Hx     Blindness Neg Hx     Glaucoma Neg Hx     Macular degeneration Neg Hx     Retinal detachment Neg Hx     Strabismus Neg Hx     Breast cancer Neg Hx     Colon cancer Neg Hx     Ovarian cancer Neg Hx         Social History:   Marital Status: Single  Alcohol History:  reports no history of alcohol use.  Tobacco History:  reports that she has never smoked. She has never used smokeless tobacco.  Drug History:  reports no history of drug use.    Review of patient's allergies indicates:   Allergen Reactions    Keflex [cephalexin] Anaphylaxis    Penicillins Anaphylaxis    Vasotec [enalapril maleate] Other (See Comments)     Causes her to pass out    Bupropion Other (See Comments)     Loss of memory    Ciprofloxacin (bulk)      Bones ache, myalgia    Wellbutrin [bupropion hcl] Other (See Comments)     Loss of memory      Zithromax [azithromycin]      Patient states medication does not work on her-not effective    Codeine Itching and Nausea Only       Current Outpatient Medications   Medication Sig Dispense Refill    albuterol (PROVENTIL/VENTOLIN HFA) 90 mcg/actuation inhaler Inhale 2 puffs into the lungs every 4 (four) hours as needed for Wheezing. This is a rescue inhaler medication and should be used as needed 3 Inhaler 3    albuterol-ipratropium (DUO-NEB) 2.5 mg-0.5 mg/3 mL nebulizer solution INHALE THE CONTENTS OF 1 VIAL VIA NEBULIZER EVERY 6 HOURS AS NEEDED FOR SHORTNESS OF BREATH  OR  WHEEZING 120 vial 3    amLODIPine (NORVASC) 5 MG tablet TAKE 1 TABLET EVERY DAY 90 tablet 3    clobetasol 0.05% (TEMOVATE) 0.05 % Oint APPLY A THIN LAYER TO THE AFFECTED AREA(S) BY TOPICAL ROUTE 2 TIMES PER DAY      clopidogreL (PLAVIX) 75 mg tablet Take 1 tablet (75 mg total) by mouth once daily. 90 tablet 3    cyclobenzaprine (FLEXERIL) 10 MG tablet Take 1 tablet (10 mg total) by mouth 3 (three) times daily as needed. 270 tablet 0     diclofenac sodium (VOLTAREN) 1 % Gel APPLY TOPICALLY TO AFFECTED AREA TWICE DAILY AS NEEDED FOR PAIN RUB IN JOINTS 1 Tube 2    fenofibrate 160 MG Tab Take 1 tablet (160 mg total) by mouth once daily. 90 tablet 3    fluticasone (FLONASE) 50 mcg/actuation nasal spray 2 sprays by Each Nare route once daily. (Patient taking differently: 2 sprays by Each Nare route daily as needed. ) 1 Bottle 0    fluticasone furoate-vilanterol (BREO ELLIPTA) 200-25 mcg/dose DsDv diskus inhaler Inhale 1 puff into the lungs once daily. Controller 3 each 3    gabapentin (NEURONTIN) 300 MG capsule Pt is taking three to four capsules at night 1 capsule 0    hydroCHLOROthiazide (HYDRODIURIL) 25 MG tablet Take 1 tablet (25 mg total) by mouth once daily. 90 tablet 3    insulin aspart U-100 (NOVOLOG FLEXPEN U-100 INSULIN) 100 unit/mL (3 mL) InPn pen Use per sliding scale 1 Box 5    insulin  unit/mL injection Inject 30 Units into the skin 2 (two) times daily before meals. 3 vial 11    levothyroxine (SYNTHROID) 25 MCG tablet TAKE 1 TABLET BEFORE BREAKFAST 90 tablet 3    magnesium oxide (MAG-OX) 400 mg tablet Take 1 tablet (400 mg total) by mouth 2 (two) times daily.  0    metFORMIN (GLUCOPHAGE-XR) 500 MG 24 hr tablet Take 2 tablets (1,000 mg total) by mouth 2 (two) times daily with meals. For diabetes 360 tablet 3    metoclopramide HCl (REGLAN) 10 MG tablet TAKE 1 TABLET EVERY 8 HOURS 270 tablet 1    metoprolol succinate (TOPROL-XL) 25 MG 24 hr tablet Take 1 tablet (25 mg total) by mouth once daily. 90 tablet 3    multivitamin Chew Take by mouth.      [START ON 10/13/2020] oxyCODONE-acetaminophen (PERCOCET)  mg per tablet Take 1 tablet by mouth every 8 (eight) hours as needed for Pain. Medically necessary for greater than 7 days for chronic pain 90 tablet 0    [START ON 11/11/2020] oxyCODONE-acetaminophen (PERCOCET)  mg per tablet Take 1 tablet by mouth every 8 (eight) hours as needed for Pain. Medically necessary  for greater than 7 days for chronic pain 90 tablet 0    potassium chloride (MICRO-K) 10 MEQ CpSR Take 1 capsule (10 mEq total) by mouth 3 (three) times daily. 270 capsule 3    pramipexole (MIRAPEX) 0.125 MG tablet Take two tablets every night 180 tablet 3    promethazine (PHENERGAN) 25 MG tablet Take 1 tablet (25 mg total) by mouth every 6 (six) hours as needed for Nausea. 90 tablet 3    rosuvastatin (CRESTOR) 40 MG Tab Take 1 tablet (40 mg total) by mouth once daily. 90 tablet 3    spironolactone (ALDACTONE) 25 MG tablet Take 1 tablet (25 mg total) by mouth once daily. 90 tablet 3    traZODone (DESYREL) 50 MG tablet Take one to two tablets by mouth nightly as needed for insomnia 60 tablet 0     No current facility-administered medications for this visit.        Review of Systems      Objective:        Physical Exam:   Foot Exam    General  General Appearance: appears stated age and healthy   Orientation: alert and oriented to person, place, and time   Affect: appropriate   Gait: unimpaired       Right Foot/Ankle     Inspection and Palpation  Ecchymosis: none  Tenderness: none   Swelling: none   Arch: normal  Hammertoes: fourth toe and fifth toe (flexible)  Hallux valgus: yes  Skin Exam: skin intact;     Neurovascular  Dorsalis pedis: 2+  Posterior tibial: 2+  Saphenous nerve sensation: normal  Tibial nerve sensation: normal  Superficial peroneal nerve sensation: normal  Deep peroneal nerve sensation: normal  Sural nerve sensation: normal    Muscle Strength  Ankle dorsiflexion: 5  Ankle plantar flexion: 5  Ankle inversion: 5  Ankle eversion: 5  Great toe extension: 5  Great toe flexion: 5    Range of Motion    Normal right ankle ROM    Comments  Skin healthy, normal texture and turgor     Left Foot/Ankle      Inspection and Palpation  Ecchymosis: none  Tenderness: none   Swelling: none   Arch: normal  Hammertoes: fourth toe and fifth toe (flexible )  Hallux valgus: yes  Skin Exam: skin intact;      Neurovascular  Dorsalis pedis: 2+  Posterior tibial: 2+  Saphenous nerve sensation: normal  Tibial nerve sensation: normal  Superficial peroneal nerve sensation: normal  Deep peroneal nerve sensation: normal  Sural nerve sensation: normal    Muscle Strength  Ankle dorsiflexion: 5  Ankle plantar flexion: 5  Ankle inversion: 5  Ankle eversion: 5  Great toe extension: 5  Great toe flexion: 5    Range of Motion    Normal left ankle ROM    Comments  Skin healthy, normal texture and turgor     Physical Exam   Cardiovascular:   Pulses:       Dorsalis pedis pulses are 2+ on the right side and 2+ on the left side.        Posterior tibial pulses are 2+ on the right side and 2+ on the left side.   Musculoskeletal:      Right foot: Bunion present.      Left foot: Bunion present.       Imaging: none            Assessment:       1. Hyperlipidemia associated with type 2 diabetes mellitus    2. Hammertoes of both feet    3. Pain in both feet      Plan:   Hyperlipidemia associated with type 2 diabetes mellitus    Hammertoes of both feet  -     Ambulatory referral/consult to Podiatry    Pain in both feet      Follow up in about 1 year (around 9/24/2021).    Procedures - None    Discussed with patient that her cramping is likely due to dehydration -she states she does not drink much water. I recommend she begin drinking more water daily.     Counseling:     I provided patient education verbally regarding:   Patient diagnosis, treatment options, as well as alternatives, risks, and benefits.     Counseled patient on the aspects of diabetes and how it pertains to the feet.  I explained the importance of proper diabetic foot care and how it is essential for the health of their feet.    I discussed the importance of knowing their HGA1c and that the level needs to be as close to 6 as possible.  I discussed the increase complications of high blood sugar including stroke, blindness, heart attack, kidney failure and loss of limb secondary  to neuropathy and PVD.    Patient  was made aware of inspecting their feet.  Patient was told to be aware of any breaks in the skin or redness.  With neuropathy, these areas are not recognized early due to the numbness.  I discussed the lab test and NCV EMG test and also the role of the neurologist in evaluating the patient.  I discussed Diabetes, lower back issues, metabolic disorders, systemic causes, chemotherapy, viatmain defiencey, heavy metal exposure, as some of the causes.  I also explained that as much as 40% of the time we can not find a cause.  I discussed different treatments available to control the symptoms but whcih may not cure the problem.      Shoe inspection. Patient instructed on proper foot hygeine. We discussed wearing proper shoe gear, daily foot inspections, never walking without protective shoe gear, never putting sharp instruments to feet, routine podiatric nail visits every 2-3 months.      I discussed hammertoe deformity and conservative treatment of deep, wider shoes, padding of the sore areas, OTC NSAID, skin softners, palliative care.  I discussed surgical procedures of fusion of the PIPJ of the toes with wires or implants, the follow up and the possible complications.      This note was created using Dragon voice recognition software that occasionally misinterpreted phrases or words.

## 2020-09-24 NOTE — PATIENT INSTRUCTIONS
Diabetic Foot Care  Diabetes can lead to a number of foot complications. Fortunately, you can prevent most of these with a little extra foot care. If diabetes is not well controlled, it can cause damage to blood vessels and result in poor circulation to the foot. When the skin does not get enough blood flow, it becomes prone to pressure sores and ulcers, which heal slowly.  Diabetes can also damage nerves, interfering with the ability to feel pain and pressure. When you cant feel your foot normally, it is easy to injure your skin, bones, and joints without knowing it. For these reasons diabetes increases the risk of fungal infections, bunions, and ulcers. An ulcer is a sore or break in the skin. With ulcers, often the skin seems to have worn away. Deep ulcers can lead to bone infection.  Gangrene is the most serious foot complication of diabetes. It usually occurs on the tips of the toes as blackened areas of skin. The black area is dead tissue. In severe cases, gangrene spreads to involve the entire toe, other toes, and the entire foot. Foot or toe amputation may be required. Good foot care and blood sugar control can prevent this.  Home care  · Wear comfortable, well-fitting shoes.  · Wash your feet daily with warm water and mild soap.  · After drying, apply a moisturizing cream or lotion to the top and bottom of your feet. Don't put lotion between toes.  · Check your feet daily for skin breaks, blisters, swelling, or redness. Look between your toes as well. If you cannot see the bottoms of your feet, ask someone to look or use a mirror.  · Wear cotton socks and change them every day.  · Trim toenails carefully, and do not cut your cuticles.  · Strive to keep your blood sugar under control with a combination of medicines, diet, and activity.  · If you smoke and have diabetes, it is very important that you stop. Smoking reduces blood flow to your foot.  · Schedule foot exams at least every year, or more often if  you have foot problems.  · Put your feet up when sitting, wiggle toes, and move ankles to help improve blood flow.  Avoid activities that increase your risk of foot injury:  · Do not walk barefoot.  · Do not use heating pads or hot water bottles on your feet.  · Do not put your foot in a hot tub without first checking the temperature with your hand.  Follow-up care  Follow up with your healthcare provider, or as advised. Be sure to take off your shoes and socks before your appointment starts so your healthcare provider will be sure to check your feet. Report any cut, puncture, scrape, blister, or other injury to your foot. Also report if you have a bunion, hammertoes, ingrown toenail, or ulcer on your foot.  When to seek medical advice  Call your healthcare provider right away if any of these occur:  · Black skin color anywhere on the foot  · Open ulcer with pus draining from the wound  · Increasing foot or leg pain  · New areas of redness or swelling or tender areas of the foot  · Fever of 100.4°F (38°C) or greater  Date Last Reviewed: 5/25/2016  © 4740-4050 [employer]. [address] [place] [address], [place] [address]. All rights reserved. This information is not intended as a substitute for professional medical care. Always follow your healthcare professional's instructions.

## 2020-09-25 ENCOUNTER — PATIENT MESSAGE (OUTPATIENT)
Dept: PAIN MEDICINE | Facility: CLINIC | Age: 70
End: 2020-09-25

## 2020-09-25 DIAGNOSIS — G89.29 CHRONIC PAIN OF RIGHT KNEE: Primary | ICD-10-CM

## 2020-09-25 DIAGNOSIS — M25.561 CHRONIC PAIN OF RIGHT KNEE: Primary | ICD-10-CM

## 2020-09-28 ENCOUNTER — PATIENT MESSAGE (OUTPATIENT)
Dept: RESEARCH | Facility: OTHER | Age: 70
End: 2020-09-28

## 2020-09-28 ENCOUNTER — PATIENT OUTREACH (OUTPATIENT)
Dept: OTHER | Facility: OTHER | Age: 70
End: 2020-09-28

## 2020-09-28 NOTE — PROGRESS NOTES
"Digital Medicine: Health  Follow-Up    The history is provided by the patient.             Reason for review: Blood glucose not at goal and Blood pressure not at goal        Topics Covered on Call: physical activity and Diet    Additional Follow-up details: BP /85, not at goal.  BG readings are elevated out of range.     Patient had reached out for [facility] contact number to order more test strips.   Provided patient with contact information.            Diet-Change      Dietary Improvements:Increase water intake            Intervention(s): portion control, carb reduction, reducing processed foods and DASH diet  Additional diet details:   She states that her diet has been "good'.   She is still on Medifast and Nutrisystem. She reports the diet being low sodium. She states that she is struggling because BG is still elevated. Discussed portion control and food substitutes. Discussed hidden salts in seasonings and processed foods. She understood. She states "I am aware and have all of the resources I need".   Patient has started to increase her water intake. She states that she takes out two bottles of water on the table and goal is to finish them. She does not enjoy water. Suggested Crystal Light Packets for flavors.       Physical Activity-no change to routine  No change to exercise routine.       Additional physical activity details:   Patient states that she has been a a great deal of pain with her right leg making it hard to do physical activity.   She states that she just found her tapes for chair exercises. She plans to start that back up.          Medication Adherence-Medication Adherence not addressed.      Substance, Sleep, Stress-Not assessed      Additional monitoring needed.  Continue current diet/physical activity routine.       Addressed patient questions and patient has my contact information if needed prior to next outreach. Patient verbalizes understanding.      Explained the importance of " self-monitoring and medication adherence. Encouraged the patient to communicate with their health  for lifestyle modifications to help improve or maintain a healthy lifestyle.                Topic    Eye Exam        Last 5 Patient Entered Readings                                      Current 30 Day Average: 146/85     Recent Readings 9/20/2020 9/20/2020 9/18/2020 9/18/2020 9/17/2020    SBP (mmHg) 144 141 130 143 145    DBP (mmHg) 84 85 87 87 86    Pulse 75 73 89 86 82        Last 6 Patient Entered Readings                                          Most Recent A1c: 7.3% on 8/6/2020  (Goal: 8%)     Recent Readings 9/28/2020 9/26/2020 9/25/2020 9/20/2020 9/18/2020    Blood Glucose (mg/dL) 132 206 205 190 223

## 2020-09-30 PROCEDURE — 99457 PR MONITORING, PHYSIOL PARAM, REMOTE, 1ST 20 MINS, PER MONTH: ICD-10-PCS | Mod: S$GLB,,, | Performed by: INTERNAL MEDICINE

## 2020-09-30 PROCEDURE — 99457 RPM TX MGMT 1ST 20 MIN: CPT | Mod: S$GLB,,, | Performed by: INTERNAL MEDICINE

## 2020-10-05 ENCOUNTER — OFFICE VISIT (OUTPATIENT)
Dept: ORTHOPEDICS | Facility: CLINIC | Age: 70
End: 2020-10-05
Payer: MEDICARE

## 2020-10-05 VITALS — WEIGHT: 184 LBS | RESPIRATION RATE: 16 BRPM | BODY MASS INDEX: 29.57 KG/M2 | HEIGHT: 66 IN

## 2020-10-05 DIAGNOSIS — M17.11 PRIMARY OSTEOARTHRITIS OF RIGHT KNEE: Primary | ICD-10-CM

## 2020-10-05 DIAGNOSIS — G89.29 CHRONIC PAIN OF RIGHT KNEE: ICD-10-CM

## 2020-10-05 DIAGNOSIS — M25.561 CHRONIC PAIN OF RIGHT KNEE: ICD-10-CM

## 2020-10-05 PROCEDURE — 1125F PR PAIN SEVERITY QUANTIFIED, PAIN PRESENT: ICD-10-PCS | Mod: HCNC,S$GLB,ICN, | Performed by: ORTHOPAEDIC SURGERY

## 2020-10-05 PROCEDURE — 99203 OFFICE O/P NEW LOW 30 MIN: CPT | Mod: 25,HCNC,S$GLB,ICN | Performed by: ORTHOPAEDIC SURGERY

## 2020-10-05 PROCEDURE — 1101F PT FALLS ASSESS-DOCD LE1/YR: CPT | Mod: HCNC,CPTII,S$GLB,ICN | Performed by: ORTHOPAEDIC SURGERY

## 2020-10-05 PROCEDURE — 1125F AMNT PAIN NOTED PAIN PRSNT: CPT | Mod: HCNC,S$GLB,ICN, | Performed by: ORTHOPAEDIC SURGERY

## 2020-10-05 PROCEDURE — 99999 PR PBB SHADOW E&M-EST. PATIENT-LVL III: ICD-10-PCS | Mod: PBBFAC,HCNC,, | Performed by: ORTHOPAEDIC SURGERY

## 2020-10-05 PROCEDURE — 20610 LARGE JOINT ASPIRATION/INJECTION: R KNEE: ICD-10-PCS | Mod: HCNC,RT,S$GLB,ICN | Performed by: ORTHOPAEDIC SURGERY

## 2020-10-05 PROCEDURE — 99999 PR PBB SHADOW E&M-EST. PATIENT-LVL III: CPT | Mod: PBBFAC,HCNC,, | Performed by: ORTHOPAEDIC SURGERY

## 2020-10-05 PROCEDURE — 1159F PR MEDICATION LIST DOCUMENTED IN MEDICAL RECORD: ICD-10-PCS | Mod: HCNC,S$GLB,ICN, | Performed by: ORTHOPAEDIC SURGERY

## 2020-10-05 PROCEDURE — 3008F BODY MASS INDEX DOCD: CPT | Mod: HCNC,CPTII,S$GLB,ICN | Performed by: ORTHOPAEDIC SURGERY

## 2020-10-05 PROCEDURE — 1159F MED LIST DOCD IN RCRD: CPT | Mod: HCNC,S$GLB,ICN, | Performed by: ORTHOPAEDIC SURGERY

## 2020-10-05 PROCEDURE — 99203 PR OFFICE/OUTPT VISIT, NEW, LEVL III, 30-44 MIN: ICD-10-PCS | Mod: 25,HCNC,S$GLB,ICN | Performed by: ORTHOPAEDIC SURGERY

## 2020-10-05 PROCEDURE — 3008F PR BODY MASS INDEX (BMI) DOCUMENTED: ICD-10-PCS | Mod: HCNC,CPTII,S$GLB,ICN | Performed by: ORTHOPAEDIC SURGERY

## 2020-10-05 PROCEDURE — 97760 PR ORTHOTIC MGMT&TRAINJ INITIAL ENC EA 15 MINS: ICD-10-PCS | Mod: GP,HCNC,S$GLB,ICN | Performed by: ORTHOPAEDIC SURGERY

## 2020-10-05 PROCEDURE — 97760 ORTHOTIC MGMT&TRAING 1ST ENC: CPT | Mod: GP,HCNC,S$GLB,ICN | Performed by: ORTHOPAEDIC SURGERY

## 2020-10-05 PROCEDURE — 20610 DRAIN/INJ JOINT/BURSA W/O US: CPT | Mod: HCNC,RT,S$GLB,ICN | Performed by: ORTHOPAEDIC SURGERY

## 2020-10-05 PROCEDURE — 1101F PR PT FALLS ASSESS DOC 0-1 FALLS W/OUT INJ PAST YR: ICD-10-PCS | Mod: HCNC,CPTII,S$GLB,ICN | Performed by: ORTHOPAEDIC SURGERY

## 2020-10-05 RX ADMIN — TRIAMCINOLONE ACETONIDE 40 MG: 40 INJECTION, SUSPENSION INTRA-ARTICULAR; INTRAMUSCULAR at 03:10

## 2020-10-05 NOTE — LETTER
October 6, 2020      Lincoln Temple MD  [address] [place] [address]   [address]  [place] [address]           [facility]  [address] [place] [address] [address]  [facility] [address]  Phone: [phone]          Patient: Nathalie Santiago   MR Number: [id number]   YOB: 1950   Date of Visit: 10/5/2020       Dear Dr. Lincoln Temple:    Thank you for referring Nathalie Santiago to me for evaluation. Attached you will find relevant portions of my assessment and plan of care.    If you have questions, please do not hesitate to call me. I look forward to following Nathalie Santiago along with you.    Sincerely,    Sarwat Vela MD    Enclosure  CC:  No Recipients    If you would like to receive this communication electronically, please contact [email or web address] or [phone] to request more information on [employer] access.    For providers and/or their staff who would like to refer a patient to [facility], please contact us through our one-stop-shop provider referral line, [facility], at [phone].    If you feel you have received this communication in error or would no longer like to receive these types of communications, please e-mail [email or web address]

## 2020-10-06 RX ORDER — TRIAMCINOLONE ACETONIDE 40 MG/ML
40 INJECTION, SUSPENSION INTRA-ARTICULAR; INTRAMUSCULAR
Status: DISCONTINUED | OUTPATIENT
Start: 2020-10-05 | End: 2020-10-06 | Stop reason: HOSPADM

## 2020-10-06 NOTE — PROGRESS NOTES
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    CVA (cerebral vascular accident)- Confirmed by neurology 9/6/2016    Degenerative joint disease of cervical and lumbar spine 8/1/2014    Depression     Diabetes mellitus     Diabetes mellitus, type II     Gastroenteritis 12/19/2016    History of malignant phylloides tumor of breast 2/15/2013    Hyperlipidemia     Hyperopia with presbyopia 2/18/2013    Hypertension     Hypertension     Hypothyroidism     Immunosuppressed status 9/3/2015    Leukocytosis 11/17/2016    Migraine     Mitral valve regurgitation     Normochromic anemia 3/28/2017    Nuclear sclerosis 2/18/2013    Obesity     JOSE LUIS (obstructive sleep apnea)     Ovarian cancer     Pneumonia     Polyneuropathy     PVD (posterior vitreous detachment) 2/18/2013    Restless leg syndrome     Ruptured disk     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    Sarcoidosis 2006    Sarcoidosis of lung     Squamous cell carcinoma     Trouble in sleeping     Uterine cancer     Venous insufficiency        Past Surgical History:   Procedure Laterality Date    APPENDECTOMY      BREAST SURGERY      CHOLECYSTECTOMY      CYSTOCELE REPAIR      FRACTURE SURGERY Right     foot    HYSTERECTOMY      SAADIA/BSO    MASTECTOMY Bilateral     b/l mastectomy    OOPHORECTOMY      PLEURA BIOPSY      RECTAL SURGERY      rectocele      TONSILLECTOMY         Current Outpatient  Medications   Medication Sig    albuterol (PROVENTIL/VENTOLIN HFA) 90 mcg/actuation inhaler Inhale 2 puffs into the lungs every 4 (four) hours as needed for Wheezing. This is a rescue inhaler medication and should be used as needed    albuterol-ipratropium (DUO-NEB) 2.5 mg-0.5 mg/3 mL nebulizer solution INHALE THE CONTENTS OF 1 VIAL VIA NEBULIZER EVERY 6 HOURS AS NEEDED FOR SHORTNESS OF BREATH  OR  WHEEZING    amLODIPine (NORVASC) 5 MG tablet TAKE 1 TABLET EVERY DAY    clobetasol 0.05% (TEMOVATE) 0.05 % Oint APPLY A THIN LAYER TO THE AFFECTED AREA(S) BY TOPICAL ROUTE 2 TIMES PER DAY    clopidogreL (PLAVIX) 75 mg tablet Take 1 tablet (75 mg total) by mouth once daily.    cyclobenzaprine (FLEXERIL) 10 MG tablet Take 1 tablet (10 mg total) by mouth 3 (three) times daily as needed.    diclofenac sodium (VOLTAREN) 1 % Gel APPLY TOPICALLY TO AFFECTED AREA TWICE DAILY AS NEEDED FOR PAIN RUB IN JOINTS    fenofibrate 160 MG Tab Take 1 tablet (160 mg total) by mouth once daily.    fluticasone (FLONASE) 50 mcg/actuation nasal spray 2 sprays by Each Nare route once daily. (Patient taking differently: 2 sprays by Each Nare route daily as needed. )    fluticasone furoate-vilanterol (BREO ELLIPTA) 200-25 mcg/dose DsDv diskus inhaler Inhale 1 puff into the lungs once daily. Controller    gabapentin (NEURONTIN) 300 MG capsule Pt is taking three to four capsules at night    hydroCHLOROthiazide (HYDRODIURIL) 25 MG tablet Take 1 tablet (25 mg total) by mouth once daily.    insulin aspart U-100 (NOVOLOG FLEXPEN U-100 INSULIN) 100 unit/mL (3 mL) InPn pen Use per sliding scale    insulin  unit/mL injection Inject 30 Units into the skin 2 (two) times daily before meals.    levothyroxine (SYNTHROID) 25 MCG tablet TAKE 1 TABLET BEFORE BREAKFAST    magnesium oxide (MAG-OX) 400 mg tablet Take 1 tablet (400 mg total) by mouth 2 (two) times daily.    metFORMIN (GLUCOPHAGE-XR) 500 MG 24 hr tablet Take 2 tablets (1,000 mg  total) by mouth 2 (two) times daily with meals. For diabetes    metoclopramide HCl (REGLAN) 10 MG tablet TAKE 1 TABLET EVERY 8 HOURS    metoprolol succinate (TOPROL-XL) 25 MG 24 hr tablet Take 1 tablet (25 mg total) by mouth once daily.    multivitamin Chew Take by mouth.    [START ON 10/13/2020] oxyCODONE-acetaminophen (PERCOCET)  mg per tablet Take 1 tablet by mouth every 8 (eight) hours as needed for Pain. Medically necessary for greater than 7 days for chronic pain    [START ON 11/11/2020] oxyCODONE-acetaminophen (PERCOCET)  mg per tablet Take 1 tablet by mouth every 8 (eight) hours as needed for Pain. Medically necessary for greater than 7 days for chronic pain    potassium chloride (MICRO-K) 10 MEQ CpSR Take 1 capsule (10 mEq total) by mouth 3 (three) times daily.    pramipexole (MIRAPEX) 0.125 MG tablet Take two tablets every night    promethazine (PHENERGAN) 25 MG tablet Take 1 tablet (25 mg total) by mouth every 6 (six) hours as needed for Nausea.    rosuvastatin (CRESTOR) 40 MG Tab Take 1 tablet (40 mg total) by mouth once daily.    spironolactone (ALDACTONE) 25 MG tablet Take 1 tablet (25 mg total) by mouth once daily.    traZODone (DESYREL) 50 MG tablet Take one to two tablets by mouth nightly as needed for insomnia     No current facility-administered medications for this visit.        Review of patient's allergies indicates:   Allergen Reactions    Keflex [cephalexin] Anaphylaxis    Penicillins Anaphylaxis    Vasotec [enalapril maleate] Other (See Comments)     Causes her to pass out    Bupropion Other (See Comments)     Loss of memory    Ciprofloxacin (bulk)      Bones ache, myalgia    Wellbutrin [bupropion hcl] Other (See Comments)     Loss of memory      Zithromax [azithromycin]      Patient states medication does not work on her-not effective    Codeine Itching and Nausea Only       Family History   Problem Relation Age of Onset    Heart disease Mother     Stroke  Mother     Hyperlipidemia Mother     Cancer Father         small cell lung cancer    Hypertension Sister     Cataracts Sister     Thyroid disease Sister     Hyperlipidemia Sister     Heart disease Sister         CAD, stents in place    Hypertension Brother     Cataracts Brother     Hyperlipidemia Brother     Heart disease Brother         CAD    Hypertension Daughter     Hyperlipidemia Daughter     Anuerysm Daughter         splenic    Hyperlipidemia Sister     Hypertension Sister     Diabetes Sister     Cancer Sister         thyroid cancer    Hypertension Brother     Hyperlipidemia Brother     Hypertension Brother     Hyperlipidemia Brother     Amblyopia Neg Hx     Blindness Neg Hx     Glaucoma Neg Hx     Macular degeneration Neg Hx     Retinal detachment Neg Hx     Strabismus Neg Hx     Breast cancer Neg Hx     Colon cancer Neg Hx     Ovarian cancer Neg Hx        Social History     Socioeconomic History    Marital status: Single     Spouse name: Not on file    Number of children: 1    Years of education: Not on file    Highest education level: Bachelor's degree (e.g., BA, AB, BS)   Occupational History    Occupation: retired    Social Needs    Financial resource strain: Not hard at all    Food insecurity     Worry: Never true     Inability: Never true    Transportation needs     Medical: No     Non-medical: No   Tobacco Use    Smoking status: Never Smoker    Smokeless tobacco: Never Used   Substance and Sexual Activity    Alcohol use: No     Frequency: Monthly or less     Drinks per session: 1 or 2     Binge frequency: Never    Drug use: No     Types: Oxycodone     Comment: prescription Oycodone    Sexual activity: Never   Lifestyle    Physical activity     Days per week: 0 days     Minutes per session: 0 min    Stress: Not at all   Relationships    Social connections     Talks on phone: More than three times a week     Gets together: More than three times a week      Attends [employer] service: Never     Active member of club or organization: No     Attends meetings of clubs or organizations: Never     Relationship status: Never    Other Topics Concern    Not on file   Social History Narrative    Raised in [place]     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  Symptoms are worsening and moderate to severe.  Pain with walking, lifting her leg or with lateral movement.  Patient has had injections previously.  She is in pain management with Dr. Temple for her back.  She has had injections in her knees in the past which used to be effective.  She had a Synvisc-One injection on September 21st with no real benefit.  No recent cortisone injections.  Pain is 7/10.  Pain mostly medially.      Answers for HPI/ROS submitted by the patient on 10/2/2020   Leg pain  unexpected weight change: No  appetite change : No  sleep disturbance: Yes  IMMUNOCOMPROMISED: Yes  nervous/ anxious: No  dysphoric mood: No  rash: No  visual disturbance: Yes  eye redness: No  eye pain: Yes  ear pain: No  tinnitus: No  hearing loss: Yes  sinus pressure : No  nosebleeds: No  enviro allergies: No  food allergies: No  cough: No  shortness of breath: Yes  sweating: No  dysuria: No  frequency: No  difficulty urinating: No  hematuria: No  painful intercourse: No  chest pain: No  palpitations: No  nausea: Yes  vomiting: No  diarrhea: No  blood in stool: No  constipation: No  headaches: No  dizziness: No  numbness: No  seizures: No  joint swelling: Yes  myalgia: Yes  weakness: Yes  back pain: Yes  Pain Chronicity: recurrent  History of trauma: No  Onset: more than 1 year ago  Frequency:  daily  Progression since onset: gradually worsening  injury location: at home  pain- numeric: 8/10  pain location: right knee  pain quality: throbbing  Radiating Pain: Yes  If your pain is radiating, to what part of the body?: right foot  Aggravating factors: activity  fever: No  inability to bear weight: Yes  itching: No  joint locking: No  limited range of motion: Yes  stiffness: Yes  tingling: No  Treatments tried: cold, injection treatment, NSAIDs, oral narcotics, OTC ointments, OTC pain meds, rest  physical therapy: ineffective  Improvement on treatment: mild      Physical Examination:    Vital Signs:    Vitals:    10/05/20 1444   Resp: 16       Body mass index is 29.7 kg/m².    This a well-developed, well nourished patient in no acute distress.  They are alert and oriented and cooperative to examination.  Pt. walks without an antalgic gait.      Examination of the right knee shows no rashes or erythema. There are no masses ecchymosis or effusion. Patient has full range of motion from 0-130°. Patient is nontender to palpation over lateral joint line and moderately tender to palpation over the medial joint line. Patient has a - Lachman exam, - anterior drawer exam, and - posterior drawer exam. - Isiah's exam. Knee is stable to varus and valgus stress. 5 out of 5 motor strength. Palpable distal pulses. Intact light touch sensation. Negative Patellofemoral crepitus    Examination of the left knee shows no rashes or erythema. There are no masses ecchymosis or effusion. Patient has full range of motion from 0-130°. Patient is nontender to palpation over lateral joint line and nontender to palpation over the medial joint line. Patient has a - Lachman exam, - anterior drawer exam, and - posterior drawer exam. - Isiah's exam. Knee is stable to varus and valgus stress. 5 out of 5 motor strength. Palpable distal pulses. Intact light touch sensation. Negative Patellofemoral crepitus        X-rays:  X-ray of the  right knee is available for review which shows mild degenerative change     Assessment::  Right knee arthritis versus possible meniscal tear    Plan:  I reviewed the findings with her today.  I recommended trying a cortisone injection and a bio skin brace.  Follow-up in a month if not improving.    We performed a custom orthotic/brace fitting, adjusting and training with the patient. The patient demonstrated understanding and proper care. This was performed for 15 minutes.          This note was created using M Modal voice recognition software that occasionally misinterpreted phrases or words.    Consult note is delivered via Epic messaging service.

## 2020-10-06 NOTE — PROCEDURES
Large Joint Aspiration/Injection: R knee    Date/Time: 10/5/2020 3:15 PM  Performed by: Sarwat Vela MD  Authorized by: Sarwat Vela MD     Consent Done?:  Yes (Verbal)  Indications:  Pain  Site marked: the procedure site was marked    Timeout: prior to procedure the correct patient, procedure, and site was verified    Local anesthetic:  Lidocaine 1% without epinephrine and bupivacaine 0.25% without epinephrine  Anesthetic total (ml):  6      Details:  Needle Size:  20 G  Approach:  Anterolateral  Location:  Knee  Site:  R knee  Medications:  40 mg triamcinolone acetonide 40 mg/mL  Patient tolerance:  Patient tolerated the procedure well with no immediate complications

## 2020-10-07 ENCOUNTER — TELEPHONE (OUTPATIENT)
Dept: PSYCHIATRY | Facility: CLINIC | Age: 70
End: 2020-10-07

## 2020-10-07 NOTE — TELEPHONE ENCOUNTER
Patient called and cancelled her appointment for 10/09/2020 and rescheduled to 12/01/2020.  She wanted to let Dr Pat know that mid July she decreased her Zoloft medication and stopped the medication on Aug. 1.  She states she does not feel any difference and all has been well. She states she is not feeling any depression since stopped the medication. Also stated she did not tell her family she stopped the medication and no one has mentioned  That she seems to be any different so she feels like she is good and will see Dr Pat in December.

## 2020-10-22 ENCOUNTER — TELEPHONE (OUTPATIENT)
Dept: PAIN MEDICINE | Facility: CLINIC | Age: 70
End: 2020-10-22

## 2020-10-23 RX ORDER — CYCLOBENZAPRINE HCL 10 MG
10 TABLET ORAL 3 TIMES DAILY PRN
Qty: 270 TABLET | Refills: 0 | Status: SHIPPED | OUTPATIENT
Start: 2020-10-23 | End: 2021-06-14 | Stop reason: SDUPTHER

## 2020-10-26 ENCOUNTER — PATIENT OUTREACH (OUTPATIENT)
Dept: OTHER | Facility: OTHER | Age: 70
End: 2020-10-26

## 2020-10-26 NOTE — PROGRESS NOTES
"Digital Medicine: Health  Follow-Up    The history is provided by the patient.             Reason for review: Blood glucose not at goal and Blood pressure not at goal        Topics Covered on Call: physical activity and Diet    Additional Follow-up details: Patient was sick for the past couple of days.  Patient has been unmotivated to making lifestyle changes. She reports living with her daughter at the moment and has not focused on her health. Explained the importance of getting her BG and BP controlled. She understood. She plans to work on getting on track.     Offered my support and guidance as needed.   She did not want to discuss lifestyle today.            Diet-Change    Patient reports eating or drinking the following: Patient admits not to following her diet at all.   Doesn't take the time to eat healthier.   Living with daughter she has become more "lack". She states that she does not notice if she does not check her readings.     Intervention(s): portion control, carb reduction, reducing processed foods and DASH diet  Additional diet details: Understand BG goals.   Discussed different ways to improve BG and BP.    Physical Activity-no change to routine  No change to exercise routine.       Additional physical activity details: Moving a little bit more with taking care of the baby.   Plans to start chair exercises when she goes back home.      Medication Adherence-Medication Adherence not addressed.        Substance, Sleep, Stress-No change  stress-assessed  Details:  Intervention(s):    Sleep-assessed  Details:  Intervention(s):    Alcohol -not assessed  Details:  Intervention(s):    Tobacco-Not Assessed  Details:  Intervention(s):          Additional monitoring needed.  Continue current diet/physical activity routine.       Addressed patient questions and patient has my contact information if needed prior to next outreach. Patient verbalizes understanding.      Explained the importance of " self-monitoring and medication adherence. Encouraged the patient to communicate with their health  for lifestyle modifications to help improve or maintain a healthy lifestyle.                   Topic    Eye Exam     Urine Protein Check          Last 5 Patient Entered Readings                                      Current 30 Day Average: 153/80     Recent Readings 10/18/2020 10/7/2020 10/7/2020 10/7/2020 10/4/2020    SBP (mmHg) 159 147 142 149 153    DBP (mmHg) 90 68 78 66 87    Pulse 73 89 88 86 77        Last 6 Patient Entered Readings                                          Most Recent A1c: 7.3% on 8/6/2020  (Goal: 8%)     Recent Readings 10/23/2020 10/21/2020 10/19/2020 10/17/2020 10/17/2020    Blood Glucose (mg/dL) 153 290 289 240 142

## 2020-10-27 ENCOUNTER — PATIENT OUTREACH (OUTPATIENT)
Dept: ADMINISTRATIVE | Facility: OTHER | Age: 70
End: 2020-10-27

## 2020-10-27 DIAGNOSIS — Z12.11 ENCOUNTER FOR FIT (FECAL IMMUNOCHEMICAL TEST) SCREENING: Primary | ICD-10-CM

## 2020-10-27 NOTE — PROGRESS NOTES
Chart was reviewed for overdue Proactive [facility] Encounters (BERNARDA)  topics  Updates were requested from care everywhere  Health Maintenance has been updated  LINKS immunization registry triggered  Fit kit ordered

## 2020-10-30 ENCOUNTER — PATIENT MESSAGE (OUTPATIENT)
Dept: ADMINISTRATIVE | Facility: HOSPITAL | Age: 70
End: 2020-10-30

## 2020-10-31 PROCEDURE — 99457 PR MONITORING, PHYSIOL PARAM, REMOTE, 1ST 20 MINS, PER MONTH: ICD-10-PCS | Mod: S$GLB,,, | Performed by: INTERNAL MEDICINE

## 2020-10-31 PROCEDURE — 99457 RPM TX MGMT 1ST 20 MIN: CPT | Mod: S$GLB,,, | Performed by: INTERNAL MEDICINE

## 2020-11-23 ENCOUNTER — PATIENT OUTREACH (OUTPATIENT)
Dept: OTHER | Facility: OTHER | Age: 70
End: 2020-11-23

## 2020-11-26 ENCOUNTER — PATIENT MESSAGE (OUTPATIENT)
Dept: PRIMARY CARE CLINIC | Facility: CLINIC | Age: 70
End: 2020-11-26

## 2020-11-30 ENCOUNTER — PATIENT OUTREACH (OUTPATIENT)
Dept: ADMINISTRATIVE | Facility: HOSPITAL | Age: 70
End: 2020-11-30

## 2020-11-30 NOTE — PROGRESS NOTES
Chart review completed 2020.  Care Everywhere updates requested and reviewed.  Immunizations reconciled. Media reports reviewed.  Duplicate HM overrides and  orders removed.  Overdue HM topic chart audit and/or requested.  Overdue lab testing linked to upcoming lab appointments if applies    Portal message sent for overdue HM    Health Maintenance Due   Topic Date Due    Shingles Vaccine (1 of 2) 1999    Colorectal Cancer Screening  1999    DEXA SCAN  2017    Foot Exam  2020    Influenza Vaccine (1) 2020    Eye Exam  2020    Urine Microalbumin  2020

## 2020-11-30 NOTE — PROGRESS NOTES
Digital Medicine: Health  Follow-Up    The history is provided by the patient.                     Topics Covered on Call: Patient recently went to emergency room.    Additional Follow-up details: Patient reached out, leaving  for follow-up. She reports having appointments on December 1st. She states that she recently went on vacation out of town with her daughter and son in law. She states that she had a bad fall, ending up in the emergency room. She states that she is suffering with severe head trama and is not in great shape. She would like to defer outreach for next couple of weeks.     Will push outreach 2-3 weeks for f/u.            Diet-Not assessed          Physical Activity-Not assessed    Medication Adherence-Medication Adherence not addressed.      Substance, Sleep, Stress-Not assessed         Addressed patient questions and patient has my contact information if needed prior to next outreach.            Topic    Eye Exam     Urine Protein Check          Last 5 Patient Entered Readings                                      Current 30 Day Average:      Recent Readings 10/18/2020 10/7/2020 10/7/2020 10/7/2020 10/4/2020    SBP (mmHg) 159 147 142 149 153    DBP (mmHg) 90 68 78 66 87    Pulse 73 89 88 86 77        Last 6 Patient Entered Readings                                          Most Recent A1c: 7.3% on 8/6/2020  (Goal: 8%)     Recent Readings 11/15/2020 11/15/2020 11/14/2020 11/13/2020 10/23/2020    Blood Glucose (mg/dL) 140 124 181 139 153

## 2020-12-01 ENCOUNTER — PATIENT OUTREACH (OUTPATIENT)
Dept: ADMINISTRATIVE | Facility: OTHER | Age: 70
End: 2020-12-01

## 2020-12-01 ENCOUNTER — OFFICE VISIT (OUTPATIENT)
Dept: PRIMARY CARE CLINIC | Facility: CLINIC | Age: 70
End: 2020-12-01
Payer: MEDICARE

## 2020-12-01 ENCOUNTER — OFFICE VISIT (OUTPATIENT)
Dept: PAIN MEDICINE | Facility: CLINIC | Age: 70
End: 2020-12-01
Payer: MEDICARE

## 2020-12-01 ENCOUNTER — OFFICE VISIT (OUTPATIENT)
Dept: PSYCHIATRY | Facility: CLINIC | Age: 70
End: 2020-12-01
Payer: MEDICARE

## 2020-12-01 VITALS
HEIGHT: 66 IN | HEART RATE: 106 BPM | TEMPERATURE: 96 F | SYSTOLIC BLOOD PRESSURE: 132 MMHG | WEIGHT: 182.63 LBS | BODY MASS INDEX: 29.35 KG/M2 | DIASTOLIC BLOOD PRESSURE: 83 MMHG

## 2020-12-01 VITALS
BODY MASS INDEX: 29.57 KG/M2 | SYSTOLIC BLOOD PRESSURE: 150 MMHG | HEART RATE: 96 BPM | HEIGHT: 66 IN | WEIGHT: 184 LBS | DIASTOLIC BLOOD PRESSURE: 86 MMHG

## 2020-12-01 VITALS
WEIGHT: 183.63 LBS | DIASTOLIC BLOOD PRESSURE: 83 MMHG | HEIGHT: 66 IN | HEART RATE: 92 BPM | TEMPERATURE: 98 F | SYSTOLIC BLOOD PRESSURE: 132 MMHG | OXYGEN SATURATION: 97 % | BODY MASS INDEX: 29.51 KG/M2

## 2020-12-01 DIAGNOSIS — D86.0 SARCOIDOSIS OF LUNG: ICD-10-CM

## 2020-12-01 DIAGNOSIS — F33.41 MDD (MAJOR DEPRESSIVE DISORDER), RECURRENT, IN PARTIAL REMISSION: ICD-10-CM

## 2020-12-01 DIAGNOSIS — F41.1 GAD (GENERALIZED ANXIETY DISORDER): ICD-10-CM

## 2020-12-01 DIAGNOSIS — S01.21XA LACERATION OF NOSE, INITIAL ENCOUNTER: ICD-10-CM

## 2020-12-01 DIAGNOSIS — E03.4 HYPOTHYROIDISM DUE TO ACQUIRED ATROPHY OF THYROID: ICD-10-CM

## 2020-12-01 DIAGNOSIS — M25.561 CHRONIC PAIN OF RIGHT KNEE: ICD-10-CM

## 2020-12-01 DIAGNOSIS — M47.896 OTHER SPONDYLOSIS, LUMBAR REGION: Primary | ICD-10-CM

## 2020-12-01 DIAGNOSIS — G89.4 CHRONIC PAIN DISORDER: ICD-10-CM

## 2020-12-01 DIAGNOSIS — G89.29 CHRONIC PAIN OF RIGHT KNEE: ICD-10-CM

## 2020-12-01 DIAGNOSIS — E11.59 HYPERTENSION ASSOCIATED WITH DIABETES: ICD-10-CM

## 2020-12-01 DIAGNOSIS — G89.4 CHRONIC PAIN SYNDROME: ICD-10-CM

## 2020-12-01 DIAGNOSIS — G89.4 CHRONIC PAIN DISORDER: Primary | ICD-10-CM

## 2020-12-01 DIAGNOSIS — E11.65 UNCONTROLLED TYPE 2 DIABETES MELLITUS WITH HYPERGLYCEMIA: ICD-10-CM

## 2020-12-01 DIAGNOSIS — I15.2 HYPERTENSION ASSOCIATED WITH DIABETES: ICD-10-CM

## 2020-12-01 DIAGNOSIS — I83.813 VARICOSE VEINS OF BOTH LOWER EXTREMITIES WITH PAIN: ICD-10-CM

## 2020-12-01 DIAGNOSIS — M51.36 DDD (DEGENERATIVE DISC DISEASE), LUMBAR: ICD-10-CM

## 2020-12-01 DIAGNOSIS — G47.00 INSOMNIA, UNSPECIFIED TYPE: ICD-10-CM

## 2020-12-01 DIAGNOSIS — M43.17 SPONDYLOLISTHESIS OF LUMBOSACRAL REGION: ICD-10-CM

## 2020-12-01 DIAGNOSIS — E04.1 THYROID NODULE GREATER THAN OR EQUAL TO 1.5 CM IN DIAMETER INCIDENTALLY NOTED ON IMAGING STUDY: Primary | ICD-10-CM

## 2020-12-01 PROBLEM — E11.9 TYPE 2 DIABETES MELLITUS, WITHOUT LONG-TERM CURRENT USE OF INSULIN: Status: ACTIVE | Noted: 2017-03-28

## 2020-12-01 PROCEDURE — 1159F MED LIST DOCD IN RCRD: CPT | Mod: HCNC,S$GLB,, | Performed by: PHYSICIAN ASSISTANT

## 2020-12-01 PROCEDURE — 99999 PR PBB SHADOW E&M-EST. PATIENT-LVL V: ICD-10-PCS | Mod: PBBFAC,HCNC,, | Performed by: PHYSICIAN ASSISTANT

## 2020-12-01 PROCEDURE — 99499 RISK ADDL DX/OHS AUDIT: ICD-10-PCS | Mod: S$GLB,,, | Performed by: PSYCHIATRY & NEUROLOGY

## 2020-12-01 PROCEDURE — 1100F PTFALLS ASSESS-DOCD GE2>/YR: CPT | Mod: HCNC,CPTII,S$GLB, | Performed by: INTERNAL MEDICINE

## 2020-12-01 PROCEDURE — 3008F BODY MASS INDEX DOCD: CPT | Mod: HCNC,CPTII,S$GLB, | Performed by: PHYSICIAN ASSISTANT

## 2020-12-01 PROCEDURE — 1159F PR MEDICATION LIST DOCUMENTED IN MEDICAL RECORD: ICD-10-PCS | Mod: HCNC,S$GLB,, | Performed by: PSYCHIATRY & NEUROLOGY

## 2020-12-01 PROCEDURE — 1125F PR PAIN SEVERITY QUANTIFIED, PAIN PRESENT: ICD-10-PCS | Mod: HCNC,S$GLB,, | Performed by: PSYCHIATRY & NEUROLOGY

## 2020-12-01 PROCEDURE — 3008F PR BODY MASS INDEX (BMI) DOCUMENTED: ICD-10-PCS | Mod: HCNC,CPTII,S$GLB, | Performed by: PSYCHIATRY & NEUROLOGY

## 2020-12-01 PROCEDURE — 3288F FALL RISK ASSESSMENT DOCD: CPT | Mod: HCNC,CPTII,S$GLB, | Performed by: INTERNAL MEDICINE

## 2020-12-01 PROCEDURE — 99214 PR OFFICE/OUTPT VISIT, EST, LEVL IV, 30-39 MIN: ICD-10-PCS | Mod: HCNC,S$GLB,, | Performed by: PSYCHIATRY & NEUROLOGY

## 2020-12-01 PROCEDURE — 3288F FALL RISK ASSESSMENT DOCD: CPT | Mod: HCNC,CPTII,S$GLB, | Performed by: PSYCHIATRY & NEUROLOGY

## 2020-12-01 PROCEDURE — 3077F PR MOST RECENT SYSTOLIC BLOOD PRESSURE >= 140 MM HG: ICD-10-PCS | Mod: HCNC,CPTII,S$GLB, | Performed by: PHYSICIAN ASSISTANT

## 2020-12-01 PROCEDURE — 1125F PR PAIN SEVERITY QUANTIFIED, PAIN PRESENT: ICD-10-PCS | Mod: HCNC,S$GLB,, | Performed by: PHYSICIAN ASSISTANT

## 2020-12-01 PROCEDURE — 1125F AMNT PAIN NOTED PAIN PRSNT: CPT | Mod: HCNC,S$GLB,, | Performed by: INTERNAL MEDICINE

## 2020-12-01 PROCEDURE — 3008F PR BODY MASS INDEX (BMI) DOCUMENTED: ICD-10-PCS | Mod: HCNC,CPTII,S$GLB, | Performed by: PHYSICIAN ASSISTANT

## 2020-12-01 PROCEDURE — 3008F PR BODY MASS INDEX (BMI) DOCUMENTED: ICD-10-PCS | Mod: HCNC,CPTII,S$GLB, | Performed by: INTERNAL MEDICINE

## 2020-12-01 PROCEDURE — 1100F PR PT FALLS ASSESS DOC 2+ FALLS/FALL W/INJURY/YR: ICD-10-PCS | Mod: HCNC,CPTII,S$GLB, | Performed by: PSYCHIATRY & NEUROLOGY

## 2020-12-01 PROCEDURE — 3051F HG A1C>EQUAL 7.0%<8.0%: CPT | Mod: HCNC,CPTII,S$GLB, | Performed by: INTERNAL MEDICINE

## 2020-12-01 PROCEDURE — 1125F AMNT PAIN NOTED PAIN PRSNT: CPT | Mod: HCNC,S$GLB,, | Performed by: PSYCHIATRY & NEUROLOGY

## 2020-12-01 PROCEDURE — 3008F BODY MASS INDEX DOCD: CPT | Mod: HCNC,CPTII,S$GLB, | Performed by: PSYCHIATRY & NEUROLOGY

## 2020-12-01 PROCEDURE — 3079F PR MOST RECENT DIASTOLIC BLOOD PRESSURE 80-89 MM HG: ICD-10-PCS | Mod: HCNC,CPTII,S$GLB, | Performed by: PSYCHIATRY & NEUROLOGY

## 2020-12-01 PROCEDURE — 3075F PR MOST RECENT SYSTOLIC BLOOD PRESS GE 130-139MM HG: ICD-10-PCS | Mod: HCNC,CPTII,S$GLB, | Performed by: PSYCHIATRY & NEUROLOGY

## 2020-12-01 PROCEDURE — 3075F SYST BP GE 130 - 139MM HG: CPT | Mod: HCNC,CPTII,S$GLB, | Performed by: PSYCHIATRY & NEUROLOGY

## 2020-12-01 PROCEDURE — 3079F DIAST BP 80-89 MM HG: CPT | Mod: HCNC,CPTII,S$GLB, | Performed by: PHYSICIAN ASSISTANT

## 2020-12-01 PROCEDURE — 99214 OFFICE O/P EST MOD 30 MIN: CPT | Mod: HCNC,S$GLB,, | Performed by: PHYSICIAN ASSISTANT

## 2020-12-01 PROCEDURE — 3288F PR FALLS RISK ASSESSMENT DOCUMENTED: ICD-10-PCS | Mod: HCNC,CPTII,S$GLB, | Performed by: PHYSICIAN ASSISTANT

## 2020-12-01 PROCEDURE — 1101F PT FALLS ASSESS-DOCD LE1/YR: CPT | Mod: HCNC,CPTII,S$GLB, | Performed by: PHYSICIAN ASSISTANT

## 2020-12-01 PROCEDURE — 3288F PR FALLS RISK ASSESSMENT DOCUMENTED: ICD-10-PCS | Mod: HCNC,CPTII,S$GLB, | Performed by: INTERNAL MEDICINE

## 2020-12-01 PROCEDURE — 1159F MED LIST DOCD IN RCRD: CPT | Mod: HCNC,S$GLB,, | Performed by: PSYCHIATRY & NEUROLOGY

## 2020-12-01 PROCEDURE — 3288F PR FALLS RISK ASSESSMENT DOCUMENTED: ICD-10-PCS | Mod: HCNC,CPTII,S$GLB, | Performed by: PSYCHIATRY & NEUROLOGY

## 2020-12-01 PROCEDURE — 3288F FALL RISK ASSESSMENT DOCD: CPT | Mod: HCNC,CPTII,S$GLB, | Performed by: PHYSICIAN ASSISTANT

## 2020-12-01 PROCEDURE — 1125F PR PAIN SEVERITY QUANTIFIED, PAIN PRESENT: ICD-10-PCS | Mod: HCNC,S$GLB,, | Performed by: INTERNAL MEDICINE

## 2020-12-01 PROCEDURE — 99215 PR OFFICE/OUTPT VISIT, EST, LEVL V, 40-54 MIN: ICD-10-PCS | Mod: HCNC,S$GLB,, | Performed by: INTERNAL MEDICINE

## 2020-12-01 PROCEDURE — 99214 PR OFFICE/OUTPT VISIT, EST, LEVL IV, 30-39 MIN: ICD-10-PCS | Mod: HCNC,S$GLB,, | Performed by: PHYSICIAN ASSISTANT

## 2020-12-01 PROCEDURE — 1100F PTFALLS ASSESS-DOCD GE2>/YR: CPT | Mod: HCNC,CPTII,S$GLB, | Performed by: PSYCHIATRY & NEUROLOGY

## 2020-12-01 PROCEDURE — 3079F DIAST BP 80-89 MM HG: CPT | Mod: HCNC,CPTII,S$GLB, | Performed by: PSYCHIATRY & NEUROLOGY

## 2020-12-01 PROCEDURE — 3008F BODY MASS INDEX DOCD: CPT | Mod: HCNC,CPTII,S$GLB, | Performed by: INTERNAL MEDICINE

## 2020-12-01 PROCEDURE — 1159F PR MEDICATION LIST DOCUMENTED IN MEDICAL RECORD: ICD-10-PCS | Mod: HCNC,S$GLB,, | Performed by: PHYSICIAN ASSISTANT

## 2020-12-01 PROCEDURE — 3079F PR MOST RECENT DIASTOLIC BLOOD PRESSURE 80-89 MM HG: ICD-10-PCS | Mod: HCNC,CPTII,S$GLB, | Performed by: PHYSICIAN ASSISTANT

## 2020-12-01 PROCEDURE — 99499 UNLISTED E&M SERVICE: CPT | Mod: S$GLB,,, | Performed by: PSYCHIATRY & NEUROLOGY

## 2020-12-01 PROCEDURE — 1100F PR PT FALLS ASSESS DOC 2+ FALLS/FALL W/INJURY/YR: ICD-10-PCS | Mod: HCNC,CPTII,S$GLB, | Performed by: INTERNAL MEDICINE

## 2020-12-01 PROCEDURE — 99999 PR PBB SHADOW E&M-EST. PATIENT-LVL V: CPT | Mod: PBBFAC,HCNC,, | Performed by: PHYSICIAN ASSISTANT

## 2020-12-01 PROCEDURE — 99999 PR PBB SHADOW E&M-EST. PATIENT-LVL V: CPT | Mod: PBBFAC,HCNC,, | Performed by: PSYCHIATRY & NEUROLOGY

## 2020-12-01 PROCEDURE — 1125F AMNT PAIN NOTED PAIN PRSNT: CPT | Mod: HCNC,S$GLB,, | Performed by: PHYSICIAN ASSISTANT

## 2020-12-01 PROCEDURE — 3077F SYST BP >= 140 MM HG: CPT | Mod: HCNC,CPTII,S$GLB, | Performed by: PHYSICIAN ASSISTANT

## 2020-12-01 PROCEDURE — 99215 OFFICE O/P EST HI 40 MIN: CPT | Mod: HCNC,S$GLB,, | Performed by: INTERNAL MEDICINE

## 2020-12-01 PROCEDURE — 99214 OFFICE O/P EST MOD 30 MIN: CPT | Mod: HCNC,S$GLB,, | Performed by: PSYCHIATRY & NEUROLOGY

## 2020-12-01 PROCEDURE — 1101F PR PT FALLS ASSESS DOC 0-1 FALLS W/OUT INJ PAST YR: ICD-10-PCS | Mod: HCNC,CPTII,S$GLB, | Performed by: PHYSICIAN ASSISTANT

## 2020-12-01 PROCEDURE — 99999 PR PBB SHADOW E&M-EST. PATIENT-LVL V: ICD-10-PCS | Mod: PBBFAC,HCNC,, | Performed by: PSYCHIATRY & NEUROLOGY

## 2020-12-01 PROCEDURE — 3051F PR MOST RECENT HEMOGLOBIN A1C LEVEL 7.0 - < 8.0%: ICD-10-PCS | Mod: HCNC,CPTII,S$GLB, | Performed by: INTERNAL MEDICINE

## 2020-12-01 RX ORDER — OXYCODONE AND ACETAMINOPHEN 10; 325 MG/1; MG/1
1 TABLET ORAL EVERY 8 HOURS PRN
Qty: 90 TABLET | Refills: 0 | Status: SHIPPED | OUTPATIENT
Start: 2021-02-06 | End: 2020-12-01 | Stop reason: SDUPTHER

## 2020-12-01 RX ORDER — OXYCODONE AND ACETAMINOPHEN 10; 325 MG/1; MG/1
1 TABLET ORAL EVERY 8 HOURS PRN
Qty: 90 TABLET | Refills: 0 | Status: SHIPPED | OUTPATIENT
Start: 2021-02-06 | End: 2021-03-01 | Stop reason: SDUPTHER

## 2020-12-01 RX ORDER — DULOXETIN HYDROCHLORIDE 60 MG/1
60 CAPSULE, DELAYED RELEASE ORAL NIGHTLY
COMMUNITY
End: 2020-12-01 | Stop reason: SDUPTHER

## 2020-12-01 RX ORDER — OXYCODONE AND ACETAMINOPHEN 10; 325 MG/1; MG/1
1 TABLET ORAL EVERY 8 HOURS PRN
Qty: 90 TABLET | Refills: 0 | Status: SHIPPED | OUTPATIENT
Start: 2021-01-08 | End: 2021-02-06

## 2020-12-01 RX ORDER — OXYCODONE AND ACETAMINOPHEN 10; 325 MG/1; MG/1
1 TABLET ORAL EVERY 8 HOURS PRN
Qty: 90 TABLET | Refills: 0 | Status: SHIPPED | OUTPATIENT
Start: 2020-12-10 | End: 2021-01-08

## 2020-12-01 RX ORDER — OXYCODONE AND ACETAMINOPHEN 10; 325 MG/1; MG/1
1 TABLET ORAL EVERY 8 HOURS PRN
Qty: 90 TABLET | Refills: 0 | Status: SHIPPED | OUTPATIENT
Start: 2021-01-08 | End: 2020-12-01 | Stop reason: SDUPTHER

## 2020-12-01 RX ORDER — DULOXETIN HYDROCHLORIDE 60 MG/1
60 CAPSULE, DELAYED RELEASE ORAL NIGHTLY
Qty: 90 CAPSULE | Refills: 2 | Status: SHIPPED | OUTPATIENT
Start: 2020-12-01 | End: 2021-05-13

## 2020-12-01 RX ORDER — OXYCODONE AND ACETAMINOPHEN 10; 325 MG/1; MG/1
1 TABLET ORAL EVERY 8 HOURS PRN
Qty: 90 TABLET | Refills: 0 | Status: SHIPPED | OUTPATIENT
Start: 2020-12-10 | End: 2020-12-01 | Stop reason: SDUPTHER

## 2020-12-01 NOTE — PROGRESS NOTES
Primary Care Provider Appointment    Subjective:      Patient ID: Nathalie Santiago is a 70 y.o. female. hx of  sarcoidosis, hx of breast CA/uterine/ovarian cancer, diabetes complicated by gastroparesis, HLD, HTN,  anxiety and depression, hx of CVA (brain stem stroke), DJD, JOSE LUIS, chronic low back and hip pain     Chief Complaint: Follow-up (ED) and Suture / Staple Removal    Seen for closer appt due to recent ER visit on 11/21 after fall after tripping over dog leash which pt sustained a laceration to her nose requiring stiches. ER report received and reviewed from [facility] in [place]. Of note, pt found to have 1.8 cm right thyroid nodule on CT imaging. Rest of imaging was benign.     Pt had 2 other appts with pain management and psychiatry today.   She needs suture removal today.     Vascular surgery - has not received a call about this. She has brought some leggings which help her leg pain.     She is still on cymbalta, she is off zoloft. She feels well mood wise, is now going to see Dr. Pat in 6 months. At times her relationship with her daughter is strained lyndsey since her granddtr has ADHD, but when things get tense she goes to her own home. She reports being able to manage this well.     Concerning shortness of breath -  Most of it is with heavy exertion. Feels like this has improved since last visit. dEnies any worsening or sob at rest. She does not feel like sarcoidosis is active.     Her daughter had thyroid cancer so she wants to have ultrasound, if abnormal looking she wants to go ahead and have it taken out, does not want biopsy due to family history with biopies and hx of cancer.     She admits to not checking her glucose levels regularly.         Other Providers:  (External Record Review)   LHC - minimal CAD 20% stensosis of proximal and mid LAD. myocardial bridge but 30-40% stenosis during systole 5/2018  cardiology last visit 7/2018 - recommended to f/u with pcp and rtc prn (   Dov)           Past Surgical History:   Procedure Laterality Date    APPENDECTOMY      BREAST SURGERY      CHOLECYSTECTOMY      CYSTOCELE REPAIR      FRACTURE SURGERY Right     foot    HYSTERECTOMY      SAADIA/BSO    MASTECTOMY Bilateral     b/l mastectomy    OOPHORECTOMY      PLEURA BIOPSY      RECTAL SURGERY      rectocele      TONSILLECTOMY         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    CVA (cerebral vascular accident)- Confirmed by neurology 9/6/2016    Degenerative joint disease of cervical and lumbar spine 8/1/2014    Depression     Diabetes mellitus     Diabetes mellitus, type II     Gastroenteritis 12/19/2016    History of malignant phylloides tumor of breast 2/15/2013    Hyperlipidemia     Hyperopia with presbyopia 2/18/2013    Hypertension     Hypertension     Hypothyroidism     Immunosuppressed status 9/3/2015    Leukocytosis 11/17/2016    Migraine     Mitral valve regurgitation     Normochromic anemia 3/28/2017    Nuclear sclerosis 2/18/2013    Obesity     JOSE LUIS (obstructive sleep apnea)     Ovarian cancer     Pneumonia     Polyneuropathy     PVD (posterior vitreous detachment) 2/18/2013    Restless leg syndrome     Ruptured disk     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    Sarcoidosis 2006    Sarcoidosis of lung     Squamous cell carcinoma     Trouble in sleeping     Uterine cancer     Venous insufficiency         Social History     Socioeconomic History    Marital status: Single     Spouse name: Not on file    Number of children: 1    Years of education: Not on file    Highest education level: Bachelor's degree (e.g., BA, AB, BS)   Occupational History    Occupation: retired    Social Needs    Financial resource strain: Not hard at all    Food insecurity     Worry: Never true     Inability: Never true    Transportation needs     Medical: No     Non-medical: No   Tobacco Use    Smoking status: Never Smoker    Smokeless tobacco: Never Used   Substance and Sexual Activity    Alcohol use: No     Frequency: Monthly or less     Drinks per session: 1 or 2     Binge frequency: Never    Drug use: No     Types: Oxycodone     Comment: prescription Oycodone    Sexual activity: Never   Lifestyle    Physical activity     Days per week: 0 days     Minutes per session: 0 min    Stress: Not at all   Relationships    Social connections     Talks on phone: More than three times a week     Gets together: More than three times a week     Attends [employer]ical service: Never     Active member of club or organization: No     Attends meetings of clubs or organizations: Never     Relationship status: Never    Other Topics Concern    Not on file   Social History Narrative    Raised in [place]     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   HENT: Negative for congestion.    Eyes: Negative for visual disturbance.   Respiratory: Negative for cough and shortness of breath.    Cardiovascular: Negative for chest pain and palpitations.   Gastrointestinal: Negative for abdominal pain, constipation, diarrhea and nausea.   Musculoskeletal: Positive for arthralgias and back pain.   Skin:  "Negative for rash and wound.   Neurological: Negative for dizziness and light-headedness.   Psychiatric/Behavioral: Negative for confusion, decreased concentration and dysphoric mood. The patient is not nervous/anxious.        Objective:   /83 (BP Location: Right arm, Patient Position: Sitting, BP Method: Large (Automatic))   Pulse 92   Temp 97.5 °F (36.4 °C) (Temporal)   Ht 5' 6" (1.676 m)   Wt 83.3 kg (183 lb 10.3 oz)   LMP  (LMP Unknown)   SpO2 97%   BMI 29.64 kg/m²     Physical Exam  Vitals signs reviewed.   Constitutional:       General: She is not in acute distress.     Appearance: Normal appearance.   HENT:      Head: Normocephalic. Laceration present.        Comments: Mildly deep laceration on bridge of nose with scabbing noted.   Eyes:      Extraocular Movements: Extraocular movements intact.      Conjunctiva/sclera: Conjunctivae normal.   Cardiovascular:      Rate and Rhythm: Normal rate and regular rhythm.   Pulmonary:      Effort: Pulmonary effort is normal. No respiratory distress.      Breath sounds: Normal breath sounds. No wheezing, rhonchi or rales.   Abdominal:      General: Abdomen is flat. Bowel sounds are normal. There is no distension.      Palpations: Abdomen is soft.   Skin:     General: Skin is warm and dry.   Neurological:      General: No focal deficit present.      Mental Status: She is alert and oriented to person, place, and time. Mental status is at baseline.               Lab Results   Component Value Date    WBC 7.21 08/06/2020    HGB 12.8 08/06/2020    HCT 40.0 08/06/2020     08/06/2020    CHOL 125 08/06/2020    TRIG 198 (H) 08/06/2020    HDL 33 (L) 08/06/2020    ALT 16 08/06/2020    AST 19 08/06/2020     08/06/2020    K 4.0 08/06/2020     08/06/2020    CREATININE 0.9 08/06/2020    BUN 24 (H) 08/06/2020    CO2 27 08/06/2020    TSH 3.467 08/06/2020    INR 1.0 06/25/2015    HGBA1C 7.3 (H) 08/06/2020       RESULTS: Reviewed labs and images " today    Assessment:   70 y.o. female with multiple co-morbid illnesses here to continue work-up of chronic issues notably sarcoidosis, hx of breast CA/uterine/ovarian cancer, diabetes complicated by gastroparesis, HLD, HTN,  anxiety and depression, hx of CVA (brain stem stroke), DJD, JOSE LUIS, chronic low back and hip pain     Here for f/u due to recent fall requiring stitches       Plan:     Problem List Items Addressed This Visit        Cardiac/Vascular    Hypertension associated with diabetes     bp well controlled   Cont current regimen          Varicose veins of both lower extremities     Will f/u on vascular referral             Immunology/Multi System    Sarcoidosis of lung     Monitor   Per patient not active currently             Endocrine    Type 2 diabetes mellitus, uncontrolled     Labs due today          Hypothyroidism due to acquired atrophy of thyroid    Relevant Orders    TSH       Orthopedic    Laceration of nose     Cleansed wound and removed stitches   Discussed plastics referral for second opinion as she may have significant scar - pt prefers to hold off on this, think her glasses may obsure view of this            Other    Thyroid nodule greater than or equal to 1.5 cm in diameter incidentally noted on imaging study - Primary     Obtain ultrasound  Pending on results, will refer to ENT for removal per pt request.          Relevant Orders    US Soft Tissue Head Neck Thyroid        Health Maintenance       Date Due Completion Date    TETANUS VACCINE 07/22/1967 ---    Shingles Vaccine (1 of 2) 07/22/1999 ---    DEXA SCAN 09/16/2017 9/16/2014    Foot Exam 06/07/2020 6/7/2019    Override on 3/3/2016: Done    Override on 11/30/2015: Done (LUPER)    Override on 3/3/2015: Done    Influenza Vaccine (1) 08/01/2020 11/28/2016 (Declined)    Override on 11/28/2016: Declined    Override on 9/3/2015: Declined    Eye Exam 09/06/2020 9/6/2019    Hemoglobin A1c 11/06/2020 8/6/2020    Urine Microalbumin 12/12/2020  12/12/2019    Lipid Panel 08/06/2021 8/6/2020    Colorectal Cancer Screening 04/29/2025 4/29/2015          Follow up in about 3 months (around 3/1/2021) for diabetes, sob .  Total face-to-face time was 60 min, greater than 50% of this was spent on counseling and coordination of care. The following issues were discussed:  Sob on exertion, varicose veins, OA, laceration, thyroid nodule     Petra Broussard MD  Internal Medicine- Geriatrics  [facility] [place]

## 2020-12-01 NOTE — PROGRESS NOTES
CC: left sided low back and left hip pain    Interval History: Ms. Santiago is a 69 y.o. female with chronic low back and hip pain who presents for her follow up.  She had a synvisc one injection and a intra articular steroid injection in right knee with benefit for only 1 week.  She continues to have lower back, left greater than right lower back pain.  Left GTB and right knee pain has worsened. Her last left GTB injection provided some short-lived relief.   She had a series of Euflexxa injections in 2014. She continues to take percocet 10mg q 8 hrs as needed for pain with moderate benefits.  No side effects reported.  Able to perform daily activities with the medications. Flexeril 10 mg and Gabapentin 600 mg BID has been helpful.  She fell over her dog leash and has a laceration on her nose.      Prior HPI: The patient is a 64-year-old woman with a history of sarcoidosis, breast CA, diabetes, anxiety and depression who presents in referral from Dr. Nguyen to Dr. Perez for multiple pain complaints including back pain, bilateral hip pain and right shoulder pain.  She is following up with me since I am much closer to her.   She presents today for multiple pain complaints.  She recently was hospitalized for pneumonia, UTI and sarcoidosis exacerbation per patient.  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  She has had past GTB injections with moderate benefit and had an exacerbation of her pain recently.  She was able to receive left GTB with Dr. Perez on 10/3/2014 that provided >50% relief of her left hip and leg pain.     She continues to take Prednisone 5mg daily. She continues Percoet 7.5mg about 3x/day as needed with moderate benefit.  She presents today with worsening right knee pain.  She states having history of right knee osteoarthritis, but has not had any treatment for her knee pain.  Continues to have improvement in her knee pain following completion  "of visco supplementation injection in her right knee.  Her back and hip pain remain tolerable with her medications.  She continues to take Percoct 7.5mg q8hrs as needed with moderate benefit.  No reported side effects.       Pain intervention history: She takes hydrocodone 7-325 2-4 times a day.  Also has undergone epidural steroid injection at L1/2 without relief.  She has been doing cervical traction with good relief of her neck pain.  Previous trochanteric bursa injections with good relief.    ROS:She reports fatigability, itching, headaches, swollen glands, chest pain and shortness of breath, nausea vomiting, easy bruising, back pain, joint stiffness, dizziness, difficulty sleeping, anxiety, depression and loss of balance.  Balance of review of systems is negative.    Medical, surgical, family and social history reviewed elsewhere in record.    Medications/Allergies: See med card    Vitals:    12/01/20 1219   BP: (!) 150/86   Pulse: 96   Weight: 83.5 kg (184 lb)   Height: 5' 6" (1.676 m)   PainSc:   8   PainLoc: Back       Physical exam:  Gen: A and O x3, pleasant, well-groomed  Skin: No rashes or obvious lesions  HEENT: Laceration on nose  CVS: RRR  Resp: non labored breathing  Abdomen: Soft, NT/ND, normal bowel sounds present.  Neuro:  Lower extremities:   +right knee crepitus. LE erythema and swelling. TTP right 2 and 3rd metatarsals.   Reflexes: Patellar 2+, Achilles 2+ bilaterally.  Sensory:  Intact   Lumbar spine:  Lumbar spine: ROM is moderately reduced with flexion extension and oblique extension with increased pain in all directions.    Orlando's test causes pain on both sides.    Supine straight leg raise is negative bilaterally.    Internal and external rotation of the hip causes increased pain in left groin.  Myofascial exam: Tenderness to palpation over both GTB.      Imaging:  Cervical spine MRI report 10/5/12: There is dextroscoliosis in the lower cervical/upper thoracic region.  There is very " mild foraminal stenosis on the left at C6/7.    MRI thoracic spine 11/21/2008: No significant degenerative disc disease or central stenosis.    MRI lumbar spine 8/14/08: L1/2 small broad-based central protrusion with mild effacement of ventral thecal sac but not distorting exiting roots.  L2/3 and L3/4 unremarkable.  At L4/5 there is mild central stenosis with bulging annulus anteriorly and facet hypertrophic degenerative change posteriorly.  No focal disc herniation and neural foramina are patent.  At L5/S1 there is minimal bulging of the annulus with no thecal sac compression.  The small annular fissure within the posterior annulus.  Foramina are patent bilaterally, no stenosis mild, degenerative changes in the facets.    Xray Right Knee 10/13/14  Mild tricompartmental degenerative changes present. No joint effusion. The soft tissues are unremarkable.    Assessment:  Ms. Santiago is a 65-year-old woman with a history of sarcoidosis, breast CA, diabetes, anxiety and depression who presents in referral from Dr. Nguyen for multiple pain complaints including back pain, bilateral hip pain and knee pain   1. Other spondylosis, lumbar region    2. DDD (degenerative disc disease), lumbar    3. Spondylolisthesis of lumbosacral region    4. Chronic pain of right knee        PLAN:  1. I have stressed the importance of physical activity and exercise to improve overall health.    2.  Percocet 10mg q8hrs as needed.  Script given for three months.  reviewed. Previous UDS consistent  3.  Continue Flexeril 10 mg q 8 h.   4.  I believe her low back pain maybe due to facet arthropathy and have recommended lumbar medial branch blocks as a diagnostic procedure.  If successful, would proceed with radiofrequency ablation.  May consider this procedure in the future  5. Recommend series of 3 Euflexxa injections. She wishes to do this in the future. Unfortunately this was denied by insurance  6. Follow up in three months   All  medication management was performed by Dr. Earl in Dr. Temple's absence

## 2020-12-01 NOTE — ASSESSMENT & PLAN NOTE
Cleansed wound and removed stitches   Discussed plastics referral for second opinion as she may have significant scar - pt prefers to hold off on this, think her glasses may obsure view of this

## 2020-12-01 NOTE — PROGRESS NOTES
Outpatient Psychiatry Follow-Up Visit (MD/NP)  h12/1/2020    Clinical Status of Patient:  Outpatient (Ambulatory)    Chief Complaint:  Nathalie Santiago is a 70 y.o. female who presents today for follow-up of depression, anxiety, adjustment.  Met with patient alone.  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   Pt reports depression and anxiety began in context of significant psychosocial stressors. Her mother passed away in 2000, her father in 2004. She was diagnosed with breast cancer in 2005, had bilateral mastectomies and then a failed reconstruction. She also has sarcoidosis of the lungs. She sold her home in [place] and moved to [place] because she could no longer care for herself due to medical issues. She has become increasingly dependent on others. She moved back here several years ago and is currently living with her brother and his significant other. The relationship with the latter is strained; her niece who has battled addiction is currently serving time in residential.   Pt has had significant health issues with worsening depression/anxiety in the last several months.  Intake 2013.     Past Psychiatric History:  Tx of depression, anxiety and panic attacks   First saw a psychiatrist 2007   No prior hospitalizations  No suicide attempts or self injury   PCP most recently treating her in [place]   Previous meds: Restoril (no effect), Dalmane (no effect), Klonopin, Ativan, could not afford Cymbalta but helped pain and depression, Wellbutrin XL (3rd day fell out of bed x 4, bruised, amnestic), Effexor (raised her BP), no antipsychotics or mood stabilizers, Citalopram (some benefit).   Trazodone (no benefit), mirtazapine, melatonin (3 mg ineffective, higher dose HA), ativan, clonazepam, amitriptyline, Seroquel (over sedating)  No prior counseling         Past medical  "history:  DM2  RLS  Sarcoidosis of lung   Hx ovarian CA  HTN  Hx of malignant phylloides tumor of breast   Bilateral cataract surgery   Chronic neck, back and hip pain   Arthritis   MVR  Hx CVA  Hx DJD  No cardiac hx   No hx seizures or head injury      Social History:  Former RN x 35 years, SSID since 2008  Lives with her brother and his SO ex wife heather Briggs, [place]   3 prior marriages, , 1 daughter   Raised in [place] in intact home - 3 brothers and 2 sisters - "stable"  No  or legal hx   No hx abuse   Hobbies: sewing, beading, reading   Education: Some college   Mandaeism: [employer]ism     Substance History:  Never smoked  Rare alcohol: 2 times a year   No illicit drug. Denies misuse of opiates, benzodiazepines   Moderate caffeine: coffee, tea, cokes     Interim hx:   Patient presents for follow-up.  She has discontinued sertraline in August.  Has not noticed any change off of it.  She does not feel it was blunting her.  She has continued to take Cymbalta, and takes trazodone just occasionally.  Patient had serious fall recently.  She got tripped up over a dog leash and fell flat on her face November 21st.  She sustained a laceration to her nose.  She will follow up today with Dr. Broussard to have the sutures removed.  pt found to have 1.8 cm   right thyroid nodule on CT imaging related to fall.    Patient continues to take gabapentin 300 mg in the morning 900 mg nightly.  She is living back and forth between her daughter Effie's house in [place], and also brother's home in [place].  Daughter is under an incredible amount of stress.  Her son is incarcerated, and she has custody of his 4-year-old daughter.  She tends to be irritable and rigid with her granddaughter.  Patient helps out considerably with her which is exhausting.  Patient feels she is managing fairly well considering the stressors.      Denies suicidal/homicidal ideations.  No self harm or violence.   Denies symptoms of arnel/psychosis. " "    Pt had LHC earlier in year due to chest tightness.  Her EF was 78% and she had non obstructive disease (20%) per pt report, MD is Dr Bal.     CPAP non-compliant.    No tobacco.   1-2 times a week, Mixed drink alcohol, no drug use.     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  Does not maintain nursing license.   no hx suicide attempts, no self harm.  Guns in home are locked up.      Normal clock drawing and MOCA 30/30 previous visit.     Review of Systems   PSYCHIATRIC: see above  CONSTITUTIONAL: weight stable  MUSCULOSKELETAL: 8/10 facial pain today.  CV: occasional exertional SOB, no chest pain   NEURO: fall is noted above    Past Medical, Family and Social History: The patient's past medical, family and social history have been reviewed and updated as appropriate within the electronic medical record - see encounter notes.    Medications:  Cymbalta 60 mg daily   Gabapentin 300 mg in am and 900 mg nightly  Trazodone  mg nightly p.r.n. insomnia   Percoet - pain management    Compliance:  D/c Sertraline, no change     Side effects:  None currently   Amnesia, complex sleep related symptoms on Xanax, Ambien   headaches when Ambien taken too often; dry mouth on amitriptyline, elevated BP on Effexor XR, falls    Risk Parameters:  Patient reports no suicidal ideation  Patient reports no homicidal ideation  Patient reports no self-injurious behavior  Patient reports no violent behavior    Exam (detailed: at least 9 elements; comprehensive: all 15 elements)   Constitutional  Vitals:  Most recent vital signs, dated more than 90 days prior to this appointment, were reviewed.         General:  age appropriate, casually dressed,  calm, adequate grooming, laceration, dressing noted to nose         Musculoskeletal  Muscle Strength/Tone:  no tremor   Gait & Station:  No ataxia      Psychiatric  Speech:  no latency; no press, spontaneous   Mood & Affect:  "managing"   Restricted    Thought Process: "  Linear    Associations:  intact   Thought Content:  normal, no suicidality, no homicidality, delusions, or paranoia, hallucinations: (auditory: no, visual: no)   Insight:  has awareness of illness   Judgement: behavior is adequate to circumstances   Orientation:  grossly intact; alert and oriented x 4    Memory: intact for content of interview,    Language: grossly intact no fluency issues    Attention Span & Concentration:  able to focus grossly intact    Fund of Knowledge:  intact and appropriate to age and level of education     Assessment and Diagnosis   Status/Progress: Based on the examination today, the patient's problem(s) is/are under fair control.   New problems have not been presented today.   Comorbidities are complicating management of the primary condition.  (multiple medical problems)     Impression: 71 yo female with multiple medical problems and hx of depression, insomnia, and anxiety presents for follow up. Significant family stressors and health issues. Pt has required multiple sleep aides in past. She has required chronic benzodiazepine for stabilization, has failed multiple sleep aides;  Sedative-hyponotics were discontinued in interim due to health issues, reports of falls.  She contacted me previously with worsening depression/anxiety - low dose clonazepam restarted, but is showing signs of stabilization both physically and mentally.  She reported that clonazepam is not helping and requested to restart Xanax/Ambien (previous meds).  Pt reports fall in interim, and also interaction/amnesia with Nyquil + Xanax which could have been dangerous. + Chronic opioid use.  She ended up staying on Sertraline and this medication has been helping with her maintaining stable mood following her niece's death.   Pt mixed Ambien and Xanax and had amnesia, complex sleep related behaviors.  I have discussed with her and daughter that sedatives are not safe medications for her.  Scored perfect MOCA last visit.   Did not tolerate Seroquel, but pt was becoming more active, living with daughter, less isolation - pt was improving.   At last visit, she reported pain has worsened, which limited her activity and leading to increased anxiety, depression, motivation issues   Pt's daughter sustained head injury in interim - pt is primary caregiver to her and great granddaughter.  She is managing, but overwhelmed at times.  Has weaned off of Sertraline in interim.  Currently maintained on Cymbalta.  Trazodone has helped insomnia, as has activity in the home.    MDD,recurrent episode, in partial remission   GARFIELD  Insomnia disorder   Chronic pain  sarcoidosis, hx phyloides tumor of breast, .DM2, RLS, arthritis, chronic neck, hip, and back pain, hx avulsion fracture, JOSE LUIS, hx CVA, recent fall with facial laceration    GAF: 65    Strengths and Liabilities: Strength: Patient accepts guidance/feedback, Strength: Patient is expressive/articulate., Strength: Patient has reasonable judgment.   Treatment Goals: Specify outcomes written in observable, behavioral terms:   Anxiety: acquiring relapse prevention skills and reducing physical symptoms of anxiety   Depression: acquiring relapse prevention skills, increasing energy, increasing motivation, reducing excessive guilt and reducing negative automatic thoughts     Treatment Plan/Recommendations:   Medication Management: The risks and benefits of medication were discussed with the patient.  1.  Continue Cymbalta 60 mg nightly.  Discussed potential for medication interactions, serotonin syndrome, hepatic impairment.  2. Call to report any worsening of symptoms or problems with the medication  3. Pt instructed to go to ER with thoughts of harming self, others   4.  Remain off of sertraline  5.  Consider psychotherapy - pt declines for now  6.  Continue trazodone  mg nightly p.r.n. insomnia - she is no longer taking nightly  7.  Patient feels current meds are adequate.  8.  Patient has  follow-up today with PCP, Dr. Broussard.  9.  Patient does not feel her great granddaughter is not in any imminent risk of harm with her daughter.  She is working to support her daughter with her.  Daughter has refused to seek treatment for her own mood symptoms per patient.    -Spent 30 min face to face with the pt; >50% time spent in counseling   -Supportive therapy and psychoeducation provided  -R/B/SE's of medications discussed with the pt who expresses understanding and chooses to take medications as prescribed.   -Pt instructed to call clinic, 911 or go to nearest emergency room if sxs worsen or pt is in   crisis. The pt expresses understanding.       Return to Clinic:  6 months or sooner PRN

## 2020-12-10 ENCOUNTER — HOSPITAL ENCOUNTER (OUTPATIENT)
Dept: RADIOLOGY | Facility: HOSPITAL | Age: 70
Discharge: HOME | End: 2020-12-10
Attending: INTERNAL MEDICINE
Payer: MEDICARE

## 2020-12-10 DIAGNOSIS — E04.1 THYROID NODULE GREATER THAN OR EQUAL TO 1.5 CM IN DIAMETER INCIDENTALLY NOTED ON IMAGING STUDY: ICD-10-CM

## 2020-12-10 PROCEDURE — 76536 US SOFT TISSUE HEAD NECK THYROID: ICD-10-PCS | Mod: 26,HCNC,, | Performed by: RADIOLOGY

## 2020-12-10 PROCEDURE — 76536 US EXAM OF HEAD AND NECK: CPT | Mod: 26,HCNC,, | Performed by: RADIOLOGY

## 2020-12-10 PROCEDURE — 76536 US EXAM OF HEAD AND NECK: CPT | Mod: TC,HCNC

## 2020-12-11 DIAGNOSIS — E04.1 THYROID NODULE GREATER THAN OR EQUAL TO 1.5 CM IN DIAMETER INCIDENTALLY NOTED ON IMAGING STUDY: Primary | ICD-10-CM

## 2020-12-14 ENCOUNTER — TELEPHONE (OUTPATIENT)
Dept: PRIMARY CARE CLINIC | Facility: CLINIC | Age: 70
End: 2020-12-14

## 2020-12-14 ENCOUNTER — PATIENT OUTREACH (OUTPATIENT)
Dept: ADMINISTRATIVE | Facility: HOSPITAL | Age: 70
End: 2020-12-14

## 2020-12-14 NOTE — LETTER
AUTHORIZATION FOR RELEASE OF   CONFIDENTIAL INFORMATION    Dear DR. DUENAS,    We are seeing Nathalie Santiago, date of birth 1950, in the clinic at [facility]. Petra Broussard MD is the patient's PCP. Nathalie Santiago has an outstanding lab/procedure at the time we reviewed her chart. In order to help keep her health information updated, she has authorized us to request the following medical record(s):        (  )  MAMMOGRAM                                      ( X )  COLONOSCOPY      (  )  PAP SMEAR                                          (  )  OUTSIDE LAB RESULTS     (  )  DEXA SCAN                                          (  )  EYE EXAM            (  )  FOOT EXAM                                          (  )  ENTIRE RECORD     (  )  OUTSIDE IMMUNIZATIONS                 (  )  _______________         Please fax records to [facility], Teresa E Garrison, MD, [phone]    Elizabeth Alonso LPN  Clinical Care Coordinator  [address] [address]  P: [phone]  F: [phone]            Patient Name: Nathalie Santiago  : 1950  Patient Phone #: [phone]

## 2020-12-14 NOTE — TELEPHONE ENCOUNTER
----- Message from Sabina Whalen PharmD sent at 12/14/2020 12:20 PM CST -----  Regarding: Patient Discharge Request  Adal Broussard,    Your patient Nathalie Santiago was enrolled in HTN and DM Digital Medicine Programs. Unfortunately, she has requested discharge from Digital Medicine monitoring and we wanted to make you aware.     Thanks for your support of Digital Medicine programs! Please let me know if you any questions or concerns.    Sincerely,     Sabina Whalen, PharmD  Digital Medicine Clinical Pharmacist   [phone]

## 2020-12-14 NOTE — PROGRESS NOTES
COLORECTAL gap report review. HM, chart, care everywhere, media reviewed.    Records requested: DR. DUENAS

## 2020-12-14 NOTE — PROGRESS NOTES
"Digital Medicine: Clinician Follow-Up    Called patient for digital medicine follow up. States that she is doing well. Checking blood pressure and blood glucose on secondary device. She does not wish to manually upload readings or begin using iHealth device. States that now is not the best time for her to participate. Scheduled to have several surgeries after the "first of the year" and she is not sure when she would begin to submit readings. Discussed discharge from digital medicine and patient agreed that this is best for her at this time.     The history is provided by the patient.      Review of patient's allergies indicates:   -- Keflex (cephalexin) -- Anaphylaxis   -- Penicillins -- Anaphylaxis   -- Vasotec (enalapril maleate) -- Other (See Comments)    --  Causes her to pass out   -- Bupropion -- Other (See Comments)    --  Loss of memory   -- Ciprofloxacin (bulk)     --  Bones ache, myalgia   -- Enalapril    -- Wellbutrin (bupropion hcl) -- Other (See Comments)    --  Loss of memory   -- Zithromax (azithromycin)     --  Patient states medication does not work on her-not             effective   -- Codeine -- Itching and Nausea Only        Last 5 Patient Entered Readings                                      Current 30 Day Average:      Recent Readings 10/18/2020 10/7/2020 10/7/2020 10/7/2020 10/4/2020    SBP (mmHg) 159 147 142 149 153    DBP (mmHg) 90 68 78 66 87    Pulse 73 89 88 86 77        Last 6 Patient Entered Readings  Most Recent A1c: 6.7% on 12/10/2020  (Goal: 8%)     Recent Readings 11/15/2020 11/15/2020 11/14/2020 11/13/2020 10/23/2020    Blood Glucose (mg/dL) 140 124 181 139 153               Depression Screening  Did not address depression screening.    Sleep Apnea Screening    Did not address sleep apnea screening.     Medication Affordability Screening  Did not address medication affordability screening.           ASSESSMENT(S)Due to lack of readings.   Hypertension Plan  Continue current " therapy.    Diabetes Plan  Continue current therapy.  Follow up with PCP as scheduled.  Patient provided with contact information for digital medicine and provider notified of removal.     Addressed patient questions and patient has my contact information if needed prior to next outreach. Patient verbalizes understanding.                 Topic    Eye Exam      There are no preventive care reminders to display for this patient.      Hypertension Medications             amLODIPine (NORVASC) 5 MG tablet TAKE 1 TABLET EVERY DAY    hydroCHLOROthiazide (HYDRODIURIL) 25 MG tablet TAKE 1 TABLET EVERY DAY    metoprolol succinate (TOPROL-XL) 25 MG 24 hr tablet Take 1 tablet (25 mg total) by mouth once daily.    spironolactone (ALDACTONE) 25 MG tablet TAKE 1 TABLET EVERY DAY        Diabetes Medications             insulin aspart U-100 (NOVOLOG FLEXPEN U-100 INSULIN) 100 unit/mL (3 mL) InPn pen Use per sliding scale    insulin  unit/mL injection Inject 30 Units into the skin 2 (two) times daily before meals.    insulin regular 100 unit/mL Inj injection Inject into the skin.    metFORMIN (GLUCOPHAGE-XR) 500 MG 24 hr tablet Take 2 tablets (1,000 mg total) by mouth 2 (two) times daily with meals. For diabetes

## 2020-12-17 ENCOUNTER — PATIENT OUTREACH (OUTPATIENT)
Dept: ADMINISTRATIVE | Facility: HOSPITAL | Age: 70
End: 2020-12-17

## 2020-12-17 ENCOUNTER — TELEPHONE (OUTPATIENT)
Dept: ADMINISTRATIVE | Facility: HOSPITAL | Age: 70
End: 2020-12-17

## 2020-12-17 NOTE — PROGRESS NOTES
EXTERNAL COLONOSCOPY UPDATED.    EYE EXAM RECEIVED FROM DR REID ON 12/17/20.  NOT A DIABETIC EXAM AND THERE IS NO MENTION OF DIABETES IN REPORT

## 2020-12-31 ENCOUNTER — PATIENT OUTREACH (OUTPATIENT)
Dept: ADMINISTRATIVE | Facility: HOSPITAL | Age: 70
End: 2020-12-31

## 2021-01-01 NOTE — PROGRESS NOTES
Patient on [employer] Non-compliant report for DM eye exam.   Care Everywhere, Media tab, chart reviewed.   Eye exam 2019 located per chart review and hyperlinked to JESSICA ALVARENGA

## 2021-01-04 ENCOUNTER — PATIENT MESSAGE (OUTPATIENT)
Dept: ADMINISTRATIVE | Facility: HOSPITAL | Age: 71
End: 2021-01-04

## 2021-02-10 ENCOUNTER — TELEPHONE (OUTPATIENT)
Dept: PRIMARY CARE CLINIC | Facility: CLINIC | Age: 71
End: 2021-02-10

## 2021-02-18 ENCOUNTER — TELEPHONE (OUTPATIENT)
Dept: PRIMARY CARE CLINIC | Facility: CLINIC | Age: 71
End: 2021-02-18

## 2021-02-19 ENCOUNTER — OFFICE VISIT (OUTPATIENT)
Dept: PRIMARY CARE CLINIC | Facility: CLINIC | Age: 71
End: 2021-02-19
Payer: MEDICARE

## 2021-02-19 DIAGNOSIS — J45.51 ASTHMA EXACERBATION, NON-ALLERGIC, SEVERE PERSISTENT: ICD-10-CM

## 2021-02-19 DIAGNOSIS — R25.2 MUSCLE CRAMPING: ICD-10-CM

## 2021-02-19 DIAGNOSIS — E87.6 HYPOKALEMIA: Primary | ICD-10-CM

## 2021-02-19 DIAGNOSIS — I73.9 PVD (PERIPHERAL VASCULAR DISEASE): ICD-10-CM

## 2021-02-19 DIAGNOSIS — I15.2 HYPERTENSION ASSOCIATED WITH DIABETES: ICD-10-CM

## 2021-02-19 DIAGNOSIS — R53.81 MALAISE: ICD-10-CM

## 2021-02-19 DIAGNOSIS — E11.8 TYPE 2 DIABETES WITH COMPLICATION: ICD-10-CM

## 2021-02-19 DIAGNOSIS — E11.59 HYPERTENSION ASSOCIATED WITH DIABETES: ICD-10-CM

## 2021-02-19 DIAGNOSIS — E83.42 HYPOMAGNESEMIA: ICD-10-CM

## 2021-02-19 DIAGNOSIS — R11.0 CHRONIC NAUSEA: ICD-10-CM

## 2021-02-19 DIAGNOSIS — J44.89 CHRONIC OBSTRUCTIVE ASTHMA: ICD-10-CM

## 2021-02-19 PROCEDURE — 99215 PR OFFICE/OUTPT VISIT, EST, LEVL V, 40-54 MIN: ICD-10-PCS | Mod: 95,,, | Performed by: INTERNAL MEDICINE

## 2021-02-19 PROCEDURE — 99215 OFFICE O/P EST HI 40 MIN: CPT | Mod: 95,,, | Performed by: INTERNAL MEDICINE

## 2021-02-19 RX ORDER — PREDNISONE 20 MG/1
40 TABLET ORAL DAILY
Qty: 10 TABLET | Refills: 0 | Status: SHIPPED | OUTPATIENT
Start: 2021-02-19 | End: 2021-08-02 | Stop reason: SDUPTHER

## 2021-02-19 RX ORDER — IPRATROPIUM BROMIDE AND ALBUTEROL SULFATE 2.5; .5 MG/3ML; MG/3ML
SOLUTION RESPIRATORY (INHALATION)
Qty: 120 VIAL | Refills: 0 | Status: SHIPPED | OUTPATIENT
Start: 2021-02-19 | End: 2021-10-27 | Stop reason: SDUPTHER

## 2021-02-25 DIAGNOSIS — E11.69 HYPERLIPIDEMIA ASSOCIATED WITH TYPE 2 DIABETES MELLITUS: Primary | ICD-10-CM

## 2021-02-25 DIAGNOSIS — E78.5 HYPERLIPIDEMIA ASSOCIATED WITH TYPE 2 DIABETES MELLITUS: Primary | ICD-10-CM

## 2021-02-25 RX ORDER — ROSUVASTATIN CALCIUM 20 MG/1
20 TABLET, COATED ORAL DAILY
Qty: 30 TABLET | Refills: 0 | Status: SHIPPED | OUTPATIENT
Start: 2021-02-25 | End: 2023-06-21

## 2021-03-01 ENCOUNTER — PATIENT OUTREACH (OUTPATIENT)
Dept: ADMINISTRATIVE | Facility: OTHER | Age: 71
End: 2021-03-01

## 2021-03-01 DIAGNOSIS — G89.4 CHRONIC PAIN DISORDER: ICD-10-CM

## 2021-03-01 RX ORDER — OXYCODONE AND ACETAMINOPHEN 10; 325 MG/1; MG/1
1 TABLET ORAL EVERY 8 HOURS PRN
Qty: 90 TABLET | Refills: 0 | Status: SHIPPED | OUTPATIENT
Start: 2021-03-09 | End: 2021-04-07

## 2021-03-01 RX ORDER — OXYCODONE AND ACETAMINOPHEN 10; 325 MG/1; MG/1
1 TABLET ORAL EVERY 8 HOURS PRN
Qty: 90 TABLET | Refills: 0 | Status: SHIPPED | OUTPATIENT
Start: 2021-04-07 | End: 2021-05-06

## 2021-03-01 RX ORDER — OXYCODONE AND ACETAMINOPHEN 10; 325 MG/1; MG/1
1 TABLET ORAL EVERY 8 HOURS PRN
Qty: 90 TABLET | Refills: 0 | Status: SHIPPED | OUTPATIENT
Start: 2021-05-06 | End: 2021-06-09 | Stop reason: SDUPTHER

## 2021-03-02 ENCOUNTER — OFFICE VISIT (OUTPATIENT)
Dept: PAIN MEDICINE | Facility: CLINIC | Age: 71
End: 2021-03-02
Payer: MEDICARE

## 2021-03-02 VITALS
BODY MASS INDEX: 29.41 KG/M2 | SYSTOLIC BLOOD PRESSURE: 132 MMHG | HEIGHT: 66 IN | DIASTOLIC BLOOD PRESSURE: 71 MMHG | WEIGHT: 183 LBS | HEART RATE: 63 BPM

## 2021-03-02 DIAGNOSIS — M43.17 SPONDYLOLISTHESIS OF LUMBOSACRAL REGION: ICD-10-CM

## 2021-03-02 DIAGNOSIS — M47.896 OTHER SPONDYLOSIS, LUMBAR REGION: ICD-10-CM

## 2021-03-02 DIAGNOSIS — M70.60 GREATER TROCHANTERIC BURSITIS, UNSPECIFIED LATERALITY: ICD-10-CM

## 2021-03-02 DIAGNOSIS — M51.36 DDD (DEGENERATIVE DISC DISEASE), LUMBAR: ICD-10-CM

## 2021-03-02 DIAGNOSIS — M25.561 CHRONIC PAIN OF RIGHT KNEE: ICD-10-CM

## 2021-03-02 DIAGNOSIS — G89.29 CHRONIC PAIN OF RIGHT KNEE: ICD-10-CM

## 2021-03-02 DIAGNOSIS — F11.90 CHRONIC, CONTINUOUS USE OF OPIOIDS: Primary | ICD-10-CM

## 2021-03-02 PROCEDURE — 99999 PR PBB SHADOW E&M-EST. PATIENT-LVL V: ICD-10-PCS | Mod: PBBFAC,,, | Performed by: PHYSICIAN ASSISTANT

## 2021-03-02 PROCEDURE — 3078F PR MOST RECENT DIASTOLIC BLOOD PRESSURE < 80 MM HG: ICD-10-PCS | Mod: CPTII,S$GLB,, | Performed by: PHYSICIAN ASSISTANT

## 2021-03-02 PROCEDURE — 1100F PR PT FALLS ASSESS DOC 2+ FALLS/FALL W/INJURY/YR: ICD-10-PCS | Mod: CPTII,S$GLB,, | Performed by: PHYSICIAN ASSISTANT

## 2021-03-02 PROCEDURE — 3288F PR FALLS RISK ASSESSMENT DOCUMENTED: ICD-10-PCS | Mod: CPTII,S$GLB,, | Performed by: PHYSICIAN ASSISTANT

## 2021-03-02 PROCEDURE — 3008F BODY MASS INDEX DOCD: CPT | Mod: CPTII,S$GLB,, | Performed by: PHYSICIAN ASSISTANT

## 2021-03-02 PROCEDURE — 1125F PR PAIN SEVERITY QUANTIFIED, PAIN PRESENT: ICD-10-PCS | Mod: S$GLB,,, | Performed by: PHYSICIAN ASSISTANT

## 2021-03-02 PROCEDURE — 80307 DRUG TEST PRSMV CHEM ANLYZR: CPT | Performed by: PHYSICIAN ASSISTANT

## 2021-03-02 PROCEDURE — 3075F SYST BP GE 130 - 139MM HG: CPT | Mod: CPTII,S$GLB,, | Performed by: PHYSICIAN ASSISTANT

## 2021-03-02 PROCEDURE — 1100F PTFALLS ASSESS-DOCD GE2>/YR: CPT | Mod: CPTII,S$GLB,, | Performed by: PHYSICIAN ASSISTANT

## 2021-03-02 PROCEDURE — 3008F PR BODY MASS INDEX (BMI) DOCUMENTED: ICD-10-PCS | Mod: CPTII,S$GLB,, | Performed by: PHYSICIAN ASSISTANT

## 2021-03-02 PROCEDURE — 1159F MED LIST DOCD IN RCRD: CPT | Mod: S$GLB,,, | Performed by: PHYSICIAN ASSISTANT

## 2021-03-02 PROCEDURE — 3288F FALL RISK ASSESSMENT DOCD: CPT | Mod: CPTII,S$GLB,, | Performed by: PHYSICIAN ASSISTANT

## 2021-03-02 PROCEDURE — 1159F PR MEDICATION LIST DOCUMENTED IN MEDICAL RECORD: ICD-10-PCS | Mod: S$GLB,,, | Performed by: PHYSICIAN ASSISTANT

## 2021-03-02 PROCEDURE — 99214 OFFICE O/P EST MOD 30 MIN: CPT | Mod: S$GLB,,, | Performed by: PHYSICIAN ASSISTANT

## 2021-03-02 PROCEDURE — 99999 PR PBB SHADOW E&M-EST. PATIENT-LVL V: CPT | Mod: PBBFAC,,, | Performed by: PHYSICIAN ASSISTANT

## 2021-03-02 PROCEDURE — 99214 PR OFFICE/OUTPT VISIT, EST, LEVL IV, 30-39 MIN: ICD-10-PCS | Mod: S$GLB,,, | Performed by: PHYSICIAN ASSISTANT

## 2021-03-02 PROCEDURE — 3075F PR MOST RECENT SYSTOLIC BLOOD PRESS GE 130-139MM HG: ICD-10-PCS | Mod: CPTII,S$GLB,, | Performed by: PHYSICIAN ASSISTANT

## 2021-03-02 PROCEDURE — 1125F AMNT PAIN NOTED PAIN PRSNT: CPT | Mod: S$GLB,,, | Performed by: PHYSICIAN ASSISTANT

## 2021-03-02 PROCEDURE — 3078F DIAST BP <80 MM HG: CPT | Mod: CPTII,S$GLB,, | Performed by: PHYSICIAN ASSISTANT

## 2021-03-15 ENCOUNTER — TELEPHONE (OUTPATIENT)
Dept: PAIN MEDICINE | Facility: CLINIC | Age: 71
End: 2021-03-15

## 2021-03-17 ENCOUNTER — TELEPHONE (OUTPATIENT)
Dept: PAIN MEDICINE | Facility: CLINIC | Age: 71
End: 2021-03-17

## 2021-03-18 ENCOUNTER — TELEPHONE (OUTPATIENT)
Dept: PAIN MEDICINE | Facility: CLINIC | Age: 71
End: 2021-03-18

## 2021-03-18 LAB
6MAM UR QL: NOT DETECTED
7AMINOCLONAZEPAM UR QL: NOT DETECTED
A-OH ALPRAZ UR QL: NOT DETECTED
ALPHA-OH-MIDAZOLAM: NOT DETECTED
ALPRAZ UR QL: NOT DETECTED
AMPHET UR QL SCN: NOT DETECTED
ANNOTATION COMMENT IMP: NORMAL
ANNOTATION COMMENT IMP: NORMAL
BARBITURATES UR QL: NOT DETECTED
BUPRENORPHINE UR QL: NOT DETECTED
BZE UR QL: NOT DETECTED
CARBOXYTHC UR QL: NOT DETECTED
CARISOPRODOL UR QL: NOT DETECTED
CLONAZEPAM UR QL: NOT DETECTED
CODEINE UR QL: NOT DETECTED
CREAT UR-MCNC: 194.5 MG/DL (ref 20–400)
DIAZEPAM UR QL: NOT DETECTED
ETHYL GLUCURONIDE UR QL: NOT DETECTED
FENTANYL UR QL: NOT DETECTED
GABAPENTIN: NOT DETECTED
HYDROCODONE UR QL: NOT DETECTED
HYDROMORPHONE UR QL: NOT DETECTED
LORAZEPAM UR QL: NOT DETECTED
MDA UR QL: NOT DETECTED
MDEA UR QL: NOT DETECTED
MDMA UR QL: NOT DETECTED
ME-PHENIDATE UR QL: NOT DETECTED
MEPERIDINE UR QL: NOT DETECTED
METHADONE UR QL: NOT DETECTED
METHAMPHET UR QL: NOT DETECTED
MIDAZOLAM UR QL SCN: NOT DETECTED
MORPHINE UR QL: NOT DETECTED
NALOXONE: NOT DETECTED
NORBUPRENORPHINE UR QL CFM: NOT DETECTED
NORDIAZEPAM UR QL: NOT DETECTED
NORFENTANYL UR QL: NOT DETECTED
NORHYDROCODONE UR QL CFM: NOT DETECTED
NOROXYCODONE UR QL CFM: PRESENT
NOROXYMORPHONE: PRESENT
OXAZEPAM UR QL: NOT DETECTED
OXYCODONE UR QL: PRESENT
OXYMORPHONE UR QL: PRESENT
PATHOLOGY STUDY: NORMAL
PCP UR QL: NOT DETECTED
PHENTERMINE UR QL: NOT DETECTED
PREGABALIN: NOT DETECTED
PROPOXYPH UR QL: NORMAL
SERVICE CMNT-IMP: NORMAL
TAPENTADOL UR QL SCN: NOT DETECTED
TAPENTADOL-O-SULF: NOT DETECTED
TEMAZEPAM UR QL: NOT DETECTED
TRAMADOL UR QL: PRESENT
ZOLPIDEM METABOLITE: NOT DETECTED
ZOLPIDEM UR QL: NOT DETECTED

## 2021-03-23 ENCOUNTER — TELEPHONE (OUTPATIENT)
Dept: PAIN MEDICINE | Facility: CLINIC | Age: 71
End: 2021-03-23

## 2021-03-24 ENCOUNTER — TELEPHONE (OUTPATIENT)
Dept: PRIMARY CARE CLINIC | Facility: CLINIC | Age: 71
End: 2021-03-24

## 2021-03-26 ENCOUNTER — PATIENT MESSAGE (OUTPATIENT)
Dept: PRIMARY CARE CLINIC | Facility: CLINIC | Age: 71
End: 2021-03-26

## 2021-03-26 ENCOUNTER — PATIENT MESSAGE (OUTPATIENT)
Dept: PSYCHIATRY | Facility: CLINIC | Age: 71
End: 2021-03-26

## 2021-03-27 ENCOUNTER — PATIENT MESSAGE (OUTPATIENT)
Dept: PSYCHIATRY | Facility: CLINIC | Age: 71
End: 2021-03-27

## 2021-03-29 ENCOUNTER — TELEPHONE (OUTPATIENT)
Dept: PRIMARY CARE CLINIC | Facility: CLINIC | Age: 71
End: 2021-03-29

## 2021-04-05 ENCOUNTER — PATIENT MESSAGE (OUTPATIENT)
Dept: ADMINISTRATIVE | Facility: HOSPITAL | Age: 71
End: 2021-04-05

## 2021-04-14 ENCOUNTER — PATIENT MESSAGE (OUTPATIENT)
Dept: PRIMARY CARE CLINIC | Facility: CLINIC | Age: 71
End: 2021-04-14

## 2021-04-16 ENCOUNTER — PATIENT MESSAGE (OUTPATIENT)
Dept: PRIMARY CARE CLINIC | Facility: CLINIC | Age: 71
End: 2021-04-16

## 2021-05-03 ENCOUNTER — PES CALL (OUTPATIENT)
Dept: ADMINISTRATIVE | Facility: CLINIC | Age: 71
End: 2021-05-03

## 2021-06-09 DIAGNOSIS — G89.4 CHRONIC PAIN DISORDER: ICD-10-CM

## 2021-06-09 RX ORDER — OXYCODONE AND ACETAMINOPHEN 10; 325 MG/1; MG/1
1 TABLET ORAL EVERY 8 HOURS PRN
Qty: 90 TABLET | Refills: 0 | Status: SHIPPED | OUTPATIENT
Start: 2021-08-08 | End: 2021-09-09 | Stop reason: SDUPTHER

## 2021-06-09 RX ORDER — OXYCODONE AND ACETAMINOPHEN 10; 325 MG/1; MG/1
1 TABLET ORAL EVERY 8 HOURS PRN
Qty: 90 TABLET | Refills: 0 | Status: SHIPPED | OUTPATIENT
Start: 2021-06-11 | End: 2021-07-10

## 2021-06-09 RX ORDER — OXYCODONE AND ACETAMINOPHEN 10; 325 MG/1; MG/1
1 TABLET ORAL EVERY 8 HOURS PRN
Qty: 90 TABLET | Refills: 0 | Status: SHIPPED | OUTPATIENT
Start: 2021-07-10 | End: 2021-08-08

## 2021-06-10 ENCOUNTER — PATIENT OUTREACH (OUTPATIENT)
Dept: ADMINISTRATIVE | Facility: OTHER | Age: 71
End: 2021-06-10

## 2021-06-14 ENCOUNTER — OFFICE VISIT (OUTPATIENT)
Dept: PAIN MEDICINE | Facility: CLINIC | Age: 71
End: 2021-06-14
Payer: MEDICARE

## 2021-06-14 VITALS
BODY MASS INDEX: 28.28 KG/M2 | SYSTOLIC BLOOD PRESSURE: 127 MMHG | DIASTOLIC BLOOD PRESSURE: 79 MMHG | HEIGHT: 66 IN | HEART RATE: 74 BPM | WEIGHT: 176 LBS

## 2021-06-14 DIAGNOSIS — M47.896 OTHER SPONDYLOSIS, LUMBAR REGION: Primary | ICD-10-CM

## 2021-06-14 DIAGNOSIS — G89.4 CHRONIC PAIN DISORDER: ICD-10-CM

## 2021-06-14 DIAGNOSIS — M51.36 DDD (DEGENERATIVE DISC DISEASE), LUMBAR: ICD-10-CM

## 2021-06-14 DIAGNOSIS — F11.90 CHRONIC, CONTINUOUS USE OF OPIOIDS: ICD-10-CM

## 2021-06-14 DIAGNOSIS — M70.60 GREATER TROCHANTERIC BURSITIS, UNSPECIFIED LATERALITY: ICD-10-CM

## 2021-06-14 PROCEDURE — 80307 DRUG TEST PRSMV CHEM ANLYZR: CPT | Performed by: PHYSICIAN ASSISTANT

## 2021-06-14 PROCEDURE — 1159F PR MEDICATION LIST DOCUMENTED IN MEDICAL RECORD: ICD-10-PCS | Mod: S$GLB,,, | Performed by: PHYSICIAN ASSISTANT

## 2021-06-14 PROCEDURE — 99999 PR PBB SHADOW E&M-EST. PATIENT-LVL IV: ICD-10-PCS | Mod: PBBFAC,,, | Performed by: PHYSICIAN ASSISTANT

## 2021-06-14 PROCEDURE — 3008F PR BODY MASS INDEX (BMI) DOCUMENTED: ICD-10-PCS | Mod: CPTII,S$GLB,, | Performed by: PHYSICIAN ASSISTANT

## 2021-06-14 PROCEDURE — 1159F MED LIST DOCD IN RCRD: CPT | Mod: S$GLB,,, | Performed by: PHYSICIAN ASSISTANT

## 2021-06-14 PROCEDURE — 3288F PR FALLS RISK ASSESSMENT DOCUMENTED: ICD-10-PCS | Mod: CPTII,S$GLB,, | Performed by: PHYSICIAN ASSISTANT

## 2021-06-14 PROCEDURE — 1100F PR PT FALLS ASSESS DOC 2+ FALLS/FALL W/INJURY/YR: ICD-10-PCS | Mod: CPTII,S$GLB,, | Performed by: PHYSICIAN ASSISTANT

## 2021-06-14 PROCEDURE — 1125F AMNT PAIN NOTED PAIN PRSNT: CPT | Mod: S$GLB,,, | Performed by: PHYSICIAN ASSISTANT

## 2021-06-14 PROCEDURE — 3008F BODY MASS INDEX DOCD: CPT | Mod: CPTII,S$GLB,, | Performed by: PHYSICIAN ASSISTANT

## 2021-06-14 PROCEDURE — 99214 OFFICE O/P EST MOD 30 MIN: CPT | Mod: S$GLB,,, | Performed by: PHYSICIAN ASSISTANT

## 2021-06-14 PROCEDURE — 1100F PTFALLS ASSESS-DOCD GE2>/YR: CPT | Mod: CPTII,S$GLB,, | Performed by: PHYSICIAN ASSISTANT

## 2021-06-14 PROCEDURE — 99214 PR OFFICE/OUTPT VISIT, EST, LEVL IV, 30-39 MIN: ICD-10-PCS | Mod: S$GLB,,, | Performed by: PHYSICIAN ASSISTANT

## 2021-06-14 PROCEDURE — 1125F PR PAIN SEVERITY QUANTIFIED, PAIN PRESENT: ICD-10-PCS | Mod: S$GLB,,, | Performed by: PHYSICIAN ASSISTANT

## 2021-06-14 PROCEDURE — 3288F FALL RISK ASSESSMENT DOCD: CPT | Mod: CPTII,S$GLB,, | Performed by: PHYSICIAN ASSISTANT

## 2021-06-14 PROCEDURE — 99999 PR PBB SHADOW E&M-EST. PATIENT-LVL IV: CPT | Mod: PBBFAC,,, | Performed by: PHYSICIAN ASSISTANT

## 2021-06-14 RX ORDER — GENTAMICIN SULFATE 3 MG/ML
SOLUTION/ DROPS OPHTHALMIC
COMMUNITY
Start: 2021-04-07 | End: 2021-08-27

## 2021-06-14 RX ORDER — CYCLOBENZAPRINE HCL 10 MG
10 TABLET ORAL 3 TIMES DAILY PRN
Qty: 270 TABLET | Refills: 0 | Status: SHIPPED | OUTPATIENT
Start: 2021-06-14 | End: 2021-09-13 | Stop reason: SDUPTHER

## 2021-06-14 RX ORDER — OXYBUTYNIN CHLORIDE 5 MG/1
5 TABLET ORAL 2 TIMES DAILY
COMMUNITY
End: 2023-06-21

## 2021-06-15 NOTE — LETTER
September 25, 2020      Petra Broussard MD  [address]  [facility] [address]           [facility]  [address] [facility] [address]  [place] [address]  Phone: [phone]  Fax: [phone]          Patient: Nathalie Santiago   MR Number: [id number]   YOB: 1950   Date of Visit: 9/24/2020       Dear Dr. Petra Broussard:    Thank you for referring Nathalie Santiago to me for evaluation. Attached you will find relevant portions of my assessment and plan of care.    If you have questions, please do not hesitate to call me. I look forward to following Nathalie Santiago along with you.    Sincerely,    Amadou Alexandre DPM    Enclosure  CC:  No Recipients    If you would like to receive this communication electronically, please contact [email or web address] or [phone] to request more information on [employer] Link access.    For providers and/or their staff who would like to refer a patient to [facility], please contact us through our one-stop-shop provider referral line, [facility], at [phone].    If you feel you have received this communication in error or would no longer like to receive these types of communications, please e-mail [email or web address]         
No

## 2021-06-23 LAB
6MAM UR QL: NOT DETECTED
7AMINOCLONAZEPAM UR QL: NOT DETECTED
A-OH ALPRAZ UR QL: NOT DETECTED
ALPHA-OH-MIDAZOLAM: NOT DETECTED
ALPRAZ UR QL: NOT DETECTED
AMPHET UR QL SCN: PRESENT
ANNOTATION COMMENT IMP: NORMAL
ANNOTATION COMMENT IMP: NORMAL
BARBITURATES UR QL: NOT DETECTED
BUPRENORPHINE UR QL: NOT DETECTED
BZE UR QL: NOT DETECTED
CARBOXYTHC UR QL: NOT DETECTED
CARISOPRODOL UR QL: NOT DETECTED
CLONAZEPAM UR QL: NOT DETECTED
CODEINE UR QL: NOT DETECTED
CREAT UR-MCNC: 92 MG/DL (ref 20–400)
DIAZEPAM UR QL: NOT DETECTED
ETHYL GLUCURONIDE UR QL: NOT DETECTED
FENTANYL UR QL: NOT DETECTED
GABAPENTIN: PRESENT
HYDROCODONE UR QL: NOT DETECTED
HYDROMORPHONE UR QL: NOT DETECTED
LORAZEPAM UR QL: NOT DETECTED
MDA UR QL: NOT DETECTED
MDEA UR QL: NOT DETECTED
MDMA UR QL: NOT DETECTED
ME-PHENIDATE UR QL: NOT DETECTED
METHADONE UR QL: NOT DETECTED
METHAMPHET UR QL: NOT DETECTED
MIDAZOLAM UR QL SCN: NOT DETECTED
MORPHINE UR QL: NOT DETECTED
NALOXONE: NOT DETECTED
NORBUPRENORPHINE UR QL CFM: NOT DETECTED
NORDIAZEPAM UR QL: NOT DETECTED
NORFENTANYL UR QL: NOT DETECTED
NORHYDROCODONE UR QL CFM: NOT DETECTED
NORMEPERIDINE UR QL CFM: NOT DETECTED
NOROXYCODONE UR QL CFM: PRESENT
NOROXYMORPHONE UR QL SCN: PRESENT
OXAZEPAM UR QL: NOT DETECTED
OXYCODONE UR QL: PRESENT
OXYMORPHONE UR QL: PRESENT
PATHOLOGY STUDY: NORMAL
PCP UR QL: NOT DETECTED
PHENTERMINE UR QL: NOT DETECTED
PREGABALIN: NOT DETECTED
SERVICE CMNT-IMP: NORMAL
TAPENTADOL UR QL SCN: NOT DETECTED
TAPENTADOL-O-SULF: NOT DETECTED
TEMAZEPAM UR QL: NOT DETECTED
TRAMADOL UR QL: NOT DETECTED
ZOLPIDEM METABOLITE: NOT DETECTED
ZOLPIDEM UR QL: NOT DETECTED

## 2021-06-24 ENCOUNTER — PATIENT MESSAGE (OUTPATIENT)
Dept: PAIN MEDICINE | Facility: CLINIC | Age: 71
End: 2021-06-24

## 2021-06-25 ENCOUNTER — PATIENT MESSAGE (OUTPATIENT)
Dept: PAIN MEDICINE | Facility: CLINIC | Age: 71
End: 2021-06-25

## 2021-07-07 ENCOUNTER — PATIENT MESSAGE (OUTPATIENT)
Dept: ADMINISTRATIVE | Facility: HOSPITAL | Age: 71
End: 2021-07-07

## 2021-07-14 ENCOUNTER — TELEPHONE (OUTPATIENT)
Dept: PSYCHIATRY | Facility: CLINIC | Age: 71
End: 2021-07-14

## 2021-07-15 ENCOUNTER — TELEPHONE (OUTPATIENT)
Dept: PSYCHIATRY | Facility: CLINIC | Age: 71
End: 2021-07-15

## 2021-07-15 DIAGNOSIS — I15.2 HYPERTENSION ASSOCIATED WITH DIABETES: ICD-10-CM

## 2021-07-15 DIAGNOSIS — E04.1 THYROID NODULE GREATER THAN OR EQUAL TO 1.5 CM IN DIAMETER INCIDENTALLY NOTED ON IMAGING STUDY: ICD-10-CM

## 2021-07-15 DIAGNOSIS — E11.59 HYPERTENSION ASSOCIATED WITH DIABETES: ICD-10-CM

## 2021-07-15 DIAGNOSIS — E83.42 HYPOMAGNESEMIA: ICD-10-CM

## 2021-07-15 DIAGNOSIS — E78.5 HYPERLIPIDEMIA ASSOCIATED WITH TYPE 2 DIABETES MELLITUS: Primary | ICD-10-CM

## 2021-07-15 DIAGNOSIS — E11.69 HYPERLIPIDEMIA ASSOCIATED WITH TYPE 2 DIABETES MELLITUS: Primary | ICD-10-CM

## 2021-07-15 RX ORDER — TRAZODONE HYDROCHLORIDE 50 MG/1
TABLET ORAL
Start: 2021-07-15 | End: 2021-07-23 | Stop reason: ALTCHOICE

## 2021-07-15 RX ORDER — AMLODIPINE BESYLATE 5 MG/1
5 TABLET ORAL DAILY
Qty: 90 TABLET | Refills: 3 | Status: SHIPPED | OUTPATIENT
Start: 2021-07-15 | End: 2021-12-13

## 2021-07-15 RX ORDER — SUVOREXANT 10 MG/1
1 TABLET, FILM COATED ORAL NIGHTLY PRN
Qty: 10 TABLET | Refills: 0 | Status: SHIPPED | OUTPATIENT
Start: 2021-07-15 | End: 2021-07-23

## 2021-07-17 ENCOUNTER — PATIENT MESSAGE (OUTPATIENT)
Dept: ADMINISTRATIVE | Facility: HOSPITAL | Age: 71
End: 2021-07-17

## 2021-07-17 ENCOUNTER — PATIENT MESSAGE (OUTPATIENT)
Dept: PAIN MEDICINE | Facility: CLINIC | Age: 71
End: 2021-07-17

## 2021-07-21 ENCOUNTER — TELEPHONE (OUTPATIENT)
Dept: PSYCHIATRY | Facility: CLINIC | Age: 71
End: 2021-07-21

## 2021-07-23 ENCOUNTER — LAB VISIT (OUTPATIENT)
Dept: LAB | Facility: HOSPITAL | Age: 71
End: 2021-07-23
Attending: INTERNAL MEDICINE
Payer: MEDICARE

## 2021-07-23 DIAGNOSIS — E11.69 HYPERLIPIDEMIA ASSOCIATED WITH TYPE 2 DIABETES MELLITUS: ICD-10-CM

## 2021-07-23 DIAGNOSIS — E78.5 HYPERLIPIDEMIA ASSOCIATED WITH TYPE 2 DIABETES MELLITUS: ICD-10-CM

## 2021-07-23 DIAGNOSIS — E83.42 HYPOMAGNESEMIA: ICD-10-CM

## 2021-07-23 DIAGNOSIS — E04.1 THYROID NODULE GREATER THAN OR EQUAL TO 1.5 CM IN DIAMETER INCIDENTALLY NOTED ON IMAGING STUDY: ICD-10-CM

## 2021-07-23 LAB
ALBUMIN SERPL BCP-MCNC: 4.2 G/DL (ref 3.5–5.2)
ALBUMIN/CREAT UR: 5.7 UG/MG (ref 0–30)
ALP SERPL-CCNC: 57 U/L (ref 55–135)
ALT SERPL W/O P-5'-P-CCNC: 22 U/L (ref 10–44)
ANION GAP SERPL CALC-SCNC: 11 MMOL/L (ref 8–16)
AST SERPL-CCNC: 23 U/L (ref 10–40)
BASOPHILS # BLD AUTO: 0.04 K/UL (ref 0–0.2)
BASOPHILS NFR BLD: 0.6 % (ref 0–1.9)
BILIRUB SERPL-MCNC: 0.6 MG/DL (ref 0.1–1)
BUN SERPL-MCNC: 17 MG/DL (ref 8–23)
CALCIUM SERPL-MCNC: 9.7 MG/DL (ref 8.7–10.5)
CHLORIDE SERPL-SCNC: 102 MMOL/L (ref 95–110)
CHOLEST SERPL-MCNC: 143 MG/DL (ref 120–199)
CHOLEST/HDLC SERPL: 4.5 {RATIO} (ref 2–5)
CO2 SERPL-SCNC: 26 MMOL/L (ref 23–29)
CREAT SERPL-MCNC: 0.9 MG/DL (ref 0.5–1.4)
CREAT UR-MCNC: 68 MG/DL (ref 15–325)
DIFFERENTIAL METHOD: ABNORMAL
EOSINOPHIL # BLD AUTO: 0.4 K/UL (ref 0–0.5)
EOSINOPHIL NFR BLD: 5.6 % (ref 0–8)
ERYTHROCYTE [DISTWIDTH] IN BLOOD BY AUTOMATED COUNT: 13.6 % (ref 11.5–14.5)
EST. GFR  (AFRICAN AMERICAN): >60 ML/MIN/1.73 M^2
EST. GFR  (NON AFRICAN AMERICAN): >60 ML/MIN/1.73 M^2
ESTIMATED AVG GLUCOSE: 174 MG/DL (ref 68–131)
GLUCOSE SERPL-MCNC: 147 MG/DL (ref 70–110)
HBA1C MFR BLD: 7.7 % (ref 4.5–6.2)
HCT VFR BLD AUTO: 42.7 % (ref 37–48.5)
HDLC SERPL-MCNC: 32 MG/DL (ref 40–75)
HDLC SERPL: 22.4 % (ref 20–50)
HGB BLD-MCNC: 13.5 G/DL (ref 12–16)
IMM GRANULOCYTES # BLD AUTO: 0.04 K/UL (ref 0–0.04)
IMM GRANULOCYTES NFR BLD AUTO: 0.6 % (ref 0–0.5)
LDLC SERPL CALC-MCNC: 70.8 MG/DL (ref 63–159)
LYMPHOCYTES # BLD AUTO: 1.7 K/UL (ref 1–4.8)
LYMPHOCYTES NFR BLD: 27.2 % (ref 18–48)
MAGNESIUM SERPL-MCNC: 1.6 MG/DL (ref 1.6–2.6)
MCH RBC QN AUTO: 28.7 PG (ref 27–31)
MCHC RBC AUTO-ENTMCNC: 31.6 G/DL (ref 32–36)
MCV RBC AUTO: 91 FL (ref 82–98)
MICROALBUMIN UR DL<=1MG/L-MCNC: 3.9 UG/ML
MONOCYTES # BLD AUTO: 0.7 K/UL (ref 0.3–1)
MONOCYTES NFR BLD: 10.6 % (ref 4–15)
NEUTROPHILS # BLD AUTO: 3.5 K/UL (ref 1.8–7.7)
NEUTROPHILS NFR BLD: 55.4 % (ref 38–73)
NONHDLC SERPL-MCNC: 111 MG/DL
NRBC BLD-RTO: 0 /100 WBC
PLATELET # BLD AUTO: 346 K/UL (ref 150–450)
PMV BLD AUTO: 9.5 FL (ref 9.2–12.9)
POTASSIUM SERPL-SCNC: 4.3 MMOL/L (ref 3.5–5.1)
PROT SERPL-MCNC: 7.8 G/DL (ref 6–8.4)
RBC # BLD AUTO: 4.7 M/UL (ref 4–5.4)
SODIUM SERPL-SCNC: 139 MMOL/L (ref 136–145)
T4 FREE SERPL-MCNC: 1.27 NG/DL (ref 0.71–1.51)
TRIGL SERPL-MCNC: 201 MG/DL (ref 30–150)
TSH SERPL DL<=0.005 MIU/L-ACNC: 6.74 UIU/ML (ref 0.34–5.6)
WBC # BLD AUTO: 6.25 K/UL (ref 3.9–12.7)

## 2021-07-23 PROCEDURE — 84439 ASSAY OF FREE THYROXINE: CPT | Performed by: INTERNAL MEDICINE

## 2021-07-23 PROCEDURE — 80053 COMPREHEN METABOLIC PANEL: CPT | Performed by: INTERNAL MEDICINE

## 2021-07-23 PROCEDURE — 82043 UR ALBUMIN QUANTITATIVE: CPT | Performed by: INTERNAL MEDICINE

## 2021-07-23 PROCEDURE — 83735 ASSAY OF MAGNESIUM: CPT | Performed by: INTERNAL MEDICINE

## 2021-07-23 PROCEDURE — 84443 ASSAY THYROID STIM HORMONE: CPT | Performed by: INTERNAL MEDICINE

## 2021-07-23 PROCEDURE — 83036 HEMOGLOBIN GLYCOSYLATED A1C: CPT | Performed by: INTERNAL MEDICINE

## 2021-07-23 PROCEDURE — 80061 LIPID PANEL: CPT | Performed by: INTERNAL MEDICINE

## 2021-07-23 PROCEDURE — 36415 COLL VENOUS BLD VENIPUNCTURE: CPT | Performed by: INTERNAL MEDICINE

## 2021-07-23 PROCEDURE — 82570 ASSAY OF URINE CREATININE: CPT | Performed by: INTERNAL MEDICINE

## 2021-07-23 PROCEDURE — 85025 COMPLETE CBC W/AUTO DIFF WBC: CPT | Performed by: INTERNAL MEDICINE

## 2021-07-23 RX ORDER — MIRTAZAPINE 15 MG/1
TABLET, FILM COATED ORAL
Qty: 30 TABLET | Refills: 0 | Status: SHIPPED | OUTPATIENT
Start: 2021-07-23 | End: 2021-08-27

## 2021-07-28 DIAGNOSIS — E78.5 HYPERLIPIDEMIA ASSOCIATED WITH TYPE 2 DIABETES MELLITUS: ICD-10-CM

## 2021-07-28 DIAGNOSIS — E11.69 HYPERLIPIDEMIA ASSOCIATED WITH TYPE 2 DIABETES MELLITUS: ICD-10-CM

## 2021-07-30 ENCOUNTER — PATIENT MESSAGE (OUTPATIENT)
Dept: PRIMARY CARE CLINIC | Facility: CLINIC | Age: 71
End: 2021-07-30

## 2021-08-02 ENCOUNTER — PATIENT MESSAGE (OUTPATIENT)
Dept: PRIMARY CARE CLINIC | Facility: CLINIC | Age: 71
End: 2021-08-02

## 2021-08-02 ENCOUNTER — PES CALL (OUTPATIENT)
Dept: ADMINISTRATIVE | Facility: CLINIC | Age: 71
End: 2021-08-02

## 2021-08-02 ENCOUNTER — PATIENT MESSAGE (OUTPATIENT)
Dept: PSYCHIATRY | Facility: CLINIC | Age: 71
End: 2021-08-02

## 2021-08-02 DIAGNOSIS — G89.4 CHRONIC PAIN SYNDROME: ICD-10-CM

## 2021-08-02 RX ORDER — CLOBETASOL PROPIONATE 0.5 MG/G
OINTMENT TOPICAL
Qty: 45 G | Refills: 0 | Status: SHIPPED | OUTPATIENT
Start: 2021-08-02 | End: 2022-04-12

## 2021-08-02 RX ORDER — PREDNISONE 20 MG/1
40 TABLET ORAL DAILY
Qty: 10 TABLET | Refills: 0 | Status: SHIPPED | OUTPATIENT
Start: 2021-08-02 | End: 2021-10-27 | Stop reason: SDUPTHER

## 2021-08-03 RX ORDER — GABAPENTIN 300 MG/1
CAPSULE ORAL
Qty: 360 CAPSULE | Refills: 0 | Status: SHIPPED | OUTPATIENT
Start: 2021-08-03 | End: 2022-01-05

## 2021-08-06 ENCOUNTER — TELEPHONE (OUTPATIENT)
Dept: PSYCHIATRY | Facility: CLINIC | Age: 71
End: 2021-08-06

## 2021-08-10 DIAGNOSIS — G25.81 RESTLESS LEGS SYNDROME (RLS): ICD-10-CM

## 2021-08-10 RX ORDER — PRAMIPEXOLE DIHYDROCHLORIDE 0.12 MG/1
TABLET ORAL
Qty: 180 TABLET | Refills: 0 | Status: SHIPPED | OUTPATIENT
Start: 2021-08-10

## 2021-08-18 ENCOUNTER — PATIENT MESSAGE (OUTPATIENT)
Dept: PSYCHIATRY | Facility: CLINIC | Age: 71
End: 2021-08-18

## 2021-08-21 ENCOUNTER — PATIENT MESSAGE (OUTPATIENT)
Dept: PSYCHIATRY | Facility: CLINIC | Age: 71
End: 2021-08-21

## 2021-08-24 ENCOUNTER — PATIENT MESSAGE (OUTPATIENT)
Dept: ADMINISTRATIVE | Facility: OTHER | Age: 71
End: 2021-08-24

## 2021-08-27 ENCOUNTER — OFFICE VISIT (OUTPATIENT)
Dept: PSYCHIATRY | Facility: CLINIC | Age: 71
End: 2021-08-27
Payer: MEDICARE

## 2021-08-27 DIAGNOSIS — F33.41 MDD (MAJOR DEPRESSIVE DISORDER), RECURRENT, IN PARTIAL REMISSION: ICD-10-CM

## 2021-08-27 DIAGNOSIS — F41.1 GAD (GENERALIZED ANXIETY DISORDER): ICD-10-CM

## 2021-08-27 DIAGNOSIS — G47.00 INSOMNIA, UNSPECIFIED TYPE: ICD-10-CM

## 2021-08-27 PROCEDURE — 1160F PR REVIEW ALL MEDS BY PRESCRIBER/CLIN PHARMACIST DOCUMENTED: ICD-10-PCS | Mod: CPTII,,, | Performed by: PSYCHIATRY & NEUROLOGY

## 2021-08-27 PROCEDURE — 90791 PSYCH DIAGNOSTIC EVALUATION: CPT | Mod: 95,,, | Performed by: PSYCHIATRY & NEUROLOGY

## 2021-08-27 PROCEDURE — 1159F MED LIST DOCD IN RCRD: CPT | Mod: CPTII,,, | Performed by: PSYCHIATRY & NEUROLOGY

## 2021-08-27 PROCEDURE — 3051F PR MOST RECENT HEMOGLOBIN A1C LEVEL 7.0 - < 8.0%: ICD-10-PCS | Mod: CPTII,,, | Performed by: PSYCHIATRY & NEUROLOGY

## 2021-08-27 PROCEDURE — 3051F HG A1C>EQUAL 7.0%<8.0%: CPT | Mod: CPTII,,, | Performed by: PSYCHIATRY & NEUROLOGY

## 2021-08-27 PROCEDURE — 1160F RVW MEDS BY RX/DR IN RCRD: CPT | Mod: CPTII,,, | Performed by: PSYCHIATRY & NEUROLOGY

## 2021-08-27 PROCEDURE — 1159F PR MEDICATION LIST DOCUMENTED IN MEDICAL RECORD: ICD-10-PCS | Mod: CPTII,,, | Performed by: PSYCHIATRY & NEUROLOGY

## 2021-08-27 PROCEDURE — 90791 PR PSYCHIATRIC DIAGNOSTIC EVALUATION: ICD-10-PCS | Mod: 95,,, | Performed by: PSYCHIATRY & NEUROLOGY

## 2021-08-27 RX ORDER — ZALEPLON 5 MG/1
5 CAPSULE ORAL NIGHTLY PRN
Qty: 20 CAPSULE | Refills: 0 | Status: SHIPPED | OUTPATIENT
Start: 2021-08-27 | End: 2021-09-29 | Stop reason: SDUPTHER

## 2021-08-27 RX ORDER — DULOXETIN HYDROCHLORIDE 30 MG/1
30 CAPSULE, DELAYED RELEASE ORAL NIGHTLY
Start: 2021-08-27 | End: 2021-10-22 | Stop reason: SDUPTHER

## 2021-09-08 ENCOUNTER — TELEPHONE (OUTPATIENT)
Dept: PSYCHIATRY | Facility: CLINIC | Age: 71
End: 2021-09-08

## 2021-09-09 ENCOUNTER — OFFICE VISIT (OUTPATIENT)
Dept: PAIN MEDICINE | Facility: CLINIC | Age: 71
End: 2021-09-09
Payer: MEDICARE

## 2021-09-09 ENCOUNTER — PATIENT OUTREACH (OUTPATIENT)
Dept: ADMINISTRATIVE | Facility: OTHER | Age: 71
End: 2021-09-09

## 2021-09-09 VITALS
DIASTOLIC BLOOD PRESSURE: 86 MMHG | SYSTOLIC BLOOD PRESSURE: 151 MMHG | BODY MASS INDEX: 28.28 KG/M2 | HEART RATE: 71 BPM | HEIGHT: 66 IN | WEIGHT: 176 LBS

## 2021-09-09 DIAGNOSIS — M43.17 SPONDYLOLISTHESIS OF LUMBOSACRAL REGION: ICD-10-CM

## 2021-09-09 DIAGNOSIS — F11.90 CHRONIC, CONTINUOUS USE OF OPIOIDS: ICD-10-CM

## 2021-09-09 DIAGNOSIS — M51.36 DDD (DEGENERATIVE DISC DISEASE), LUMBAR: ICD-10-CM

## 2021-09-09 DIAGNOSIS — F11.90 CHRONIC, CONTINUOUS USE OF OPIOIDS: Primary | ICD-10-CM

## 2021-09-09 DIAGNOSIS — G89.4 CHRONIC PAIN DISORDER: ICD-10-CM

## 2021-09-09 DIAGNOSIS — M47.896 OTHER SPONDYLOSIS, LUMBAR REGION: Primary | ICD-10-CM

## 2021-09-09 DIAGNOSIS — M70.60 GREATER TROCHANTERIC BURSITIS, UNSPECIFIED LATERALITY: ICD-10-CM

## 2021-09-09 PROCEDURE — 1125F AMNT PAIN NOTED PAIN PRSNT: CPT | Mod: CPTII,S$GLB,, | Performed by: PHYSICIAN ASSISTANT

## 2021-09-09 PROCEDURE — 1101F PT FALLS ASSESS-DOCD LE1/YR: CPT | Mod: CPTII,S$GLB,, | Performed by: PHYSICIAN ASSISTANT

## 2021-09-09 PROCEDURE — 80307 DRUG TEST PRSMV CHEM ANLYZR: CPT | Performed by: PHYSICIAN ASSISTANT

## 2021-09-09 PROCEDURE — 3288F PR FALLS RISK ASSESSMENT DOCUMENTED: ICD-10-PCS | Mod: CPTII,S$GLB,, | Performed by: PHYSICIAN ASSISTANT

## 2021-09-09 PROCEDURE — 99214 OFFICE O/P EST MOD 30 MIN: CPT | Mod: S$GLB,,, | Performed by: PHYSICIAN ASSISTANT

## 2021-09-09 PROCEDURE — 1159F PR MEDICATION LIST DOCUMENTED IN MEDICAL RECORD: ICD-10-PCS | Mod: CPTII,S$GLB,, | Performed by: PHYSICIAN ASSISTANT

## 2021-09-09 PROCEDURE — 99999 PR PBB SHADOW E&M-EST. PATIENT-LVL V: CPT | Mod: PBBFAC,,, | Performed by: PHYSICIAN ASSISTANT

## 2021-09-09 PROCEDURE — 3061F PR NEG MICROALBUMINURIA RESULT DOCUMENTED/REVIEW: ICD-10-PCS | Mod: CPTII,S$GLB,, | Performed by: PHYSICIAN ASSISTANT

## 2021-09-09 PROCEDURE — 1125F PR PAIN SEVERITY QUANTIFIED, PAIN PRESENT: ICD-10-PCS | Mod: CPTII,S$GLB,, | Performed by: PHYSICIAN ASSISTANT

## 2021-09-09 PROCEDURE — 1101F PR PT FALLS ASSESS DOC 0-1 FALLS W/OUT INJ PAST YR: ICD-10-PCS | Mod: CPTII,S$GLB,, | Performed by: PHYSICIAN ASSISTANT

## 2021-09-09 PROCEDURE — 99999 PR PBB SHADOW E&M-EST. PATIENT-LVL V: ICD-10-PCS | Mod: PBBFAC,,, | Performed by: PHYSICIAN ASSISTANT

## 2021-09-09 PROCEDURE — 1159F MED LIST DOCD IN RCRD: CPT | Mod: CPTII,S$GLB,, | Performed by: PHYSICIAN ASSISTANT

## 2021-09-09 PROCEDURE — 3051F PR MOST RECENT HEMOGLOBIN A1C LEVEL 7.0 - < 8.0%: ICD-10-PCS | Mod: CPTII,S$GLB,, | Performed by: PHYSICIAN ASSISTANT

## 2021-09-09 PROCEDURE — 3079F DIAST BP 80-89 MM HG: CPT | Mod: CPTII,S$GLB,, | Performed by: PHYSICIAN ASSISTANT

## 2021-09-09 PROCEDURE — 3077F PR MOST RECENT SYSTOLIC BLOOD PRESSURE >= 140 MM HG: ICD-10-PCS | Mod: CPTII,S$GLB,, | Performed by: PHYSICIAN ASSISTANT

## 2021-09-09 PROCEDURE — 3066F NEPHROPATHY DOC TX: CPT | Mod: CPTII,S$GLB,, | Performed by: PHYSICIAN ASSISTANT

## 2021-09-09 PROCEDURE — 3079F PR MOST RECENT DIASTOLIC BLOOD PRESSURE 80-89 MM HG: ICD-10-PCS | Mod: CPTII,S$GLB,, | Performed by: PHYSICIAN ASSISTANT

## 2021-09-09 PROCEDURE — 3051F HG A1C>EQUAL 7.0%<8.0%: CPT | Mod: CPTII,S$GLB,, | Performed by: PHYSICIAN ASSISTANT

## 2021-09-09 PROCEDURE — 3008F PR BODY MASS INDEX (BMI) DOCUMENTED: ICD-10-PCS | Mod: CPTII,S$GLB,, | Performed by: PHYSICIAN ASSISTANT

## 2021-09-09 PROCEDURE — 3008F BODY MASS INDEX DOCD: CPT | Mod: CPTII,S$GLB,, | Performed by: PHYSICIAN ASSISTANT

## 2021-09-09 PROCEDURE — 3061F NEG MICROALBUMINURIA REV: CPT | Mod: CPTII,S$GLB,, | Performed by: PHYSICIAN ASSISTANT

## 2021-09-09 PROCEDURE — 3077F SYST BP >= 140 MM HG: CPT | Mod: CPTII,S$GLB,, | Performed by: PHYSICIAN ASSISTANT

## 2021-09-09 PROCEDURE — 3288F FALL RISK ASSESSMENT DOCD: CPT | Mod: CPTII,S$GLB,, | Performed by: PHYSICIAN ASSISTANT

## 2021-09-09 PROCEDURE — 99214 PR OFFICE/OUTPT VISIT, EST, LEVL IV, 30-39 MIN: ICD-10-PCS | Mod: S$GLB,,, | Performed by: PHYSICIAN ASSISTANT

## 2021-09-09 PROCEDURE — 3066F PR DOCUMENTATION OF TREATMENT FOR NEPHROPATHY: ICD-10-PCS | Mod: CPTII,S$GLB,, | Performed by: PHYSICIAN ASSISTANT

## 2021-09-09 RX ORDER — DUREZOL 0.5 MG/ML
EMULSION OPHTHALMIC
COMMUNITY
Start: 2021-04-08 | End: 2022-04-12

## 2021-09-09 RX ORDER — OXYCODONE AND ACETAMINOPHEN 10; 325 MG/1; MG/1
1 TABLET ORAL EVERY 8 HOURS PRN
Qty: 90 TABLET | Refills: 0 | Status: SHIPPED | OUTPATIENT
Start: 2021-11-06 | End: 2021-12-03 | Stop reason: SDUPTHER

## 2021-09-09 RX ORDER — OXYCODONE AND ACETAMINOPHEN 10; 325 MG/1; MG/1
1 TABLET ORAL EVERY 8 HOURS PRN
Qty: 90 TABLET | Refills: 0 | Status: SHIPPED | OUTPATIENT
Start: 2021-10-08 | End: 2021-11-06

## 2021-09-09 RX ORDER — OXYCODONE AND ACETAMINOPHEN 10; 325 MG/1; MG/1
1 TABLET ORAL EVERY 8 HOURS PRN
Qty: 90 TABLET | Refills: 0 | Status: SHIPPED | OUTPATIENT
Start: 2021-09-09 | End: 2021-10-08

## 2021-09-12 ENCOUNTER — PATIENT MESSAGE (OUTPATIENT)
Dept: PSYCHIATRY | Facility: CLINIC | Age: 71
End: 2021-09-12

## 2021-09-13 ENCOUNTER — PATIENT MESSAGE (OUTPATIENT)
Dept: PAIN MEDICINE | Facility: CLINIC | Age: 71
End: 2021-09-13

## 2021-09-13 RX ORDER — CYCLOBENZAPRINE HCL 10 MG
10 TABLET ORAL 3 TIMES DAILY PRN
Qty: 270 TABLET | Refills: 0 | Status: SHIPPED | OUTPATIENT
Start: 2021-09-13 | End: 2022-03-02 | Stop reason: SDUPTHER

## 2021-09-17 ENCOUNTER — PATIENT MESSAGE (OUTPATIENT)
Dept: PSYCHIATRY | Facility: CLINIC | Age: 71
End: 2021-09-17

## 2021-09-17 LAB
6MAM UR QL: NOT DETECTED
7AMINOCLONAZEPAM UR QL: NOT DETECTED
A-OH ALPRAZ UR QL: NOT DETECTED
ALPHA-OH-MIDAZOLAM: NOT DETECTED
ALPRAZ UR QL: NOT DETECTED
AMPHET UR QL SCN: NOT DETECTED
ANNOTATION COMMENT IMP: NORMAL
ANNOTATION COMMENT IMP: NORMAL
BARBITURATES UR QL: NOT DETECTED
BUPRENORPHINE UR QL: NOT DETECTED
BZE UR QL: NOT DETECTED
CARBOXYTHC UR QL: NOT DETECTED
CARISOPRODOL UR QL: NOT DETECTED
CLONAZEPAM UR QL: NOT DETECTED
CODEINE UR QL: NOT DETECTED
CREAT UR-MCNC: 121.1 MG/DL (ref 20–400)
DIAZEPAM UR QL: NOT DETECTED
ETHYL GLUCURONIDE UR QL: NOT DETECTED
FENTANYL UR QL: NOT DETECTED
GABAPENTIN: PRESENT
HYDROCODONE UR QL: NOT DETECTED
HYDROMORPHONE UR QL: NOT DETECTED
LORAZEPAM UR QL: NOT DETECTED
MDA UR QL: NOT DETECTED
MDEA UR QL: NOT DETECTED
MDMA UR QL: NOT DETECTED
ME-PHENIDATE UR QL: NOT DETECTED
METHADONE UR QL: NOT DETECTED
METHAMPHET UR QL: NOT DETECTED
MIDAZOLAM UR QL SCN: NOT DETECTED
MORPHINE UR QL: NOT DETECTED
NALOXONE: NOT DETECTED
NORBUPRENORPHINE UR QL CFM: NOT DETECTED
NORDIAZEPAM UR QL: NOT DETECTED
NORFENTANYL UR QL: NOT DETECTED
NORHYDROCODONE UR QL CFM: NOT DETECTED
NORMEPERIDINE UR QL CFM: NOT DETECTED
NOROXYCODONE UR QL CFM: PRESENT
NOROXYMORPHONE UR QL SCN: PRESENT
OXAZEPAM UR QL: NOT DETECTED
OXYCODONE UR QL: PRESENT
OXYMORPHONE UR QL: PRESENT
PATHOLOGY STUDY: NORMAL
PCP UR QL: NOT DETECTED
PHENTERMINE UR QL: NOT DETECTED
PREGABALIN: NOT DETECTED
SERVICE CMNT-IMP: NORMAL
TAPENTADOL UR QL SCN: NOT DETECTED
TAPENTADOL UR QL SCN: NOT DETECTED
TEMAZEPAM UR QL: NOT DETECTED
TRAMADOL UR QL: NOT DETECTED
ZOLPIDEM METABOLITE: NOT DETECTED
ZOLPIDEM UR QL: NOT DETECTED

## 2021-09-29 ENCOUNTER — PATIENT MESSAGE (OUTPATIENT)
Dept: PSYCHIATRY | Facility: CLINIC | Age: 71
End: 2021-09-29

## 2021-09-29 RX ORDER — ZALEPLON 5 MG/1
5 CAPSULE ORAL NIGHTLY PRN
Qty: 60 CAPSULE | Refills: 0 | Status: SHIPPED | OUTPATIENT
Start: 2021-09-29 | End: 2021-10-22 | Stop reason: SDUPTHER

## 2021-10-07 ENCOUNTER — PATIENT MESSAGE (OUTPATIENT)
Dept: ADMINISTRATIVE | Facility: HOSPITAL | Age: 71
End: 2021-10-07

## 2021-10-11 ENCOUNTER — TELEPHONE (OUTPATIENT)
Dept: PSYCHIATRY | Facility: CLINIC | Age: 71
End: 2021-10-11

## 2021-10-12 ENCOUNTER — TELEPHONE (OUTPATIENT)
Dept: PRIMARY CARE CLINIC | Facility: CLINIC | Age: 71
End: 2021-10-12

## 2021-10-14 ENCOUNTER — PATIENT OUTREACH (OUTPATIENT)
Dept: ADMINISTRATIVE | Facility: HOSPITAL | Age: 71
End: 2021-10-14

## 2021-10-14 ENCOUNTER — TELEPHONE (OUTPATIENT)
Dept: PRIMARY CARE CLINIC | Facility: CLINIC | Age: 71
End: 2021-10-14

## 2021-10-22 ENCOUNTER — OFFICE VISIT (OUTPATIENT)
Dept: PSYCHIATRY | Facility: CLINIC | Age: 71
End: 2021-10-22
Payer: MEDICARE

## 2021-10-22 VITALS
HEART RATE: 90 BPM | BODY MASS INDEX: 28.98 KG/M2 | SYSTOLIC BLOOD PRESSURE: 119 MMHG | DIASTOLIC BLOOD PRESSURE: 79 MMHG | WEIGHT: 180.31 LBS | HEIGHT: 66 IN

## 2021-10-22 DIAGNOSIS — G47.00 INSOMNIA, UNSPECIFIED TYPE: ICD-10-CM

## 2021-10-22 DIAGNOSIS — F41.1 GAD (GENERALIZED ANXIETY DISORDER): ICD-10-CM

## 2021-10-22 DIAGNOSIS — G89.4 CHRONIC PAIN SYNDROME: ICD-10-CM

## 2021-10-22 DIAGNOSIS — F33.1 MDD (MAJOR DEPRESSIVE DISORDER), RECURRENT EPISODE, MODERATE: ICD-10-CM

## 2021-10-22 PROCEDURE — 1125F AMNT PAIN NOTED PAIN PRSNT: CPT | Mod: HCNC,CPTII,S$GLB, | Performed by: PSYCHIATRY & NEUROLOGY

## 2021-10-22 PROCEDURE — 3008F BODY MASS INDEX DOCD: CPT | Mod: HCNC,CPTII,S$GLB, | Performed by: PSYCHIATRY & NEUROLOGY

## 2021-10-22 PROCEDURE — 3051F HG A1C>EQUAL 7.0%<8.0%: CPT | Mod: HCNC,CPTII,S$GLB, | Performed by: PSYCHIATRY & NEUROLOGY

## 2021-10-22 PROCEDURE — 3288F PR FALLS RISK ASSESSMENT DOCUMENTED: ICD-10-PCS | Mod: HCNC,CPTII,S$GLB, | Performed by: PSYCHIATRY & NEUROLOGY

## 2021-10-22 PROCEDURE — 3288F FALL RISK ASSESSMENT DOCD: CPT | Mod: HCNC,CPTII,S$GLB, | Performed by: PSYCHIATRY & NEUROLOGY

## 2021-10-22 PROCEDURE — 3066F NEPHROPATHY DOC TX: CPT | Mod: HCNC,CPTII,S$GLB, | Performed by: PSYCHIATRY & NEUROLOGY

## 2021-10-22 PROCEDURE — 90833 PSYTX W PT W E/M 30 MIN: CPT | Mod: HCNC,S$GLB,, | Performed by: PSYCHIATRY & NEUROLOGY

## 2021-10-22 PROCEDURE — 3078F DIAST BP <80 MM HG: CPT | Mod: HCNC,CPTII,S$GLB, | Performed by: PSYCHIATRY & NEUROLOGY

## 2021-10-22 PROCEDURE — 90833 PR PSYCHOTHERAPY W/PATIENT W/E&M, 30 MIN (ADD ON): ICD-10-PCS | Mod: HCNC,S$GLB,, | Performed by: PSYCHIATRY & NEUROLOGY

## 2021-10-22 PROCEDURE — 1101F PR PT FALLS ASSESS DOC 0-1 FALLS W/OUT INJ PAST YR: ICD-10-PCS | Mod: HCNC,CPTII,S$GLB, | Performed by: PSYCHIATRY & NEUROLOGY

## 2021-10-22 PROCEDURE — 3074F SYST BP LT 130 MM HG: CPT | Mod: HCNC,CPTII,S$GLB, | Performed by: PSYCHIATRY & NEUROLOGY

## 2021-10-22 PROCEDURE — 3051F PR MOST RECENT HEMOGLOBIN A1C LEVEL 7.0 - < 8.0%: ICD-10-PCS | Mod: HCNC,CPTII,S$GLB, | Performed by: PSYCHIATRY & NEUROLOGY

## 2021-10-22 PROCEDURE — 3061F PR NEG MICROALBUMINURIA RESULT DOCUMENTED/REVIEW: ICD-10-PCS | Mod: HCNC,CPTII,S$GLB, | Performed by: PSYCHIATRY & NEUROLOGY

## 2021-10-22 PROCEDURE — 1159F MED LIST DOCD IN RCRD: CPT | Mod: HCNC,CPTII,S$GLB, | Performed by: PSYCHIATRY & NEUROLOGY

## 2021-10-22 PROCEDURE — 99999 PR PBB SHADOW E&M-EST. PATIENT-LVL V: CPT | Mod: PBBFAC,HCNC,, | Performed by: PSYCHIATRY & NEUROLOGY

## 2021-10-22 PROCEDURE — 3074F PR MOST RECENT SYSTOLIC BLOOD PRESSURE < 130 MM HG: ICD-10-PCS | Mod: HCNC,CPTII,S$GLB, | Performed by: PSYCHIATRY & NEUROLOGY

## 2021-10-22 PROCEDURE — 1125F PR PAIN SEVERITY QUANTIFIED, PAIN PRESENT: ICD-10-PCS | Mod: HCNC,CPTII,S$GLB, | Performed by: PSYCHIATRY & NEUROLOGY

## 2021-10-22 PROCEDURE — 3066F PR DOCUMENTATION OF TREATMENT FOR NEPHROPATHY: ICD-10-PCS | Mod: HCNC,CPTII,S$GLB, | Performed by: PSYCHIATRY & NEUROLOGY

## 2021-10-22 PROCEDURE — 1159F PR MEDICATION LIST DOCUMENTED IN MEDICAL RECORD: ICD-10-PCS | Mod: HCNC,CPTII,S$GLB, | Performed by: PSYCHIATRY & NEUROLOGY

## 2021-10-22 PROCEDURE — 3008F PR BODY MASS INDEX (BMI) DOCUMENTED: ICD-10-PCS | Mod: HCNC,CPTII,S$GLB, | Performed by: PSYCHIATRY & NEUROLOGY

## 2021-10-22 PROCEDURE — 99499 RISK ADDL DX/OHS AUDIT: ICD-10-PCS | Mod: HCNC,S$GLB,, | Performed by: PSYCHIATRY & NEUROLOGY

## 2021-10-22 PROCEDURE — 1160F RVW MEDS BY RX/DR IN RCRD: CPT | Mod: HCNC,CPTII,S$GLB, | Performed by: PSYCHIATRY & NEUROLOGY

## 2021-10-22 PROCEDURE — 99214 OFFICE O/P EST MOD 30 MIN: CPT | Mod: HCNC,S$GLB,, | Performed by: PSYCHIATRY & NEUROLOGY

## 2021-10-22 PROCEDURE — 99214 PR OFFICE/OUTPT VISIT, EST, LEVL IV, 30-39 MIN: ICD-10-PCS | Mod: HCNC,S$GLB,, | Performed by: PSYCHIATRY & NEUROLOGY

## 2021-10-22 PROCEDURE — 3061F NEG MICROALBUMINURIA REV: CPT | Mod: HCNC,CPTII,S$GLB, | Performed by: PSYCHIATRY & NEUROLOGY

## 2021-10-22 PROCEDURE — 99999 PR PBB SHADOW E&M-EST. PATIENT-LVL V: ICD-10-PCS | Mod: PBBFAC,HCNC,, | Performed by: PSYCHIATRY & NEUROLOGY

## 2021-10-22 PROCEDURE — 99499 UNLISTED E&M SERVICE: CPT | Mod: HCNC,S$GLB,, | Performed by: PSYCHIATRY & NEUROLOGY

## 2021-10-22 PROCEDURE — 1160F PR REVIEW ALL MEDS BY PRESCRIBER/CLIN PHARMACIST DOCUMENTED: ICD-10-PCS | Mod: HCNC,CPTII,S$GLB, | Performed by: PSYCHIATRY & NEUROLOGY

## 2021-10-22 PROCEDURE — 1101F PT FALLS ASSESS-DOCD LE1/YR: CPT | Mod: HCNC,CPTII,S$GLB, | Performed by: PSYCHIATRY & NEUROLOGY

## 2021-10-22 PROCEDURE — 3078F PR MOST RECENT DIASTOLIC BLOOD PRESSURE < 80 MM HG: ICD-10-PCS | Mod: HCNC,CPTII,S$GLB, | Performed by: PSYCHIATRY & NEUROLOGY

## 2021-10-22 RX ORDER — DULOXETIN HYDROCHLORIDE 30 MG/1
30 CAPSULE, DELAYED RELEASE ORAL NIGHTLY
Qty: 90 CAPSULE | Refills: 0 | Status: SHIPPED | OUTPATIENT
Start: 2021-10-22 | End: 2021-12-30

## 2021-10-22 RX ORDER — SERTRALINE HYDROCHLORIDE 50 MG/1
TABLET, FILM COATED ORAL
Qty: 60 TABLET | Refills: 0 | Status: SHIPPED | OUTPATIENT
Start: 2021-10-22 | End: 2021-12-13

## 2021-10-22 RX ORDER — DULOXETIN HYDROCHLORIDE 60 MG/1
CAPSULE, DELAYED RELEASE ORAL
Qty: 90 CAPSULE | Refills: 0 | Status: SHIPPED | OUTPATIENT
Start: 2021-10-22 | End: 2022-01-05

## 2021-10-22 RX ORDER — ZALEPLON 5 MG/1
5 CAPSULE ORAL NIGHTLY PRN
Qty: 60 CAPSULE | Refills: 0 | Status: SHIPPED | OUTPATIENT
Start: 2021-10-22 | End: 2022-02-03 | Stop reason: DRUGHIGH

## 2021-10-26 ENCOUNTER — PATIENT OUTREACH (OUTPATIENT)
Dept: ADMINISTRATIVE | Facility: OTHER | Age: 71
End: 2021-10-26
Payer: MEDICARE

## 2021-10-27 ENCOUNTER — TELEPHONE (OUTPATIENT)
Dept: PULMONOLOGY | Facility: CLINIC | Age: 71
End: 2021-10-27
Payer: MEDICARE

## 2021-10-27 ENCOUNTER — OFFICE VISIT (OUTPATIENT)
Dept: PRIMARY CARE CLINIC | Facility: CLINIC | Age: 71
End: 2021-10-27
Payer: MEDICARE

## 2021-10-27 ENCOUNTER — OFFICE VISIT (OUTPATIENT)
Dept: PULMONOLOGY | Facility: CLINIC | Age: 71
End: 2021-10-27
Payer: MEDICARE

## 2021-10-27 VITALS
OXYGEN SATURATION: 94 % | WEIGHT: 179.44 LBS | TEMPERATURE: 97 F | DIASTOLIC BLOOD PRESSURE: 80 MMHG | SYSTOLIC BLOOD PRESSURE: 130 MMHG | HEIGHT: 66 IN | BODY MASS INDEX: 28.84 KG/M2 | HEART RATE: 100 BPM

## 2021-10-27 VITALS
DIASTOLIC BLOOD PRESSURE: 69 MMHG | OXYGEN SATURATION: 97 % | SYSTOLIC BLOOD PRESSURE: 131 MMHG | HEIGHT: 66 IN | HEART RATE: 87 BPM | WEIGHT: 179.44 LBS | BODY MASS INDEX: 28.84 KG/M2

## 2021-10-27 DIAGNOSIS — G47.33 OSA (OBSTRUCTIVE SLEEP APNEA): ICD-10-CM

## 2021-10-27 DIAGNOSIS — J45.20 MILD INTERMITTENT ASTHMA WITHOUT COMPLICATION: ICD-10-CM

## 2021-10-27 DIAGNOSIS — Z12.11 COLON CANCER SCREENING: ICD-10-CM

## 2021-10-27 DIAGNOSIS — D86.0 SARCOIDOSIS OF LUNG: Primary | ICD-10-CM

## 2021-10-27 DIAGNOSIS — R53.81 MALAISE: ICD-10-CM

## 2021-10-27 DIAGNOSIS — R41.3 MEMORY CHANGE: ICD-10-CM

## 2021-10-27 DIAGNOSIS — E11.69 HYPERLIPIDEMIA ASSOCIATED WITH TYPE 2 DIABETES MELLITUS: Primary | ICD-10-CM

## 2021-10-27 DIAGNOSIS — R26.89 BALANCE DISORDER: ICD-10-CM

## 2021-10-27 DIAGNOSIS — Z78.0 POSTMENOPAUSAL: ICD-10-CM

## 2021-10-27 DIAGNOSIS — I15.2 HYPERTENSION ASSOCIATED WITH DIABETES: ICD-10-CM

## 2021-10-27 DIAGNOSIS — E03.9 HYPOTHYROIDISM, UNSPECIFIED TYPE: ICD-10-CM

## 2021-10-27 DIAGNOSIS — E11.8 TYPE 2 DIABETES WITH COMPLICATION: ICD-10-CM

## 2021-10-27 DIAGNOSIS — J45.51 ASTHMA EXACERBATION, NON-ALLERGIC, SEVERE PERSISTENT: ICD-10-CM

## 2021-10-27 DIAGNOSIS — E03.4 HYPOTHYROIDISM DUE TO ACQUIRED ATROPHY OF THYROID: ICD-10-CM

## 2021-10-27 DIAGNOSIS — E78.5 HYPERLIPIDEMIA ASSOCIATED WITH TYPE 2 DIABETES MELLITUS: Primary | ICD-10-CM

## 2021-10-27 DIAGNOSIS — E11.59 HYPERTENSION ASSOCIATED WITH DIABETES: ICD-10-CM

## 2021-10-27 DIAGNOSIS — J45.20 INTERMITTENT ASTHMA WITHOUT COMPLICATION, UNSPECIFIED ASTHMA SEVERITY: ICD-10-CM

## 2021-10-27 PROBLEM — S01.21XA LACERATION OF NOSE: Status: RESOLVED | Noted: 2020-12-01 | Resolved: 2021-10-27

## 2021-10-27 PROCEDURE — 1125F AMNT PAIN NOTED PAIN PRSNT: CPT | Mod: HCNC,CPTII,S$GLB, | Performed by: INTERNAL MEDICINE

## 2021-10-27 PROCEDURE — 3288F FALL RISK ASSESSMENT DOCD: CPT | Mod: HCNC,CPTII,S$GLB, | Performed by: INTERNAL MEDICINE

## 2021-10-27 PROCEDURE — 1101F PT FALLS ASSESS-DOCD LE1/YR: CPT | Mod: HCNC,CPTII,S$GLB, | Performed by: INTERNAL MEDICINE

## 2021-10-27 PROCEDURE — 3066F PR DOCUMENTATION OF TREATMENT FOR NEPHROPATHY: ICD-10-PCS | Mod: HCNC,CPTII,S$GLB, | Performed by: INTERNAL MEDICINE

## 2021-10-27 PROCEDURE — 99215 PR OFFICE/OUTPT VISIT, EST, LEVL V, 40-54 MIN: ICD-10-PCS | Mod: HCNC,S$GLB,, | Performed by: INTERNAL MEDICINE

## 2021-10-27 PROCEDURE — 3008F BODY MASS INDEX DOCD: CPT | Mod: HCNC,CPTII,S$GLB, | Performed by: INTERNAL MEDICINE

## 2021-10-27 PROCEDURE — 3008F PR BODY MASS INDEX (BMI) DOCUMENTED: ICD-10-PCS | Mod: HCNC,CPTII,S$GLB, | Performed by: INTERNAL MEDICINE

## 2021-10-27 PROCEDURE — 3061F PR NEG MICROALBUMINURIA RESULT DOCUMENTED/REVIEW: ICD-10-PCS | Mod: HCNC,CPTII,S$GLB, | Performed by: INTERNAL MEDICINE

## 2021-10-27 PROCEDURE — 1159F PR MEDICATION LIST DOCUMENTED IN MEDICAL RECORD: ICD-10-PCS | Mod: HCNC,CPTII,S$GLB, | Performed by: INTERNAL MEDICINE

## 2021-10-27 PROCEDURE — 3051F PR MOST RECENT HEMOGLOBIN A1C LEVEL 7.0 - < 8.0%: ICD-10-PCS | Mod: HCNC,CPTII,S$GLB, | Performed by: INTERNAL MEDICINE

## 2021-10-27 PROCEDURE — 1125F PR PAIN SEVERITY QUANTIFIED, PAIN PRESENT: ICD-10-PCS | Mod: HCNC,CPTII,S$GLB, | Performed by: INTERNAL MEDICINE

## 2021-10-27 PROCEDURE — 99213 OFFICE O/P EST LOW 20 MIN: CPT | Mod: HCNC,S$GLB,, | Performed by: INTERNAL MEDICINE

## 2021-10-27 PROCEDURE — 99215 OFFICE O/P EST HI 40 MIN: CPT | Mod: HCNC,S$GLB,, | Performed by: INTERNAL MEDICINE

## 2021-10-27 PROCEDURE — 1159F MED LIST DOCD IN RCRD: CPT | Mod: HCNC,CPTII,S$GLB, | Performed by: INTERNAL MEDICINE

## 2021-10-27 PROCEDURE — 99499 RISK ADDL DX/OHS AUDIT: ICD-10-PCS | Mod: HCNC,S$GLB,, | Performed by: INTERNAL MEDICINE

## 2021-10-27 PROCEDURE — 3079F DIAST BP 80-89 MM HG: CPT | Mod: HCNC,CPTII,S$GLB, | Performed by: INTERNAL MEDICINE

## 2021-10-27 PROCEDURE — 3066F NEPHROPATHY DOC TX: CPT | Mod: HCNC,CPTII,S$GLB, | Performed by: INTERNAL MEDICINE

## 2021-10-27 PROCEDURE — 3078F DIAST BP <80 MM HG: CPT | Mod: HCNC,CPTII,S$GLB, | Performed by: INTERNAL MEDICINE

## 2021-10-27 PROCEDURE — 3051F HG A1C>EQUAL 7.0%<8.0%: CPT | Mod: HCNC,CPTII,S$GLB, | Performed by: INTERNAL MEDICINE

## 2021-10-27 PROCEDURE — 99999 PR PBB SHADOW E&M-EST. PATIENT-LVL V: ICD-10-PCS | Mod: PBBFAC,HCNC,, | Performed by: INTERNAL MEDICINE

## 2021-10-27 PROCEDURE — 3075F PR MOST RECENT SYSTOLIC BLOOD PRESS GE 130-139MM HG: ICD-10-PCS | Mod: HCNC,CPTII,S$GLB, | Performed by: INTERNAL MEDICINE

## 2021-10-27 PROCEDURE — 99499 UNLISTED E&M SERVICE: CPT | Mod: HCNC,S$GLB,, | Performed by: INTERNAL MEDICINE

## 2021-10-27 PROCEDURE — 1101F PR PT FALLS ASSESS DOC 0-1 FALLS W/OUT INJ PAST YR: ICD-10-PCS | Mod: HCNC,CPTII,S$GLB, | Performed by: INTERNAL MEDICINE

## 2021-10-27 PROCEDURE — 3061F NEG MICROALBUMINURIA REV: CPT | Mod: HCNC,CPTII,S$GLB, | Performed by: INTERNAL MEDICINE

## 2021-10-27 PROCEDURE — 3288F PR FALLS RISK ASSESSMENT DOCUMENTED: ICD-10-PCS | Mod: HCNC,CPTII,S$GLB, | Performed by: INTERNAL MEDICINE

## 2021-10-27 PROCEDURE — 99213 PR OFFICE/OUTPT VISIT, EST, LEVL III, 20-29 MIN: ICD-10-PCS | Mod: HCNC,S$GLB,, | Performed by: INTERNAL MEDICINE

## 2021-10-27 PROCEDURE — 3078F PR MOST RECENT DIASTOLIC BLOOD PRESSURE < 80 MM HG: ICD-10-PCS | Mod: HCNC,CPTII,S$GLB, | Performed by: INTERNAL MEDICINE

## 2021-10-27 PROCEDURE — 3079F PR MOST RECENT DIASTOLIC BLOOD PRESSURE 80-89 MM HG: ICD-10-PCS | Mod: HCNC,CPTII,S$GLB, | Performed by: INTERNAL MEDICINE

## 2021-10-27 PROCEDURE — 3075F SYST BP GE 130 - 139MM HG: CPT | Mod: HCNC,CPTII,S$GLB, | Performed by: INTERNAL MEDICINE

## 2021-10-27 PROCEDURE — 99999 PR PBB SHADOW E&M-EST. PATIENT-LVL V: CPT | Mod: PBBFAC,HCNC,, | Performed by: INTERNAL MEDICINE

## 2021-10-27 RX ORDER — PREDNISONE 20 MG/1
20 TABLET ORAL DAILY
Qty: 20 TABLET | Refills: 0 | Status: SHIPPED | OUTPATIENT
Start: 2021-10-27

## 2021-10-27 RX ORDER — FLUTICASONE FUROATE AND VILANTEROL TRIFENATATE 200; 25 UG/1; UG/1
1 POWDER RESPIRATORY (INHALATION) DAILY
Qty: 3 EACH | Refills: 3 | Status: SHIPPED | OUTPATIENT
Start: 2021-10-27 | End: 2023-06-21

## 2021-10-27 RX ORDER — IPRATROPIUM BROMIDE AND ALBUTEROL SULFATE 2.5; .5 MG/3ML; MG/3ML
SOLUTION RESPIRATORY (INHALATION)
Qty: 120 EACH | Refills: 0 | Status: SHIPPED | OUTPATIENT
Start: 2021-10-27

## 2021-10-27 RX ORDER — ALBUTEROL SULFATE 90 UG/1
1-2 AEROSOL, METERED RESPIRATORY (INHALATION) EVERY 4 HOURS PRN
Qty: 54 G | Refills: 3 | Status: SHIPPED | OUTPATIENT
Start: 2021-10-27

## 2021-11-10 ENCOUNTER — PATIENT OUTREACH (OUTPATIENT)
Dept: ADMINISTRATIVE | Facility: HOSPITAL | Age: 71
End: 2021-11-10
Payer: MEDICARE

## 2021-11-11 ENCOUNTER — PATIENT OUTREACH (OUTPATIENT)
Dept: ADMINISTRATIVE | Facility: HOSPITAL | Age: 71
End: 2021-11-11
Payer: MEDICARE

## 2021-11-15 ENCOUNTER — PATIENT MESSAGE (OUTPATIENT)
Dept: ADMINISTRATIVE | Facility: HOSPITAL | Age: 71
End: 2021-11-15
Payer: MEDICARE

## 2021-11-15 ENCOUNTER — PATIENT OUTREACH (OUTPATIENT)
Dept: ADMINISTRATIVE | Facility: HOSPITAL | Age: 71
End: 2021-11-15
Payer: MEDICARE

## 2021-11-16 ENCOUNTER — PATIENT OUTREACH (OUTPATIENT)
Dept: ADMINISTRATIVE | Facility: HOSPITAL | Age: 71
End: 2021-11-16
Payer: MEDICARE

## 2021-11-17 ENCOUNTER — PATIENT OUTREACH (OUTPATIENT)
Dept: ADMINISTRATIVE | Facility: HOSPITAL | Age: 71
End: 2021-11-17
Payer: MEDICARE

## 2021-11-18 NOTE — PROGRESS NOTES
HPI     70 YO female presents today for an eye problem. She states she is having   trouble with photophobia always OD, irritation and discomfort when she   opens OD from resting or sleeping.     Agree with above. 4-6 weeks ago feels like eyelid is a roller and eye is   rolling over it, tends to be more in the eye. Light shining is very   uncomfortable. No pain when eyelid is open. Has GI issues, but does not   correlate temporally with eye pain. Did not get most recent Rx filled for   glasses.    Lab Results       Component                Value               Date                       HGBA1C                   6.7 (H)             08/22/2018                 HGBA1C                   6.7 (H)             03/28/2017                 HGBA1C                   6.9 (H)             11/17/2016            No results found for: LABA1C      Last edited by Elizabeth Smith MD on 9/6/2019  1:57 PM.   (History)        ROS     Positive for: Constitutional (sardoidosis - on long-term steroids;   hospitalized twice), Gastrointestinal (gastroparesis and reflux),   Endocrine (DM diagnosed 2009 after steroids for sarcoid; hypothyroid),   Cardiovascular (HTN - controlled with meds per pt), Respiratory (pulmonary   sarcoid), Heme/Lymph (ASA)    Negative for: Eyes (denies surgery or trauma)    Other comments for: Neurological (denies neuropathy), Genitourinary   (denies nephropathy)    Last edited by Elizabeth Smith MD on 9/6/2019  1:48 PM.   (History)        Assessment /Plan     For exam results, see Encounter Report.    Eye pain, right    Anatomical narrow angle    Cortical age-related cataract of both eyes    Nuclear sclerosis, bilateral    Diabetes mellitus type 2 without retinopathy    Refractive error        DDx includes narrow angles (yet IOP is low, nerve is good and angle remains open), iritis (no significant cell/flare noted), neuropathic pain, episcleritis. Will try short course of topical prednisolone BID OD x 5  Impression: Retinal edema, OD. Status: Chronic.
h/o Sterile inflammation OD s/p IVK
s/p IVK x 2 last 03/30/16
s/p PPV/MP/PRP OD 04/04/16 (DYK)
s/p Avastin x 36  last 07/29/2021 Plan: Exam and OCT reveal CME OD (354 microns, from 381 microns). The patient prefers not to have FAs, but if done prefers Benadryl. An IVFA from 11/18/2021 demonstrated no telangiectasia. Fundus Photos from 11/18/2021demonstrated no IRH. The patient notes worsening. Discussed trial of Avastin vs observation. R/B/A discussed with patient. The risks of Avastin were discussed, including off-label use and compounding pharmacy risks, and the patient elects to proceed with Avastin. After 2% Subconjunctival anesthesia & Betadine prep, 1.25mg of Avastin was injected in the eye. Cold compress and Tylenol suggested for pain if needed. Cindy Rizzo Return in 4-6 weeks for follow up, OCT OU (add gel) days then QD x 5 days then stop. Also discussed PO Aleve, as she is no longer taking oral steroid pills. Consider cataract surgery if not improving with treatment.       1. Diabetes mellitus type II, controlled  No CSME, no NPDR   2. PVD OU RD precautions.   3. ERM OD Nasal to fovea. First time noted on clinical exam. Observe. Baseline OCT done prior visit.    4. Nuclear sclerosis, bilateral  Avoid nighttime driving for now. Pt on long-term steroids for sarcoidosis   5. Cortical cataract   Becoming symptomatic. Approaching visual significance OU. Re-check 1 year, sooner if MRx does not help with ADL's.   6. Sarcoidosis  quiet   7. Hyperopia with presbyopia  MRx given      Discussed concerns regarding cataract surgery - currently unable to have surgery due to caring for daughter/granddaughter.

## 2021-11-19 ENCOUNTER — PATIENT MESSAGE (OUTPATIENT)
Dept: PSYCHIATRY | Facility: CLINIC | Age: 71
End: 2021-11-19
Payer: MEDICARE

## 2021-11-20 ENCOUNTER — PATIENT MESSAGE (OUTPATIENT)
Dept: PSYCHIATRY | Facility: CLINIC | Age: 71
End: 2021-11-20
Payer: MEDICARE

## 2021-12-03 ENCOUNTER — OFFICE VISIT (OUTPATIENT)
Dept: PAIN MEDICINE | Facility: CLINIC | Age: 71
End: 2021-12-03
Payer: MEDICARE

## 2021-12-03 VITALS — SYSTOLIC BLOOD PRESSURE: 147 MMHG | HEART RATE: 77 BPM | DIASTOLIC BLOOD PRESSURE: 91 MMHG

## 2021-12-03 DIAGNOSIS — M47.896 OTHER SPONDYLOSIS, LUMBAR REGION: Primary | ICD-10-CM

## 2021-12-03 DIAGNOSIS — M43.17 SPONDYLOLISTHESIS OF LUMBOSACRAL REGION: ICD-10-CM

## 2021-12-03 DIAGNOSIS — M70.60 GREATER TROCHANTERIC BURSITIS, UNSPECIFIED LATERALITY: ICD-10-CM

## 2021-12-03 DIAGNOSIS — G89.4 CHRONIC PAIN DISORDER: ICD-10-CM

## 2021-12-03 DIAGNOSIS — M51.36 DDD (DEGENERATIVE DISC DISEASE), LUMBAR: ICD-10-CM

## 2021-12-03 PROCEDURE — 3066F NEPHROPATHY DOC TX: CPT | Mod: HCNC,CPTII,S$GLB, | Performed by: PHYSICIAN ASSISTANT

## 2021-12-03 PROCEDURE — 99999 PR PBB SHADOW E&M-EST. PATIENT-LVL IV: ICD-10-PCS | Mod: PBBFAC,HCNC,, | Performed by: PHYSICIAN ASSISTANT

## 2021-12-03 PROCEDURE — 99214 PR OFFICE/OUTPT VISIT, EST, LEVL IV, 30-39 MIN: ICD-10-PCS | Mod: HCNC,S$GLB,, | Performed by: PHYSICIAN ASSISTANT

## 2021-12-03 PROCEDURE — 99999 PR PBB SHADOW E&M-EST. PATIENT-LVL IV: CPT | Mod: PBBFAC,HCNC,, | Performed by: PHYSICIAN ASSISTANT

## 2021-12-03 PROCEDURE — 3061F PR NEG MICROALBUMINURIA RESULT DOCUMENTED/REVIEW: ICD-10-PCS | Mod: HCNC,CPTII,S$GLB, | Performed by: PHYSICIAN ASSISTANT

## 2021-12-03 PROCEDURE — 3066F PR DOCUMENTATION OF TREATMENT FOR NEPHROPATHY: ICD-10-PCS | Mod: HCNC,CPTII,S$GLB, | Performed by: PHYSICIAN ASSISTANT

## 2021-12-03 PROCEDURE — 3061F NEG MICROALBUMINURIA REV: CPT | Mod: HCNC,CPTII,S$GLB, | Performed by: PHYSICIAN ASSISTANT

## 2021-12-03 PROCEDURE — 99214 OFFICE O/P EST MOD 30 MIN: CPT | Mod: HCNC,S$GLB,, | Performed by: PHYSICIAN ASSISTANT

## 2021-12-03 RX ORDER — OXYCODONE AND ACETAMINOPHEN 10; 325 MG/1; MG/1
1 TABLET ORAL EVERY 8 HOURS PRN
Qty: 90 TABLET | Refills: 0 | Status: SHIPPED | OUTPATIENT
Start: 2021-12-05 | End: 2022-01-03

## 2021-12-03 RX ORDER — OXYCODONE AND ACETAMINOPHEN 10; 325 MG/1; MG/1
1 TABLET ORAL EVERY 8 HOURS PRN
Qty: 90 TABLET | Refills: 0 | Status: SHIPPED | OUTPATIENT
Start: 2022-02-01 | End: 2022-02-23 | Stop reason: SDUPTHER

## 2021-12-03 RX ORDER — OXYCODONE AND ACETAMINOPHEN 10; 325 MG/1; MG/1
1 TABLET ORAL EVERY 8 HOURS PRN
Qty: 90 TABLET | Refills: 0 | Status: SHIPPED | OUTPATIENT
Start: 2022-01-03 | End: 2022-02-01

## 2021-12-14 ENCOUNTER — PATIENT OUTREACH (OUTPATIENT)
Dept: ADMINISTRATIVE | Facility: HOSPITAL | Age: 71
End: 2021-12-14
Payer: MEDICARE

## 2021-12-20 ENCOUNTER — PATIENT OUTREACH (OUTPATIENT)
Dept: ADMINISTRATIVE | Facility: HOSPITAL | Age: 71
End: 2021-12-20
Payer: MEDICARE

## 2021-12-22 DIAGNOSIS — I15.2 HYPERTENSION ASSOCIATED WITH DIABETES: ICD-10-CM

## 2021-12-22 DIAGNOSIS — E11.59 HYPERTENSION ASSOCIATED WITH DIABETES: ICD-10-CM

## 2021-12-29 DIAGNOSIS — E11.59 HYPERTENSION ASSOCIATED WITH DIABETES: ICD-10-CM

## 2021-12-29 DIAGNOSIS — I15.2 HYPERTENSION ASSOCIATED WITH DIABETES: ICD-10-CM

## 2022-01-03 ENCOUNTER — TELEPHONE (OUTPATIENT)
Dept: PAIN MEDICINE | Facility: CLINIC | Age: 72
End: 2022-01-03
Payer: MEDICARE

## 2022-01-03 NOTE — TELEPHONE ENCOUNTER
"Returned call to patient who reports an accidental fall directly on face, left CW and left knee and is inquiring "the maximum amount of pain meds she can take". Advised patient not to use pain meds more often than prescribed. Also advised that info would be forwarded to provider in the event there is any suggestions to add for care. Verbalizes understanding.  "

## 2022-01-03 NOTE — TELEPHONE ENCOUNTER
----- Message from Mikayla Ingram sent at 1/3/2022  3:41 PM CST -----  Type: Needs Medical Advice    Who Called:  Patient  Best Call Back Number: [phone]  Additional Information:  Patient stated she had a major fall/fell on face and left side of body/needs to know maximum amount of pain medication she can take/please call back to advise.

## 2022-01-04 DIAGNOSIS — G89.4 CHRONIC PAIN SYNDROME: ICD-10-CM

## 2022-01-04 DIAGNOSIS — E11.59 HYPERTENSION ASSOCIATED WITH DIABETES: ICD-10-CM

## 2022-01-04 DIAGNOSIS — I15.2 HYPERTENSION ASSOCIATED WITH DIABETES: ICD-10-CM

## 2022-01-04 DIAGNOSIS — E87.6 HYPOKALEMIA: ICD-10-CM

## 2022-01-05 DIAGNOSIS — E11.59 HYPERTENSION ASSOCIATED WITH DIABETES: ICD-10-CM

## 2022-01-05 DIAGNOSIS — I15.2 HYPERTENSION ASSOCIATED WITH DIABETES: ICD-10-CM

## 2022-01-05 RX ORDER — DULOXETIN HYDROCHLORIDE 60 MG/1
CAPSULE, DELAYED RELEASE ORAL
Qty: 90 CAPSULE | Refills: 0 | Status: SHIPPED | OUTPATIENT
Start: 2022-01-05 | End: 2022-04-12 | Stop reason: SDUPTHER

## 2022-01-05 RX ORDER — POTASSIUM CHLORIDE 750 MG/1
CAPSULE, EXTENDED RELEASE ORAL
Qty: 270 CAPSULE | Refills: 1 | Status: SHIPPED | OUTPATIENT
Start: 2022-01-05

## 2022-01-05 RX ORDER — HYDROCHLOROTHIAZIDE 25 MG/1
TABLET ORAL
Qty: 90 TABLET | Refills: 2 | Status: SHIPPED | OUTPATIENT
Start: 2022-01-05

## 2022-01-05 RX ORDER — GABAPENTIN 300 MG/1
CAPSULE ORAL
Qty: 360 CAPSULE | Refills: 0 | Status: SHIPPED | OUTPATIENT
Start: 2022-01-05 | End: 2022-04-12 | Stop reason: SDUPTHER

## 2022-01-05 NOTE — TELEPHONE ENCOUNTER
No new care gaps identified.  Powered by [employer]. Reference number: [id number].   1/05/2022 8:15:34 AM CST

## 2022-01-05 NOTE — TELEPHONE ENCOUNTER
Refill Routing Note   Medication(s) are not appropriate for processing by [facility] for the following reason(s):      - Required vitals are abnormal  - Drug-Drug Interaction (ALDACTONE)  - Drug-Disease Interaction (Hypokalemia)    OR action(s):  Defer  Approve Medication-related problems identified:   Drug-drug interaction  Drug-disease interaction     Medication Therapy Plan: BP ELEVATED 147/91 NOT TO OR STANDARDS ABNORMAL VITALS, PLEASE ADVISE  --->Care Gap information included in message below if applicable.   Medication reconciliation completed: No   Automatic Epic Generated Protocol Data:        Requested Prescriptions   Pending Prescriptions Disp Refills    hydroCHLOROthiazide (HYDRODIURIL) 25 MG tablet [Pharmacy Med Name: HYDROCHLOROTHIAZIDE 25 MG Tablet] 90 tablet 1     Sig: TAKE 1 TABLET EVERY DAY       Cardiovascular: Diuretics - Thiazide Failed - 1/5/2022  8:14 AM        Failed - Last BP in normal range within 360 days     BP Readings from Last 1 Encounters:   12/03/21 (!) 147/91               Passed - Patient is at least 18 years old        Passed - Valid encounter within last 15 months     Recent Visits  Date Type Provider Dept   10/27/21 Office Visit Petra Broussard MD [facility]   02/19/21 Office Visit Petra Broussard MD [facility]   12/01/20 Office Visit Petra Broussard MD [facility]   09/14/20 Office Visit [facility] Br[facility]   08/06/20 Office Visit Pet[facility] Br[facility] MD [facility]   05/06/20 [facility]   Showing recent visits within past 720 days and meeting all other requirements  Future Appointments  No visits were found meeting these conditions.  Showing future appointments within next 150 days and meeting all other requirements      Future Appointments               In 1 week LAB, [facility]  [place] [facility]    In 1 week LAB, [facility] 1[facility]  [facility]    In 3 weeks Petra Broussard MD [facility], [facility] at [facility]    In 1 month Maylin Doran PA-C [place] [facility], [place]                Passed - Cr is 1.39 or below and within 360 days     Lab Results   Component Value Date    CREATININE 0.9 07/23/2021    CREATININE 0.8 12/10/2020    CREATININE 0.9 08/06/2020              Passed - K in normal range and within 360 days     Potassium   Date Value Ref Range Status   07/23/2021 4.3 3.5 - 5.1 mmol/L Final   12/10/2020 3.4 (L) 3.5 - 5.1 mmol/L Final   08/06/2020 4.0 3.5 - 5.1 mmol/L Final              Passed - Na is between 130 and 148 and within 360 days     Sodium   Date Value Ref Range Status   07/23/2021 139 136 - 145 mmol/L Final   12/10/2020 139 136 - 145 mmol/L Final   08/06/2020 140 136 - 145 mmol/L Final              Passed - eGFR within 360 days     Lab Results   Component Value Date    LABGLOM >60 08/27/2013    LABGLOM >60 08/27/2013    LABGLOM 56 (L) 08/08/2013    LABGLOM >60 08/08/2013    EGFRNONAA >60.0 07/23/2021    EGFRNONAA >60 12/10/2020    EGFRNONAA >60.0 08/06/2020               Diuretics Protocol Passed - 1/5/2022  8:13 AM        Passed - Visit with authorizing provider in past 12 months or upcoming 90 days         Passed - Blood Pressure below 139/89 on file in past 12 months      BP Readings from Last 3 Encounters:   12/03/21 (!) 147/91   10/27/21 131/69   10/27/21 130/80             Passed - Serum Potassium between 3.5 to 5.2 on file in the past 12 months     Lab Results   Component Value Date    K 4.3 07/23/2021    K 3.4 (L) 12/10/2020    K 4.0 08/06/2020                  Passed - Serum Creatinine less than 1.4 on file in the past 12 months     Lab Results   Component Value Date    CREATININE 0.9 07/23/2021    CREATININE 0.8  12/10/2020    CREATININE 0.9 08/06/2020     Lab Results   Component Value Date    EGFRNONAA >60.0 07/23/2021    EGFRNONAA >60 12/10/2020    EGFRNONAA >60.0 08/06/2020               Passed - Serum Sodium between 130 to 148 on file in the past 12 months     Lab Results   Component Value Date     07/23/2021                    potassium chloride (MICRO-K) 10 MEQ CpSR [Pharmacy Med Name: POTASSIUM CHLORIDE ER 10 MEQ Capsule Extended Release] 270 capsule 1     Sig: TAKE 1 CAPSULE THREE TIMES DAILY       Endocrinology:  Minerals - Potassium Supplementation Passed - 1/5/2022  8:14 AM        Passed - Patient is at least 18 years old        Passed - Valid encounter within last 15 months     Recent Visits  Date Type Provider Dept   10/27/21 Office Visit Petra Broussard MD [facility]   02/19/21 Office Visit [facility] Br[facility] MD [facility]   12/01/20 Office Visit Petra Broussard MD [facility]   09/14/20 Office Visit Petra Broussard MD [facility]   08/06/20 Office Visit Petra Broussard MD [facility]   05/06/20 Office Visit Petra Broussard MD [facility]   Showing recent visits within past 720 days and meeting all other requirements  Future Appointments  No visits were found meeting these conditions.  Showing future appointments within next 150 days and meeting all other requirements      Future Appointments              In 1 week LAB, [facility] 1ST  FL IN [facility] LAB[facility]ATORY [facility]    In 1 week LAB, [facility] 1ST  FL IN [facility] LAB[facility]ORY [facility]    In 3 weeks Petra Broussard MD [facility] - [facility], [facility] at [facility]    In 1 month Maylin Doran PA-C [place] [facility], [place]                Passed - K is 5.2 or below and within 360 days     Potassium   Date Value Ref Range  Status   07/23/2021 4.3 3.5 - 5.1 mmol/L Final   12/10/2020 3.4 (L) 3.5 - 5.1 mmol/L Final   08/06/2020 4.0 3.5 - 5.1 mmol/L Final              Passed - Cr is 1.39 or below and within 360 days     Lab Results   Component Value Date    CREATININE 0.9 07/23/2021    CREATININE 0.8 12/10/2020    CREATININE 0.9 08/06/2020              Passed - eGFR within 360 days     Lab Results   Component Value Date    LABGLOM >60 08/27/2013    LABGLOM >60 08/27/2013    LABGLOM 56 (L) 08/08/2013    LABGLOM >60 08/08/2013    EGFRNONAA >60.0 07/23/2021    EGFRNONAA >60 12/10/2020    EGFRNONAA >60.0 08/06/2020                      Appointments  past 12m or future 3m with PCP    Date Provider   Last Visit   10/27/2021 Petra Broussard MD   Next Visit   1/26/2022 Petra Broussard MD   ED visits in past 90 days: 0        Note composed:12:15 PM 01/05/2022

## 2022-01-06 DIAGNOSIS — I15.2 HYPERTENSION ASSOCIATED WITH DIABETES: ICD-10-CM

## 2022-01-06 DIAGNOSIS — E11.59 HYPERTENSION ASSOCIATED WITH DIABETES: ICD-10-CM

## 2022-01-06 NOTE — TELEPHONE ENCOUNTER
Encounter details require adjustment(s)/ updating by ORC Staff  As of this time Protocols and CDM: did not populate or display   Adjustment(s) made: Department  CDM should display. Medication(s) delegated by the OR.  Will resend refill request encounter to P Centralized Refill Staff Pool.   [facility]   Note composed:2:02 PM 01/06/2022

## 2022-01-06 NOTE — TELEPHONE ENCOUNTER
No new care gaps identified.  Powered by [employer]. Reference number: [id number].   1/06/2022 2:03:59 PM CST

## 2022-01-07 RX ORDER — SPIRONOLACTONE 25 MG/1
TABLET ORAL
Qty: 90 TABLET | Refills: 3 | Status: SHIPPED | OUTPATIENT
Start: 2022-01-07

## 2022-01-12 DIAGNOSIS — E11.59 HYPERTENSION ASSOCIATED WITH DIABETES: ICD-10-CM

## 2022-01-12 DIAGNOSIS — I15.2 HYPERTENSION ASSOCIATED WITH DIABETES: ICD-10-CM

## 2022-01-24 ENCOUNTER — PATIENT MESSAGE (OUTPATIENT)
Dept: ADMINISTRATIVE | Facility: OTHER | Age: 72
End: 2022-01-24
Payer: MEDICARE

## 2022-01-26 ENCOUNTER — OFFICE VISIT (OUTPATIENT)
Dept: PRIMARY CARE CLINIC | Facility: CLINIC | Age: 72
End: 2022-01-26
Payer: MEDICARE

## 2022-01-26 DIAGNOSIS — F33.41 MDD (MAJOR DEPRESSIVE DISORDER), RECURRENT, IN PARTIAL REMISSION: ICD-10-CM

## 2022-01-26 DIAGNOSIS — R29.6 REPEATED FALLS: ICD-10-CM

## 2022-01-26 DIAGNOSIS — E11.8 TYPE 2 DIABETES WITH COMPLICATION: ICD-10-CM

## 2022-01-26 DIAGNOSIS — J44.89 CHRONIC OBSTRUCTIVE ASTHMA: ICD-10-CM

## 2022-01-26 DIAGNOSIS — E03.9 HYPOTHYROIDISM, UNSPECIFIED TYPE: ICD-10-CM

## 2022-01-26 PROCEDURE — 4010F PR ACE/ARB THEARPY RXD/TAKEN: ICD-10-PCS | Mod: HCNC,CPTII,95, | Performed by: INTERNAL MEDICINE

## 2022-01-26 PROCEDURE — 4010F ACE/ARB THERAPY RXD/TAKEN: CPT | Mod: HCNC,CPTII,95, | Performed by: INTERNAL MEDICINE

## 2022-01-26 PROCEDURE — 99214 OFFICE O/P EST MOD 30 MIN: CPT | Mod: HCNC,95,, | Performed by: INTERNAL MEDICINE

## 2022-01-26 PROCEDURE — 3051F PR MOST RECENT HEMOGLOBIN A1C LEVEL 7.0 - < 8.0%: ICD-10-PCS | Mod: HCNC,CPTII,95, | Performed by: INTERNAL MEDICINE

## 2022-01-26 PROCEDURE — 3051F HG A1C>EQUAL 7.0%<8.0%: CPT | Mod: HCNC,CPTII,95, | Performed by: INTERNAL MEDICINE

## 2022-01-26 PROCEDURE — 99214 PR OFFICE/OUTPT VISIT, EST, LEVL IV, 30-39 MIN: ICD-10-PCS | Mod: HCNC,95,, | Performed by: INTERNAL MEDICINE

## 2022-01-26 NOTE — PROGRESS NOTES
Primary Care Provider Telemedicine Appointment      The patient location is:  Patient Home   The chief complaint leading to consultation is: follow up   Total time spent with patient: 20 minutes     Visit type: Virtual visit with synchronous audio only and video  Each patient to whom he or she provides medical services by telemedicine is:  (1) informed of the relationship between the physician and patient and the respective role of any other health care provider with respect to management of the patient; and (2) notified that he or she may decline to receive medical services by telemedicine and may withdraw from such care at any time.      Subjective:      Patient ID: Nathalie Santiago is a 71 y.o. female hx of  sarcoidosis, hx of breast CA/uterine/ovarian cancer, diabetes complicated by gastroparesis, HLD, HTN, anxiety and depression, hx of CVA (brain stem stroke), DJD, JOSE LUIS, chronic low back and hip pain    Chief Complaint: Follow-up    Does not feel well overall.     norvasc was increased to 10 mg from 5 mg and new med of olmesartan 20 mg was added to regimen from digital medicine program due to uncontrolled HTN  - has not started yet.     12/30 fell and she hit her left side - her face, ribs. Reports still generalized pain in her chest. Hurts with coughing that is non-productive. Slowly pain is improving. Did ice therapy at first, taking flexeril and percocet together.    Did not seek attention for fall . Her daughter has been ill and has been busy taking care of her great- grandchild.     She has been using walking cane that she received from her son.     Has been using inhalers for asthma.       Social History  Components    Tobacco (-)     Alcohol (+) rare    Ililcit drugs (-)    Sex (+) , exclusive, one male partner    Employment (-) Disability         Past Surgical History:   Procedure Laterality Date    APPENDECTOMY      BREAST SURGERY      CHOLECYSTECTOMY      CYSTOCELE REPAIR       FRACTURE SURGERY Right     foot    HYSTERECTOMY      SAADIA/BSO    MASTECTOMY Bilateral     b/l mastectomy    OOPHORECTOMY      PLEURA BIOPSY      RECTAL SURGERY      rectocele      TONSILLECTOMY         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    CVA (cerebral vascular accident)- Confirmed by neurology 9/6/2016    Degenerative joint disease of cervical and lumbar spine 8/1/2014    Depression     Diabetes mellitus     Diabetes mellitus, type II     Gastroenteritis 12/19/2016    History of malignant phylloides tumor of breast 2/15/2013    Hyperlipidemia     Hyperopia with presbyopia 2/18/2013    Hypertension     Hypertension     Hypothyroidism     Immunosuppressed status 9/3/2015    Laceration of nose 12/1/2020    Leukocytosis 11/17/2016    Migraine     Mitral valve regurgitation     Normochromic anemia 3/28/2017    Nuclear sclerosis 2/18/2013    Obesity     JOSE LUIS (obstructive sleep apnea)     Ovarian cancer     Pneumonia     Polyneuropathy     PVD (posterior vitreous detachment) 2/18/2013    Restless leg syndrome     Ruptured disk     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    Cardiovascular: Positive for chest pain. Negative for  palpitations.   Musculoskeletal: Negative for neck pain.   Neurological: Positive for headaches.       Objective:   LMP  (LMP Unknown)     Physical Exam  Constitutional:       General: She is not in acute distress.     Appearance: She is not toxic-appearing.      Comments: Does not appear to be feeling well    Pulmonary:      Effort: No respiratory distress.   Neurological:      Mental Status: She is alert. Mental status is at baseline.         Lab Results   Component Value Date    WBC 6.25 07/23/2021    HGB 13.5 07/23/2021    HCT 42.7 07/23/2021     07/23/2021    CHOL 143 07/23/2021    TRIG 201 (H) 07/23/2021    HDL 32 (L) 07/23/2021    ALT 22 07/23/2021    AST 23 07/23/2021     07/23/2021    K 4.3 07/23/2021     07/23/2021    CREATININE 0.9 07/23/2021    BUN 17 07/23/2021    CO2 26 07/23/2021    TSH 6.740 (H) 07/23/2021    INR 1.0 06/25/2015    HGBA1C 7.7 (H) 07/23/2021         Current Outpatient Medications:     albuterol (PROVENTIL/VENTOLIN HFA) 90 mcg/actuation inhaler, Inhale 1-2 puffs into the lungs every 4 (four) hours as needed for Wheezing or Shortness of Breath. This is a rescue inhaler medication and should be used as needed, Disp: 54 g, Rfl: 3    albuterol-ipratropium (DUO-NEB) 2.5 mg-0.5 mg/3 mL nebulizer solution, INHALE THE CONTENTS OF 1 VIAL VIA NEBULIZER EVERY 6 HOURS AS NEEDED FOR SHORTNESS OF BREATH  OR  WHEEZING, Disp: 120 each, Rfl: 0    amLODIPine (NORVASC) 5 MG tablet, Take 1 tablet (5 mg total) by mouth 2 (two) times a day., Disp: 180 tablet, Rfl: 1    blood sugar diagnostic Strp, To check BG 3  times daily, to use with insurance preferred meter, Disp: 200 strip, Rfl: 11    clobetasol 0.05% (TEMOVATE) 0.05 % Oint, APPLY A THIN LAYER TO THE AFFECTED AREA(S) BY TOPICAL ROUTE 2 TIMES PER DAY, Disp: 45 g, Rfl: 0    clopidogreL (PLAVIX) 75 mg tablet, TAKE 1 TABLET EVERY DAY, Disp: 90 tablet, Rfl: 3    diclofenac sodium (VOLTAREN) 1 % Gel, APPLY TOPICALLY TO AFFECTED AREA  TWICE DAILY AS NEEDED FOR PAIN RUB IN JOINTS, Disp: 1 Tube, Rfl: 2    DULoxetine (CYMBALTA) 30 MG capsule, TAKE 1 CAPSULE EVERY EVENING, Disp: 90 capsule, Rfl: 0    DULoxetine (CYMBALTA) 60 MG capsule, TAKE 1 CAPSULE EVERY NIGHT, Disp: 90 capsule, Rfl: 0    DUREZOL 0.05 % Drop ophthalmic solution, BEGINNING ONE DAY BEFORE SURGERY INSTILL 1 DROP IN OPERATIVE EYE EVERY MORNING FOR 30 DAYS, Disp: , Rfl:     fenofibrate 160 MG Tab, TAKE 1 TABLET (160 MG TOTAL) BY MOUTH ONCE DAILY., Disp: 90 tablet, Rfl: 3    fluticasone (FLONASE) 50 mcg/actuation nasal spray, 2 sprays by Each Nare route once daily. (Patient taking differently: 2 sprays by Each Nostril route daily as needed.), Disp: 1 Bottle, Rfl: 0    fluticasone furoate-vilanteroL (BREO ELLIPTA) 200-25 mcg/dose DsDv diskus inhaler, Inhale 1 puff into the lungs once daily. Controller, Disp: 3 each, Rfl: 3    gabapentin (NEURONTIN) 300 MG capsule, TAKE THREE TO FOUR CAPSULES NIGHTLY, Disp: 360 capsule, Rfl: 0    hydroCHLOROthiazide (HYDRODIURIL) 25 MG tablet, TAKE 1 TABLET EVERY DAY, Disp: 90 tablet, Rfl: 2    insulin aspart U-100 (NOVOLOG FLEXPEN U-100 INSULIN) 100 unit/mL (3 mL) InPn pen, USE  PER  SLIDING  SCALE BEFORE MEALS  MAX  15 UNITS FOR EACH DOSE., Disp: 45 mL, Rfl: 3    insulin  unit/mL injection, Inject 30 Units into the skin 2 (two) times daily before meals., Disp: 3 vial, Rfl: 11    insulin regular 100 unit/mL Inj injection, Inject into the skin., Disp: , Rfl:     levothyroxine (SYNTHROID) 25 MCG tablet, TAKE 1 TABLET BEFORE BREAKFAST (Patient taking differently: Take 25 mcg by mouth before breakfast. TAKE 2 TABLET BEFORE BREAKFAST), Disp: 90 tablet, Rfl: 3    magnesium oxide (MAG-OX) 400 mg tablet, Take 1 tablet (400 mg total) by mouth 2 (two) times daily., Disp: , Rfl: 0    meclizine (ANTIVERT) 25 mg tablet, Take 1 tablet (25 mg total) by mouth 3 (three) times daily as needed for Dizziness or Nausea., Disp: 30 tablet, Rfl: 2     metFORMIN (GLUCOPHAGE-XR) 500 MG ER 24hr tablet, TAKE 2 TABLETS EVERY DAY WITH BREAKFAST, Disp: 180 tablet, Rfl: 3    metoclopramide HCl (REGLAN) 10 MG tablet, TAKE 1 TABLET EVERY 8 HOURS, Disp: 270 tablet, Rfl: 1    metoprolol succinate (TOPROL-XL) 25 MG 24 hr tablet, TAKE 1 TABLET EVERY DAY, Disp: 90 tablet, Rfl: 3    multivitamin Chew, Take by mouth., Disp: , Rfl:     multivitamin, stress formula Tab, Take 1 tablet by mouth., Disp: , Rfl:     olmesartan (BENICAR) 20 MG tablet, Take 1 tablet (20 mg total) by mouth once daily., Disp: 30 tablet, Rfl: 0    oxybutynin (DITROPAN) 5 MG Tab, Take 5 mg by mouth 2 (two) times daily. , Disp: , Rfl:     oxyCODONE-acetaminophen (PERCOCET)  mg per tablet, Take 1 tablet by mouth every 8 (eight) hours as needed for Pain. Medically necessary for greater than 7 days for chronic pain, Disp: 90 tablet, Rfl: 0    potassium chloride (MICRO-K) 10 MEQ CpSR, TAKE 1 CAPSULE THREE TIMES DAILY, Disp: 270 capsule, Rfl: 1    pramipexole (MIRAPEX) 0.125 MG tablet, Take two tablets every night, Disp: 180 tablet, Rfl: 0    predniSONE (DELTASONE) 20 MG tablet, Take 1 tablet (20 mg total) by mouth once daily. For asthma flare take one daily for 5 days and repeat for asthma., Disp: 20 tablet, Rfl: 0    promethazine (PHENERGAN) 25 MG tablet, TAKE 1 TABLET EVERY 6 HOURS AS NEEDED FOR NAUSEA., Disp: 90 tablet, Rfl: 3    rosuvastatin (CRESTOR) 20 MG tablet, Take 1 tablet (20 mg total) by mouth once daily., Disp: 30 tablet, Rfl: 0    sertraline (ZOLOFT) 100 MG tablet, Take one tablet PO daily, Disp: 90 tablet, Rfl: 0    spironolactone (ALDACTONE) 25 MG tablet, TAKE 1 TABLET EVERY DAY, Disp: 90 tablet, Rfl: 3    zaleplon (SONATA) 10 MG capsule, Take 1 capsule (10 mg total) by mouth nightly as needed (insomnia)., Disp: 30 capsule, Rfl: 0    Assessment:   71 y.o. female with multiple co-morbid illnesses here to continue work-up of chronic issues notably frequent falls, balance  disorder, type 2 diabetes     Plan:     Problem List Items Addressed This Visit        Psychiatric    MDD (major depressive disorder), recurrent, in partial remission     Stable monitor             Pulmonary    Chronic obstructive asthma     No recent flares   Continue inhalers as directed             Endocrine    Type 2 diabetes with complication     Overdue for labs, asked that pt obtain labs          Hypothyroidism     Labs are due             Other    Repeated falls     Offered PT, pt declined.                Health Maintenance       Date Due Completion Date    Shingles Vaccine (1 of 2) Never done ---    Colorectal Cancer Screening 04/29/2020 4/29/2015    Eye Exam 09/06/2020 9/6/2019    Hemoglobin A1c 01/23/2022 7/23/2021    COVID-19 Vaccine (1) 10/27/2022 (Originally 7/22/1955) ---    Diabetes Urine Screening 07/23/2022 7/23/2021    Lipid Panel 07/23/2022 7/23/2021    Foot Exam 10/27/2022 10/27/2021    Override on 10/27/2021: Done    Override on 3/3/2016: Done    Override on 11/30/2015: Done (LUPER)    Override on 3/3/2015: Done    DEXA SCAN 09/16/2024 9/16/2014    TETANUS VACCINE 11/21/2030 11/21/2020          No follow-ups on file. . 20  minutes of total time spent on the encounter, which includes face to face time and non-face to face time preparing to see the patient (eg, review of tests), Obtaining and/or reviewing separately obtained history, Documenting clinical information in the electronic or other health record, Independently interpreting results (not separately reported) and communicating results to the patient/family/caregiver, or Care coordination (not separately reported).      Petra Broussard MD  Internal Medicine- Geriatrics  [facility] [place]

## 2022-01-28 NOTE — MEDICAL/APP STUDENT
Telephone call 1/28/22 10:00 AM    Called to check in on patient's pain following fall.   Says that it has not gotten better or worse. About the same. Says pain is now tolerable.   Has been bracing her side with a pillow when she lays down which has been helping.  When she gets up experiences pain which takes 4-5 min to ease back down.   Has still being taking the percocet two or three times a day which makes the pain a little more tolerable, especially at night when she is preparing to sleep. Still frequently wakes up due to pain, despite being on the percocet. Is able to go back to sleep, but wakes up every time she moves from one side to the other.   Says she has not noted any bruising.     Told patient to let us know if the pain worsens or becomes severe. Will f/u again next week to monitor progression.

## 2022-02-02 ENCOUNTER — TELEPHONE (OUTPATIENT)
Dept: PRIMARY CARE CLINIC | Facility: CLINIC | Age: 72
End: 2022-02-02
Payer: MEDICARE

## 2022-02-02 RX ORDER — MECLIZINE HYDROCHLORIDE 25 MG/1
25 TABLET ORAL 3 TIMES DAILY PRN
Qty: 30 TABLET | Refills: 2 | Status: SHIPPED | OUTPATIENT
Start: 2022-02-02 | End: 2022-08-03

## 2022-02-02 NOTE — TELEPHONE ENCOUNTER
Pt c/o vertigo over the past several days with nausea, pt is taking Meclazine with little help.  Pt stated that her systoloc BP has been running in the 120's.  Pt staying with daughter at present time.  Requested RX sent to [facility] in [place]

## 2022-02-02 NOTE — TELEPHONE ENCOUNTER
----- Message from Dc Banks sent at 2/2/2022 12:41 PM CST -----  Contact: Self  Type: Needs Medical Advice  Who Called:  patient  Symptoms (please be specific):  vertigo/ naseua   How long has patient had these symptoms:  11 days  Pharmacy name and phone #:      [facility] [place] - [id number]  [facility] [place] [address]  Phone: [phone] Fax: [phone]      Best Call Back Number: [phone]  Additional Information: Pt states that she is been having vertigo issues and nasuea for 11 days and does not know what to do. Please call pt back at [phone] to update and advise please.

## 2022-02-03 ENCOUNTER — TELEPHONE (OUTPATIENT)
Dept: PSYCHIATRY | Facility: CLINIC | Age: 72
End: 2022-02-03
Payer: MEDICARE

## 2022-02-03 RX ORDER — ZALEPLON 5 MG/1
5 CAPSULE ORAL NIGHTLY PRN
Qty: 60 CAPSULE | Refills: 0 | Status: CANCELLED | OUTPATIENT
Start: 2022-02-03

## 2022-02-03 RX ORDER — SERTRALINE HYDROCHLORIDE 50 MG/1
TABLET, FILM COATED ORAL
Qty: 90 TABLET | Refills: 0 | Status: CANCELLED | OUTPATIENT
Start: 2022-02-03

## 2022-02-03 RX ORDER — ZALEPLON 10 MG/1
10 CAPSULE ORAL NIGHTLY PRN
Qty: 30 CAPSULE | Refills: 0 | Status: SHIPPED | OUTPATIENT
Start: 2022-02-03 | End: 2022-03-05

## 2022-02-03 RX ORDER — SERTRALINE HYDROCHLORIDE 100 MG/1
TABLET, FILM COATED ORAL
Qty: 90 TABLET | Refills: 0 | Status: SHIPPED | OUTPATIENT
Start: 2022-02-03 | End: 2022-04-12 | Stop reason: SDUPTHER

## 2022-02-03 NOTE — TELEPHONE ENCOUNTER
----- Message from Bebe Uriarteza sent at 2/3/2022 12:56 PM CST -----  Patient is requesting a new script for accuchek akil smart view test strips. She needs a 90 day supply for 3 times daily.  Her pharmacy is   [facility] - [place], [place] - [address]  [address] [address]  Phone: [phone] Fax: [phone]    Her number is [phone].  Thank you!

## 2022-02-03 NOTE — TELEPHONE ENCOUNTER
Patient states she has been out of her Zoloft for 2 weeks at this point. She states she was taking 100mg of the Zoloft and that was helping, but she would like to up it to 200 mg if possible. She also states that she has been taking the Sonata 5mg at night and it is helping. She would like to knoe if the dose can be changed to 10mg at night. She is still ou [facility] in [place] with family and is not sure when she will be coming back. Scheduled a follow up for first available with Dr. Pat on 4/12/22 @ 3 pm.     Pt is requesting medication refill on sertraline (ZOLOFT) 50 MG tablet -Wants dose change  Last refill: 12/13/2021      Pt is requesting medication refill on zaleplon (SONATA) 5 MG Cap -Wants dose change  Last refill: 10/22/2021  Last visit: 10/22/2021  Follow Up: 04/12/2022      Verified pharmacy info [facility]

## 2022-02-03 NOTE — TELEPHONE ENCOUNTER
Please confirm current dosing of Cymbalta.  I have refilled Sertraline at 100 mg daily (since she has been off of it x 2 weeks).  I would recommend reduction of Cymbalta before any increases in Sertraline due to risks of med interactions.  I did refill Sonata at 10 mg dosing.  Please note Sertraline is refilled at 100 mg tablet - take one tablet daily (not 50 mg).

## 2022-02-04 NOTE — TELEPHONE ENCOUNTER
Called pt regarding below message. Pt states she is currently taking Duloxetine 60 mg and 30 mg once at night for a total dose of 90 mg. Advised pt on further recommendations below per Dr. Pat. Pt verbalized understanding with no further questions.

## 2022-02-10 ENCOUNTER — PATIENT MESSAGE (OUTPATIENT)
Dept: PRIMARY CARE CLINIC | Facility: CLINIC | Age: 72
End: 2022-02-10
Payer: MEDICARE

## 2022-02-10 NOTE — TELEPHONE ENCOUNTER
Her blood presures readings have been high - yesterday  systolic BP was 150 and 180 on the 7th so I would check her blood pressure today and see where she is at.     The most concerning is if she is having another stroke - her blood pressure was better controlled one week prior so if she has not been skipping her blood pressure medications and her diet has not changed from one week prior then I would be concenred that something else could be going on besides vertigo -> the quickest solution to this would be to be evaluated in the hospital because they could do quick brain imaging and can get blood pressure under better control. If her symptoms are severe which it sounds like it is then I recommend hospital evaluation.   Vertigo usually only last for a few days and meclizine generally is helpful.   With this persisting, this is a concern.     Is she still in [place]? If her symptoms are mild then I would recommend some vestibular therapy to help get vertigo under control but I do not know any of the facilities over there - she may need to call her insurance company and see hwere she can get vestibular rehab.

## 2022-02-16 ENCOUNTER — PATIENT MESSAGE (OUTPATIENT)
Dept: PRIMARY CARE CLINIC | Facility: CLINIC | Age: 72
End: 2022-02-16
Payer: MEDICARE

## 2022-02-16 RX ORDER — CLARITHROMYCIN 250 MG/1
250 TABLET, FILM COATED ORAL 2 TIMES DAILY
Qty: 20 TABLET | Refills: 0 | Status: SHIPPED | OUTPATIENT
Start: 2022-02-16 | End: 2022-02-26

## 2022-02-16 NOTE — TELEPHONE ENCOUNTER
Spoke with the pt, Pt granddaughter dx with Flu and Strept throat.  Pt with c/o non productive cough, post nasal drip, sore throat with right ear ache and fever.  Pt taking Tylenol, Afrin and Nasocort.  Pt has not tried a decongestant secondary to HX HTN. Pt requesting advice and a RX.  Pt remains in [place] with family.

## 2022-02-23 ENCOUNTER — PATIENT OUTREACH (OUTPATIENT)
Dept: ADMINISTRATIVE | Facility: OTHER | Age: 72
End: 2022-02-23
Payer: MEDICARE

## 2022-02-23 DIAGNOSIS — G89.4 CHRONIC PAIN DISORDER: ICD-10-CM

## 2022-02-23 RX ORDER — OXYCODONE AND ACETAMINOPHEN 10; 325 MG/1; MG/1
1 TABLET ORAL EVERY 8 HOURS PRN
Qty: 90 TABLET | Refills: 0 | Status: SHIPPED | OUTPATIENT
Start: 2022-03-31 | End: 2022-04-29

## 2022-02-23 RX ORDER — OXYCODONE AND ACETAMINOPHEN 10; 325 MG/1; MG/1
1 TABLET ORAL EVERY 8 HOURS PRN
Qty: 90 TABLET | Refills: 0 | Status: SHIPPED | OUTPATIENT
Start: 2022-04-29 | End: 2022-06-16 | Stop reason: SDUPTHER

## 2022-02-23 RX ORDER — OXYCODONE AND ACETAMINOPHEN 10; 325 MG/1; MG/1
1 TABLET ORAL EVERY 8 HOURS PRN
Qty: 90 TABLET | Refills: 0 | Status: SHIPPED | OUTPATIENT
Start: 2022-03-02 | End: 2022-03-31

## 2022-03-02 ENCOUNTER — OFFICE VISIT (OUTPATIENT)
Dept: PAIN MEDICINE | Facility: CLINIC | Age: 72
End: 2022-03-02
Payer: MEDICARE

## 2022-03-02 VITALS
BODY MASS INDEX: 28.77 KG/M2 | SYSTOLIC BLOOD PRESSURE: 138 MMHG | DIASTOLIC BLOOD PRESSURE: 80 MMHG | WEIGHT: 179 LBS | HEIGHT: 66 IN | HEART RATE: 80 BPM

## 2022-03-02 DIAGNOSIS — M79.10 MYALGIA: ICD-10-CM

## 2022-03-02 DIAGNOSIS — M51.36 DDD (DEGENERATIVE DISC DISEASE), LUMBAR: ICD-10-CM

## 2022-03-02 DIAGNOSIS — M47.896 OTHER SPONDYLOSIS, LUMBAR REGION: Primary | ICD-10-CM

## 2022-03-02 DIAGNOSIS — M70.60 GREATER TROCHANTERIC BURSITIS, UNSPECIFIED LATERALITY: ICD-10-CM

## 2022-03-02 PROCEDURE — 4010F PR ACE/ARB THEARPY RXD/TAKEN: ICD-10-PCS | Mod: HCNC,CPTII,S$GLB, | Performed by: PHYSICIAN ASSISTANT

## 2022-03-02 PROCEDURE — 1159F PR MEDICATION LIST DOCUMENTED IN MEDICAL RECORD: ICD-10-PCS | Mod: HCNC,CPTII,S$GLB, | Performed by: PHYSICIAN ASSISTANT

## 2022-03-02 PROCEDURE — 4010F ACE/ARB THERAPY RXD/TAKEN: CPT | Mod: HCNC,CPTII,S$GLB, | Performed by: PHYSICIAN ASSISTANT

## 2022-03-02 PROCEDURE — 3288F PR FALLS RISK ASSESSMENT DOCUMENTED: ICD-10-PCS | Mod: HCNC,CPTII,S$GLB, | Performed by: PHYSICIAN ASSISTANT

## 2022-03-02 PROCEDURE — 1125F AMNT PAIN NOTED PAIN PRSNT: CPT | Mod: HCNC,CPTII,S$GLB, | Performed by: PHYSICIAN ASSISTANT

## 2022-03-02 PROCEDURE — 3008F PR BODY MASS INDEX (BMI) DOCUMENTED: ICD-10-PCS | Mod: HCNC,CPTII,S$GLB, | Performed by: PHYSICIAN ASSISTANT

## 2022-03-02 PROCEDURE — 99214 OFFICE O/P EST MOD 30 MIN: CPT | Mod: HCNC,S$GLB,, | Performed by: PHYSICIAN ASSISTANT

## 2022-03-02 PROCEDURE — 3075F PR MOST RECENT SYSTOLIC BLOOD PRESS GE 130-139MM HG: ICD-10-PCS | Mod: HCNC,CPTII,S$GLB, | Performed by: PHYSICIAN ASSISTANT

## 2022-03-02 PROCEDURE — 1101F PT FALLS ASSESS-DOCD LE1/YR: CPT | Mod: HCNC,CPTII,S$GLB, | Performed by: PHYSICIAN ASSISTANT

## 2022-03-02 PROCEDURE — 3079F PR MOST RECENT DIASTOLIC BLOOD PRESSURE 80-89 MM HG: ICD-10-PCS | Mod: HCNC,CPTII,S$GLB, | Performed by: PHYSICIAN ASSISTANT

## 2022-03-02 PROCEDURE — 3288F FALL RISK ASSESSMENT DOCD: CPT | Mod: HCNC,CPTII,S$GLB, | Performed by: PHYSICIAN ASSISTANT

## 2022-03-02 PROCEDURE — 3079F DIAST BP 80-89 MM HG: CPT | Mod: HCNC,CPTII,S$GLB, | Performed by: PHYSICIAN ASSISTANT

## 2022-03-02 PROCEDURE — 3008F BODY MASS INDEX DOCD: CPT | Mod: HCNC,CPTII,S$GLB, | Performed by: PHYSICIAN ASSISTANT

## 2022-03-02 PROCEDURE — 99999 PR PBB SHADOW E&M-EST. PATIENT-LVL V: CPT | Mod: PBBFAC,HCNC,, | Performed by: PHYSICIAN ASSISTANT

## 2022-03-02 PROCEDURE — 3075F SYST BP GE 130 - 139MM HG: CPT | Mod: HCNC,CPTII,S$GLB, | Performed by: PHYSICIAN ASSISTANT

## 2022-03-02 PROCEDURE — 1125F PR PAIN SEVERITY QUANTIFIED, PAIN PRESENT: ICD-10-PCS | Mod: HCNC,CPTII,S$GLB, | Performed by: PHYSICIAN ASSISTANT

## 2022-03-02 PROCEDURE — 1159F MED LIST DOCD IN RCRD: CPT | Mod: HCNC,CPTII,S$GLB, | Performed by: PHYSICIAN ASSISTANT

## 2022-03-02 PROCEDURE — 99214 PR OFFICE/OUTPT VISIT, EST, LEVL IV, 30-39 MIN: ICD-10-PCS | Mod: HCNC,S$GLB,, | Performed by: PHYSICIAN ASSISTANT

## 2022-03-02 PROCEDURE — 1101F PR PT FALLS ASSESS DOC 0-1 FALLS W/OUT INJ PAST YR: ICD-10-PCS | Mod: HCNC,CPTII,S$GLB, | Performed by: PHYSICIAN ASSISTANT

## 2022-03-02 PROCEDURE — 99999 PR PBB SHADOW E&M-EST. PATIENT-LVL V: ICD-10-PCS | Mod: PBBFAC,HCNC,, | Performed by: PHYSICIAN ASSISTANT

## 2022-03-02 RX ORDER — CYCLOBENZAPRINE HCL 10 MG
10 TABLET ORAL 3 TIMES DAILY PRN
Qty: 270 TABLET | Refills: 0 | Status: SHIPPED | OUTPATIENT
Start: 2022-03-02 | End: 2022-06-16 | Stop reason: SDUPTHER

## 2022-03-02 NOTE — PROGRESS NOTES
CC: left sided low back and left hip pain    Interval History: Ms. Santiago is a 69 y.o. female with chronic low back and hip pain who presents for follow up.  She had a synvisc one injection and a intra articular steroid injection in right knee with benefit for only 1 week.  She continues to have lower back, left greater than right lower back pain.  Left GTB and right knee pain has worsened. Her last left GTB injection provided some short-lived relief.  Stretching exercises have helped.  She had a series of Euflexxa injections in 2014. She continues to take percocet 10mg q 8 hrs as needed for pain with moderate benefits.  No side effects reported.  Able to perform daily activities with the medications. Flexeril 10 mg and Gabapentin 600 mg BID has been helpful.  In the past UDS positive for Tramadol. Admits taking a Tramadol for her headache. She has a history of chronic headaches, occur about 2 x monthly.  In the past she was taking diet supplements and UDS was positive for amphetamines.  Depressed today surrounding recent death of a niece to possible OD.     Prior HPI: The patient is a 64-year-old woman with a history of sarcoidosis, breast CA, diabetes, anxiety and depression who presents in referral from Dr. Nguyen to Dr. Perez for multiple pain complaints including back pain, bilateral hip pain and right shoulder pain.  She is following up with me since I am much closer to her.   She presents today for multiple pain complaints.  She recently was hospitalized for pneumonia, UTI and sarcoidosis exacerbation per patient.  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  She has had past GTB injections with moderate benefit and had an exacerbation of her pain recently.  She was able to receive left GTB with Dr. Perez on 10/3/2014 that provided >50% relief of her left hip and leg pain.     She continues to take Prednisone 5mg daily. She continues Percoet 7.5mg about  "3x/day as needed with moderate benefit.  She presents today with worsening right knee pain.  She states having history of right knee osteoarthritis, but has not had any treatment for her knee pain.  Continues to have improvement in her knee pain following completion of visco supplementation injection in her right knee.  Her back and hip pain remain tolerable with her medications.  She continues to take Percoct 7.5mg q8hrs as needed with moderate benefit.  No reported side effects.       Pain intervention history: She takes hydrocodone 7-325 2-4 times a day.  Also has undergone epidural steroid injection at L1/2 without relief.  She has been doing cervical traction with good relief of her neck pain.  Previous trochanteric bursa injections with good relief.    ROS:She reports fatigability, itching, headaches, swollen glands, chest pain and shortness of breath, nausea vomiting, easy bruising, back pain, joint stiffness, dizziness, difficulty sleeping, anxiety, depression and loss of balance.  Balance of review of systems is negative.    Medical, surgical, family and social history reviewed elsewhere in record.    Medications/Allergies: See med card    Vitals:    03/02/22 1317   BP: 138/80   Pulse: 80   Weight: 81.2 kg (179 lb)   Height: 5' 6" (1.676 m)   PainSc:   5   PainLoc: Back       Physical exam:  Gen: A and O x3, pleasant, well-groomed  Skin: No rashes or obvious lesions  CVS: RRR  Resp: non labored breathing  Abdomen: Soft, NT/ND, normal bowel sounds present.  Neuro:  Lower extremities:   +right knee crepitus. LE erythema and swelling. TTP right 2 and 3rd metatarsals.   Reflexes: Patellar 2+, Achilles 2+ bilaterally.  Sensory:  Intact   Lumbar spine:  Lumbar spine: ROM is moderately reduced with flexion extension and oblique extension with increased pain in all directions.    Orlando's test causes pain on both sides.    Supine straight leg raise is negative bilaterally.    Internal and external rotation of the " hip causes increased pain in left groin.  Myofascial exam: Tenderness to palpation over both GTB.      Imaging:  Cervical spine MRI report 10/5/12: There is dextroscoliosis in the lower cervical/upper thoracic region.  There is very mild foraminal stenosis on the left at C6/7.    MRI thoracic spine 11/21/2008: No significant degenerative disc disease or central stenosis.    MRI lumbar spine 8/14/08: L1/2 small broad-based central protrusion with mild effacement of ventral thecal sac but not distorting exiting roots.  L2/3 and L3/4 unremarkable.  At L4/5 there is mild central stenosis with bulging annulus anteriorly and facet hypertrophic degenerative change posteriorly.  No focal disc herniation and neural foramina are patent.  At L5/S1 there is minimal bulging of the annulus with no thecal sac compression.  The small annular fissure within the posterior annulus.  Foramina are patent bilaterally, no stenosis mild, degenerative changes in the facets.    Xray Right Knee 10/13/14  Mild tricompartmental degenerative changes present. No joint effusion. The soft tissues are unremarkable.    Assessment:  Ms. Santiago is a 65-year-old woman with   1. Other spondylosis, lumbar region    2. DDD (degenerative disc disease), lumbar    3. Greater trochanteric bursitis, unspecified laterality    4. Myalgia        PLAN:  1. I have stressed the importance of physical activity and exercise to improve overall health.    2.  Percocet 10mg q8hrs as needed.  Script given for three months.  reviewed. Previous UDS consistent  3.  Flexeril 10 mg q 8 h. 90 day supply.  4.  I believe her low back pain maybe due to facet arthropathy and have recommended lumbar medial branch blocks as a diagnostic procedure.  If successful, would proceed with radiofrequency ablation.  May consider this procedure in the future  5. Recommend series of 3 Euflexxa injections. She wishes to do this in the future. Unfortunately this was denied by insurance  6.  Follow up in three months   All medication management was performed by Dr. Temple

## 2022-03-16 ENCOUNTER — PATIENT OUTREACH (OUTPATIENT)
Dept: ADMINISTRATIVE | Facility: HOSPITAL | Age: 72
End: 2022-03-16
Payer: MEDICARE

## 2022-04-12 ENCOUNTER — LAB VISIT (OUTPATIENT)
Dept: LAB | Facility: HOSPITAL | Age: 72
End: 2022-04-12
Attending: INTERNAL MEDICINE
Payer: MEDICARE

## 2022-04-12 ENCOUNTER — OFFICE VISIT (OUTPATIENT)
Dept: PSYCHIATRY | Facility: CLINIC | Age: 72
End: 2022-04-12
Payer: MEDICARE

## 2022-04-12 VITALS
BODY MASS INDEX: 28.49 KG/M2 | HEIGHT: 66 IN | WEIGHT: 177.25 LBS | SYSTOLIC BLOOD PRESSURE: 95 MMHG | DIASTOLIC BLOOD PRESSURE: 62 MMHG | HEART RATE: 59 BPM

## 2022-04-12 DIAGNOSIS — F33.1 MDD (MAJOR DEPRESSIVE DISORDER), RECURRENT EPISODE, MODERATE: ICD-10-CM

## 2022-04-12 DIAGNOSIS — E11.69 HYPERLIPIDEMIA ASSOCIATED WITH TYPE 2 DIABETES MELLITUS: ICD-10-CM

## 2022-04-12 DIAGNOSIS — G89.4 CHRONIC PAIN SYNDROME: ICD-10-CM

## 2022-04-12 DIAGNOSIS — G47.00 INSOMNIA, UNSPECIFIED TYPE: ICD-10-CM

## 2022-04-12 DIAGNOSIS — E78.5 HYPERLIPIDEMIA ASSOCIATED WITH TYPE 2 DIABETES MELLITUS: ICD-10-CM

## 2022-04-12 LAB
ANION GAP SERPL CALC-SCNC: 11 MMOL/L (ref 8–16)
BUN SERPL-MCNC: 29 MG/DL (ref 8–23)
CALCIUM SERPL-MCNC: 9.8 MG/DL (ref 8.7–10.5)
CHLORIDE SERPL-SCNC: 101 MMOL/L (ref 95–110)
CHOLEST SERPL-MCNC: 157 MG/DL (ref 120–199)
CHOLEST/HDLC SERPL: 4.9 {RATIO} (ref 2–5)
CO2 SERPL-SCNC: 24 MMOL/L (ref 23–29)
CREAT SERPL-MCNC: 1.6 MG/DL (ref 0.5–1.4)
EST. GFR  (AFRICAN AMERICAN): 36.8 ML/MIN/1.73 M^2
EST. GFR  (NON AFRICAN AMERICAN): 32 ML/MIN/1.73 M^2
GLUCOSE SERPL-MCNC: 158 MG/DL (ref 70–110)
HDLC SERPL-MCNC: 32 MG/DL (ref 40–75)
HDLC SERPL: 20.4 % (ref 20–50)
LDLC SERPL CALC-MCNC: 69.6 MG/DL (ref 63–159)
NONHDLC SERPL-MCNC: 125 MG/DL
POTASSIUM SERPL-SCNC: 4.4 MMOL/L (ref 3.5–5.1)
SODIUM SERPL-SCNC: 136 MMOL/L (ref 136–145)
TRIGL SERPL-MCNC: 277 MG/DL (ref 30–150)

## 2022-04-12 PROCEDURE — 1125F AMNT PAIN NOTED PAIN PRSNT: CPT | Mod: CPTII,S$GLB,, | Performed by: PSYCHIATRY & NEUROLOGY

## 2022-04-12 PROCEDURE — 1125F PR PAIN SEVERITY QUANTIFIED, PAIN PRESENT: ICD-10-PCS | Mod: CPTII,S$GLB,, | Performed by: PSYCHIATRY & NEUROLOGY

## 2022-04-12 PROCEDURE — 4010F PR ACE/ARB THEARPY RXD/TAKEN: ICD-10-PCS | Mod: CPTII,S$GLB,, | Performed by: PSYCHIATRY & NEUROLOGY

## 2022-04-12 PROCEDURE — 1160F PR REVIEW ALL MEDS BY PRESCRIBER/CLIN PHARMACIST DOCUMENTED: ICD-10-PCS | Mod: CPTII,S$GLB,, | Performed by: PSYCHIATRY & NEUROLOGY

## 2022-04-12 PROCEDURE — 3078F PR MOST RECENT DIASTOLIC BLOOD PRESSURE < 80 MM HG: ICD-10-PCS | Mod: CPTII,S$GLB,, | Performed by: PSYCHIATRY & NEUROLOGY

## 2022-04-12 PROCEDURE — 3051F HG A1C>EQUAL 7.0%<8.0%: CPT | Mod: CPTII,S$GLB,, | Performed by: PSYCHIATRY & NEUROLOGY

## 2022-04-12 PROCEDURE — 99214 PR OFFICE/OUTPT VISIT, EST, LEVL IV, 30-39 MIN: ICD-10-PCS | Mod: S$GLB,,, | Performed by: PSYCHIATRY & NEUROLOGY

## 2022-04-12 PROCEDURE — 3288F FALL RISK ASSESSMENT DOCD: CPT | Mod: CPTII,S$GLB,, | Performed by: PSYCHIATRY & NEUROLOGY

## 2022-04-12 PROCEDURE — 83036 HEMOGLOBIN GLYCOSYLATED A1C: CPT | Performed by: INTERNAL MEDICINE

## 2022-04-12 PROCEDURE — 3008F PR BODY MASS INDEX (BMI) DOCUMENTED: ICD-10-PCS | Mod: CPTII,S$GLB,, | Performed by: PSYCHIATRY & NEUROLOGY

## 2022-04-12 PROCEDURE — 1159F MED LIST DOCD IN RCRD: CPT | Mod: CPTII,S$GLB,, | Performed by: PSYCHIATRY & NEUROLOGY

## 2022-04-12 PROCEDURE — 1100F PTFALLS ASSESS-DOCD GE2>/YR: CPT | Mod: CPTII,S$GLB,, | Performed by: PSYCHIATRY & NEUROLOGY

## 2022-04-12 PROCEDURE — 3008F BODY MASS INDEX DOCD: CPT | Mod: CPTII,S$GLB,, | Performed by: PSYCHIATRY & NEUROLOGY

## 2022-04-12 PROCEDURE — 1100F PR PT FALLS ASSESS DOC 2+ FALLS/FALL W/INJURY/YR: ICD-10-PCS | Mod: CPTII,S$GLB,, | Performed by: PSYCHIATRY & NEUROLOGY

## 2022-04-12 PROCEDURE — 99214 OFFICE O/P EST MOD 30 MIN: CPT | Mod: S$GLB,,, | Performed by: PSYCHIATRY & NEUROLOGY

## 2022-04-12 PROCEDURE — 3074F PR MOST RECENT SYSTOLIC BLOOD PRESSURE < 130 MM HG: ICD-10-PCS | Mod: CPTII,S$GLB,, | Performed by: PSYCHIATRY & NEUROLOGY

## 2022-04-12 PROCEDURE — 3074F SYST BP LT 130 MM HG: CPT | Mod: CPTII,S$GLB,, | Performed by: PSYCHIATRY & NEUROLOGY

## 2022-04-12 PROCEDURE — 3288F PR FALLS RISK ASSESSMENT DOCUMENTED: ICD-10-PCS | Mod: CPTII,S$GLB,, | Performed by: PSYCHIATRY & NEUROLOGY

## 2022-04-12 PROCEDURE — 36415 COLL VENOUS BLD VENIPUNCTURE: CPT | Performed by: INTERNAL MEDICINE

## 2022-04-12 PROCEDURE — 1159F PR MEDICATION LIST DOCUMENTED IN MEDICAL RECORD: ICD-10-PCS | Mod: CPTII,S$GLB,, | Performed by: PSYCHIATRY & NEUROLOGY

## 2022-04-12 PROCEDURE — 99499 UNLISTED E&M SERVICE: CPT | Mod: S$GLB,,, | Performed by: PSYCHIATRY & NEUROLOGY

## 2022-04-12 PROCEDURE — 99999 PR PBB SHADOW E&M-EST. PATIENT-LVL IV: CPT | Mod: PBBFAC,,, | Performed by: PSYCHIATRY & NEUROLOGY

## 2022-04-12 PROCEDURE — 3078F DIAST BP <80 MM HG: CPT | Mod: CPTII,S$GLB,, | Performed by: PSYCHIATRY & NEUROLOGY

## 2022-04-12 PROCEDURE — 99999 PR PBB SHADOW E&M-EST. PATIENT-LVL IV: ICD-10-PCS | Mod: PBBFAC,,, | Performed by: PSYCHIATRY & NEUROLOGY

## 2022-04-12 PROCEDURE — 4010F ACE/ARB THERAPY RXD/TAKEN: CPT | Mod: CPTII,S$GLB,, | Performed by: PSYCHIATRY & NEUROLOGY

## 2022-04-12 PROCEDURE — 99499 RISK ADDL DX/OHS AUDIT: ICD-10-PCS | Mod: S$GLB,,, | Performed by: PSYCHIATRY & NEUROLOGY

## 2022-04-12 PROCEDURE — 80061 LIPID PANEL: CPT | Performed by: INTERNAL MEDICINE

## 2022-04-12 PROCEDURE — 80048 BASIC METABOLIC PNL TOTAL CA: CPT | Performed by: INTERNAL MEDICINE

## 2022-04-12 PROCEDURE — 1160F RVW MEDS BY RX/DR IN RCRD: CPT | Mod: CPTII,S$GLB,, | Performed by: PSYCHIATRY & NEUROLOGY

## 2022-04-12 PROCEDURE — 3051F PR MOST RECENT HEMOGLOBIN A1C LEVEL 7.0 - < 8.0%: ICD-10-PCS | Mod: CPTII,S$GLB,, | Performed by: PSYCHIATRY & NEUROLOGY

## 2022-04-12 RX ORDER — GABAPENTIN 300 MG/1
CAPSULE ORAL
Qty: 360 CAPSULE | Refills: 0 | Status: SHIPPED | OUTPATIENT
Start: 2022-04-12 | End: 2022-04-14 | Stop reason: SDUPTHER

## 2022-04-12 RX ORDER — NALOXONE HYDROCHLORIDE 4 MG/.1ML
SPRAY NASAL
Qty: 1 EACH | Refills: 0 | Status: SHIPPED | OUTPATIENT
Start: 2022-04-12

## 2022-04-12 RX ORDER — SERTRALINE HYDROCHLORIDE 100 MG/1
TABLET, FILM COATED ORAL
Qty: 135 TABLET | Refills: 1 | Status: SHIPPED | OUTPATIENT
Start: 2022-04-12 | End: 2022-04-14 | Stop reason: SDUPTHER

## 2022-04-12 RX ORDER — DULOXETIN HYDROCHLORIDE 30 MG/1
30 CAPSULE, DELAYED RELEASE ORAL NIGHTLY
Qty: 90 CAPSULE | Refills: 1 | Status: SHIPPED | OUTPATIENT
Start: 2022-04-12 | End: 2022-04-14 | Stop reason: SDUPTHER

## 2022-04-12 RX ORDER — DULOXETIN HYDROCHLORIDE 60 MG/1
CAPSULE, DELAYED RELEASE ORAL
Qty: 90 CAPSULE | Refills: 1 | Status: SHIPPED | OUTPATIENT
Start: 2022-04-12 | End: 2022-04-14 | Stop reason: SDUPTHER

## 2022-04-12 NOTE — PROGRESS NOTES
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   Pt reports depression and anxiety began in context of significant psychosocial stressors. Her mother passed away in 2000, her father in 2004. She was diagnosed with breast cancer in 2005, had bilateral mastectomies and then a failed reconstruction. She also has sarcoidosis of the lungs.  Intake 2013.      Past Psychiatric History:  Tx of depression, anxiety and panic attacks   First saw a psychiatrist 2007   No prior hospitalizations  No suicide attempts or self injury   PCP most recently treating her in [place]   Previous meds: Restoril (no effect), Dalmane (no effect), Klonopin, Ativan, could not afford Cymbalta but helped pain and depression, Wellbutrin XL (3rd day fell out of bed x 4, bruised, amnestic), Effexor (raised her BP), no antipsychotics or mood stabilizers, Citalopram (some benefit).   Trazodone (no benefit), mirtazapine, melatonin (3 mg ineffective, higher dose HA), ativan, clonazepam, amitriptyline, Seroquel (over sedating), Sertraline (no significant benefit)  No prior counseling         Past medical history:  DM2  RLS  Sarcoidosis of lung   Hx ovarian CA  HTN  Hx of malignant phylloides tumor of breast   Bilateral cataract surgery   Chronic neck, back and hip pain   Arthritis   MVR  Hx CVA  Hx DJD  No cardiac hx   No hx seizures or head injury      Social History:  Former RN x 35 years, SSID since 2008  Lives in [facility] time with brother and daughter   3 prior marriages, , 1 daughter   Raised in [place] in intact home - 3 brothers and 2 sisters - "stable"  No  or legal hx   No hx abuse   Hobbies: sewing, beading, reading   Education: Some college   Religious: [employer]    Interim hx:   Patient presents for follow-up.   Duloxetine helps with pain, not really with mood. Addition of " "Sertraline has helped her.  She is a little better.  She is living in [place] with her daughter Effie right now (since December).  She is helping to care for her great granddaughter Jade.   She takes Sonata about 3-4 nights a week.  Sleep is not significantly improved.  She sleeps about 5-6 hrs a night.   Chronic sleep deprivation.    Describes self as lazy.   + physical pain 5/10 with daily opiate use.  She likes to lay down and read - this upsets her daughter who feels like she should be doing more.  This is a double standard which frustrated patient.   She does feel down, anhedonic at times. She is displaced from home in [place]. Self care is somewhat down.   Appetite is not good, + intentional wt loss.   Anxiety is "so/so."  So frustrated some days.   Goals:   # 1 get in bed at a reasonable, consistent time  # 2 work on her short fuse   # 3 work on physical hygiene     Drinks # two 16 oz cokes daily.   Has not been sewing, crocheting     Denies suicidal/homicidal ideations. Denies symptoms of arnel/psychosis.  No self harm or violence.   Irritability is triggered by circumstances.   Does endorse worthlessness, hopelessness.     Non compliant with CPAP.    No tobacco.   Occasional mixed drink alcohol, no drug use.      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  Does not maintain nursing license.   no hx suicide attempts, no self harm.  Guns in home are locked up.      Review of Systems   PSYCHIATRIC: see above  CONSTITUTIONAL: weight loss   MUSCULOSKELETAL: 6/10 back pain   CV:  Denies chest pain   NEURO: no recent falls, but having balance issues      Past Medical, Family and Social History: The patient's past medical, family and social history have been reviewed and updated as appropriate within the electronic medical record - see encounter notes.     Medications:  Cymbalta 90 mg nightly   Sertraline 100 mg daily   Gabapentin 900 - 1200  mg nightly  Sonata 5 mg nightly prn insomnia " "  Percoet - pain management, 2-3 times a day      Compliance:  yes     Side effects:  wt gain on Mirtazapine    Amnesia, complex sleep related symptoms on Xanax, Ambien   headaches when Ambien taken too often; dry mouth on amitriptyline, elevated BP on Effexor XR, falls     Risk Parameters:  Patient reports no suicidal ideation  Patient reports no homicidal ideation  Patient reports no self-injurious behavior  Patient reports no violent behavior     Exam (detailed: at least 9 elements; comprehensive: all 15 elements)   Constitutional  Vitals:  Most recent vital signs, dated more than 90 days prior to this appointment, were reviewed.           General:  age appropriate, casually dressed,  calm, well groomed, good eye contact         Musculoskeletal  Muscle Strength/Tone:  no tremor   Gait & Station:  No ataxia       Psychiatric  Speech:  no latency; no press, spontaneous   Mood & Affect:  "lazy"   Restricted    Thought Process:  Linear    Associations:  intact   Thought Content:  normal, no suicidality, no homicidality, delusions, or paranoia, hallucinations: (auditory: no, visual: no)   Insight:  has awareness of illness   Judgement: behavior is adequate to circumstances   Orientation:  grossly intact; alert and oriented x 4    Memory: intact for content of interview,    Language: grossly intact no fluency issues    Attention Span & Concentration:  able to focus grossly intact    Fund of Knowledge:  intact and appropriate to age and level of education      Assessment and Diagnosis   Status/Progress: Based on the examination today, the patient's problem(s) is/are under inadequate control.   New problems have not been presented today.   Comorbidities are complicating management of the primary condition.  (multiple medical problems)      Impression: 70 yo female with multiple medical problems and hx of depression, insomnia, and anxiety presents for follow up. Significant family stressors and health issues. Pt has " required multiple sleep aides in past. She has required chronic benzodiazepine for stabilization, has failed multiple sleep aides;  Sedative-hyponotics were discontinued in interim due to health issues, reports of falls.  She contacted me previously with worsening depression/anxiety - low dose clonazepam restarted, but is showing signs of stabilization both physically and mentally.  She reported that clonazepam is not helping and requested to restart Xanax/Ambien (previous meds).  Pt reports fall in interim, and also interaction/amnesia with Nyquil + Xanax which could have been dangerous. + Chronic opioid use.  She ended up staying on Sertraline and this medication has been helping with her maintaining stable mood following her niece's death.   Pt mixed Ambien and Xanax and had amnesia, complex sleep related behaviors.  I have discussed with her and daughter that sedatives are not safe medications for her.  Scored perfect MOCA last visit.  Did not tolerate Seroquel, but pt was becoming more active, living with daughter, less isolation - pt was improving.   At last visit, she reported pain has worsened, which limited her activity and leading to increased anxiety, depression, motivation issues   Pt's daughter sustained head injury in interim - pt is primary caregiver to her and great granddaughter.  She is managing, but overwhelmed at times.  Has weaned off of Sertraline.  Duloxetine helping with pain not as much depressed mood.  Struggles with chronic refractory insomnia, depression.      MDD, recurrent episode, moderate   GARFIELD  Insomnia disorder   Chronic pain disorder   sarcoidosis, hx phyloides tumor of breast, .DM2, RLS, arthritis, chronic neck, hip, and back pain, hx avulsion fracture, JOSE LUIS, hx CVA  Body mass index is 28.61 kg/m².  GAF: 60      Strengths and Liabilities: Strength: Patient accepts guidance/feedback, Strength: Patient is expressive/articulate., Strength: Patient has reasonable judgment.   Treatment  Goals: Specify outcomes written in observable, behavioral terms:   Anxiety: acquiring relapse prevention skills and reducing physical symptoms of anxiety   Depression: acquiring relapse prevention skills, increasing energy, increasing motivation, reducing excessive guilt and reducing negative automatic thoughts      Treatment Plan/Recommendations:   · Medication Management: The risks and benefits of medication were discussed with the patient.  1. Continue Cymbalta 90 mg nightly.  Discussed potential for medication interactions, serotonin syndrome, hepatic impairment. Does help with pain, less so depression.   2. Call to report any worsening of symptoms or problems with the medication  3. Pt instructed to go to ER with thoughts of harming self, others   4. D/C Sonata - pt does not feel like it is really helping   5. [facility] pt for [facility]   follow up with Dr Steele for CPAP   6. Trial to increase Sertraline to 150 mg daily. Target mood, anxiety Discussed potential for adverse effects, med interaction, hyponatremia.  Discussed risks of med interactions, serotonin syndrome  7.Ok to continue Gabapentin 600-900 mg nightly prn insomnia, Rx for Narcan sent to pt's pharmacy (she is also taking Opioids). Education provided about risks of respiratory depression,        Return to Clinic:  2 months

## 2022-04-13 ENCOUNTER — PATIENT MESSAGE (OUTPATIENT)
Dept: PSYCHIATRY | Facility: CLINIC | Age: 72
End: 2022-04-13
Payer: MEDICARE

## 2022-04-13 DIAGNOSIS — G89.4 CHRONIC PAIN SYNDROME: ICD-10-CM

## 2022-04-13 LAB
ESTIMATED AVG GLUCOSE: 160 MG/DL (ref 68–131)
HBA1C MFR BLD: 7.2 % (ref 4.5–6.2)

## 2022-04-14 ENCOUNTER — PATIENT MESSAGE (OUTPATIENT)
Dept: ADMINISTRATIVE | Facility: OTHER | Age: 72
End: 2022-04-14
Payer: MEDICARE

## 2022-04-14 RX ORDER — GABAPENTIN 300 MG/1
CAPSULE ORAL
Qty: 360 CAPSULE | Refills: 0 | Status: SHIPPED | OUTPATIENT
Start: 2022-04-14 | End: 2023-01-11

## 2022-04-14 RX ORDER — DULOXETIN HYDROCHLORIDE 30 MG/1
30 CAPSULE, DELAYED RELEASE ORAL NIGHTLY
Qty: 90 CAPSULE | Refills: 1 | Status: SHIPPED | OUTPATIENT
Start: 2022-04-14 | End: 2022-12-27

## 2022-04-14 RX ORDER — DULOXETIN HYDROCHLORIDE 60 MG/1
CAPSULE, DELAYED RELEASE ORAL
Qty: 90 CAPSULE | Refills: 1 | Status: SHIPPED | OUTPATIENT
Start: 2022-04-14 | End: 2022-12-27 | Stop reason: SDUPTHER

## 2022-04-14 RX ORDER — CLOPIDOGREL BISULFATE 75 MG/1
TABLET ORAL
Qty: 90 TABLET | Refills: 1 | Status: SHIPPED | OUTPATIENT
Start: 2022-04-14

## 2022-04-14 RX ORDER — SERTRALINE HYDROCHLORIDE 100 MG/1
TABLET, FILM COATED ORAL
Qty: 135 TABLET | Refills: 1 | Status: SHIPPED | OUTPATIENT
Start: 2022-04-14 | End: 2022-04-18 | Stop reason: SDUPTHER

## 2022-04-14 NOTE — TELEPHONE ENCOUNTER
Contacted the pharmacy. Pt already picked up gabapentin and sertraline sent on 4/12. Pharmacy will cancel remaining refills and have already canceled duloxetine 30 mg and 60 mg.

## 2022-04-14 NOTE — TELEPHONE ENCOUNTER
Refill Authorization Note   Nathalie Santiago  is requesting a refill authorization.  Brief Assessment and Rationale for Refill:  Approve     Medication Therapy Plan:       Medication Reconciliation Completed: No   Comments:     No Care Gaps recommended.     Note composed:2:12 PM 04/14/2022

## 2022-04-14 NOTE — TELEPHONE ENCOUNTER
No new care gaps identified.  Powered by [employer]. Reference number: [id number].   4/14/2022 1:35:17 PM CDT

## 2022-04-14 NOTE — TELEPHONE ENCOUNTER
I confirmed with [facility] pharmacy by phone that Sertraline was dispensed as take 150 mg (1.5 tabs) daily.

## 2022-04-18 ENCOUNTER — TELEPHONE (OUTPATIENT)
Dept: PSYCHIATRY | Facility: CLINIC | Age: 72
End: 2022-04-18
Payer: MEDICARE

## 2022-04-18 RX ORDER — SERTRALINE HYDROCHLORIDE 100 MG/1
TABLET, FILM COATED ORAL
Qty: 135 TABLET | Refills: 1 | Status: SHIPPED | OUTPATIENT
Start: 2022-04-18 | End: 2022-12-22

## 2022-04-18 NOTE — TELEPHONE ENCOUNTER
Rec'd fax from [facility] pharmacy requesting that you resend Rx for sertraline 100 mg with correct directions. Thank you.

## 2022-05-02 ENCOUNTER — PATIENT MESSAGE (OUTPATIENT)
Dept: PSYCHIATRY | Facility: CLINIC | Age: 72
End: 2022-05-02
Payer: MEDICARE

## 2022-05-02 NOTE — TELEPHONE ENCOUNTER
Sent email through portal to new client referred by Dr. Pat welcoming her to my practice and advising that failure to show may lead to discharge from my care.

## 2022-05-04 ENCOUNTER — PATIENT MESSAGE (OUTPATIENT)
Dept: PSYCHIATRY | Facility: CLINIC | Age: 72
End: 2022-05-04
Payer: MEDICARE

## 2022-05-04 ENCOUNTER — TELEPHONE (OUTPATIENT)
Dept: PSYCHIATRY | Facility: CLINIC | Age: 72
End: 2022-05-04
Payer: MEDICARE

## 2022-05-04 NOTE — TELEPHONE ENCOUNTER
Reached out to client as she has appt with clinician for 3 today and failed to show so left detailed msg inquiring if she would like to switch to audio instead and will attempt to reach her again before the end of the day.  Am hopeful that she is all right.  She does have a high no show rate for psychiatry.

## 2022-05-30 ENCOUNTER — PATIENT MESSAGE (OUTPATIENT)
Dept: OTHER | Facility: OTHER | Age: 72
End: 2022-05-30
Payer: MEDICARE

## 2022-06-16 ENCOUNTER — LAB VISIT (OUTPATIENT)
Dept: LAB | Facility: HOSPITAL | Age: 72
End: 2022-06-16
Payer: MEDICARE

## 2022-06-16 ENCOUNTER — OFFICE VISIT (OUTPATIENT)
Dept: PAIN MEDICINE | Facility: CLINIC | Age: 72
End: 2022-06-16
Payer: MEDICARE

## 2022-06-16 VITALS
SYSTOLIC BLOOD PRESSURE: 137 MMHG | DIASTOLIC BLOOD PRESSURE: 77 MMHG | BODY MASS INDEX: 28.45 KG/M2 | HEIGHT: 66 IN | WEIGHT: 177 LBS | HEART RATE: 84 BPM

## 2022-06-16 DIAGNOSIS — G89.4 CHRONIC PAIN DISORDER: ICD-10-CM

## 2022-06-16 DIAGNOSIS — M47.896 OTHER SPONDYLOSIS, LUMBAR REGION: Primary | ICD-10-CM

## 2022-06-16 DIAGNOSIS — M51.36 DDD (DEGENERATIVE DISC DISEASE), LUMBAR: ICD-10-CM

## 2022-06-16 DIAGNOSIS — E11.8 TYPE 2 DIABETES WITH COMPLICATION: ICD-10-CM

## 2022-06-16 DIAGNOSIS — E11.59 HYPERTENSION ASSOCIATED WITH DIABETES: ICD-10-CM

## 2022-06-16 DIAGNOSIS — M79.10 MYALGIA: ICD-10-CM

## 2022-06-16 DIAGNOSIS — M70.60 GREATER TROCHANTERIC BURSITIS, UNSPECIFIED LATERALITY: ICD-10-CM

## 2022-06-16 DIAGNOSIS — I15.2 HYPERTENSION ASSOCIATED WITH DIABETES: ICD-10-CM

## 2022-06-16 LAB
ANION GAP SERPL CALC-SCNC: 9 MMOL/L (ref 8–16)
BUN SERPL-MCNC: 24 MG/DL (ref 8–23)
CALCIUM SERPL-MCNC: 9.5 MG/DL (ref 8.7–10.5)
CHLORIDE SERPL-SCNC: 99 MMOL/L (ref 95–110)
CO2 SERPL-SCNC: 27 MMOL/L (ref 23–29)
CREAT SERPL-MCNC: 1 MG/DL (ref 0.5–1.4)
EST. GFR  (AFRICAN AMERICAN): >60 ML/MIN/1.73 M^2
EST. GFR  (NON AFRICAN AMERICAN): 56.4 ML/MIN/1.73 M^2
ESTIMATED AVG GLUCOSE: 177 MG/DL (ref 68–131)
GLUCOSE SERPL-MCNC: 134 MG/DL (ref 70–110)
HBA1C MFR BLD: 7.8 % (ref 4.5–6.2)
POTASSIUM SERPL-SCNC: 4 MMOL/L (ref 3.5–5.1)
SODIUM SERPL-SCNC: 135 MMOL/L (ref 136–145)

## 2022-06-16 PROCEDURE — 4010F ACE/ARB THERAPY RXD/TAKEN: CPT | Mod: CPTII,S$GLB,, | Performed by: PHYSICIAN ASSISTANT

## 2022-06-16 PROCEDURE — 3008F PR BODY MASS INDEX (BMI) DOCUMENTED: ICD-10-PCS | Mod: CPTII,S$GLB,, | Performed by: PHYSICIAN ASSISTANT

## 2022-06-16 PROCEDURE — 3288F FALL RISK ASSESSMENT DOCD: CPT | Mod: CPTII,S$GLB,, | Performed by: PHYSICIAN ASSISTANT

## 2022-06-16 PROCEDURE — 1125F AMNT PAIN NOTED PAIN PRSNT: CPT | Mod: CPTII,S$GLB,, | Performed by: PHYSICIAN ASSISTANT

## 2022-06-16 PROCEDURE — 99214 PR OFFICE/OUTPT VISIT, EST, LEVL IV, 30-39 MIN: ICD-10-PCS | Mod: S$GLB,,, | Performed by: PHYSICIAN ASSISTANT

## 2022-06-16 PROCEDURE — 1159F PR MEDICATION LIST DOCUMENTED IN MEDICAL RECORD: ICD-10-PCS | Mod: CPTII,S$GLB,, | Performed by: PHYSICIAN ASSISTANT

## 2022-06-16 PROCEDURE — 3008F BODY MASS INDEX DOCD: CPT | Mod: CPTII,S$GLB,, | Performed by: PHYSICIAN ASSISTANT

## 2022-06-16 PROCEDURE — 83036 HEMOGLOBIN GLYCOSYLATED A1C: CPT | Performed by: INTERNAL MEDICINE

## 2022-06-16 PROCEDURE — 3075F SYST BP GE 130 - 139MM HG: CPT | Mod: CPTII,S$GLB,, | Performed by: PHYSICIAN ASSISTANT

## 2022-06-16 PROCEDURE — 4010F PR ACE/ARB THEARPY RXD/TAKEN: ICD-10-PCS | Mod: CPTII,S$GLB,, | Performed by: PHYSICIAN ASSISTANT

## 2022-06-16 PROCEDURE — 3078F PR MOST RECENT DIASTOLIC BLOOD PRESSURE < 80 MM HG: ICD-10-PCS | Mod: CPTII,S$GLB,, | Performed by: PHYSICIAN ASSISTANT

## 2022-06-16 PROCEDURE — 99214 OFFICE O/P EST MOD 30 MIN: CPT | Mod: S$GLB,,, | Performed by: PHYSICIAN ASSISTANT

## 2022-06-16 PROCEDURE — 3051F PR MOST RECENT HEMOGLOBIN A1C LEVEL 7.0 - < 8.0%: ICD-10-PCS | Mod: CPTII,S$GLB,, | Performed by: PHYSICIAN ASSISTANT

## 2022-06-16 PROCEDURE — 1125F PR PAIN SEVERITY QUANTIFIED, PAIN PRESENT: ICD-10-PCS | Mod: CPTII,S$GLB,, | Performed by: PHYSICIAN ASSISTANT

## 2022-06-16 PROCEDURE — 3078F DIAST BP <80 MM HG: CPT | Mod: CPTII,S$GLB,, | Performed by: PHYSICIAN ASSISTANT

## 2022-06-16 PROCEDURE — 3288F PR FALLS RISK ASSESSMENT DOCUMENTED: ICD-10-PCS | Mod: CPTII,S$GLB,, | Performed by: PHYSICIAN ASSISTANT

## 2022-06-16 PROCEDURE — 3075F PR MOST RECENT SYSTOLIC BLOOD PRESS GE 130-139MM HG: ICD-10-PCS | Mod: CPTII,S$GLB,, | Performed by: PHYSICIAN ASSISTANT

## 2022-06-16 PROCEDURE — 99999 PR PBB SHADOW E&M-EST. PATIENT-LVL IV: ICD-10-PCS | Mod: PBBFAC,,, | Performed by: PHYSICIAN ASSISTANT

## 2022-06-16 PROCEDURE — 3051F HG A1C>EQUAL 7.0%<8.0%: CPT | Mod: CPTII,S$GLB,, | Performed by: PHYSICIAN ASSISTANT

## 2022-06-16 PROCEDURE — 80048 BASIC METABOLIC PNL TOTAL CA: CPT | Performed by: INTERNAL MEDICINE

## 2022-06-16 PROCEDURE — 1159F MED LIST DOCD IN RCRD: CPT | Mod: CPTII,S$GLB,, | Performed by: PHYSICIAN ASSISTANT

## 2022-06-16 PROCEDURE — 36415 COLL VENOUS BLD VENIPUNCTURE: CPT | Performed by: INTERNAL MEDICINE

## 2022-06-16 PROCEDURE — 99999 PR PBB SHADOW E&M-EST. PATIENT-LVL IV: CPT | Mod: PBBFAC,,, | Performed by: PHYSICIAN ASSISTANT

## 2022-06-16 PROCEDURE — 1101F PT FALLS ASSESS-DOCD LE1/YR: CPT | Mod: CPTII,S$GLB,, | Performed by: PHYSICIAN ASSISTANT

## 2022-06-16 PROCEDURE — 1101F PR PT FALLS ASSESS DOC 0-1 FALLS W/OUT INJ PAST YR: ICD-10-PCS | Mod: CPTII,S$GLB,, | Performed by: PHYSICIAN ASSISTANT

## 2022-06-16 RX ORDER — OXYCODONE AND ACETAMINOPHEN 10; 325 MG/1; MG/1
1 TABLET ORAL EVERY 8 HOURS PRN
Qty: 90 TABLET | Refills: 0 | Status: SHIPPED | OUTPATIENT
Start: 2022-07-15 | End: 2022-08-13

## 2022-06-16 RX ORDER — OXYCODONE AND ACETAMINOPHEN 10; 325 MG/1; MG/1
1 TABLET ORAL EVERY 8 HOURS PRN
Qty: 90 TABLET | Refills: 0 | Status: SHIPPED | OUTPATIENT
Start: 2022-06-16 | End: 2022-07-15

## 2022-06-16 RX ORDER — OXYCODONE AND ACETAMINOPHEN 10; 325 MG/1; MG/1
1 TABLET ORAL EVERY 8 HOURS PRN
Qty: 90 TABLET | Refills: 0 | Status: SHIPPED | OUTPATIENT
Start: 2022-08-13 | End: 2022-09-16 | Stop reason: SDUPTHER

## 2022-06-16 RX ORDER — CYCLOBENZAPRINE HCL 10 MG
10 TABLET ORAL 3 TIMES DAILY PRN
Qty: 270 TABLET | Refills: 0 | Status: SHIPPED | OUTPATIENT
Start: 2022-06-16 | End: 2022-09-16 | Stop reason: SDUPTHER

## 2022-06-16 NOTE — PROGRESS NOTES
CC: left sided low back and left hip pain    Interval History: Ms. Santiago is a 69 y.o. female with chronic low back and hip pain who presents for follow up.  She had a synvisc one injection and a intra articular steroid injection in right knee with benefit for only 1 week.  She continues to have lower back, left greater than right lower back pain.  Left GTB and right knee pain has worsened. Her last left GTB injection provided some short-lived relief.  Stretching exercises have helped.  She had a series of Euflexxa injections in 2014. She continues to take percocet 10mg q 8 hrs as needed for pain with moderate benefits.  No side effects reported.  Able to perform daily activities with the medications. Flexeril 10 mg and Gabapentin 600 mg BID has been helpful.  In the past UDS positive for Tramadol. Admited taking a Tramadol for her headache. She has a history of chronic headaches, occur about 2 x monthly.  In the past she was taking diet supplements and UDS was positive for amphetamines.    Prior HPI: The patient is a 64-year-old woman with a history of sarcoidosis, breast CA, diabetes, anxiety and depression who presents in referral from Dr. Nguyen to Dr. Perez for multiple pain complaints including back pain, bilateral hip pain and right shoulder pain.  She is following up with me since I am much closer to her.   She presents today for multiple pain complaints.  She recently was hospitalized for pneumonia, UTI and sarcoidosis exacerbation per patient.  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  She has had past GTB injections with moderate benefit and had an exacerbation of her pain recently.  She was able to receive left GTB with Dr. Perez on 10/3/2014 that provided >50% relief of her left hip and leg pain.     She continues to take Prednisone 5mg daily. She continues Percoet 7.5mg about 3x/day as needed with moderate benefit.  She presents today with  "worsening right knee pain.  She states having history of right knee osteoarthritis, but has not had any treatment for her knee pain.  Continues to have improvement in her knee pain following completion of visco supplementation injection in her right knee.  Her back and hip pain remain tolerable with her medications.  She continues to take Percoct 7.5mg q8hrs as needed with moderate benefit.  No reported side effects.       Pain intervention history: She takes hydrocodone 7-325 2-4 times a day.  Also has undergone epidural steroid injection at L1/2 without relief.  She has been doing cervical traction with good relief of her neck pain.  Previous trochanteric bursa injections with good relief.    ROS:She reports fatigability, itching, headaches, swollen glands, chest pain and shortness of breath, nausea vomiting, easy bruising, back pain, joint stiffness, dizziness, difficulty sleeping, anxiety, depression and loss of balance.  Balance of review of systems is negative.    Medical, surgical, family and social history reviewed elsewhere in record.    Medications/Allergies: See med card    Vitals:    06/16/22 1312   BP: 137/77   Pulse: 84   Weight: 80.3 kg (177 lb)   Height: 5' 6" (1.676 m)   PainSc:   5   PainLoc: Back       Physical exam:  Gen: A and O x3, pleasant, well-groomed  Skin: No rashes or obvious lesions  CVS: RRR  Resp: non labored breathing  Abdomen: Soft, NT/ND, normal bowel sounds present.  Neuro:  Lower extremities:   +right knee crepitus. LE erythema and swelling. TTP right 2 and 3rd metatarsals.   Reflexes: Patellar 2+, Achilles 2+ bilaterally.  Sensory:  Intact   Lumbar spine:  Lumbar spine: ROM is moderately reduced with flexion extension and oblique extension with increased pain in all directions.    Orlando's test causes pain on both sides.    Supine straight leg raise is negative bilaterally.    Internal and external rotation of the hip causes increased pain in left groin.  Myofascial exam: " Tenderness to palpation over both GTB.      Imaging:  Cervical spine MRI report 10/5/12: There is dextroscoliosis in the lower cervical/upper thoracic region.  There is very mild foraminal stenosis on the left at C6/7.    MRI thoracic spine 11/21/2008: No significant degenerative disc disease or central stenosis.    MRI lumbar spine 8/14/08: L1/2 small broad-based central protrusion with mild effacement of ventral thecal sac but not distorting exiting roots.  L2/3 and L3/4 unremarkable.  At L4/5 there is mild central stenosis with bulging annulus anteriorly and facet hypertrophic degenerative change posteriorly.  No focal disc herniation and neural foramina are patent.  At L5/S1 there is minimal bulging of the annulus with no thecal sac compression.  The small annular fissure within the posterior annulus.  Foramina are patent bilaterally, no stenosis mild, degenerative changes in the facets.    Xray Right Knee 10/13/14  Mild tricompartmental degenerative changes present. No joint effusion. The soft tissues are unremarkable.    Assessment:  Ms. Santiago is a 65-year-old woman with   1. Other spondylosis, lumbar region    2. DDD (degenerative disc disease), lumbar    3. Greater trochanteric bursitis, unspecified laterality    4. Myalgia        PLAN:  1. I have stressed the importance of physical activity and exercise to improve overall health.    2.  Percocet 10mg q8hrs as needed.  Script given for three months.  reviewed. Previous UDS consistent. UDS next clinic visit  3.  Flexeril 10 mg q 8 h. 90 day supply.  4.  I believe her low back pain maybe due to facet arthropathy and have recommended lumbar medial branch blocks as a diagnostic procedure.  If successful, would proceed with radiofrequency ablation.  May consider this procedure in the future  5. Recommend series of 3 Euflexxa injections. She wishes to do this in the future. Unfortunately this was denied by insurance  6. Follow up in three months   All  medication management was performed by Dr. Temple

## 2022-06-29 ENCOUNTER — PATIENT MESSAGE (OUTPATIENT)
Dept: OTHER | Facility: OTHER | Age: 72
End: 2022-06-29
Payer: MEDICARE

## 2022-08-03 ENCOUNTER — OFFICE VISIT (OUTPATIENT)
Dept: FAMILY MEDICINE | Facility: CLINIC | Age: 72
End: 2022-08-03
Payer: MEDICARE

## 2022-08-03 VITALS
OXYGEN SATURATION: 99 % | BODY MASS INDEX: 30.01 KG/M2 | HEART RATE: 73 BPM | WEIGHT: 186.75 LBS | RESPIRATION RATE: 17 BRPM | SYSTOLIC BLOOD PRESSURE: 138 MMHG | HEIGHT: 66 IN | TEMPERATURE: 98 F | DIASTOLIC BLOOD PRESSURE: 76 MMHG

## 2022-08-03 DIAGNOSIS — E78.5 HYPERLIPIDEMIA ASSOCIATED WITH TYPE 2 DIABETES MELLITUS: ICD-10-CM

## 2022-08-03 DIAGNOSIS — G89.29 OTHER CHRONIC PAIN: ICD-10-CM

## 2022-08-03 DIAGNOSIS — R22.31 LUMP OF SKIN OF RIGHT UPPER EXTREMITY: ICD-10-CM

## 2022-08-03 DIAGNOSIS — E11.59 HYPERTENSION ASSOCIATED WITH DIABETES: ICD-10-CM

## 2022-08-03 DIAGNOSIS — E11.69 HYPERLIPIDEMIA ASSOCIATED WITH TYPE 2 DIABETES MELLITUS: ICD-10-CM

## 2022-08-03 DIAGNOSIS — E11.8 TYPE 2 DIABETES WITH COMPLICATION: Primary | ICD-10-CM

## 2022-08-03 DIAGNOSIS — H81.10 BENIGN PAROXYSMAL POSITIONAL VERTIGO, UNSPECIFIED LATERALITY: ICD-10-CM

## 2022-08-03 DIAGNOSIS — I15.2 HYPERTENSION ASSOCIATED WITH DIABETES: ICD-10-CM

## 2022-08-03 DIAGNOSIS — E03.4 HYPOTHYROIDISM DUE TO ACQUIRED ATROPHY OF THYROID: ICD-10-CM

## 2022-08-03 PROCEDURE — 3075F SYST BP GE 130 - 139MM HG: CPT | Mod: CPTII,S$GLB,, | Performed by: PHYSICIAN ASSISTANT

## 2022-08-03 PROCEDURE — 1160F RVW MEDS BY RX/DR IN RCRD: CPT | Mod: CPTII,S$GLB,, | Performed by: PHYSICIAN ASSISTANT

## 2022-08-03 PROCEDURE — 99214 OFFICE O/P EST MOD 30 MIN: CPT | Mod: S$GLB,,, | Performed by: PHYSICIAN ASSISTANT

## 2022-08-03 PROCEDURE — 3051F HG A1C>EQUAL 7.0%<8.0%: CPT | Mod: CPTII,S$GLB,, | Performed by: PHYSICIAN ASSISTANT

## 2022-08-03 PROCEDURE — 4010F PR ACE/ARB THEARPY RXD/TAKEN: ICD-10-PCS | Mod: CPTII,S$GLB,, | Performed by: PHYSICIAN ASSISTANT

## 2022-08-03 PROCEDURE — 1159F MED LIST DOCD IN RCRD: CPT | Mod: CPTII,S$GLB,, | Performed by: PHYSICIAN ASSISTANT

## 2022-08-03 PROCEDURE — 3008F PR BODY MASS INDEX (BMI) DOCUMENTED: ICD-10-PCS | Mod: CPTII,S$GLB,, | Performed by: PHYSICIAN ASSISTANT

## 2022-08-03 PROCEDURE — 1159F PR MEDICATION LIST DOCUMENTED IN MEDICAL RECORD: ICD-10-PCS | Mod: CPTII,S$GLB,, | Performed by: PHYSICIAN ASSISTANT

## 2022-08-03 PROCEDURE — 3075F PR MOST RECENT SYSTOLIC BLOOD PRESS GE 130-139MM HG: ICD-10-PCS | Mod: CPTII,S$GLB,, | Performed by: PHYSICIAN ASSISTANT

## 2022-08-03 PROCEDURE — 99214 PR OFFICE/OUTPT VISIT, EST, LEVL IV, 30-39 MIN: ICD-10-PCS | Mod: S$GLB,,, | Performed by: PHYSICIAN ASSISTANT

## 2022-08-03 PROCEDURE — 3008F BODY MASS INDEX DOCD: CPT | Mod: CPTII,S$GLB,, | Performed by: PHYSICIAN ASSISTANT

## 2022-08-03 PROCEDURE — 4010F ACE/ARB THERAPY RXD/TAKEN: CPT | Mod: CPTII,S$GLB,, | Performed by: PHYSICIAN ASSISTANT

## 2022-08-03 PROCEDURE — 1160F PR REVIEW ALL MEDS BY PRESCRIBER/CLIN PHARMACIST DOCUMENTED: ICD-10-PCS | Mod: CPTII,S$GLB,, | Performed by: PHYSICIAN ASSISTANT

## 2022-08-03 PROCEDURE — 3078F PR MOST RECENT DIASTOLIC BLOOD PRESSURE < 80 MM HG: ICD-10-PCS | Mod: CPTII,S$GLB,, | Performed by: PHYSICIAN ASSISTANT

## 2022-08-03 PROCEDURE — 1125F PR PAIN SEVERITY QUANTIFIED, PAIN PRESENT: ICD-10-PCS | Mod: CPTII,S$GLB,, | Performed by: PHYSICIAN ASSISTANT

## 2022-08-03 PROCEDURE — 99999 PR PBB SHADOW E&M-EST. PATIENT-LVL V: ICD-10-PCS | Mod: PBBFAC,,, | Performed by: PHYSICIAN ASSISTANT

## 2022-08-03 PROCEDURE — 3078F DIAST BP <80 MM HG: CPT | Mod: CPTII,S$GLB,, | Performed by: PHYSICIAN ASSISTANT

## 2022-08-03 PROCEDURE — 3051F PR MOST RECENT HEMOGLOBIN A1C LEVEL 7.0 - < 8.0%: ICD-10-PCS | Mod: CPTII,S$GLB,, | Performed by: PHYSICIAN ASSISTANT

## 2022-08-03 PROCEDURE — 1100F PTFALLS ASSESS-DOCD GE2>/YR: CPT | Mod: CPTII,S$GLB,, | Performed by: PHYSICIAN ASSISTANT

## 2022-08-03 PROCEDURE — 3288F FALL RISK ASSESSMENT DOCD: CPT | Mod: CPTII,S$GLB,, | Performed by: PHYSICIAN ASSISTANT

## 2022-08-03 PROCEDURE — 1125F AMNT PAIN NOTED PAIN PRSNT: CPT | Mod: CPTII,S$GLB,, | Performed by: PHYSICIAN ASSISTANT

## 2022-08-03 PROCEDURE — 99999 PR PBB SHADOW E&M-EST. PATIENT-LVL V: CPT | Mod: PBBFAC,,, | Performed by: PHYSICIAN ASSISTANT

## 2022-08-03 PROCEDURE — 1100F PR PT FALLS ASSESS DOC 2+ FALLS/FALL W/INJURY/YR: ICD-10-PCS | Mod: CPTII,S$GLB,, | Performed by: PHYSICIAN ASSISTANT

## 2022-08-03 PROCEDURE — 3288F PR FALLS RISK ASSESSMENT DOCUMENTED: ICD-10-PCS | Mod: CPTII,S$GLB,, | Performed by: PHYSICIAN ASSISTANT

## 2022-08-03 NOTE — PATIENT INSTRUCTIONS
Guy Zelaya,     If you are due for any health screening(s) below please notify me so we can arrange them to be ordered and scheduled to maintain your health. Most healthy patients complete it. Don't lose out on improving your health.     Tests to Keep You Healthy    Eye Exam: Met  Colon Cancer Screening: DUE  Blood Pressure <= 139/89: Yes  HbA1c < 8: Yes        Colon Cancer Screening    Of cancers affecting both men and women, colorectal cancer is the third leading cancer killer in the [place]. But it doesnt have to be. Screening can prevent colorectal cancer or find it at an early stage when treatment often leads to a cure.    A colonoscopy is the preferred test for detecting colon cancer. It is needed only once every 10 years if results are negative. While sedated, a flexible, lighted tube with a tiny camera is inserted into the rectum and advanced through the colon to look for cancers. An alternative screening test that is used at home and returned to the lab may also be used. It detects hidden blood in bowel movements which could indicate cancer in the colon. If results are positive, you will need a colonoscopy to determine if the blood is a sign of cancer. This type of follow up (diagnostic) colonoscopy usually requires additional copays as required by your insurance provider. Please contact your PCP if you have any questions.    Although you are still overdue for this important screening, due to the COVID-19 pandemic, we recommend scheduling it in the near future.

## 2022-08-03 NOTE — PROGRESS NOTES
Subjective:       Patient ID: Nathalie Santiago is a 72 y.o. female.    Chief Complaint: Establish Care    Nathalie Santiago is a 72 year old female with an extensive medical history who presents to the clinic to establish care. She was previously a patient of Dr. Broussard. She states that she recently stopped taking her metformin due to upper abdominal cramps. She also reports that she has been drinking 2 cokes per day and her blood sugars usually run in the 150-300 range. She is due for a colonoscopy and states that she will have it done with Dr. Wright in [place]. Additionally the patient states that she has chronic balance issues after changing positions. She states that someone taught her to hang her head off the bed and move it into different positions to treat the condition. She states that this works for a while but it usually comes back. She prefers to continue using this treatment for now. She has no other concerns or complaints at this time. She is concerned about a lump on her right arm that has become tender; she denies any trauma or injury to the arm. The patient does have chronic back pain, managed by Dr. Temple, pain management.     Review of patient's allergies indicates:   Allergen Reactions    Keflex [cephalexin] Anaphylaxis    Penicillins Anaphylaxis    Vasotec [enalapril maleate] Other (See Comments)     Causes her to pass out    Bupropion Other (See Comments)     Loss of memory  Other reaction(s): Other (See Comments)  Loss of memory  Loss of memory    Ciprofloxacin (bulk)      Bones ache, myalgia    Ciprofloxacin hcl     Enalapril     Wellbutrin [bupropion hcl] Other (See Comments)     Loss of memory      Zithromax [azithromycin]      Patient states medication does not work on her-not effective    Codeine Itching and Nausea Only         Current Outpatient Medications:     albuterol (PROVENTIL/VENTOLIN HFA) 90 mcg/actuation inhaler, Inhale 1-2 puffs into the lungs every 4 (four) hours as  needed for Wheezing or Shortness of Breath. This is a rescue inhaler medication and should be used as needed, Disp: 54 g, Rfl: 3    albuterol-ipratropium (DUO-NEB) 2.5 mg-0.5 mg/3 mL nebulizer solution, INHALE THE CONTENTS OF 1 VIAL VIA NEBULIZER EVERY 6 HOURS AS NEEDED FOR SHORTNESS OF BREATH  OR  WHEEZING, Disp: 120 each, Rfl: 0    amLODIPine (NORVASC) 5 MG tablet, Take 1 tablet (5 mg total) by mouth 2 (two) times a day., Disp: 180 tablet, Rfl: 1    blood sugar diagnostic Strp, To check BG 3  times daily, to use with insurance preferred meter, Disp: 200 strip, Rfl: 11    clopidogreL (PLAVIX) 75 mg tablet, TAKE 1 TABLET EVERY DAY, Disp: 90 tablet, Rfl: 1    cyclobenzaprine (FLEXERIL) 10 MG tablet, Take 1 tablet (10 mg total) by mouth 3 (three) times daily as needed for Muscle spasms., Disp: 270 tablet, Rfl: 0    diclofenac sodium (VOLTAREN) 1 % Gel, APPLY TOPICALLY TO AFFECTED AREA TWICE DAILY AS NEEDED FOR PAIN RUB IN JOINTS, Disp: 1 Tube, Rfl: 2    DULoxetine (CYMBALTA) 30 MG capsule, Take 1 capsule (30 mg total) by mouth every evening., Disp: 90 capsule, Rfl: 1    DULoxetine (CYMBALTA) 60 MG capsule, TAKE 1 CAPSULE PO EVERY EVENING, Disp: 90 capsule, Rfl: 1    fenofibrate 160 MG Tab, TAKE 1 TABLET (160 MG TOTAL) BY MOUTH ONCE DAILY., Disp: 90 tablet, Rfl: 3    fluticasone (FLONASE) 50 mcg/actuation nasal spray, 2 sprays by Each Nare route once daily. (Patient taking differently: 2 sprays by Each Nostril route daily as needed.), Disp: 1 Bottle, Rfl: 0    fluticasone furoate-vilanteroL (BREO ELLIPTA) 200-25 mcg/dose DsDv diskus inhaler, Inhale 1 puff into the lungs once daily. Controller, Disp: 3 each, Rfl: 3    gabapentin (NEURONTIN) 300 MG capsule, TAKE THREE TO FOUR CAPSULES PO NIGHTLY. Medication may cause sedation., Disp: 360 capsule, Rfl: 0    hydroCHLOROthiazide (HYDRODIURIL) 25 MG tablet, TAKE 1 TABLET EVERY DAY, Disp: 90 tablet, Rfl: 2    insulin aspart U-100 (NOVOLOG FLEXPEN U-100 INSULIN)  100 unit/mL (3 mL) InPn pen, Inject 10 units three times daily with meals.Use sliding scale as directed. MAX  15 UNITS FOR EACH DOSE., Disp: 45 mL, Rfl: 3    insulin  unit/mL injection, Inject 30 Units into the skin 2 (two) times daily before meals., Disp: 3 vial, Rfl: 11    levothyroxine (SYNTHROID) 25 MCG tablet, TAKE 1 TABLET BEFORE BREAKFAST (Patient taking differently: Take 25 mcg by mouth before breakfast. TAKE 2 TABLET BEFORE BREAKFAST), Disp: 90 tablet, Rfl: 3    metoclopramide HCl (REGLAN) 10 MG tablet, TAKE 1 TABLET EVERY 8 HOURS, Disp: 270 tablet, Rfl: 1    metoprolol succinate (TOPROL-XL) 25 MG 24 hr tablet, TAKE 1 TABLET EVERY DAY, Disp: 90 tablet, Rfl: 3    multivitamin Chew, Take by mouth., Disp: , Rfl:     multivitamin, stress formula Tab, Take 1 tablet by mouth., Disp: , Rfl:     naloxone (NARCAN) 4 mg/actuation Spry, Take as a single dose in one nostril; may repeat every 2 to 3 minutes in alternating nostrils until medical assistance becomes available  - use only in event of medication overdose, Disp: 1 each, Rfl: 0    olmesartan (BENICAR) 20 MG tablet, Take 1 tablet (20 mg total) by mouth once daily., Disp: 90 tablet, Rfl: 1    oxybutynin (DITROPAN) 5 MG Tab, Take 5 mg by mouth 2 (two) times daily. , Disp: , Rfl:     oxyCODONE-acetaminophen (PERCOCET)  mg per tablet, Take 1 tablet by mouth every 8 (eight) hours as needed for Pain. Medically necessary for greater than 7 days for chronic pain, Disp: 90 tablet, Rfl: 0    [START ON 8/13/2022] oxyCODONE-acetaminophen (PERCOCET)  mg per tablet, Take 1 tablet by mouth every 8 (eight) hours as needed for Pain. Medically necessary for greater than 7 days for chronic pain, Disp: 90 tablet, Rfl: 0    potassium chloride (MICRO-K) 10 MEQ CpSR, TAKE 1 CAPSULE THREE TIMES DAILY, Disp: 270 capsule, Rfl: 1    pramipexole (MIRAPEX) 0.125 MG tablet, Take two tablets every night, Disp: 180 tablet, Rfl: 0    predniSONE (DELTASONE) 20  MG tablet, Take 1 tablet (20 mg total) by mouth once daily. For asthma flare take one daily for 5 days and repeat for asthma., Disp: 20 tablet, Rfl: 0    promethazine (PHENERGAN) 25 MG tablet, TAKE 1 TABLET EVERY 6 HOURS AS NEEDED FOR NAUSEA., Disp: 90 tablet, Rfl: 3    rosuvastatin (CRESTOR) 20 MG tablet, Take 1 tablet (20 mg total) by mouth once daily., Disp: 30 tablet, Rfl: 0    sertraline (ZOLOFT) 100 MG tablet, Take one and one-half tablets PO daily, Disp: 135 tablet, Rfl: 1    spironolactone (ALDACTONE) 25 MG tablet, TAKE 1 TABLET EVERY DAY, Disp: 90 tablet, Rfl: 3    metFORMIN (GLUCOPHAGE-XR) 500 MG ER 24hr tablet, TAKE 2 TABLETS EVERY DAY WITH BREAKFAST (Patient not taking: Reported on 8/3/2022), Disp: 180 tablet, Rfl: 3    Lab Results   Component Value Date    WBC 6.25 07/23/2021    HGB 13.5 07/23/2021    HCT 42.7 07/23/2021     07/23/2021    CHOL 157 04/12/2022    TRIG 277 (H) 04/12/2022    HDL 32 (L) 04/12/2022    ALT 22 07/23/2021    AST 23 07/23/2021     (L) 06/16/2022    K 4.0 06/16/2022    CL 99 06/16/2022    CREATININE 1.0 06/16/2022    BUN 24 (H) 06/16/2022    CO2 27 06/16/2022    TSH 6.740 (H) 07/23/2021    INR 1.0 06/25/2015    HGBA1C 7.8 (H) 06/16/2022       Review of Systems   Constitutional: Negative for activity change, appetite change, chills, fatigue and fever.   HENT: Negative for congestion, hearing loss, postnasal drip, rhinorrhea and sinus pressure.    Eyes: Negative for visual disturbance.   Respiratory: Negative for cough, shortness of breath and wheezing.    Cardiovascular: Negative for chest pain, palpitations and leg swelling.   Gastrointestinal: Negative for abdominal distention, abdominal pain, constipation, diarrhea, nausea and vomiting.   Genitourinary: Negative for difficulty urinating, dysuria and urgency.   Musculoskeletal: Negative for arthralgias and myalgias.   Skin: Negative for rash.   Neurological: Negative for dizziness, numbness and headaches.    Hematological: Negative for adenopathy.   Psychiatric/Behavioral: Negative for behavioral problems, confusion and dysphoric mood. The patient is not nervous/anxious.        Objective:      Physical Exam  Constitutional:       Appearance: Normal appearance.   HENT:      Head: Normocephalic and atraumatic.      Nose: Nose normal.      Mouth/Throat:      Mouth: Mucous membranes are moist.   Eyes:      Extraocular Movements: Extraocular movements intact.      Conjunctiva/sclera: Conjunctivae normal.   Cardiovascular:      Rate and Rhythm: Normal rate and regular rhythm.      Heart sounds: Normal heart sounds. No murmur heard.  Pulmonary:      Effort: Pulmonary effort is normal. No respiratory distress.      Breath sounds: Normal breath sounds. No wheezing.   Abdominal:      General: Bowel sounds are normal. There is no distension.      Palpations: Abdomen is soft.      Tenderness: There is no abdominal tenderness. There is no guarding.   Musculoskeletal:         General: Normal range of motion.      Cervical back: Normal range of motion.   Skin:     General: Skin is warm.   Neurological:      General: No focal deficit present.      Mental Status: She is alert and oriented to person, place, and time.   Psychiatric:         Mood and Affect: Mood normal.         Behavior: Behavior normal.         Assessment:       1. Type 2 diabetes with complication    2. Hypertension associated with diabetes    3. Hyperlipidemia associated with type 2 diabetes mellitus    4. Hypothyroidism due to acquired atrophy of thyroid    5. Other chronic pain    6. Benign paroxysmal positional vertigo, unspecified laterality    7. Lump of skin of right upper extremity        Plan:     Nathalie was seen today for establish care.    Diagnoses and all orders for this visit:    Type 2 diabetes with complication  -     CBC W/ AUTO DIFFERENTIAL; Future  -     Hemoglobin A1C; Future  -     Comprehensive Metabolic Panel; Future        -     Start diabetic  diet         -     Continue current medications as prescribed. Patient agrees to resume metformin. If not tolerated, we will change diabetic medication.  Hypertension associated with diabetes  -     CBC W/ AUTO DIFFERENTIAL; Future  -     Microalbumin/creatinine urine ratio; Future  -     Hemoglobin A1C; Future  -     Comprehensive Metabolic Panel; Future        -     Low sodium diet         -     Continue to monitor blood pressure with regular blood pressure checks        -     Continue current medications as prescribed  Hyperlipidemia associated with type 2 diabetes mellitus  -     Hemoglobin A1C; Future  -     Comprehensive Metabolic Panel; Future        -     High fiber diet         -     Continue current medications as prescribed  Hypothyroidism due to acquired atrophy of thyroid  -     TSH; Future        -     Continue current medications as prescribed   Other chronic pain        -     Continue to follow up with pain management   Benign paroxysmal positional vertigo, unspecified laterality       -      Patient denies ENT referral; would like to manage symptoms independently  Lump of skin of right upper extremity  -     US Extremity Non Vascular Complete Right; Future        Patient will be notified when labs and imaging return and further recommendations will be given at that time.   Labs in 3 months. Follow up with new PCP after labs  Patient reminded to update colonoscopy.

## 2022-08-26 ENCOUNTER — PATIENT MESSAGE (OUTPATIENT)
Dept: PAIN MEDICINE | Facility: CLINIC | Age: 72
End: 2022-08-26
Payer: MEDICARE

## 2022-08-31 ENCOUNTER — PATIENT MESSAGE (OUTPATIENT)
Dept: FAMILY MEDICINE | Facility: CLINIC | Age: 72
End: 2022-08-31
Payer: MEDICARE

## 2022-09-05 ENCOUNTER — PATIENT MESSAGE (OUTPATIENT)
Dept: PAIN MEDICINE | Facility: CLINIC | Age: 72
End: 2022-09-05
Payer: MEDICARE

## 2022-09-06 DIAGNOSIS — M25.561 CHRONIC PAIN OF RIGHT KNEE: Primary | ICD-10-CM

## 2022-09-06 DIAGNOSIS — G89.29 CHRONIC PAIN OF RIGHT KNEE: Primary | ICD-10-CM

## 2022-09-07 ENCOUNTER — PATIENT MESSAGE (OUTPATIENT)
Dept: PAIN MEDICINE | Facility: CLINIC | Age: 72
End: 2022-09-07
Payer: MEDICARE

## 2022-09-16 ENCOUNTER — OFFICE VISIT (OUTPATIENT)
Dept: PAIN MEDICINE | Facility: CLINIC | Age: 72
End: 2022-09-16
Payer: MEDICARE

## 2022-09-16 VITALS
WEIGHT: 186 LBS | BODY MASS INDEX: 29.89 KG/M2 | SYSTOLIC BLOOD PRESSURE: 125 MMHG | DIASTOLIC BLOOD PRESSURE: 69 MMHG | HEIGHT: 66 IN | HEART RATE: 69 BPM

## 2022-09-16 DIAGNOSIS — F11.90 CHRONIC, CONTINUOUS USE OF OPIOIDS: ICD-10-CM

## 2022-09-16 DIAGNOSIS — M51.36 DDD (DEGENERATIVE DISC DISEASE), LUMBAR: ICD-10-CM

## 2022-09-16 DIAGNOSIS — G89.4 CHRONIC PAIN DISORDER: ICD-10-CM

## 2022-09-16 DIAGNOSIS — M79.10 MYALGIA: ICD-10-CM

## 2022-09-16 DIAGNOSIS — M70.60 GREATER TROCHANTERIC BURSITIS, UNSPECIFIED LATERALITY: ICD-10-CM

## 2022-09-16 DIAGNOSIS — G89.29 CHRONIC PAIN OF RIGHT KNEE: ICD-10-CM

## 2022-09-16 DIAGNOSIS — M25.561 CHRONIC PAIN OF RIGHT KNEE: ICD-10-CM

## 2022-09-16 DIAGNOSIS — M47.896 OTHER SPONDYLOSIS, LUMBAR REGION: Primary | ICD-10-CM

## 2022-09-16 PROCEDURE — 3051F PR MOST RECENT HEMOGLOBIN A1C LEVEL 7.0 - < 8.0%: ICD-10-PCS | Mod: CPTII,S$GLB,, | Performed by: PHYSICIAN ASSISTANT

## 2022-09-16 PROCEDURE — 1125F PR PAIN SEVERITY QUANTIFIED, PAIN PRESENT: ICD-10-PCS | Mod: CPTII,S$GLB,, | Performed by: PHYSICIAN ASSISTANT

## 2022-09-16 PROCEDURE — 4010F PR ACE/ARB THEARPY RXD/TAKEN: ICD-10-PCS | Mod: CPTII,S$GLB,, | Performed by: PHYSICIAN ASSISTANT

## 2022-09-16 PROCEDURE — 1125F AMNT PAIN NOTED PAIN PRSNT: CPT | Mod: CPTII,S$GLB,, | Performed by: PHYSICIAN ASSISTANT

## 2022-09-16 PROCEDURE — 4010F ACE/ARB THERAPY RXD/TAKEN: CPT | Mod: CPTII,S$GLB,, | Performed by: PHYSICIAN ASSISTANT

## 2022-09-16 PROCEDURE — 99999 PR PBB SHADOW E&M-EST. PATIENT-LVL V: ICD-10-PCS | Mod: PBBFAC,,, | Performed by: PHYSICIAN ASSISTANT

## 2022-09-16 PROCEDURE — 3051F HG A1C>EQUAL 7.0%<8.0%: CPT | Mod: CPTII,S$GLB,, | Performed by: PHYSICIAN ASSISTANT

## 2022-09-16 PROCEDURE — 3074F PR MOST RECENT SYSTOLIC BLOOD PRESSURE < 130 MM HG: ICD-10-PCS | Mod: CPTII,S$GLB,, | Performed by: PHYSICIAN ASSISTANT

## 2022-09-16 PROCEDURE — 3078F DIAST BP <80 MM HG: CPT | Mod: CPTII,S$GLB,, | Performed by: PHYSICIAN ASSISTANT

## 2022-09-16 PROCEDURE — 99999 PR PBB SHADOW E&M-EST. PATIENT-LVL V: CPT | Mod: PBBFAC,,, | Performed by: PHYSICIAN ASSISTANT

## 2022-09-16 PROCEDURE — 99214 PR OFFICE/OUTPT VISIT, EST, LEVL IV, 30-39 MIN: ICD-10-PCS | Mod: S$GLB,,, | Performed by: PHYSICIAN ASSISTANT

## 2022-09-16 PROCEDURE — 3288F FALL RISK ASSESSMENT DOCD: CPT | Mod: CPTII,S$GLB,, | Performed by: PHYSICIAN ASSISTANT

## 2022-09-16 PROCEDURE — 99214 OFFICE O/P EST MOD 30 MIN: CPT | Mod: S$GLB,,, | Performed by: PHYSICIAN ASSISTANT

## 2022-09-16 PROCEDURE — 1159F MED LIST DOCD IN RCRD: CPT | Mod: CPTII,S$GLB,, | Performed by: PHYSICIAN ASSISTANT

## 2022-09-16 PROCEDURE — 3008F BODY MASS INDEX DOCD: CPT | Mod: CPTII,S$GLB,, | Performed by: PHYSICIAN ASSISTANT

## 2022-09-16 PROCEDURE — 3288F PR FALLS RISK ASSESSMENT DOCUMENTED: ICD-10-PCS | Mod: CPTII,S$GLB,, | Performed by: PHYSICIAN ASSISTANT

## 2022-09-16 PROCEDURE — 1101F PR PT FALLS ASSESS DOC 0-1 FALLS W/OUT INJ PAST YR: ICD-10-PCS | Mod: CPTII,S$GLB,, | Performed by: PHYSICIAN ASSISTANT

## 2022-09-16 PROCEDURE — 3078F PR MOST RECENT DIASTOLIC BLOOD PRESSURE < 80 MM HG: ICD-10-PCS | Mod: CPTII,S$GLB,, | Performed by: PHYSICIAN ASSISTANT

## 2022-09-16 PROCEDURE — 1101F PT FALLS ASSESS-DOCD LE1/YR: CPT | Mod: CPTII,S$GLB,, | Performed by: PHYSICIAN ASSISTANT

## 2022-09-16 PROCEDURE — 3008F PR BODY MASS INDEX (BMI) DOCUMENTED: ICD-10-PCS | Mod: CPTII,S$GLB,, | Performed by: PHYSICIAN ASSISTANT

## 2022-09-16 PROCEDURE — 1159F PR MEDICATION LIST DOCUMENTED IN MEDICAL RECORD: ICD-10-PCS | Mod: CPTII,S$GLB,, | Performed by: PHYSICIAN ASSISTANT

## 2022-09-16 PROCEDURE — 3074F SYST BP LT 130 MM HG: CPT | Mod: CPTII,S$GLB,, | Performed by: PHYSICIAN ASSISTANT

## 2022-09-16 PROCEDURE — 80326 AMPHETAMINES 5 OR MORE: CPT | Performed by: PHYSICIAN ASSISTANT

## 2022-09-16 RX ORDER — CYCLOBENZAPRINE HCL 10 MG
10 TABLET ORAL 3 TIMES DAILY PRN
Qty: 270 TABLET | Refills: 0 | Status: SHIPPED | OUTPATIENT
Start: 2022-09-16 | End: 2022-12-29 | Stop reason: SDUPTHER

## 2022-09-16 RX ORDER — OXYCODONE AND ACETAMINOPHEN 10; 325 MG/1; MG/1
1 TABLET ORAL EVERY 8 HOURS PRN
Qty: 90 TABLET | Refills: 0 | Status: SHIPPED | OUTPATIENT
Start: 2022-10-16 | End: 2022-11-14

## 2022-09-16 RX ORDER — OXYCODONE AND ACETAMINOPHEN 10; 325 MG/1; MG/1
1 TABLET ORAL EVERY 8 HOURS PRN
Qty: 90 TABLET | Refills: 0 | Status: SHIPPED | OUTPATIENT
Start: 2022-09-17 | End: 2022-10-16

## 2022-09-16 RX ORDER — OXYCODONE AND ACETAMINOPHEN 10; 325 MG/1; MG/1
1 TABLET ORAL EVERY 8 HOURS PRN
Qty: 90 TABLET | Refills: 0 | Status: SHIPPED | OUTPATIENT
Start: 2022-11-14 | End: 2022-12-29 | Stop reason: SDUPTHER

## 2022-09-16 NOTE — PROGRESS NOTES
CC: left sided low back and left hip pain    Interval History: Ms. Santiago is a 69 y.o. female with chronic low back and hip pain who presents for follow up.  She had a synvisc one injection and a intra articular steroid injection in right knee with benefit for only 1 week.  She continues to have lower back, left greater than right lower back pain.  Left GTB and right knee pain has worsened. Her last left GTB injection provided some short-lived relief.  Stretching exercises have helped.  She had a series of Euflexxa injections in 2014. She continues to take percocet 10mg q 8 hrs as needed for pain with moderate benefits.  No side effects reported.  Able to perform daily activities with the medications. Flexeril 10 mg and Gabapentin 600 mg BID has been helpful.  In the past UDS positive for Tramadol. Admited taking a Tramadol for her headache. She has a history of chronic headaches, occur about 2 x monthly.  In the past she was taking diet supplements and UDS was positive for amphetamines.      Prior HPI: The patient is a 64-year-old woman with a history of sarcoidosis, breast CA, diabetes, anxiety and depression who presents in referral from Dr. Nguyen to Dr. Perez for multiple pain complaints including back pain, bilateral hip pain and right shoulder pain.  She is following up with me since I am much closer to her.   She presents today for multiple pain complaints.  She recently was hospitalized for pneumonia, UTI and sarcoidosis exacerbation per patient.  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  She has had past GTB injections with moderate benefit and had an exacerbation of her pain recently.  She was able to receive left GTB with Dr. Perez on 10/3/2014 that provided >50% relief of her left hip and leg pain.     She continues to take Prednisone 5mg daily. She continues Percoet 7.5mg about 3x/day as needed with moderate benefit.  She presents today with  "worsening right knee pain.  She states having history of right knee osteoarthritis, but has not had any treatment for her knee pain.  Continues to have improvement in her knee pain following completion of visco supplementation injection in her right knee.  Her back and hip pain remain tolerable with her medications.  She continues to take Percoct 7.5mg q8hrs as needed with moderate benefit.  No reported side effects.       Pain intervention history: She takes hydrocodone 7-325 2-4 times a day.  Also has undergone epidural steroid injection at L1/2 without relief.  She has been doing cervical traction with good relief of her neck pain.  Previous trochanteric bursa injections with good relief.    ROS:She reports fatigability, itching, headaches, swollen glands, chest pain and shortness of breath, nausea vomiting, easy bruising, back pain, joint stiffness, dizziness, difficulty sleeping, anxiety, depression and loss of balance.  Balance of review of systems is negative.    Medical, surgical, family and social history reviewed elsewhere in record.    Medications/Allergies: See med card    Vitals:    09/16/22 1257   BP: 125/69   Pulse: 69   Weight: 84.4 kg (186 lb)   Height: 5' 6" (1.676 m)   PainSc:   8   PainLoc: Back       Physical exam:  Gen: A and O x3, pleasant, well-groomed  Skin: No rashes or obvious lesions  CVS: RRR  Resp: non labored breathing  Abdomen: Soft, NT/ND, normal bowel sounds present.  Neuro:  Lower extremities:   +right knee crepitus. LE erythema and swelling. TTP right 2 and 3rd metatarsals.   Reflexes: Patellar 2+, Achilles 2+ bilaterally.  Sensory:  Intact   Lumbar spine:  Lumbar spine: ROM is moderately reduced with flexion extension and oblique extension with increased pain in all directions.    Orlando's test causes pain on both sides.    Supine straight leg raise is negative bilaterally.    Internal and external rotation of the hip causes increased pain in left groin.  Myofascial exam: " Tenderness to palpation over both GTB.      Imaging:  Cervical spine MRI report 10/5/12: There is dextroscoliosis in the lower cervical/upper thoracic region.  There is very mild foraminal stenosis on the left at C6/7.    MRI thoracic spine 11/21/2008: No significant degenerative disc disease or central stenosis.    MRI lumbar spine 8/14/08: L1/2 small broad-based central protrusion with mild effacement of ventral thecal sac but not distorting exiting roots.  L2/3 and L3/4 unremarkable.  At L4/5 there is mild central stenosis with bulging annulus anteriorly and facet hypertrophic degenerative change posteriorly.  No focal disc herniation and neural foramina are patent.  At L5/S1 there is minimal bulging of the annulus with no thecal sac compression.  The small annular fissure within the posterior annulus.  Foramina are patent bilaterally, no stenosis mild, degenerative changes in the facets.    Xray Right Knee 10/13/14  Mild tricompartmental degenerative changes present. No joint effusion. The soft tissues are unremarkable.    Assessment:  Ms. Santiago is a 65-year-old woman with   1. Other spondylosis, lumbar region    2. DDD (degenerative disc disease), lumbar    3. Chronic, continuous use of opioids    4. Chronic pain of right knee    5. Greater trochanteric bursitis, unspecified laterality    6. Myalgia      PLAN:  1. I have stressed the importance of physical activity and exercise to improve overall health.    2.  Percocet 10mg q8hrs as needed.  Script given for three months.  reviewed. Previous UDS consistent. UDS today  3.  Flexeril 10 mg q 8 h. 90 day supply.  4.  I believe her low back pain maybe due to facet arthropathy and have recommended lumbar medial branch blocks as a diagnostic procedure.  If successful, would proceed with radiofrequency ablation.  May consider this procedure in the future  5. Recommend series of 3 Euflexxa injections. She wishes to do this in the future. Unfortunately this was  denied by insurance We will resubmit and if denied seek approval for Synvisc 1  6. Follow up in three months   All medication management was performed by Dr. Temple

## 2022-09-18 ENCOUNTER — TELEPHONE (OUTPATIENT)
Dept: PAIN MEDICINE | Facility: CLINIC | Age: 72
End: 2022-09-18
Payer: MEDICARE

## 2022-09-20 ENCOUNTER — PATIENT MESSAGE (OUTPATIENT)
Dept: OTHER | Facility: OTHER | Age: 72
End: 2022-09-20
Payer: MEDICARE

## 2022-09-24 LAB
6MAM UR QL: NOT DETECTED
7AMINOCLONAZEPAM UR QL: NOT DETECTED
A-OH ALPRAZ UR QL: NOT DETECTED
ALPHA-OH-MIDAZOLAM: NOT DETECTED
ALPRAZ UR QL: NOT DETECTED
AMPHET UR QL SCN: NOT DETECTED
ANNOTATION COMMENT IMP: NORMAL
ANNOTATION COMMENT IMP: NORMAL
BARBITURATES UR QL: NOT DETECTED
BUPRENORPHINE UR QL: NOT DETECTED
BZE UR QL: NOT DETECTED
CARBOXYTHC UR QL: NOT DETECTED
CARISOPRODOL UR QL: NOT DETECTED
CLONAZEPAM UR QL: NOT DETECTED
CODEINE UR QL: NOT DETECTED
CREAT UR-MCNC: 69.5 MG/DL (ref 20–400)
DIAZEPAM UR QL: NOT DETECTED
ETHYL GLUCURONIDE UR QL: NOT DETECTED
FENTANYL UR QL: NOT DETECTED
GABAPENTIN: PRESENT
HYDROCODONE UR QL: NOT DETECTED
HYDROMORPHONE UR QL: NOT DETECTED
LORAZEPAM UR QL: NOT DETECTED
MDA UR QL: NOT DETECTED
MDEA UR QL: NOT DETECTED
MDMA UR QL: NOT DETECTED
ME-PHENIDATE UR QL: NOT DETECTED
METHADONE UR QL: NOT DETECTED
METHAMPHET UR QL: NOT DETECTED
MIDAZOLAM UR QL SCN: NOT DETECTED
MORPHINE UR QL: NOT DETECTED
NALOXONE: NOT DETECTED
NORBUPRENORPHINE UR QL CFM: NOT DETECTED
NORDIAZEPAM UR QL: NOT DETECTED
NORFENTANYL UR QL: NOT DETECTED
NORHYDROCODONE UR QL CFM: NOT DETECTED
NORMEPERIDINE UR QL CFM: NOT DETECTED
NOROXYCODONE UR QL CFM: PRESENT
NOROXYMORPHONE UR QL SCN: PRESENT
OXAZEPAM UR QL: NOT DETECTED
OXYCODONE UR QL: PRESENT
OXYMORPHONE UR QL: PRESENT
PATHOLOGY STUDY: NORMAL
PCP UR QL: NOT DETECTED
PHENTERMINE UR QL: NOT DETECTED
PREGABALIN: NOT DETECTED
SERVICE CMNT-IMP: NORMAL
TAPENTADOL UR QL SCN: NOT DETECTED
TAPENTADOL UR QL SCN: NOT DETECTED
TEMAZEPAM UR QL: NOT DETECTED
TRAMADOL UR QL: NOT DETECTED
ZOLPIDEM METABOLITE: NOT DETECTED
ZOLPIDEM UR QL: NOT DETECTED

## 2022-10-07 ENCOUNTER — PATIENT OUTREACH (OUTPATIENT)
Dept: ADMINISTRATIVE | Facility: HOSPITAL | Age: 72
End: 2022-10-07
Payer: MEDICARE

## 2022-12-27 RX ORDER — DULOXETIN HYDROCHLORIDE 30 MG/1
30 CAPSULE, DELAYED RELEASE ORAL NIGHTLY
Qty: 90 CAPSULE | Refills: 0 | Status: SHIPPED | OUTPATIENT
Start: 2022-12-27 | End: 2023-03-17 | Stop reason: SDUPTHER

## 2022-12-27 RX ORDER — DULOXETIN HYDROCHLORIDE 60 MG/1
CAPSULE, DELAYED RELEASE ORAL
Qty: 90 CAPSULE | Refills: 0 | Status: SHIPPED | OUTPATIENT
Start: 2022-12-27 | End: 2023-03-17 | Stop reason: SDUPTHER

## 2022-12-27 NOTE — TELEPHONE ENCOUNTER
Pt is requesting medication refill on Duloxetine 30 mg, Duloxetine 60 mg   Last refill: 4/14/22  Last visit: 4/12/22  Follow Up: none scheduled.

## 2022-12-29 ENCOUNTER — OFFICE VISIT (OUTPATIENT)
Dept: PAIN MEDICINE | Facility: CLINIC | Age: 72
End: 2022-12-29
Payer: MEDICARE

## 2022-12-29 VITALS
WEIGHT: 186 LBS | HEIGHT: 66 IN | DIASTOLIC BLOOD PRESSURE: 70 MMHG | SYSTOLIC BLOOD PRESSURE: 112 MMHG | HEART RATE: 59 BPM | BODY MASS INDEX: 29.89 KG/M2

## 2022-12-29 DIAGNOSIS — F11.90 CHRONIC, CONTINUOUS USE OF OPIOIDS: ICD-10-CM

## 2022-12-29 DIAGNOSIS — M47.896 OTHER SPONDYLOSIS, LUMBAR REGION: ICD-10-CM

## 2022-12-29 DIAGNOSIS — G89.4 CHRONIC PAIN DISORDER: ICD-10-CM

## 2022-12-29 DIAGNOSIS — G89.29 CHRONIC PAIN OF RIGHT KNEE: ICD-10-CM

## 2022-12-29 DIAGNOSIS — M25.561 CHRONIC PAIN OF RIGHT KNEE: ICD-10-CM

## 2022-12-29 DIAGNOSIS — M70.60 GREATER TROCHANTERIC BURSITIS, UNSPECIFIED LATERALITY: ICD-10-CM

## 2022-12-29 DIAGNOSIS — M51.36 DDD (DEGENERATIVE DISC DISEASE), LUMBAR: Primary | ICD-10-CM

## 2022-12-29 PROCEDURE — 99214 OFFICE O/P EST MOD 30 MIN: CPT | Mod: S$GLB,,, | Performed by: PHYSICIAN ASSISTANT

## 2022-12-29 PROCEDURE — 1125F AMNT PAIN NOTED PAIN PRSNT: CPT | Mod: CPTII,S$GLB,, | Performed by: PHYSICIAN ASSISTANT

## 2022-12-29 PROCEDURE — 3074F SYST BP LT 130 MM HG: CPT | Mod: CPTII,S$GLB,, | Performed by: PHYSICIAN ASSISTANT

## 2022-12-29 PROCEDURE — 1101F PT FALLS ASSESS-DOCD LE1/YR: CPT | Mod: CPTII,S$GLB,, | Performed by: PHYSICIAN ASSISTANT

## 2022-12-29 PROCEDURE — 1159F PR MEDICATION LIST DOCUMENTED IN MEDICAL RECORD: ICD-10-PCS | Mod: CPTII,S$GLB,, | Performed by: PHYSICIAN ASSISTANT

## 2022-12-29 PROCEDURE — 1101F PR PT FALLS ASSESS DOC 0-1 FALLS W/OUT INJ PAST YR: ICD-10-PCS | Mod: CPTII,S$GLB,, | Performed by: PHYSICIAN ASSISTANT

## 2022-12-29 PROCEDURE — 3288F PR FALLS RISK ASSESSMENT DOCUMENTED: ICD-10-PCS | Mod: CPTII,S$GLB,, | Performed by: PHYSICIAN ASSISTANT

## 2022-12-29 PROCEDURE — 4010F ACE/ARB THERAPY RXD/TAKEN: CPT | Mod: CPTII,S$GLB,, | Performed by: PHYSICIAN ASSISTANT

## 2022-12-29 PROCEDURE — 4010F PR ACE/ARB THEARPY RXD/TAKEN: ICD-10-PCS | Mod: CPTII,S$GLB,, | Performed by: PHYSICIAN ASSISTANT

## 2022-12-29 PROCEDURE — 3051F HG A1C>EQUAL 7.0%<8.0%: CPT | Mod: CPTII,S$GLB,, | Performed by: PHYSICIAN ASSISTANT

## 2022-12-29 PROCEDURE — 99999 PR PBB SHADOW E&M-EST. PATIENT-LVL IV: CPT | Mod: PBBFAC,,, | Performed by: PHYSICIAN ASSISTANT

## 2022-12-29 PROCEDURE — 99214 PR OFFICE/OUTPT VISIT, EST, LEVL IV, 30-39 MIN: ICD-10-PCS | Mod: S$GLB,,, | Performed by: PHYSICIAN ASSISTANT

## 2022-12-29 PROCEDURE — 3078F PR MOST RECENT DIASTOLIC BLOOD PRESSURE < 80 MM HG: ICD-10-PCS | Mod: CPTII,S$GLB,, | Performed by: PHYSICIAN ASSISTANT

## 2022-12-29 PROCEDURE — 3078F DIAST BP <80 MM HG: CPT | Mod: CPTII,S$GLB,, | Performed by: PHYSICIAN ASSISTANT

## 2022-12-29 PROCEDURE — 3051F PR MOST RECENT HEMOGLOBIN A1C LEVEL 7.0 - < 8.0%: ICD-10-PCS | Mod: CPTII,S$GLB,, | Performed by: PHYSICIAN ASSISTANT

## 2022-12-29 PROCEDURE — 3008F BODY MASS INDEX DOCD: CPT | Mod: CPTII,S$GLB,, | Performed by: PHYSICIAN ASSISTANT

## 2022-12-29 PROCEDURE — 1125F PR PAIN SEVERITY QUANTIFIED, PAIN PRESENT: ICD-10-PCS | Mod: CPTII,S$GLB,, | Performed by: PHYSICIAN ASSISTANT

## 2022-12-29 PROCEDURE — 3008F PR BODY MASS INDEX (BMI) DOCUMENTED: ICD-10-PCS | Mod: CPTII,S$GLB,, | Performed by: PHYSICIAN ASSISTANT

## 2022-12-29 PROCEDURE — 99999 PR PBB SHADOW E&M-EST. PATIENT-LVL IV: ICD-10-PCS | Mod: PBBFAC,,, | Performed by: PHYSICIAN ASSISTANT

## 2022-12-29 PROCEDURE — 1160F RVW MEDS BY RX/DR IN RCRD: CPT | Mod: CPTII,S$GLB,, | Performed by: PHYSICIAN ASSISTANT

## 2022-12-29 PROCEDURE — 1160F PR REVIEW ALL MEDS BY PRESCRIBER/CLIN PHARMACIST DOCUMENTED: ICD-10-PCS | Mod: CPTII,S$GLB,, | Performed by: PHYSICIAN ASSISTANT

## 2022-12-29 PROCEDURE — 3288F FALL RISK ASSESSMENT DOCD: CPT | Mod: CPTII,S$GLB,, | Performed by: PHYSICIAN ASSISTANT

## 2022-12-29 PROCEDURE — 1159F MED LIST DOCD IN RCRD: CPT | Mod: CPTII,S$GLB,, | Performed by: PHYSICIAN ASSISTANT

## 2022-12-29 PROCEDURE — 3074F PR MOST RECENT SYSTOLIC BLOOD PRESSURE < 130 MM HG: ICD-10-PCS | Mod: CPTII,S$GLB,, | Performed by: PHYSICIAN ASSISTANT

## 2022-12-29 RX ORDER — OXYCODONE AND ACETAMINOPHEN 10; 325 MG/1; MG/1
1 TABLET ORAL EVERY 8 HOURS PRN
Qty: 90 TABLET | Refills: 0 | Status: SHIPPED | OUTPATIENT
Start: 2023-03-04 | End: 2023-03-23 | Stop reason: SDUPTHER

## 2022-12-29 RX ORDER — OXYCODONE AND ACETAMINOPHEN 10; 325 MG/1; MG/1
1 TABLET ORAL EVERY 8 HOURS PRN
Qty: 90 TABLET | Refills: 0 | Status: SHIPPED | OUTPATIENT
Start: 2023-01-05 | End: 2023-02-03

## 2022-12-29 RX ORDER — OXYCODONE AND ACETAMINOPHEN 10; 325 MG/1; MG/1
1 TABLET ORAL EVERY 8 HOURS PRN
Qty: 90 TABLET | Refills: 0 | Status: SHIPPED | OUTPATIENT
Start: 2023-02-03 | End: 2023-03-04

## 2022-12-29 RX ORDER — CYCLOBENZAPRINE HCL 10 MG
10 TABLET ORAL 3 TIMES DAILY PRN
Qty: 270 TABLET | Refills: 0 | Status: SHIPPED | OUTPATIENT
Start: 2022-12-29 | End: 2023-03-23 | Stop reason: SDUPTHER

## 2022-12-29 NOTE — PROGRESS NOTES
CC: left sided low back and left hip pain    Interval History: Ms. Santiago is a 69 y.o. female with chronic low back and hip pain who presents for follow up.  She had a synvisc one injection and a intra articular steroid injection in right knee with benefit for only 1 week.  She continues to have lower back, left greater than right lower back pain.  Left GTB and right knee pain has worsened. Her last left GTB injection provided some short-lived relief.  Stretching exercises have helped.  She had a series of Euflexxa injections in 2014. She continues to take percocet 10mg q 8 hrs as needed for pain with moderate benefits.  No side effects reported.  Able to perform daily activities with the medications. Flexeril 10 mg and Gabapentin 600 mg BID has been helpful.  In the past UDS positive for Tramadol. Admited taking a Tramadol for her headache. She has a history of chronic headaches, occur about 2 x monthly.  In the past she was taking diet supplements and UDS was positive for amphetamines.      Prior HPI: The patient is a 64-year-old woman with a history of sarcoidosis, breast CA, diabetes, anxiety and depression who presents in referral from Dr. Nguyen to Dr. Perez for multiple pain complaints including back pain, bilateral hip pain and right shoulder pain.  She is following up with me since I am much closer to her.   She presents today for multiple pain complaints.  She recently was hospitalized for pneumonia, UTI and sarcoidosis exacerbation per patient.  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  She has had past GTB injections with moderate benefit and had an exacerbation of her pain recently.  She was able to receive left GTB with Dr. Perez on 10/3/2014 that provided >50% relief of her left hip and leg pain.     She continues to take Prednisone 5mg daily. She continues Percoet 7.5mg about 3x/day as needed with moderate benefit.  She presents today with  "worsening right knee pain.  She states having history of right knee osteoarthritis, but has not had any treatment for her knee pain.  Continues to have improvement in her knee pain following completion of visco supplementation injection in her right knee.  Her back and hip pain remain tolerable with her medications.  She continues to take Percoct 7.5mg q8hrs as needed with moderate benefit.  No reported side effects.       Pain intervention history: She takes hydrocodone 7-325 2-4 times a day.  Also has undergone epidural steroid injection at L1/2 without relief.  She has been doing cervical traction with good relief of her neck pain.  Previous trochanteric bursa injections with good relief.    ROS:She reports fatigability, itching, headaches, swollen glands, chest pain and shortness of breath, nausea vomiting, easy bruising, back pain, joint stiffness, dizziness, difficulty sleeping, anxiety, depression and loss of balance.  Balance of review of systems is negative.    Medical, surgical, family and social history reviewed elsewhere in record.    Medications/Allergies: See med card    Vitals:    12/29/22 1351   BP: 112/70   Pulse: (!) 59   Weight: 84.4 kg (186 lb)   Height: 5' 6" (1.676 m)   PainSc:   7   PainLoc: Back       Physical exam:  Gen: A and O x3, pleasant, well-groomed  Skin: No rashes or obvious lesions  CVS: bradycardia  Resp: non labored breathing  Abdomen: Soft, NT/ND, normal bowel sounds present.  Neuro:  Lower extremities:   +right knee crepitus. LE erythema and swelling. TTP right 2 and 3rd metatarsals.   Reflexes: Patellar 2+, Achilles 2+ bilaterally.  Sensory:  Intact   Lumbar spine:  Lumbar spine: ROM is moderately reduced with flexion extension and oblique extension with increased pain in all directions.    Orlando's test causes pain on both sides.    Supine straight leg raise is negative bilaterally.    Internal and external rotation of the hip causes increased pain in left groin.  Myofascial " exam: Tenderness to palpation over both GTB.      Imaging:  Cervical spine MRI report 10/5/12: There is dextroscoliosis in the lower cervical/upper thoracic region.  There is very mild foraminal stenosis on the left at C6/7.    MRI thoracic spine 11/21/2008: No significant degenerative disc disease or central stenosis.    MRI lumbar spine 8/14/08: L1/2 small broad-based central protrusion with mild effacement of ventral thecal sac but not distorting exiting roots.  L2/3 and L3/4 unremarkable.  At L4/5 there is mild central stenosis with bulging annulus anteriorly and facet hypertrophic degenerative change posteriorly.  No focal disc herniation and neural foramina are patent.  At L5/S1 there is minimal bulging of the annulus with no thecal sac compression.  The small annular fissure within the posterior annulus.  Foramina are patent bilaterally, no stenosis mild, degenerative changes in the facets.    Xray Right Knee 10/13/14  Mild tricompartmental degenerative changes present. No joint effusion. The soft tissues are unremarkable.    Assessment:  Ms. Santiago is a 65-year-old woman with   1. DDD (degenerative disc disease), lumbar    2. Chronic, continuous use of opioids    3. Other spondylosis, lumbar region    4. Chronic pain of right knee    5. Greater trochanteric bursitis, unspecified laterality        PLAN:  1. I have stressed the importance of physical activity and exercise to improve overall health.    2.  Percocet 10mg q8hrs as needed.  Script given for three months.  reviewed. Previous UDS consistent. UDS today  3.  Flexeril 10 mg q 8 h. 90 day supply.  4.  I believe her low back pain maybe due to facet arthropathy and have recommended lumbar medial branch blocks as a diagnostic procedure.  If successful, would proceed with radiofrequency ablation.  May consider this procedure in the future  5. Recommend series of 3 Euflexxa injections. She wishes to do this in the future. Unfortunately this was denied by  insurance We will resubmit and if denied seek approval for Synvisc 1  6. Follow up in three months   All medication management was performed by Dr. Temple

## 2023-01-05 ENCOUNTER — PATIENT MESSAGE (OUTPATIENT)
Dept: OTHER | Facility: OTHER | Age: 73
End: 2023-01-05
Payer: MEDICARE

## 2023-01-10 ENCOUNTER — TELEPHONE (OUTPATIENT)
Dept: PSYCHIATRY | Facility: CLINIC | Age: 73
End: 2023-01-10
Payer: MEDICARE

## 2023-01-10 DIAGNOSIS — G89.4 CHRONIC PAIN SYNDROME: ICD-10-CM

## 2023-01-11 RX ORDER — GABAPENTIN 300 MG/1
CAPSULE ORAL
Qty: 120 CAPSULE | Refills: 1 | Status: SHIPPED | OUTPATIENT
Start: 2023-01-11 | End: 2023-03-17 | Stop reason: SDUPTHER

## 2023-01-11 NOTE — TELEPHONE ENCOUNTER
Patrick refill request on gabapentin 300 mg  Last refill: 4/14  Last visit: 4/12  Follow up: none scheduled

## 2023-02-02 ENCOUNTER — PES CALL (OUTPATIENT)
Dept: ADMINISTRATIVE | Facility: CLINIC | Age: 73
End: 2023-02-02
Payer: MEDICARE

## 2023-02-07 DIAGNOSIS — Z00.00 ENCOUNTER FOR MEDICARE ANNUAL WELLNESS EXAM: ICD-10-CM

## 2023-02-09 ENCOUNTER — TELEPHONE (OUTPATIENT)
Dept: PAIN MEDICINE | Facility: CLINIC | Age: 73
End: 2023-02-09
Payer: MEDICARE

## 2023-02-09 DIAGNOSIS — Z00.00 ENCOUNTER FOR MEDICARE ANNUAL WELLNESS EXAM: ICD-10-CM

## 2023-02-09 NOTE — TELEPHONE ENCOUNTER
Aniket from [facility] was filled on 1/12/23 from mail order pharmacy, [facility] pharmacy wanted to make aware.

## 2023-02-09 NOTE — TELEPHONE ENCOUNTER
----- Message from Laura Guzman sent at 2/9/2023  8:51 AM CST -----  Regarding: patient call back  Contact: PHARMACY  Name of Who is Calling: TRAMAINE WALLACE [[id number]] Danae from [facility]       What is the request in detail: Pharmacist is requesting a call back to confirm the diagnosis for the patient's prescription oxyCODONE-acetaminophen (PERCOCET)  mg per tablet. Please advise!         Can the clinic reply by [facility]: NO        What Number to Call Back if not in [facility]: [phone] [facility]

## 2023-03-17 ENCOUNTER — OFFICE VISIT (OUTPATIENT)
Dept: PSYCHIATRY | Facility: CLINIC | Age: 73
End: 2023-03-17
Payer: MEDICARE

## 2023-03-17 DIAGNOSIS — G47.00 INSOMNIA, UNSPECIFIED TYPE: ICD-10-CM

## 2023-03-17 DIAGNOSIS — F33.41 MDD (MAJOR DEPRESSIVE DISORDER), RECURRENT, IN PARTIAL REMISSION: ICD-10-CM

## 2023-03-17 DIAGNOSIS — F41.1 GAD (GENERALIZED ANXIETY DISORDER): ICD-10-CM

## 2023-03-17 DIAGNOSIS — G89.4 CHRONIC PAIN SYNDROME: ICD-10-CM

## 2023-03-17 PROCEDURE — 99214 PR OFFICE/OUTPT VISIT, EST, LEVL IV, 30-39 MIN: ICD-10-PCS | Mod: HCNC,95,, | Performed by: PSYCHIATRY & NEUROLOGY

## 2023-03-17 PROCEDURE — 99214 OFFICE O/P EST MOD 30 MIN: CPT | Mod: HCNC,95,, | Performed by: PSYCHIATRY & NEUROLOGY

## 2023-03-17 RX ORDER — SERTRALINE HYDROCHLORIDE 100 MG/1
TABLET, FILM COATED ORAL
Qty: 90 TABLET | Refills: 0 | Status: SHIPPED | OUTPATIENT
Start: 2023-03-17 | End: 2023-06-06

## 2023-03-17 RX ORDER — DULOXETIN HYDROCHLORIDE 60 MG/1
CAPSULE, DELAYED RELEASE ORAL
Qty: 90 CAPSULE | Refills: 0 | Status: SHIPPED | OUTPATIENT
Start: 2023-03-17 | End: 2023-06-06

## 2023-03-17 RX ORDER — CEPHALEXIN 500 MG/1
500 CAPSULE ORAL DAILY
COMMUNITY
End: 2023-10-20 | Stop reason: ALTCHOICE

## 2023-03-17 RX ORDER — DULOXETIN HYDROCHLORIDE 30 MG/1
30 CAPSULE, DELAYED RELEASE ORAL NIGHTLY
Qty: 90 CAPSULE | Refills: 0 | Status: SHIPPED | OUTPATIENT
Start: 2023-03-17 | End: 2023-06-06

## 2023-03-17 RX ORDER — GABAPENTIN 300 MG/1
CAPSULE ORAL
Qty: 360 CAPSULE | Refills: 0 | Status: SHIPPED | OUTPATIENT
Start: 2023-03-17 | End: 2023-06-26

## 2023-03-17 NOTE — PROGRESS NOTES
The patient location is: [place] (brother and favio)  The chief complaint leading to consultation is: follow up depression, anxiety, insomnia     Visit type: audiovisual    Face to Face time with patient: 30 mins  35 minutes of total time spent on the encounter, which includes face to face time and non-face to face time preparing to see the patient (eg, review of tests), Obtaining and/or reviewing separately obtained history, Documenting clinical information in the electronic or other health record, Independently interpreting results (not separately reported) and communicating results to the patient/family/caregiver, or Care coordination (not separately reported).     Each patient to whom he or she provides medical services by telemedicine is:  (1) informed of the relationship between the physician and patient and the respective role of any other health care provider with respect to management of the patient; and (2) notified that he or she may decline to receive medical services by telemedicine and may withdraw from such care at any time.    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   Pt reports depression and anxiety began in context of significant psychosocial stressors. Her mother passed away in 2000, her father in 2004. She was diagnosed with breast cancer in 2005, had bilateral mastectomies and then a failed reconstruction. She also has sarcoidosis of the lungs.  Intake 2013.      Past Psychiatric History:  Tx of depression, anxiety and panic attacks   First saw a psychiatrist 2007   No prior hospitalizations  No suicide attempts or self injury   PCP most recently treating her in [place]   Previous meds: Restoril (no effect), Dalmane (no effect), Klonopin, Ativan, could not afford Cymbalta but helped pain and depression, Wellbutrin XL (3rd day fell  "out of bed x 4, bruised, amnestic), Effexor (raised her BP), no antipsychotics or mood stabilizers, Citalopram (some benefit).   Trazodone (no benefit), mirtazapine, melatonin (3 mg ineffective, higher dose HA), ativan, clonazepam, amitriptyline, Seroquel (over sedating), Sertraline (no significant benefit)  No prior counseling         Past medical history:  DM2  RLS  Sarcoidosis of lung   Hx ovarian CA  HTN  Hx of malignant phylloides tumor of breast   Bilateral cataract surgery   Chronic neck, back and hip pain   Arthritis   MVR  Hx CVA  Hx DJD  No cardiac hx   No hx seizures or head injury      Social History:  Former RN x 35 years, SSID since 2008  Lives in [place] - splits time with brother and daughter   3 prior marriages, , 1 daughter   Raised in [place] in intact home - 3 brothers and 2 sisters - "stable"  No  or legal hx   No hx abuse   Hobbies: sewing, beading, reading   Education: Some college   Roman Catholic: Orthodox    Interim hx:   Patient presents for follow-up.   Jade - 5 yo, very opinionated   Lives with Effie and brother in [place] in [place].   her personal belongings  Will never have own place again since brainstem stroke, uses cane and walker   Sleeping well now.  Feels she has made definite progress.  Takes Duloxetine with dinner, Gabapentin at bedtime.  Up at 6 am, goes to bed at 10 pm.  Not irritable lately, "rolls with the punches."  Would rather stay at home in comfort - daughter brings this to her attention.   Physical hygiene is fine. Does not feels she is depressed.  She is content.  Denies anxiety.  Describes self as lazy.  Appetite normal for her.  Eats reasonably; vegetables "I am fine for me, twice a day"    Recurrent UTIs - on daily keflex x 1 month; incontinence  Non compliant with diet with diabetes - checking meds and accu check; checks 176   Drinks soft drinks (one coke every 2-3 days)  Not using CPAP - not tolerating mask, not good seal;  followed Dr Steele  RLS better " "  No desire to get up and do anything; love to read    Enjoys seeing family   It is fun when kids come over   Loves music, Celtic thunder, reading.   Does not want to have to do anything  Great granddaughter Jade with her - hibernates when not with her.     Denies hopelessness, worthlessness.   Denies suicidal/homicidal ideations.  Denies symptoms of arnel/psychosis.     Duloxetine helps with pain, not really with mood. Addition of Sertraline has helped her.      Prior Goals:   # 1 get in bed at a reasonable, consistent time  # 2 work on her short fuse   # 3 work on physical hygiene     Drinks # two 16 oz cokes daily. No coffee  No major issues with memory, focus is not as great  Has not been sewing, crocheting     Denies suicidal/homicidal ideations. Denies symptoms of arnel/psychosis.  No self harm or violence.   Irritability is triggered by circumstances.     Non compliant with CPAP.    No tobacco.   Occasional mixed drink alcohol, no drug use.      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  Does not maintain nursing license.   no hx suicide attempts, no self harm.  Guns in home are locked up.      Review of Systems   PSYCHIATRIC: see above  CONSTITUTIONAL: weight loss down to 171 lbs "I am comfortable with this"   MUSCULOSKELETAL: 4-5/10 back pain   CV:  Denies chest pain   NEURO: no recent falls, not dizziness. having balance issues "I am learning to live with it"      Past Medical, Family and Social History: The patient's past medical, family and social history have been reviewed and updated as appropriate within the electronic medical record - see encounter notes.     Medications:  Cymbalta 90 mg nightly   Sertraline 100 mg daily   Gabapentin 900 - 1200 mg nightly - 900 mg nightly   Percoet - pain management, 2-3 times a day      Compliance:  she is taking 100 mg daily of sertraline      Side effects:  wt gain on Mirtazapine    Amnesia, complex sleep related symptoms on Xanax, Ambien " "  headaches when Ambien taken too often; dry mouth on amitriptyline, elevated BP on Effexor XR, falls     Risk Parameters:  Patient reports no suicidal ideation  Patient reports no homicidal ideation  Patient reports no self-injurious behavior  Patient reports no violent behavior     Exam (detailed: at least 9 elements; comprehensive: all 15 elements)   Constitutional  Vitals:  Most recent vital signs, dated more than 90 days prior to this appointment, were reviewed.           General:  age appropriate, casually dressed,  calm, adequately groomed, good eye contact         Musculoskeletal  Muscle Strength/Tone:  Mild hand tremors bilaterally - pt states this is baseline    Gait & Station:  Seated during virtual visit       Psychiatric  Speech:  no latency; no press, spontaneous   Mood & Affect:  "comfortable"  Restricted    Thought Process:  Linear    Associations:  intact   Thought Content:  normal, no suicidality, no homicidality, delusions, or paranoia, hallucinations: (auditory: no, visual: no)   Insight:  has awareness of illness   Judgement: behavior is adequate to circumstances   Orientation:  grossly intact; alert and oriented x 4; 03/16/2023, Friday    Memory: intact for content of interview, immediate recall 3/3, delayed recall 2/3 at 3 mins     Language: grossly intact no fluency issues; can repeat a phrase    Attention Span & Concentration:  able to focus grossly intact; DLROW   Fund of Knowledge:  intact and appropriate to age and level of education      Assessment and Diagnosis   Status/Progress: Based on the examination today, the patient's problem(s) is/are under fair control.   New problems have not been presented today.   Comorbidities are complicating management of the primary condition.  (multiple medical problems)      Impression: 72 y.o.female with multiple medical problems and hx of depression, insomnia, and anxiety presents for follow up. Significant family stressors and health issues. Pt has " required multiple sleep aides in past. She has required chronic benzodiazepine for stabilization, has failed multiple sleep aides;  Sedative-hyponotics were discontinued in interim due to health issues, reports of falls.  She contacted me previously with worsening depression/anxiety - low dose clonazepam restarted, but is showing signs of stabilization both physically and mentally.  She reported that clonazepam is not helping and requested to restart Xanax/Ambien (previous meds).  Pt reports fall in interim, and also interaction/amnesia with Nyquil + Xanax which could have been dangerous. + Chronic opioid use.  She ended up staying on Sertraline and this medication has been helping with her maintaining stable mood following her niece's death.   Pt mixed Ambien and Xanax and had amnesia, complex sleep related behaviors.  I have discussed with her and daughter that sedatives are not safe medications for her.  Scored perfect MOCA last visit.  Did not tolerate Seroquel, but pt was becoming more active, living with daughter, less isolation - pt was improving.   At last visit, she reported pain has worsened, which limited her activity and leading to increased anxiety, depression, motivation issues   Pt's daughter sustained head injury in interim - pt is primary caregiver to her and great granddaughter.  She is managing, but overwhelmed at times.  Has weaned off of Sertraline.  Duloxetine helping with pain not as much depressed mood.  Struggles with chronic refractory insomnia, depression.  Improved this visit.      MDD, recurrent episode, in partial remission   Generalized Anxiety Disorder   R/O Apathy following CVA  Insomnia disorder   Chronic pain disorder   sarcoidosis, hx phyloides tumor of breast, .DM2, RLS, arthritis, chronic neck, hip, and back pain, hx avulsion fracture, JOSE LUIS, hx CVA    GAF: 65      Strengths and Liabilities: Strength: Patient accepts guidance/feedback, Strength: Patient is expressive/articulate.,  Strength: Patient has reasonable judgment.   Treatment Goals: Specify outcomes written in observable, behavioral terms:   Anxiety: acquiring relapse prevention skills and reducing physical symptoms of anxiety   Depression: acquiring relapse prevention skills, increasing energy, increasing motivation, reducing excessive guilt and reducing negative automatic thoughts      Treatment Plan/Recommendations:   Medication Management: The risks and benefits of medication were discussed with the patient.  1. Continue Cymbalta 90 mg nightly.  Discussed potential for medication interactions, serotonin syndrome, hepatic impairment. Does help with pain, less so depression.   2. Call to report any worsening of symptoms or problems with the medication  3. Pt instructed to go to ER with thoughts of harming self, others   4  Prior referral for psychotherapy. Pt declined.   5. Follow up with Dr Steele for CPAP   6. Continue Sertraline 100 mg daily. Target mood, anxiety Discussed potential for adverse effects, med interaction, hyponatremia.  Discussed risks of med interactions, serotonin syndrome  7.Ok to continue Gabapentin 600-900 mg nightly prn insomnia, Rx for Narcan sent to pt's pharmacy (she is also taking Opioids). Education provided about risks of respiratory depression,        Return to Clinic:  3 months (in person)

## 2023-03-23 ENCOUNTER — OFFICE VISIT (OUTPATIENT)
Dept: PAIN MEDICINE | Facility: CLINIC | Age: 73
End: 2023-03-23
Payer: MEDICARE

## 2023-03-23 VITALS
SYSTOLIC BLOOD PRESSURE: 131 MMHG | DIASTOLIC BLOOD PRESSURE: 80 MMHG | HEIGHT: 66 IN | WEIGHT: 186 LBS | BODY MASS INDEX: 29.89 KG/M2 | HEART RATE: 68 BPM

## 2023-03-23 DIAGNOSIS — M70.60 GREATER TROCHANTERIC BURSITIS, UNSPECIFIED LATERALITY: ICD-10-CM

## 2023-03-23 DIAGNOSIS — G89.29 CHRONIC PAIN OF RIGHT KNEE: ICD-10-CM

## 2023-03-23 DIAGNOSIS — M47.896 OTHER SPONDYLOSIS, LUMBAR REGION: ICD-10-CM

## 2023-03-23 DIAGNOSIS — M51.36 DDD (DEGENERATIVE DISC DISEASE), LUMBAR: Primary | ICD-10-CM

## 2023-03-23 DIAGNOSIS — G89.4 CHRONIC PAIN DISORDER: ICD-10-CM

## 2023-03-23 DIAGNOSIS — M25.561 CHRONIC PAIN OF RIGHT KNEE: ICD-10-CM

## 2023-03-23 PROCEDURE — 3288F PR FALLS RISK ASSESSMENT DOCUMENTED: ICD-10-PCS | Mod: HCNC,CPTII,S$GLB, | Performed by: PHYSICIAN ASSISTANT

## 2023-03-23 PROCEDURE — 3008F PR BODY MASS INDEX (BMI) DOCUMENTED: ICD-10-PCS | Mod: HCNC,CPTII,S$GLB, | Performed by: PHYSICIAN ASSISTANT

## 2023-03-23 PROCEDURE — 1159F MED LIST DOCD IN RCRD: CPT | Mod: HCNC,CPTII,S$GLB, | Performed by: PHYSICIAN ASSISTANT

## 2023-03-23 PROCEDURE — 99214 OFFICE O/P EST MOD 30 MIN: CPT | Mod: HCNC,S$GLB,, | Performed by: PHYSICIAN ASSISTANT

## 2023-03-23 PROCEDURE — 99999 PR PBB SHADOW E&M-EST. PATIENT-LVL V: ICD-10-PCS | Mod: PBBFAC,HCNC,, | Performed by: PHYSICIAN ASSISTANT

## 2023-03-23 PROCEDURE — 3079F PR MOST RECENT DIASTOLIC BLOOD PRESSURE 80-89 MM HG: ICD-10-PCS | Mod: HCNC,CPTII,S$GLB, | Performed by: PHYSICIAN ASSISTANT

## 2023-03-23 PROCEDURE — 1125F PR PAIN SEVERITY QUANTIFIED, PAIN PRESENT: ICD-10-PCS | Mod: HCNC,CPTII,S$GLB, | Performed by: PHYSICIAN ASSISTANT

## 2023-03-23 PROCEDURE — 3288F FALL RISK ASSESSMENT DOCD: CPT | Mod: HCNC,CPTII,S$GLB, | Performed by: PHYSICIAN ASSISTANT

## 2023-03-23 PROCEDURE — 3079F DIAST BP 80-89 MM HG: CPT | Mod: HCNC,CPTII,S$GLB, | Performed by: PHYSICIAN ASSISTANT

## 2023-03-23 PROCEDURE — 1101F PT FALLS ASSESS-DOCD LE1/YR: CPT | Mod: HCNC,CPTII,S$GLB, | Performed by: PHYSICIAN ASSISTANT

## 2023-03-23 PROCEDURE — 99999 PR PBB SHADOW E&M-EST. PATIENT-LVL V: CPT | Mod: PBBFAC,HCNC,, | Performed by: PHYSICIAN ASSISTANT

## 2023-03-23 PROCEDURE — 3075F SYST BP GE 130 - 139MM HG: CPT | Mod: HCNC,CPTII,S$GLB, | Performed by: PHYSICIAN ASSISTANT

## 2023-03-23 PROCEDURE — 99214 PR OFFICE/OUTPT VISIT, EST, LEVL IV, 30-39 MIN: ICD-10-PCS | Mod: HCNC,S$GLB,, | Performed by: PHYSICIAN ASSISTANT

## 2023-03-23 PROCEDURE — 3075F PR MOST RECENT SYSTOLIC BLOOD PRESS GE 130-139MM HG: ICD-10-PCS | Mod: HCNC,CPTII,S$GLB, | Performed by: PHYSICIAN ASSISTANT

## 2023-03-23 PROCEDURE — 1159F PR MEDICATION LIST DOCUMENTED IN MEDICAL RECORD: ICD-10-PCS | Mod: HCNC,CPTII,S$GLB, | Performed by: PHYSICIAN ASSISTANT

## 2023-03-23 PROCEDURE — 3008F BODY MASS INDEX DOCD: CPT | Mod: HCNC,CPTII,S$GLB, | Performed by: PHYSICIAN ASSISTANT

## 2023-03-23 PROCEDURE — 1125F AMNT PAIN NOTED PAIN PRSNT: CPT | Mod: HCNC,CPTII,S$GLB, | Performed by: PHYSICIAN ASSISTANT

## 2023-03-23 PROCEDURE — 1101F PR PT FALLS ASSESS DOC 0-1 FALLS W/OUT INJ PAST YR: ICD-10-PCS | Mod: HCNC,CPTII,S$GLB, | Performed by: PHYSICIAN ASSISTANT

## 2023-03-23 RX ORDER — OXYCODONE AND ACETAMINOPHEN 10; 325 MG/1; MG/1
1 TABLET ORAL EVERY 8 HOURS PRN
Qty: 90 TABLET | Refills: 0 | Status: SHIPPED | OUTPATIENT
Start: 2023-04-08 | End: 2023-05-07

## 2023-03-23 RX ORDER — OXYCODONE AND ACETAMINOPHEN 10; 325 MG/1; MG/1
1 TABLET ORAL EVERY 8 HOURS PRN
Qty: 90 TABLET | Refills: 0 | Status: SHIPPED | OUTPATIENT
Start: 2023-06-05 | End: 2023-06-21 | Stop reason: SDUPTHER

## 2023-03-23 RX ORDER — OXYCODONE AND ACETAMINOPHEN 10; 325 MG/1; MG/1
1 TABLET ORAL EVERY 8 HOURS PRN
Qty: 90 TABLET | Refills: 0 | Status: SHIPPED | OUTPATIENT
Start: 2023-05-07 | End: 2023-06-05

## 2023-03-23 RX ORDER — CYCLOBENZAPRINE HCL 10 MG
10 TABLET ORAL 3 TIMES DAILY PRN
Qty: 270 TABLET | Refills: 0 | Status: SHIPPED | OUTPATIENT
Start: 2023-03-23 | End: 2023-06-21 | Stop reason: SDUPTHER

## 2023-03-23 NOTE — PROGRESS NOTES
CC: left sided low back and left hip pain    Interval History: Ms. Santiago is a 69 y.o. female with chronic low back and hip pain who presents for follow up.  She had a synvisc one injection and a intra articular steroid injection in right knee with benefit for only 1 week.  She continues to have lower back, left greater than right lower back pain.  Left GTB and right knee pain has worsened. Her last left GTB injection provided some short-lived relief.  Stretching exercises have helped.  She had a series of Euflexxa injections in 2014. She continues to take percocet 10mg q 8 hrs as needed for pain with moderate benefits.  No side effects reported.  Able to perform daily activities with the medications. Flexeril 10 mg and Gabapentin 600 mg BID has been helpful.  In the past UDS positive for Tramadol. Admited taking a Tramadol for her headache. She has a history of chronic headaches, occur about 2 x monthly.  In the past she was taking diet supplements and UDS was positive for amphetamines.      Prior HPI: The patient is a 64-year-old woman with a history of sarcoidosis, breast CA, diabetes, anxiety and depression who presents in referral from Dr. Nguyen to Dr. Perez for multiple pain complaints including back pain, bilateral hip pain and right shoulder pain.  She is following up with me since I am much closer to her.   She presents today for multiple pain complaints.  She recently was hospitalized for pneumonia, UTI and sarcoidosis exacerbation per patient.  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  She has had past GTB injections with moderate benefit and had an exacerbation of her pain recently.  She was able to receive left GTB with Dr. Perez on 10/3/2014 that provided >50% relief of her left hip and leg pain.     She continues to take Prednisone 5mg daily. She continues Percoet 7.5mg about 3x/day as needed with moderate benefit.  She presents today with  "worsening right knee pain.  She states having history of right knee osteoarthritis, but has not had any treatment for her knee pain.  Continues to have improvement in her knee pain following completion of visco supplementation injection in her right knee.  Her back and hip pain remain tolerable with her medications.  She continues to take Percoct 7.5mg q8hrs as needed with moderate benefit.  No reported side effects.       Pain intervention history: She takes hydrocodone 7-325 2-4 times a day.  Also has undergone epidural steroid injection at L1/2 without relief.  She has been doing cervical traction with good relief of her neck pain.  Previous trochanteric bursa injections with good relief.    ROS:She reports fatigability, itching, headaches, swollen glands, chest pain and shortness of breath, nausea vomiting, easy bruising, back pain, joint stiffness, dizziness, difficulty sleeping, anxiety, depression and loss of balance.  Balance of review of systems is negative.    Medical, surgical, family and social history reviewed elsewhere in record.    Medications/Allergies: See med card    Vitals:    03/23/23 1328   BP: 131/80   Pulse: 68   Weight: 84.4 kg (186 lb)   Height: 5' 6" (1.676 m)   PainSc:   8   PainLoc: Back       Physical exam:  Gen: A and O x3, pleasant, well-groomed  Skin: No rashes or obvious lesions  CVS: bradycardia  Resp: non labored breathing  Abdomen: Soft, NT/ND, normal bowel sounds present.  Neuro:  Lower extremities:   +right knee crepitus. LE erythema and swelling. TTP right 2 and 3rd metatarsals.   Reflexes: Patellar 2+, Achilles 2+ bilaterally.  Sensory:  Intact   Lumbar spine:  Lumbar spine: ROM is moderately reduced with flexion extension and oblique extension with increased pain in all directions.    Orlando's test causes pain on both sides.    Supine straight leg raise is negative bilaterally.    Internal and external rotation of the hip causes increased pain in left groin.  Myofascial " exam: Tenderness to palpation over both GTB.      Imaging:  Cervical spine MRI report 10/5/12: There is dextroscoliosis in the lower cervical/upper thoracic region.  There is very mild foraminal stenosis on the left at C6/7.    MRI thoracic spine 11/21/2008: No significant degenerative disc disease or central stenosis.    MRI lumbar spine 8/14/08: L1/2 small broad-based central protrusion with mild effacement of ventral thecal sac but not distorting exiting roots.  L2/3 and L3/4 unremarkable.  At L4/5 there is mild central stenosis with bulging annulus anteriorly and facet hypertrophic degenerative change posteriorly.  No focal disc herniation and neural foramina are patent.  At L5/S1 there is minimal bulging of the annulus with no thecal sac compression.  The small annular fissure within the posterior annulus.  Foramina are patent bilaterally, no stenosis mild, degenerative changes in the facets.    Xray Right Knee 10/13/14  Mild tricompartmental degenerative changes present. No joint effusion. The soft tissues are unremarkable.    Assessment:  Ms. Santiago is a 65-year-old woman with   1. DDD (degenerative disc disease), lumbar    2. Other spondylosis, lumbar region    3. Chronic pain of right knee    4. Greater trochanteric bursitis, unspecified laterality          PLAN:  1. I have stressed the importance of physical activity and exercise to improve overall health.    2.  Percocet 10mg q8hrs as needed.  Script given for three months.  reviewed. Previous UDS consistent.   3.  Flexeril 10 mg q 8 h. 90 day supply.  4.  I believe her low back pain maybe due to facet arthropathy and have recommended lumbar medial branch blocks as a diagnostic procedure.  If successful, would proceed with radiofrequency ablation.  May consider this procedure in the future  5. Recommend series of 3 Euflexxa injections. She wishes to do this in the future. Unfortunately this was denied by insurance We will resubmit and if denied seek  approval for Synvisc 1  6. Follow up in three months   All medication management was performed by Dr. Temple

## 2023-03-28 ENCOUNTER — TELEPHONE (OUTPATIENT)
Dept: PSYCHIATRY | Facility: CLINIC | Age: 73
End: 2023-03-28
Payer: MEDICARE

## 2023-03-28 NOTE — TELEPHONE ENCOUNTER
----- Message from Fauzia Pat MD sent at 3/23/2023 11:05 PM CDT -----  3 months please, will require in person visit

## 2023-04-04 ENCOUNTER — PATIENT MESSAGE (OUTPATIENT)
Dept: PSYCHIATRY | Facility: CLINIC | Age: 73
End: 2023-04-04
Payer: MEDICARE

## 2023-04-04 ENCOUNTER — PATIENT MESSAGE (OUTPATIENT)
Dept: OTHER | Facility: OTHER | Age: 73
End: 2023-04-04
Payer: MEDICARE

## 2023-04-28 NOTE — ASSESSMENT & PLAN NOTE
Obtain ultrasound  Pending on results, will refer to ENT for removal per pt request.    Xray Tibia + Fibula 2 Views, Left

## 2023-05-04 ENCOUNTER — TELEPHONE (OUTPATIENT)
Dept: FAMILY MEDICINE | Facility: CLINIC | Age: 73
End: 2023-05-04
Payer: MEDICARE

## 2023-05-04 NOTE — TELEPHONE ENCOUNTER
Called and spoke to pt about setting up AWV  Appt set up for 6/21/2023 @2:30pm w/Ms Castano at [facility]

## 2023-06-06 NOTE — TELEPHONE ENCOUNTER
"I spoke to the patient.  She is tearful on the phone.  She has lost 4 family members since 12/15 including her 49 yo nephew, 2 weeks later his father, her sister in law whom she was very close to and her sister's fiance.  Currently her middle sister Ivania is at [facility].   She is now living in [place], [place]  She has not been sleeping well, feels very overwhelmed.  Denies SI "my family has been through enough, I would never do that to them." Options reviewed for sleep - gabapentin has helped in the past.  She is taking 600 mg BID; will change to 300 mg in am and 900 mg at night for now which worked for her in the past.  Pt asked to update me by Thursday if this is helping.  She declined sooner appt; currently scheduled for 2/19.   "
"Pt called clinic tearful stating she is "busting at the seams." Pt states a 4 th family member has  in the last 8 wks. Pt states she is falling apart of over this. Pt denies SI/HI and states she just cant stop crying. Pt is requesting for a prescription to be called in to get her through this difficult time. Advised pt I will send her concerns to Dr. Pat for review and recommendation. Pt verbalized understanding with no further questions.     "
Called and spoke to patient she states she wants to just keep her apt that is already scheduled for 02/19/2020 and she states she is ok and Dr Cullen does not need to call her back . She was aware that she missed the calls. She was sorry she missed them.    
Called pt regarding below message. Pt states she is doing a little better but still lacks motivation to do anything. Pt has an appt on 2/19 but would like to be seen sooner. Advised pt I will discuss with Dr. Pat and return her call. Advised pt to call us if she needs anything before my return call. Pt verbalized understanding with no further questions.   
I had actually called pt last pm - please offer pt a sooner appt 2/14/20 at 11:30 am if she is able to make it then   
I tried to reach the patient again, no answer.  Voicemail left with our contact info for call back.   
I tried to reach the patient.  No answer, left voicemail message for call back and will try to reach pt again on Monday.   
Pt states she missed a call from Dr. Pat. Advised pt she is likely in with a pt currently and will return her call after clinic. Pt verbalized understanding with no further questions.   
Fax

## 2023-06-20 ENCOUNTER — TELEPHONE (OUTPATIENT)
Dept: ADMINISTRATIVE | Facility: CLINIC | Age: 73
End: 2023-06-20
Payer: MEDICARE

## 2023-06-21 ENCOUNTER — LAB VISIT (OUTPATIENT)
Dept: LAB | Facility: CLINIC | Age: 73
End: 2023-06-21
Payer: MEDICARE

## 2023-06-21 ENCOUNTER — OFFICE VISIT (OUTPATIENT)
Dept: PAIN MEDICINE | Facility: CLINIC | Age: 73
End: 2023-06-21
Payer: MEDICARE

## 2023-06-21 ENCOUNTER — OFFICE VISIT (OUTPATIENT)
Dept: FAMILY MEDICINE | Facility: CLINIC | Age: 73
End: 2023-06-21
Payer: MEDICARE

## 2023-06-21 VITALS
DIASTOLIC BLOOD PRESSURE: 63 MMHG | WEIGHT: 186 LBS | HEART RATE: 74 BPM | BODY MASS INDEX: 29.89 KG/M2 | HEIGHT: 66 IN | SYSTOLIC BLOOD PRESSURE: 105 MMHG

## 2023-06-21 VITALS
HEIGHT: 66 IN | OXYGEN SATURATION: 97 % | BODY MASS INDEX: 29.87 KG/M2 | TEMPERATURE: 98 F | SYSTOLIC BLOOD PRESSURE: 100 MMHG | WEIGHT: 185.88 LBS | DIASTOLIC BLOOD PRESSURE: 62 MMHG | HEART RATE: 63 BPM | RESPIRATION RATE: 18 BRPM

## 2023-06-21 DIAGNOSIS — D71 GRANULOMATOUS DISEASE: ICD-10-CM

## 2023-06-21 DIAGNOSIS — Z00.00 ENCOUNTER FOR PREVENTIVE HEALTH EXAMINATION: ICD-10-CM

## 2023-06-21 DIAGNOSIS — E11.65 UNCONTROLLED TYPE 2 DIABETES MELLITUS WITH HYPERGLYCEMIA: ICD-10-CM

## 2023-06-21 DIAGNOSIS — E03.4 HYPOTHYROIDISM DUE TO ACQUIRED ATROPHY OF THYROID: ICD-10-CM

## 2023-06-21 DIAGNOSIS — Z00.00 ENCOUNTER FOR MEDICARE ANNUAL WELLNESS EXAM: Primary | ICD-10-CM

## 2023-06-21 DIAGNOSIS — Z86.73 HISTORY OF CVA (CEREBROVASCULAR ACCIDENT): ICD-10-CM

## 2023-06-21 DIAGNOSIS — I25.10 CORONARY ARTERY DISEASE, UNSPECIFIED VESSEL OR LESION TYPE, UNSPECIFIED WHETHER ANGINA PRESENT, UNSPECIFIED WHETHER NATIVE OR TRANSPLANTED HEART: ICD-10-CM

## 2023-06-21 DIAGNOSIS — G89.4 CHRONIC PAIN DISORDER: ICD-10-CM

## 2023-06-21 DIAGNOSIS — E11.8 TYPE 2 DIABETES WITH COMPLICATION: ICD-10-CM

## 2023-06-21 DIAGNOSIS — E11.69 HYPERLIPIDEMIA ASSOCIATED WITH TYPE 2 DIABETES MELLITUS: ICD-10-CM

## 2023-06-21 DIAGNOSIS — I15.2 HYPERTENSION ASSOCIATED WITH DIABETES: ICD-10-CM

## 2023-06-21 DIAGNOSIS — M51.36 DDD (DEGENERATIVE DISC DISEASE), LUMBAR: Primary | ICD-10-CM

## 2023-06-21 DIAGNOSIS — E78.5 HYPERLIPIDEMIA ASSOCIATED WITH TYPE 2 DIABETES MELLITUS: ICD-10-CM

## 2023-06-21 DIAGNOSIS — M25.561 CHRONIC PAIN OF RIGHT KNEE: ICD-10-CM

## 2023-06-21 DIAGNOSIS — G47.33 OSA (OBSTRUCTIVE SLEEP APNEA): ICD-10-CM

## 2023-06-21 DIAGNOSIS — R26.9 ABNORMALITY OF GAIT AND MOBILITY: ICD-10-CM

## 2023-06-21 DIAGNOSIS — Z13.31 POSITIVE DEPRESSION SCREENING: ICD-10-CM

## 2023-06-21 DIAGNOSIS — E11.59 HYPERTENSION ASSOCIATED WITH DIABETES: ICD-10-CM

## 2023-06-21 DIAGNOSIS — N32.81 OVERACTIVE BLADDER: ICD-10-CM

## 2023-06-21 DIAGNOSIS — N39.41 URGE INCONTINENCE: ICD-10-CM

## 2023-06-21 DIAGNOSIS — F33.41 MDD (MAJOR DEPRESSIVE DISORDER), RECURRENT, IN PARTIAL REMISSION: ICD-10-CM

## 2023-06-21 DIAGNOSIS — I73.9 PVD (PERIPHERAL VASCULAR DISEASE): ICD-10-CM

## 2023-06-21 DIAGNOSIS — F11.90 CHRONIC, CONTINUOUS USE OF OPIOIDS: ICD-10-CM

## 2023-06-21 DIAGNOSIS — M70.60 GREATER TROCHANTERIC BURSITIS, UNSPECIFIED LATERALITY: ICD-10-CM

## 2023-06-21 DIAGNOSIS — E66.9 OBESITY, CLASS I, BMI 30-34.9: ICD-10-CM

## 2023-06-21 DIAGNOSIS — J44.89 CHRONIC OBSTRUCTIVE ASTHMA: ICD-10-CM

## 2023-06-21 DIAGNOSIS — G89.29 CHRONIC PAIN OF RIGHT KNEE: ICD-10-CM

## 2023-06-21 DIAGNOSIS — M47.896 OTHER SPONDYLOSIS, LUMBAR REGION: ICD-10-CM

## 2023-06-21 LAB
ALBUMIN SERPL BCP-MCNC: 4.2 G/DL (ref 3.5–5.2)
ALBUMIN/CREAT UR: 16.3 UG/MG (ref 0–30)
ALBUMIN/CREAT UR: 16.3 UG/MG (ref 0–30)
ALP SERPL-CCNC: 74 U/L (ref 55–135)
ALT SERPL W/O P-5'-P-CCNC: 41 U/L (ref 10–44)
ANION GAP SERPL CALC-SCNC: 9 MMOL/L (ref 8–16)
ANION GAP SERPL CALC-SCNC: 9 MMOL/L (ref 8–16)
AST SERPL-CCNC: 35 U/L (ref 10–40)
BASOPHILS # BLD AUTO: 0.03 K/UL (ref 0–0.2)
BASOPHILS NFR BLD: 0.4 % (ref 0–1.9)
BILIRUB SERPL-MCNC: 0.3 MG/DL (ref 0.1–1)
BUN SERPL-MCNC: 21 MG/DL (ref 8–23)
BUN SERPL-MCNC: 21 MG/DL (ref 8–23)
CALCIUM SERPL-MCNC: 9.5 MG/DL (ref 8.7–10.5)
CALCIUM SERPL-MCNC: 9.5 MG/DL (ref 8.7–10.5)
CHLORIDE SERPL-SCNC: 102 MMOL/L (ref 95–110)
CHLORIDE SERPL-SCNC: 102 MMOL/L (ref 95–110)
CO2 SERPL-SCNC: 26 MMOL/L (ref 23–29)
CO2 SERPL-SCNC: 26 MMOL/L (ref 23–29)
CREAT SERPL-MCNC: 1.4 MG/DL (ref 0.5–1.4)
CREAT SERPL-MCNC: 1.4 MG/DL (ref 0.5–1.4)
CREAT UR-MCNC: 98 MG/DL (ref 15–325)
CREAT UR-MCNC: 98 MG/DL (ref 15–325)
DIFFERENTIAL METHOD: ABNORMAL
EOSINOPHIL # BLD AUTO: 0.4 K/UL (ref 0–0.5)
EOSINOPHIL NFR BLD: 5 % (ref 0–8)
ERYTHROCYTE [DISTWIDTH] IN BLOOD BY AUTOMATED COUNT: 13.6 % (ref 11.5–14.5)
EST. GFR  (NO RACE VARIABLE): 40 ML/MIN/1.73 M^2
EST. GFR  (NO RACE VARIABLE): 40 ML/MIN/1.73 M^2
ESTIMATED AVG GLUCOSE: 169 MG/DL (ref 68–131)
ESTIMATED AVG GLUCOSE: 169 MG/DL (ref 68–131)
GLUCOSE SERPL-MCNC: 83 MG/DL (ref 70–110)
GLUCOSE SERPL-MCNC: 83 MG/DL (ref 70–110)
HBA1C MFR BLD: 7.5 % (ref 4–5.6)
HBA1C MFR BLD: 7.5 % (ref 4–5.6)
HCT VFR BLD AUTO: 39.4 % (ref 37–48.5)
HGB BLD-MCNC: 12.4 G/DL (ref 12–16)
IMM GRANULOCYTES # BLD AUTO: 0.05 K/UL (ref 0–0.04)
IMM GRANULOCYTES NFR BLD AUTO: 0.7 % (ref 0–0.5)
LYMPHOCYTES # BLD AUTO: 2.4 K/UL (ref 1–4.8)
LYMPHOCYTES NFR BLD: 34.7 % (ref 18–48)
MCH RBC QN AUTO: 28.7 PG (ref 27–31)
MCHC RBC AUTO-ENTMCNC: 31.5 G/DL (ref 32–36)
MCV RBC AUTO: 91 FL (ref 82–98)
MICROALBUMIN UR DL<=1MG/L-MCNC: 16 UG/ML
MICROALBUMIN UR DL<=1MG/L-MCNC: 16 UG/ML
MONOCYTES # BLD AUTO: 0.7 K/UL (ref 0.3–1)
MONOCYTES NFR BLD: 9.8 % (ref 4–15)
NEUTROPHILS # BLD AUTO: 3.4 K/UL (ref 1.8–7.7)
NEUTROPHILS NFR BLD: 49.4 % (ref 38–73)
NRBC BLD-RTO: 0 /100 WBC
PLATELET # BLD AUTO: 288 K/UL (ref 150–450)
PMV BLD AUTO: 9.5 FL (ref 9.2–12.9)
POTASSIUM SERPL-SCNC: 4.4 MMOL/L (ref 3.5–5.1)
POTASSIUM SERPL-SCNC: 4.4 MMOL/L (ref 3.5–5.1)
PROT SERPL-MCNC: 7.8 G/DL (ref 6–8.4)
RBC # BLD AUTO: 4.32 M/UL (ref 4–5.4)
SODIUM SERPL-SCNC: 137 MMOL/L (ref 136–145)
SODIUM SERPL-SCNC: 137 MMOL/L (ref 136–145)
TSH SERPL DL<=0.005 MIU/L-ACNC: 3.79 UIU/ML (ref 0.4–4)
WBC # BLD AUTO: 6.95 K/UL (ref 3.9–12.7)

## 2023-06-21 PROCEDURE — 3078F DIAST BP <80 MM HG: CPT | Mod: HCWC,CPTII,, | Performed by: FAMILY MEDICINE

## 2023-06-21 PROCEDURE — 1170F PR FUNCTIONAL STATUS ASSESSED: ICD-10-PCS | Mod: HCWC,CPTII,, | Performed by: FAMILY MEDICINE

## 2023-06-21 PROCEDURE — 3288F FALL RISK ASSESSMENT DOCD: CPT | Mod: HCNC,CPTII,S$GLB, | Performed by: PHYSICIAN ASSISTANT

## 2023-06-21 PROCEDURE — 3008F BODY MASS INDEX DOCD: CPT | Mod: HCWC,CPTII,, | Performed by: FAMILY MEDICINE

## 2023-06-21 PROCEDURE — 82570 ASSAY OF URINE CREATININE: CPT | Mod: HCNC | Performed by: INTERNAL MEDICINE

## 2023-06-21 PROCEDURE — G0439 PPPS, SUBSEQ VISIT: HCPCS | Mod: HCWC,,, | Performed by: FAMILY MEDICINE

## 2023-06-21 PROCEDURE — 85025 COMPLETE CBC W/AUTO DIFF WBC: CPT | Mod: HCNC | Performed by: PHYSICIAN ASSISTANT

## 2023-06-21 PROCEDURE — 3288F PR FALLS RISK ASSESSMENT DOCUMENTED: ICD-10-PCS | Mod: HCWC,CPTII,, | Performed by: FAMILY MEDICINE

## 2023-06-21 PROCEDURE — 1159F PR MEDICATION LIST DOCUMENTED IN MEDICAL RECORD: ICD-10-PCS | Mod: HCWC,CPTII,, | Performed by: FAMILY MEDICINE

## 2023-06-21 PROCEDURE — 3288F FALL RISK ASSESSMENT DOCD: CPT | Mod: HCWC,CPTII,, | Performed by: FAMILY MEDICINE

## 2023-06-21 PROCEDURE — 80053 COMPREHEN METABOLIC PANEL: CPT | Mod: HCNC | Performed by: PHYSICIAN ASSISTANT

## 2023-06-21 PROCEDURE — 1125F AMNT PAIN NOTED PAIN PRSNT: CPT | Mod: HCWC,CPTII,, | Performed by: FAMILY MEDICINE

## 2023-06-21 PROCEDURE — 1170F FXNL STATUS ASSESSED: CPT | Mod: HCWC,CPTII,, | Performed by: FAMILY MEDICINE

## 2023-06-21 PROCEDURE — 3008F PR BODY MASS INDEX (BMI) DOCUMENTED: ICD-10-PCS | Mod: HCWC,CPTII,, | Performed by: FAMILY MEDICINE

## 2023-06-21 PROCEDURE — 3074F PR MOST RECENT SYSTOLIC BLOOD PRESSURE < 130 MM HG: ICD-10-PCS | Mod: HCNC,CPTII,S$GLB, | Performed by: PHYSICIAN ASSISTANT

## 2023-06-21 PROCEDURE — 1125F PR PAIN SEVERITY QUANTIFIED, PAIN PRESENT: ICD-10-PCS | Mod: HCWC,CPTII,, | Performed by: FAMILY MEDICINE

## 2023-06-21 PROCEDURE — 1101F PT FALLS ASSESS-DOCD LE1/YR: CPT | Mod: HCWC,CPTII,, | Performed by: FAMILY MEDICINE

## 2023-06-21 PROCEDURE — 1160F RVW MEDS BY RX/DR IN RCRD: CPT | Mod: HCWC,CPTII,, | Performed by: FAMILY MEDICINE

## 2023-06-21 PROCEDURE — 4010F PR ACE/ARB THEARPY RXD/TAKEN: ICD-10-PCS | Mod: HCNC,CPTII,S$GLB, | Performed by: PHYSICIAN ASSISTANT

## 2023-06-21 PROCEDURE — 1101F PT FALLS ASSESS-DOCD LE1/YR: CPT | Mod: HCNC,CPTII,S$GLB, | Performed by: PHYSICIAN ASSISTANT

## 2023-06-21 PROCEDURE — G0439 PR MEDICARE ANNUAL WELLNESS SUBSEQUENT VISIT: ICD-10-PCS | Mod: HCWC,,, | Performed by: FAMILY MEDICINE

## 2023-06-21 PROCEDURE — 3078F PR MOST RECENT DIASTOLIC BLOOD PRESSURE < 80 MM HG: ICD-10-PCS | Mod: HCNC,CPTII,S$GLB, | Performed by: PHYSICIAN ASSISTANT

## 2023-06-21 PROCEDURE — 84443 ASSAY THYROID STIM HORMONE: CPT | Mod: HCNC | Performed by: PHYSICIAN ASSISTANT

## 2023-06-21 PROCEDURE — 1125F PR PAIN SEVERITY QUANTIFIED, PAIN PRESENT: ICD-10-PCS | Mod: HCNC,CPTII,S$GLB, | Performed by: PHYSICIAN ASSISTANT

## 2023-06-21 PROCEDURE — 1101F PR PT FALLS ASSESS DOC 0-1 FALLS W/OUT INJ PAST YR: ICD-10-PCS | Mod: HCWC,CPTII,, | Performed by: FAMILY MEDICINE

## 2023-06-21 PROCEDURE — 99214 OFFICE O/P EST MOD 30 MIN: CPT | Mod: HCNC,S$GLB,, | Performed by: PHYSICIAN ASSISTANT

## 2023-06-21 PROCEDURE — 3078F PR MOST RECENT DIASTOLIC BLOOD PRESSURE < 80 MM HG: ICD-10-PCS | Mod: HCWC,CPTII,, | Performed by: FAMILY MEDICINE

## 2023-06-21 PROCEDURE — 1101F PR PT FALLS ASSESS DOC 0-1 FALLS W/OUT INJ PAST YR: ICD-10-PCS | Mod: HCNC,CPTII,S$GLB, | Performed by: PHYSICIAN ASSISTANT

## 2023-06-21 PROCEDURE — 3008F PR BODY MASS INDEX (BMI) DOCUMENTED: ICD-10-PCS | Mod: HCNC,CPTII,S$GLB, | Performed by: PHYSICIAN ASSISTANT

## 2023-06-21 PROCEDURE — 99214 PR OFFICE/OUTPT VISIT, EST, LEVL IV, 30-39 MIN: ICD-10-PCS | Mod: HCNC,S$GLB,, | Performed by: PHYSICIAN ASSISTANT

## 2023-06-21 PROCEDURE — 3074F PR MOST RECENT SYSTOLIC BLOOD PRESSURE < 130 MM HG: ICD-10-PCS | Mod: HCWC,CPTII,, | Performed by: FAMILY MEDICINE

## 2023-06-21 PROCEDURE — 1125F AMNT PAIN NOTED PAIN PRSNT: CPT | Mod: HCNC,CPTII,S$GLB, | Performed by: PHYSICIAN ASSISTANT

## 2023-06-21 PROCEDURE — 1159F MED LIST DOCD IN RCRD: CPT | Mod: HCWC,CPTII,, | Performed by: FAMILY MEDICINE

## 2023-06-21 PROCEDURE — 4010F PR ACE/ARB THEARPY RXD/TAKEN: ICD-10-PCS | Mod: HCWC,CPTII,, | Performed by: FAMILY MEDICINE

## 2023-06-21 PROCEDURE — 99999 PR PBB SHADOW E&M-EST. PATIENT-LVL IV: ICD-10-PCS | Mod: PBBFAC,HCNC,, | Performed by: PHYSICIAN ASSISTANT

## 2023-06-21 PROCEDURE — 4010F ACE/ARB THERAPY RXD/TAKEN: CPT | Mod: HCNC,CPTII,S$GLB, | Performed by: PHYSICIAN ASSISTANT

## 2023-06-21 PROCEDURE — 4010F ACE/ARB THERAPY RXD/TAKEN: CPT | Mod: HCWC,CPTII,, | Performed by: FAMILY MEDICINE

## 2023-06-21 PROCEDURE — 3078F DIAST BP <80 MM HG: CPT | Mod: HCNC,CPTII,S$GLB, | Performed by: PHYSICIAN ASSISTANT

## 2023-06-21 PROCEDURE — 3074F SYST BP LT 130 MM HG: CPT | Mod: HCWC,CPTII,, | Performed by: FAMILY MEDICINE

## 2023-06-21 PROCEDURE — 3074F SYST BP LT 130 MM HG: CPT | Mod: HCNC,CPTII,S$GLB, | Performed by: PHYSICIAN ASSISTANT

## 2023-06-21 PROCEDURE — 3008F BODY MASS INDEX DOCD: CPT | Mod: HCNC,CPTII,S$GLB, | Performed by: PHYSICIAN ASSISTANT

## 2023-06-21 PROCEDURE — 1160F PR REVIEW ALL MEDS BY PRESCRIBER/CLIN PHARMACIST DOCUMENTED: ICD-10-PCS | Mod: HCWC,CPTII,, | Performed by: FAMILY MEDICINE

## 2023-06-21 PROCEDURE — 99999 PR PBB SHADOW E&M-EST. PATIENT-LVL IV: CPT | Mod: PBBFAC,HCNC,, | Performed by: PHYSICIAN ASSISTANT

## 2023-06-21 PROCEDURE — 3288F PR FALLS RISK ASSESSMENT DOCUMENTED: ICD-10-PCS | Mod: HCNC,CPTII,S$GLB, | Performed by: PHYSICIAN ASSISTANT

## 2023-06-21 RX ORDER — OXYCODONE AND ACETAMINOPHEN 10; 325 MG/1; MG/1
1 TABLET ORAL EVERY 8 HOURS PRN
Qty: 90 TABLET | Refills: 0 | Status: SHIPPED | OUTPATIENT
Start: 2023-07-20 | End: 2023-08-18

## 2023-06-21 RX ORDER — FUROSEMIDE 40 MG/1
40 TABLET ORAL
COMMUNITY
Start: 2023-04-04

## 2023-06-21 RX ORDER — METHENAMINE HIPPURATE 1000 MG/1
TABLET ORAL
COMMUNITY
Start: 2023-06-16

## 2023-06-21 RX ORDER — CYCLOBENZAPRINE HCL 10 MG
10 TABLET ORAL 3 TIMES DAILY PRN
Qty: 270 TABLET | Refills: 0 | Status: SHIPPED | OUTPATIENT
Start: 2023-06-21 | End: 2023-09-14 | Stop reason: SDUPTHER

## 2023-06-21 RX ORDER — NITROFURANTOIN 25; 75 MG/1; MG/1
100 CAPSULE ORAL 2 TIMES DAILY
COMMUNITY
Start: 2023-03-31 | End: 2023-09-27

## 2023-06-21 RX ORDER — OXYCODONE AND ACETAMINOPHEN 10; 325 MG/1; MG/1
1 TABLET ORAL EVERY 8 HOURS PRN
Qty: 90 TABLET | Refills: 0 | Status: SHIPPED | OUTPATIENT
Start: 2023-06-21 | End: 2023-07-20

## 2023-06-21 RX ORDER — OXYCODONE AND ACETAMINOPHEN 10; 325 MG/1; MG/1
1 TABLET ORAL EVERY 8 HOURS PRN
Qty: 90 TABLET | Refills: 0 | Status: SHIPPED | OUTPATIENT
Start: 2023-08-18 | End: 2023-09-14 | Stop reason: SDUPTHER

## 2023-06-21 NOTE — PROGRESS NOTES
"  Nathalie Santiago presented for a  Medicare AWV and comprehensive Health Risk Assessment today. The following components were reviewed and updated:    Medical history  Family History  Social history  Allergies and Current Medications  Health Risk Assessment  Health Maintenance  Care Team         ** See Completed Assessments for Annual Wellness Visit within the encounter summary.**         The following assessments were completed:  Living Situation  CAGE  Depression Screening  Timed Get Up and Go  Whisper Test  Cognitive Function Screening  Nutrition Screening  ADL Screening  PAQ Screening          Vitals:    06/21/23 1440   BP: 100/62   BP Location: Right arm   Patient Position: Sitting   BP Method: Medium (Manual)   Pulse: 63   Resp: 18   Temp: 98 °F (36.7 °C)   TempSrc: Oral   SpO2: 97%   Weight: 84.3 kg (185 lb 13.6 oz)   Height: 5' 6" (1.676 m)     Body mass index is 30 kg/m².  Physical Exam  Vitals reviewed.   Constitutional:       Appearance: Normal appearance.   HENT:      Head: Normocephalic and atraumatic.   Eyes:      Pupils: Pupils are equal, round, and reactive to light.   Pulmonary:      Effort: Pulmonary effort is normal. No respiratory distress.   Skin:     General: Skin is warm and dry.   Neurological:      General: No focal deficit present.      Mental Status: She is alert and oriented to person, place, and time.   Psychiatric:         Mood and Affect: Mood normal.         Behavior: Behavior normal.     The 10-year ASCVD risk score (Cydney BORJAS, et al., 2019) is: 19.8%    Values used to calculate the score:      Age: 72 years      Sex: Female      Is Non- : No      Diabetic: Yes      Tobacco smoker: No      Systolic Blood Pressure: 100 mmHg      Is BP treated: Yes      HDL Cholesterol: 32 mg/dL      Total Cholesterol: 157 mg/dL        Diagnoses and health risks identified today and associated recommendations/orders:    1. Encounter for Medicare annual wellness exam  - Ambulatory " Referral/Consult to Enhanced Annual Wellness Visit (eAWV)    2. Encounter for preventive health examination    3. Obesity, Class I, BMI 30-34.9  Body mass index is 30 kg/m².  Continue healthy diet and regular exercise as tolerated.  Continue medications as prescribed.  Follow up with PCP, Ofelia Ruiz MD     4. Hypertension associated with diabetes  Stable  Continue medications as prescribed.  Follow up with PCP and cardiology, Dr Real    5. Hyperlipidemia associated with type 2 diabetes mellitus  Stable  Continue medications as prescribed.  Follow up with PCP and cardio    6. Coronary artery disease, unspecified vessel or lesion type, unspecified whether angina present, unspecified whether native or transplanted heart  Stable  Continue medications as prescribed.  Follow up with PCP and cardio    7. PVD (peripheral vascular disease)  Stable  Continue medications as prescribed.  Follow up with PCP and vascular as needed    8. History of CVA (cerebrovascular accident)  Stable  Continue medications as prescribed.  Follow up with PCP and cardio    9. MDD (major depressive disorder), recurrent, in partial remission  Stable  Continue medications as prescribed.  Follow up with PCP and psychiatry, Dr Pat    10. Overactive bladder  Stable  Continue medications as prescribed.  Follow up with PCP and urology, Dr Aguilera    11. Urge incontinence  Stable  Continue medications as prescribed.  Follow up with PCP and uro    12. Hypothyroidism due to acquired atrophy of thyroid  Stable  Continue medications as prescribed.  Follow up with PCP     13. Type 2 diabetes with complication  Stable  Continue medications as prescribed.  Follow up with PCP     14. JOSE LUIS (obstructive sleep apnea), CPAP nightly (14)  Stable  Continue medications as prescribed.  Follow up with PCP and pulmonology, Dr Steele    15. Granulomatous disease  Stable  Continue medications as prescribed.  Follow up with PCP and pulm    16. Chronic obstructive  asthma  Stable  Continue medications as prescribed.  Follow up with PCP and pulm     17. Abnormality of gait and mobility  Stable with cane assistance      Provided Nathalie with a 5-10 year written screening schedule and personal prevention plan. Recommendations were developed using the USPSTF age appropriate recommendations. Education, counseling, and referrals were provided as needed. After Visit Summary printed and given to patient which includes a list of additional screenings\tests needed.    Follow up if symptoms worsen or fail to improve, for 1 year for AWV, scheduled appt.    Jessenia Castano NP        Future Appointments       Date Provider Specialty Appt Notes    6/30/2023 Fauzia Pat MD Psychiatry f/u in clinic     9/14/2023 Maylin Doran PA-C Pain Medicine 3 month              Review for Opioid Screening: Risk factors reviewed for any potential opioid use disorder  Review for Substance Use Disorders: Risk factors reviewed for any potential opioid use disorder     I offered to discuss advanced care planning, including how to pick a person who would make decisions for you if you were unable to make them for yourself, called a health care power of , and what kind of decisions you might make such as use of life sustaining treatments such as ventilators and tube feeding when faced with a life limiting illness recorded on a living will that they will need to know. (How you want to be cared for as you near the end of your natural life)     X Patient is interested in learning more about how to make advanced directives.  I provided them paperwork and offered to discuss this with them.

## 2023-06-21 NOTE — PROGRESS NOTES
CC: left sided low back and left hip pain    Interval History: Ms. Santiago is a 69 y.o. female with chronic low back and hip pain who presents for follow up.  She had a synvisc one injection and a intra articular steroid injection in right knee with benefit for only 1 week.  She continues to have lower back, left greater than right lower back pain.  Left GTB and right knee pain has worsened. Her last left GTB injection provided some short-lived relief.  Stretching exercises have helped.  She had a series of Euflexxa injections in 2014. She continues to take percocet 10mg q 8 hrs as needed for pain with moderate benefits.  No side effects reported.  Able to perform daily activities with the medications. Flexeril 10 mg and Gabapentin 600 mg BID has been helpful.  In the past UDS positive for Tramadol. Admited taking a Tramadol for her headache. She has a history of chronic headaches, occur about 2 x monthly.  In the past she was taking diet supplements and UDS was positive for amphetamines. Currently being treated for 90 days for chronic resistant UTI.      Prior HPI: The patient is a 64-year-old woman with a history of sarcoidosis, breast CA, diabetes, anxiety and depression who presents in referral from Dr. Nguyen to Dr. Perez for multiple pain complaints including back pain, bilateral hip pain and right shoulder pain.  She is following up with me since I am much closer to her.   She presents today for multiple pain complaints.  She recently was hospitalized for pneumonia, UTI and sarcoidosis exacerbation per patient.  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  She has had past GTB injections with moderate benefit and had an exacerbation of her pain recently.  She was able to receive left GTB with Dr. Perez on 10/3/2014 that provided >50% relief of her left hip and leg pain.     She continues to take Prednisone 5mg daily. She continues Percoet 7.5mg about  "3x/day as needed with moderate benefit.  She presents today with worsening right knee pain.  She states having history of right knee osteoarthritis, but has not had any treatment for her knee pain.  Continues to have improvement in her knee pain following completion of visco supplementation injection in her right knee.  Her back and hip pain remain tolerable with her medications.  She continues to take Percoct 7.5mg q8hrs as needed with moderate benefit.  No reported side effects.       Pain intervention history: She takes hydrocodone 7-325 2-4 times a day.  Also has undergone epidural steroid injection at L1/2 without relief.  She has been doing cervical traction with good relief of her neck pain.  Previous trochanteric bursa injections with good relief.    ROS:She reports fatigability, itching, headaches, swollen glands, chest pain and shortness of breath, nausea vomiting, easy bruising, back pain, joint stiffness, dizziness, difficulty sleeping, anxiety, depression and loss of balance.  Balance of review of systems is negative.    Medical, surgical, family and social history reviewed elsewhere in record.    Medications/Allergies: See med card    Vitals:    06/21/23 1305   BP: 105/63   Pulse: 74   Weight: 84.4 kg (186 lb)   Height: 5' 6" (1.676 m)   PainSc:   5   PainLoc: Back       Physical exam:  Gen: A and O x3, pleasant, well-groomed  Skin: No rashes or obvious lesions  CVS: bradycardia  Resp: non labored breathing  Abdomen: Soft, NT/ND, normal bowel sounds present.  Neuro:  Lower extremities:   +right knee crepitus. LE erythema and swelling. TTP right 2 and 3rd metatarsals.   Reflexes: Patellar 2+, Achilles 2+ bilaterally.  Sensory:  Intact   Lumbar spine:  Lumbar spine: ROM is moderately reduced with flexion extension and oblique extension with increased pain in all directions.    Orlando's test causes pain on both sides.    Supine straight leg raise is negative bilaterally.    Internal and external rotation " of the hip causes increased pain in left groin.  Myofascial exam: Tenderness to palpation over both GTB.      Imaging:  Cervical spine MRI report 10/5/12: There is dextroscoliosis in the lower cervical/upper thoracic region.  There is very mild foraminal stenosis on the left at C6/7.    MRI thoracic spine 11/21/2008: No significant degenerative disc disease or central stenosis.    MRI lumbar spine 8/14/08: L1/2 small broad-based central protrusion with mild effacement of ventral thecal sac but not distorting exiting roots.  L2/3 and L3/4 unremarkable.  At L4/5 there is mild central stenosis with bulging annulus anteriorly and facet hypertrophic degenerative change posteriorly.  No focal disc herniation and neural foramina are patent.  At L5/S1 there is minimal bulging of the annulus with no thecal sac compression.  The small annular fissure within the posterior annulus.  Foramina are patent bilaterally, no stenosis mild, degenerative changes in the facets.    Xray Right Knee 10/13/14  Mild tricompartmental degenerative changes present. No joint effusion. The soft tissues are unremarkable.    Assessment:  Ms. Santiago is a 65-year-old woman with   1. DDD (degenerative disc disease), lumbar    2. Other spondylosis, lumbar region    3. Chronic pain of right knee    4. Greater trochanteric bursitis, unspecified laterality    5. Chronic, continuous use of opioids    6. Positive depression screening        PLAN:  1. I have stressed the importance of physical activity and exercise to improve overall health.    2.  Percocet 10mg q8hrs as needed.  Script given for three months.  reviewed. Previous UDS consistent.   3.  Flexeril 10 mg q 8 h. 90 day supply.  4.  I believe her low back pain maybe due to facet arthropathy and have recommended lumbar medial branch blocks as a diagnostic procedure.  If successful, would proceed with radiofrequency ablation.  May consider this procedure in the future  5. Recommend series of 3  Euflexxa injections. She wishes to do this in the future. Unfortunately this was denied by insurance We will resubmit and if denied seek approval for Synvisc 1  6. Continue antibiotic treatment for chronic UTI  7. I have reviewed the positive depression score which warrants active treatment with psychotherapy and/or medications. Currently taking Cymbalta prescribed by Dr. Pat  8.  Follow up in three months   All medication management was performed by Dr. Temple

## 2023-06-21 NOTE — PATIENT INSTRUCTIONS
Counseling and Referral of Other Preventative  (Italic type indicates deductible and co-insurance are waived)    Patient Name: Nathalie Santiago  Today's Date: 6/21/2023    Health Maintenance       Date Due Completion Date    COVID-19 Vaccine (1) Never done ---    Shingles Vaccine (1 of 2) Never done ---    Colorectal Cancer Screening 04/29/2020 4/29/2015    Diabetes Urine Screening 07/23/2022 7/23/2021    Foot Exam 10/27/2022 10/27/2021    Override on 10/27/2021: Done    Override on 3/3/2016: Done    Override on 11/30/2015: Done (LUPER)    Override on 3/3/2015: Done    Hemoglobin A1c 02/14/2023 8/14/2022    Eye Exam 05/02/2023 5/2/2022 (Done)    Override on 5/2/2022: Done    Lipid Panel 08/15/2023 8/15/2022    DEXA Scan 09/16/2024 9/16/2014    TETANUS VACCINE 11/21/2030 11/21/2020        No orders of the defined types were placed in this encounter.    The following information is provided to all patients.  This information is to help you find resources for any of the problems found today that may be affecting your health:                Living healthy guide: www.[facility].gov      Understanding Diabetes: www.diabetes.org      Eating healthy: www.cdc.gov/healthyweight      CDC home safety checklist: www.cdc.gov/steadi/patient.html      Agency on Aging: www.goea.[place].[facility]      Alcoholics anonymous (AA): www.aa.org      Physical Activity: www.breezy.nih.gov/ev4sqgh      Tobacco use: www.quitwithusla.org

## 2023-06-28 ENCOUNTER — PATIENT MESSAGE (OUTPATIENT)
Dept: PSYCHIATRY | Facility: CLINIC | Age: 73
End: 2023-06-28
Payer: MEDICARE

## 2023-06-30 ENCOUNTER — PATIENT MESSAGE (OUTPATIENT)
Dept: PSYCHIATRY | Facility: CLINIC | Age: 73
End: 2023-06-30
Payer: MEDICARE

## 2023-07-03 ENCOUNTER — PATIENT OUTREACH (OUTPATIENT)
Dept: ADMINISTRATIVE | Facility: HOSPITAL | Age: 73
End: 2023-07-03
Payer: MEDICARE

## 2023-07-04 ENCOUNTER — PATIENT MESSAGE (OUTPATIENT)
Dept: PSYCHIATRY | Facility: CLINIC | Age: 73
End: 2023-07-04
Payer: MEDICARE

## 2023-07-07 ENCOUNTER — PATIENT OUTREACH (OUTPATIENT)
Dept: ADMINISTRATIVE | Facility: HOSPITAL | Age: 73
End: 2023-07-07
Payer: MEDICARE

## 2023-07-07 ENCOUNTER — PATIENT MESSAGE (OUTPATIENT)
Dept: ADMINISTRATIVE | Facility: HOSPITAL | Age: 73
End: 2023-07-07
Payer: MEDICARE

## 2023-07-07 NOTE — PROGRESS NOTES
OPCM GAP report-Left VM and portal message sent out for pt to call back regarding an OPCM referral.

## 2023-07-11 ENCOUNTER — TELEPHONE (OUTPATIENT)
Dept: FAMILY MEDICINE | Facility: CLINIC | Age: 73
End: 2023-07-11

## 2023-07-11 NOTE — TELEPHONE ENCOUNTER
----- Message from Portia Vu sent at 7/11/2023  4:15 PM CDT -----  Contact: self  Type:  Patient Returning Call    Who Called:self  Who Left Message for Patient:n/a  Does the patient know what this is regarding?:no  Would the patient rather a call back or a response via [facility]? Call  Best Call Back Number:[phone] (home)     Additional Information: pt sts she thinks office called but not sure. Please advise and thank you.

## 2023-07-24 NOTE — TELEPHONE ENCOUNTER
Canceled xanax at [facility]  Spoke with shala who states she will discuss with patient about coming to next appointment    Really wants to get mom help    
Daughter states that she will discuss joining appointment with mother or the patient. Patient has aggreed to stop xanax use until appointment  Patient added to wait list for sooner appointment  
Noted.  Pt prescribed low dose Xanax 0.25 mg BID prn anxiety (reports pt is sedated on med, fall risk); refills cancelled at pharmacy.  Pt on wait list for sooner appt.   
Please cancel refills of Xanax at [facility] pharmacy and confirm no refills of Lunesta at local pharmacy. which pt should have already d/c.  Did pt's daughter tell pt she was calling me?  Ok if daughter attends next appt with patient's permission.   
Please offer patient sooner appt if available to discuss current symptoms and include daughter in visit.   
Spoke with Effie- patient daughter. Involvement of care in place from 8/22/2014    States that she wants to attend the next appointment with patient because she believes xanax is the wrong medication for the patient. When she takes it she is to sedated and slurres her words. She is loopy and unsteady on her feet. Is afraid patient will hurt herself by falling or something    Also daughter states that her mother's mccullough is out of control again. States she has had multiple talks with her mom and nothing seems to help. Its to the point that the patient can not sleep in bed. Must climb to bathroom. Its dirty and not safe.    [phone]  Please advise  
Statement Selected

## 2023-08-09 ENCOUNTER — PATIENT MESSAGE (OUTPATIENT)
Dept: ADMINISTRATIVE | Facility: OTHER | Age: 73
End: 2023-08-09
Payer: MEDICARE

## 2023-09-08 ENCOUNTER — PATIENT MESSAGE (OUTPATIENT)
Dept: ADMINISTRATIVE | Facility: HOSPITAL | Age: 73
End: 2023-09-08
Payer: MEDICARE

## 2023-09-08 ENCOUNTER — PATIENT OUTREACH (OUTPATIENT)
Dept: ADMINISTRATIVE | Facility: HOSPITAL | Age: 73
End: 2023-09-08
Payer: MEDICARE

## 2023-09-14 ENCOUNTER — TELEPHONE (OUTPATIENT)
Dept: FAMILY MEDICINE | Facility: CLINIC | Age: 73
End: 2023-09-14

## 2023-09-14 ENCOUNTER — OFFICE VISIT (OUTPATIENT)
Dept: PAIN MEDICINE | Facility: CLINIC | Age: 73
End: 2023-09-14
Payer: MEDICARE

## 2023-09-14 VITALS
DIASTOLIC BLOOD PRESSURE: 75 MMHG | WEIGHT: 185 LBS | HEART RATE: 69 BPM | HEIGHT: 66 IN | SYSTOLIC BLOOD PRESSURE: 139 MMHG | BODY MASS INDEX: 29.73 KG/M2

## 2023-09-14 DIAGNOSIS — M47.896 OTHER SPONDYLOSIS, LUMBAR REGION: ICD-10-CM

## 2023-09-14 DIAGNOSIS — G89.4 CHRONIC PAIN DISORDER: ICD-10-CM

## 2023-09-14 DIAGNOSIS — N39.0 CHRONIC UTI: ICD-10-CM

## 2023-09-14 DIAGNOSIS — M51.36 DDD (DEGENERATIVE DISC DISEASE), LUMBAR: ICD-10-CM

## 2023-09-14 DIAGNOSIS — M25.561 CHRONIC PAIN OF RIGHT KNEE: ICD-10-CM

## 2023-09-14 DIAGNOSIS — G89.29 CHRONIC PAIN OF RIGHT KNEE: ICD-10-CM

## 2023-09-14 DIAGNOSIS — Z79.891 CHRONIC USE OF OPIATE FOR THERAPEUTIC PURPOSE: Primary | ICD-10-CM

## 2023-09-14 PROCEDURE — 80326 AMPHETAMINES 5 OR MORE: CPT | Mod: HCNC | Performed by: PHYSICIAN ASSISTANT

## 2023-09-14 PROCEDURE — 1125F PR PAIN SEVERITY QUANTIFIED, PAIN PRESENT: ICD-10-PCS | Mod: HCNC,CPTII,S$GLB, | Performed by: PHYSICIAN ASSISTANT

## 2023-09-14 PROCEDURE — 99214 OFFICE O/P EST MOD 30 MIN: CPT | Mod: HCNC,S$GLB,, | Performed by: PHYSICIAN ASSISTANT

## 2023-09-14 PROCEDURE — 1125F AMNT PAIN NOTED PAIN PRSNT: CPT | Mod: HCNC,CPTII,S$GLB, | Performed by: PHYSICIAN ASSISTANT

## 2023-09-14 PROCEDURE — 3051F HG A1C>EQUAL 7.0%<8.0%: CPT | Mod: HCNC,CPTII,S$GLB, | Performed by: PHYSICIAN ASSISTANT

## 2023-09-14 PROCEDURE — 3008F BODY MASS INDEX DOCD: CPT | Mod: HCNC,CPTII,S$GLB, | Performed by: PHYSICIAN ASSISTANT

## 2023-09-14 PROCEDURE — 99999 PR PBB SHADOW E&M-EST. PATIENT-LVL III: CPT | Mod: PBBFAC,HCNC,, | Performed by: PHYSICIAN ASSISTANT

## 2023-09-14 PROCEDURE — 3288F PR FALLS RISK ASSESSMENT DOCUMENTED: ICD-10-PCS | Mod: HCNC,CPTII,S$GLB, | Performed by: PHYSICIAN ASSISTANT

## 2023-09-14 PROCEDURE — 3008F PR BODY MASS INDEX (BMI) DOCUMENTED: ICD-10-PCS | Mod: HCNC,CPTII,S$GLB, | Performed by: PHYSICIAN ASSISTANT

## 2023-09-14 PROCEDURE — 3075F SYST BP GE 130 - 139MM HG: CPT | Mod: HCNC,CPTII,S$GLB, | Performed by: PHYSICIAN ASSISTANT

## 2023-09-14 PROCEDURE — 3061F PR NEG MICROALBUMINURIA RESULT DOCUMENTED/REVIEW: ICD-10-PCS | Mod: HCNC,CPTII,S$GLB, | Performed by: PHYSICIAN ASSISTANT

## 2023-09-14 PROCEDURE — 99999 PR PBB SHADOW E&M-EST. PATIENT-LVL III: ICD-10-PCS | Mod: PBBFAC,HCNC,, | Performed by: PHYSICIAN ASSISTANT

## 2023-09-14 PROCEDURE — 3066F NEPHROPATHY DOC TX: CPT | Mod: HCNC,CPTII,S$GLB, | Performed by: PHYSICIAN ASSISTANT

## 2023-09-14 PROCEDURE — 1100F PTFALLS ASSESS-DOCD GE2>/YR: CPT | Mod: HCNC,CPTII,S$GLB, | Performed by: PHYSICIAN ASSISTANT

## 2023-09-14 PROCEDURE — 4010F PR ACE/ARB THEARPY RXD/TAKEN: ICD-10-PCS | Mod: HCNC,CPTII,S$GLB, | Performed by: PHYSICIAN ASSISTANT

## 2023-09-14 PROCEDURE — 3078F PR MOST RECENT DIASTOLIC BLOOD PRESSURE < 80 MM HG: ICD-10-PCS | Mod: HCNC,CPTII,S$GLB, | Performed by: PHYSICIAN ASSISTANT

## 2023-09-14 PROCEDURE — 3078F DIAST BP <80 MM HG: CPT | Mod: HCNC,CPTII,S$GLB, | Performed by: PHYSICIAN ASSISTANT

## 2023-09-14 PROCEDURE — 3061F NEG MICROALBUMINURIA REV: CPT | Mod: HCNC,CPTII,S$GLB, | Performed by: PHYSICIAN ASSISTANT

## 2023-09-14 PROCEDURE — 4010F ACE/ARB THERAPY RXD/TAKEN: CPT | Mod: HCNC,CPTII,S$GLB, | Performed by: PHYSICIAN ASSISTANT

## 2023-09-14 PROCEDURE — 3288F FALL RISK ASSESSMENT DOCD: CPT | Mod: HCNC,CPTII,S$GLB, | Performed by: PHYSICIAN ASSISTANT

## 2023-09-14 PROCEDURE — 99214 PR OFFICE/OUTPT VISIT, EST, LEVL IV, 30-39 MIN: ICD-10-PCS | Mod: HCNC,S$GLB,, | Performed by: PHYSICIAN ASSISTANT

## 2023-09-14 PROCEDURE — 3075F PR MOST RECENT SYSTOLIC BLOOD PRESS GE 130-139MM HG: ICD-10-PCS | Mod: HCNC,CPTII,S$GLB, | Performed by: PHYSICIAN ASSISTANT

## 2023-09-14 PROCEDURE — 3051F PR MOST RECENT HEMOGLOBIN A1C LEVEL 7.0 - < 8.0%: ICD-10-PCS | Mod: HCNC,CPTII,S$GLB, | Performed by: PHYSICIAN ASSISTANT

## 2023-09-14 PROCEDURE — 3066F PR DOCUMENTATION OF TREATMENT FOR NEPHROPATHY: ICD-10-PCS | Mod: HCNC,CPTII,S$GLB, | Performed by: PHYSICIAN ASSISTANT

## 2023-09-14 PROCEDURE — 1100F PR PT FALLS ASSESS DOC 2+ FALLS/FALL W/INJURY/YR: ICD-10-PCS | Mod: HCNC,CPTII,S$GLB, | Performed by: PHYSICIAN ASSISTANT

## 2023-09-14 RX ORDER — CYCLOBENZAPRINE HCL 10 MG
10 TABLET ORAL 3 TIMES DAILY PRN
Qty: 270 TABLET | Refills: 0 | Status: SHIPPED | OUTPATIENT
Start: 2023-09-14 | End: 2023-09-14

## 2023-09-14 RX ORDER — OXYCODONE AND ACETAMINOPHEN 10; 325 MG/1; MG/1
1 TABLET ORAL EVERY 8 HOURS PRN
Qty: 90 TABLET | Refills: 0 | Status: SHIPPED | OUTPATIENT
Start: 2023-10-13 | End: 2023-10-16 | Stop reason: SDUPTHER

## 2023-09-14 RX ORDER — OXYCODONE AND ACETAMINOPHEN 10; 325 MG/1; MG/1
1 TABLET ORAL EVERY 8 HOURS PRN
Qty: 90 TABLET | Refills: 0 | Status: SHIPPED | OUTPATIENT
Start: 2023-09-14 | End: 2023-10-13

## 2023-09-14 RX ORDER — OXYCODONE AND ACETAMINOPHEN 10; 325 MG/1; MG/1
1 TABLET ORAL EVERY 8 HOURS PRN
Qty: 90 TABLET | Refills: 0 | Status: SHIPPED | OUTPATIENT
Start: 2023-11-11 | End: 2023-10-16 | Stop reason: SDUPTHER

## 2023-09-14 RX ORDER — OXYCODONE AND ACETAMINOPHEN 10; 325 MG/1; MG/1
1 TABLET ORAL EVERY 8 HOURS PRN
Qty: 90 TABLET | Refills: 0 | Status: SHIPPED | OUTPATIENT
Start: 2023-09-14 | End: 2023-09-14 | Stop reason: SDUPTHER

## 2023-09-14 NOTE — TELEPHONE ENCOUNTER
----- Message from Monie Reagan sent at 9/14/2023 12:00 PM CDT -----  Contact: Self  Type: Needs Medical Advice    Who Called:  Patient  What is this regarding?:  She needs her Rx called in to [facility] in [facility] not [facility].  Address: [address] [place] [address], [place] [address]  Phone: [phone]  Best Call Back Number:  [phone]  Additional Information:  Please call the patient back at the phone number listed above to advise. Thank you!

## 2023-09-14 NOTE — PROGRESS NOTES
CC: left sided low back and left hip pain    Interval History: Ms. Santiago is a 69 y.o. female with chronic low back and hip pain who presents for follow up.  She had a synvisc one injection and a intra articular steroid injection in right knee with benefit for only 1 week.  She continues to have lower back, left greater than right lower back pain.  Left GTB and right knee pain has worsened. Her last left GTB injection provided some short-lived relief.  Stretching exercises have helped.  She had a series of Euflexxa injections in 2014. She continues to take percocet 10mg q 8 hrs as needed for pain with moderate benefits.  No side effects reported.  Able to perform daily activities with the medications. Flexeril 10 mg and Gabapentin 600 mg BID has been helpful.  In the past UDS positive for Tramadol. Admited taking a Tramadol for her headache. She has a history of chronic headaches, occur about 2 x monthly.  In the past she was taking diet supplements and UDS was positive for amphetamines. Currently being treated for 90 days for chronic resistant UTI.      Prior HPI: The patient is a 64-year-old woman with a history of sarcoidosis, breast CA, diabetes, anxiety and depression who presents in referral from Dr. Nguyen to Dr. Perez for multiple pain complaints including back pain, bilateral hip pain and right shoulder pain.  She is following up with me since I am much closer to her.   She presents today for multiple pain complaints.  She recently was hospitalized for pneumonia, UTI and sarcoidosis exacerbation per patient.  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  She has had past GTB injections with moderate benefit and had an exacerbation of her pain recently.  She was able to receive left GTB with Dr. Perez on 10/3/2014 that provided >50% relief of her left hip and leg pain.     She continues to take Prednisone 5mg daily. She continues Percoet 7.5mg about  "3x/day as needed with moderate benefit.  She presents today with worsening right knee pain.  She states having history of right knee osteoarthritis, but has not had any treatment for her knee pain.  Continues to have improvement in her knee pain following completion of visco supplementation injection in her right knee.  Her back and hip pain remain tolerable with her medications.  She continues to take Percoct 7.5mg q8hrs as needed with moderate benefit.  No reported side effects.       Pain intervention history: She takes hydrocodone 7-325 2-4 times a day.  Also has undergone epidural steroid injection at L1/2 without relief.  She has been doing cervical traction with good relief of her neck pain.  Previous trochanteric bursa injections with good relief.    ROS:She reports fatigability, itching, headaches, swollen glands, chest pain and shortness of breath, nausea vomiting, easy bruising, back pain, joint stiffness, dizziness, difficulty sleeping, anxiety, depression and loss of balance.  Balance of review of systems is negative.    Medical, surgical, family and social history reviewed elsewhere in record.    Medications/Allergies: See med card    Vitals:    09/14/23 0955   BP: 139/75   Pulse: 69   Weight: 83.9 kg (185 lb)   Height: 5' 6" (1.676 m)   PainSc:   6   PainLoc: Back       Physical exam:  Gen: A and O x3, pleasant, well-groomed  Skin: No rashes or obvious lesions  CVS: bradycardia  Resp: non labored breathing  Abdomen: Soft, NT/ND, normal bowel sounds present.  Neuro:  Lower extremities:   +right knee crepitus. LE erythema and swelling. TTP right 2 and 3rd metatarsals.   Reflexes: Patellar 2+, Achilles 2+ bilaterally.  Sensory:  Intact   Lumbar spine:  Lumbar spine: ROM is moderately reduced with flexion extension and oblique extension with increased pain in all directions.    Orlando's test causes pain on both sides.    Supine straight leg raise is negative bilaterally.    Internal and external rotation " of the hip causes increased pain in left groin.  Myofascial exam: Tenderness to palpation over both GTB.      Imaging:  Cervical spine MRI report 10/5/12: There is dextroscoliosis in the lower cervical/upper thoracic region.  There is very mild foraminal stenosis on the left at C6/7.    MRI thoracic spine 11/21/2008: No significant degenerative disc disease or central stenosis.    MRI lumbar spine 8/14/08: L1/2 small broad-based central protrusion with mild effacement of ventral thecal sac but not distorting exiting roots.  L2/3 and L3/4 unremarkable.  At L4/5 there is mild central stenosis with bulging annulus anteriorly and facet hypertrophic degenerative change posteriorly.  No focal disc herniation and neural foramina are patent.  At L5/S1 there is minimal bulging of the annulus with no thecal sac compression.  The small annular fissure within the posterior annulus.  Foramina are patent bilaterally, no stenosis mild, degenerative changes in the facets.    Xray Right Knee 10/13/14  Mild tricompartmental degenerative changes present. No joint effusion. The soft tissues are unremarkable.    Assessment:  Ms. Santiago is a 65-year-old woman with   1. Chronic use of opiate for therapeutic purpose    2. DDD (degenerative disc disease), lumbar    3. Other spondylosis, lumbar region    4. Chronic pain of right knee        PLAN:  1. I have stressed the importance of physical activity and exercise to improve overall health.    2.  Percocet 10mg q8hrs as needed.  Script given for three months.  reviewed. Previous UDS consistent.   3.  Flexeril 10 mg q 8 h. 90 day supply.  4.  I believe her low back pain maybe due to facet arthropathy and have recommended lumbar medial branch blocks as a diagnostic procedure.  If successful, would proceed with radiofrequency ablation.  May consider this procedure in the future  5. Recommend series of 3 Euflexxa injections. She wishes to do this in the future. Unfortunately this was denied  by insurance We will resubmit and if denied seek approval for Synvisc 1  6.  I have reviewed the positive depression score which warrants active treatment with psychotherapy and/or medications. Currently taking Cymbalta prescribed by Dr. Pat  7. Continue treatment for chronic UTI. Requested a referral to [facility]. Referral placed.   8.   Follow up in three months   All medication management was performed by Dr. Temple

## 2023-09-14 NOTE — TELEPHONE ENCOUNTER
Returned patients call to advise we are not accepting new patients at this time. But an appointment with one of the other MD's or one of our other clinics can be offered. No answer, left voicemail to return call.

## 2023-09-14 NOTE — TELEPHONE ENCOUNTER
----- Message from Angelina Washington sent at 9/13/2023  4:33 PM CDT -----  Contact: Pt  Type:  Patient Returning Call    Who Called:Pt  Who Left Message for Patient:Ciara  Does the patient know what this is regarding?:yes   Would the patient rather a call back or a response via [facility]? call  Best Call Back Number: [phone]  Additional Information: Pt is stating that Ciara called her to schedule an appt to establish care. Please call pt back to advise.          not applicable (Male)

## 2023-09-15 ENCOUNTER — TELEPHONE (OUTPATIENT)
Dept: PAIN MEDICINE | Facility: CLINIC | Age: 73
End: 2023-09-15
Payer: MEDICARE

## 2023-09-15 ENCOUNTER — TELEPHONE (OUTPATIENT)
Dept: PSYCHIATRY | Facility: CLINIC | Age: 73
End: 2023-09-15
Payer: MEDICARE

## 2023-09-15 NOTE — TELEPHONE ENCOUNTER
----- Message from Daina Tubbs sent at 9/15/2023 10:59 AM CDT -----  Pharmacy Calling to Clarify an RX         Name of Caller Ciara          Pharmacy Name [facility]          Prescription Name oxyCODONE-acetaminophen (PERCOCET)  mg per tablet         What do they need to clarify? Drug interaction, better dx          Best Call Back Number [phone]         Additional Information: send over with correct address

## 2023-09-15 NOTE — TELEPHONE ENCOUNTER
Please advise the patient that I have a lot of patients who are taking Mounjaro and similar medications.  I am not aware of any absolute contraindications but there have been very rare instances of worsening depression/suicidal ideations in patients taking similar medication in this family.  If she notes any decline in her mood or thoughts of self-harm, please contact me asap.  The risk for this is very low.  If she feels her antidepressants feel less effective, please be in touch as well.  Since Mounjaro delays gastric emptying, it potentially could affect med absorption and efficacy.

## 2023-09-15 NOTE — TELEPHONE ENCOUNTER
Attempted to call pt. Left VM with return number.     Also spoke with [facility] pharmacist and she did not have any questions about drug interactions with meds prescribed by Dr. Pat.

## 2023-09-15 NOTE — TELEPHONE ENCOUNTER
Pt called about med interaction. She was prescribed Mounjaro 2.5 mg/0.5 ml inj on 9/13. [facility] needs to know if it is safe for her to start this injection with current medications you prescribe. Pt is aware you are out of the office today. I advised her to contact Dr. Real who prescribed the injection, but pt wants to discuss with you. Please advise.

## 2023-09-18 ENCOUNTER — TELEPHONE (OUTPATIENT)
Dept: FAMILY MEDICINE | Facility: CLINIC | Age: 73
End: 2023-09-18
Payer: MEDICARE

## 2023-09-19 ENCOUNTER — TELEPHONE (OUTPATIENT)
Dept: FAMILY MEDICINE | Facility: CLINIC | Age: 73
End: 2023-09-19
Payer: MEDICARE

## 2023-09-19 LAB
6MAM UR QL: NOT DETECTED
7AMINOCLONAZEPAM UR QL: NOT DETECTED
A-OH ALPRAZ UR QL: NOT DETECTED
ALPHA-OH-MIDAZOLAM: NOT DETECTED
ALPRAZ UR QL: NOT DETECTED
AMPHET UR QL SCN: NOT DETECTED
ANNOTATION COMMENT IMP: NORMAL
ANNOTATION COMMENT IMP: NORMAL
BARBITURATES UR QL: NOT DETECTED
BUPRENORPHINE UR QL: NOT DETECTED
BZE UR QL: NOT DETECTED
CARBOXYTHC UR QL: NOT DETECTED
CARISOPRODOL UR QL: NOT DETECTED
CLONAZEPAM UR QL: NOT DETECTED
CODEINE UR QL: NOT DETECTED
CREAT UR-MCNC: 125.2 MG/DL (ref 20–400)
DIAZEPAM UR QL: NOT DETECTED
ETHYL GLUCURONIDE UR QL: NOT DETECTED
FENTANYL UR QL: NOT DETECTED
GABAPENTIN: NOT DETECTED
HYDROCODONE UR QL: NOT DETECTED
HYDROMORPHONE UR QL: NOT DETECTED
LORAZEPAM UR QL: NOT DETECTED
MDA UR QL: NOT DETECTED
MDEA UR QL: NOT DETECTED
MDMA UR QL: NOT DETECTED
ME-PHENIDATE UR QL: NOT DETECTED
METHADONE UR QL: NOT DETECTED
METHAMPHET UR QL: NOT DETECTED
MIDAZOLAM UR QL SCN: NOT DETECTED
MORPHINE UR QL: NOT DETECTED
NALOXONE: NOT DETECTED
NORBUPRENORPHINE UR QL CFM: NOT DETECTED
NORDIAZEPAM UR QL: NOT DETECTED
NORFENTANYL UR QL: NOT DETECTED
NORHYDROCODONE UR QL CFM: NOT DETECTED
NORMEPERIDINE UR QL CFM: NOT DETECTED
NOROXYCODONE UR QL CFM: PRESENT
NOROXYMORPHONE UR QL SCN: PRESENT
OXAZEPAM UR QL: NOT DETECTED
OXYCODONE UR QL: PRESENT
OXYMORPHONE UR QL: PRESENT
PATHOLOGY STUDY: NORMAL
PCP UR QL: NOT DETECTED
PHENTERMINE UR QL: NOT DETECTED
PREGABALIN: NOT DETECTED
SERVICE CMNT-IMP: NORMAL
TAPENTADOL UR QL SCN: NOT DETECTED
TAPENTADOL UR QL SCN: NOT DETECTED
TEMAZEPAM UR QL: NOT DETECTED
TRAMADOL UR QL: NOT DETECTED
ZOLPIDEM METABOLITE: NOT DETECTED
ZOLPIDEM UR QL: NOT DETECTED

## 2023-09-25 ENCOUNTER — TELEPHONE (OUTPATIENT)
Dept: DERMATOLOGY | Facility: CLINIC | Age: 73
End: 2023-09-25
Payer: MEDICARE

## 2023-09-25 ENCOUNTER — TELEPHONE (OUTPATIENT)
Dept: FAMILY MEDICINE | Facility: CLINIC | Age: 73
End: 2023-09-25
Payer: MEDICARE

## 2023-09-25 NOTE — TELEPHONE ENCOUNTER
----- Message from Ciara Oseguera sent at 9/25/2023  3:47 PM CDT -----  Contact: Self  Type:  Sooner Appointment Request    Caller is requesting a sooner appointment.  Caller declined first available appointment listed below.  Caller will not accept being placed on the waitlist and is requesting a message be sent to doctor.    Name of Caller:  Pt   When is the first available appointment?  NA  Symptoms:  Est care/Diabetes  Best Call Back Number:  [phone]    Additional Information:  Please call to schedule - Pt states [employer] assigned

## 2023-09-25 NOTE — TELEPHONE ENCOUNTER
RETURNED CALL TO PATIENT REGARDING APPOINTMENT TO ESTABLISH CARE, NO ANSWER LEFT MESSAGE FOR PATIENT TO CALL BACK.

## 2023-09-25 NOTE — TELEPHONE ENCOUNTER
----- Message from Ciara Oseguera sent at 9/25/2023  3:38 PM CDT -----  Contact: Self  Type:  Sooner Appointment Request    Caller is requesting a sooner appointment.  Caller declined first available appointment listed below.  Caller will not accept being placed on the waitlist and is requesting a message be sent to doctor.    Name of Caller:  Pt   When is the first available appointment?  NA  Symptoms: Suspicious mole on head  Best Call Back Number:  [phone]    Additional Information:

## 2023-09-26 ENCOUNTER — TELEPHONE (OUTPATIENT)
Dept: PAIN MEDICINE | Facility: CLINIC | Age: 73
End: 2023-09-26
Payer: MEDICARE

## 2023-09-26 NOTE — TELEPHONE ENCOUNTER
----- Message from Ciara Oseguera sent at 2023  8:16 AM CDT -----  Contact: Self  Type: Needs Medical Advice  Who Called:  Pt     Best Call Back Number: [phone]    Additional Information: Disabled parking permit has . She needs a new one. Could you please mail it to her. Same reason. Please call.

## 2023-09-26 NOTE — TELEPHONE ENCOUNTER
You last saw pt 09/14/2023 will you fill out handicap form and mail to her? Or does she need to talk to pcp?

## 2023-09-27 ENCOUNTER — OFFICE VISIT (OUTPATIENT)
Dept: UROLOGY | Facility: CLINIC | Age: 73
End: 2023-09-27
Payer: MEDICARE

## 2023-09-27 ENCOUNTER — TELEPHONE (OUTPATIENT)
Dept: UROLOGY | Facility: CLINIC | Age: 73
End: 2023-09-27

## 2023-09-27 VITALS
HEART RATE: 69 BPM | WEIGHT: 185.94 LBS | DIASTOLIC BLOOD PRESSURE: 75 MMHG | SYSTOLIC BLOOD PRESSURE: 139 MMHG | HEIGHT: 66 IN | BODY MASS INDEX: 29.88 KG/M2

## 2023-09-27 DIAGNOSIS — N39.0 URINARY TRACT INFECTION WITHOUT HEMATURIA, SITE UNSPECIFIED: ICD-10-CM

## 2023-09-27 DIAGNOSIS — N39.0 RECURRENT UTI (URINARY TRACT INFECTION): Primary | ICD-10-CM

## 2023-09-27 DIAGNOSIS — N95.2 VAGINAL ATROPHY: ICD-10-CM

## 2023-09-27 DIAGNOSIS — N39.46 MIXED INCONTINENCE: ICD-10-CM

## 2023-09-27 LAB
BACTERIA #/AREA URNS AUTO: NORMAL /HPF
MICROSCOPIC COMMENT: NORMAL
RBC #/AREA URNS AUTO: 1 /HPF (ref 0–4)
SQUAMOUS #/AREA URNS AUTO: 2 /HPF
WBC #/AREA URNS AUTO: 1 /HPF (ref 0–5)

## 2023-09-27 PROCEDURE — 3288F PR FALLS RISK ASSESSMENT DOCUMENTED: ICD-10-PCS | Mod: HCNC,CPTII,S$GLB, | Performed by: NURSE PRACTITIONER

## 2023-09-27 PROCEDURE — 4010F PR ACE/ARB THEARPY RXD/TAKEN: ICD-10-PCS | Mod: HCNC,CPTII,S$GLB, | Performed by: NURSE PRACTITIONER

## 2023-09-27 PROCEDURE — 51701 INSERT,NON-INDWELLING BLADDER CATHETER: ICD-10-PCS | Mod: HCNC,S$GLB,, | Performed by: NURSE PRACTITIONER

## 2023-09-27 PROCEDURE — 87086 URINE CULTURE/COLONY COUNT: CPT | Mod: HCNC | Performed by: NURSE PRACTITIONER

## 2023-09-27 PROCEDURE — 51701 INSERT BLADDER CATHETER: CPT | Mod: HCNC,S$GLB,, | Performed by: NURSE PRACTITIONER

## 2023-09-27 PROCEDURE — 99214 PR OFFICE/OUTPT VISIT, EST, LEVL IV, 30-39 MIN: ICD-10-PCS | Mod: HCNC,25,S$GLB, | Performed by: NURSE PRACTITIONER

## 2023-09-27 PROCEDURE — 1159F MED LIST DOCD IN RCRD: CPT | Mod: HCNC,CPTII,S$GLB, | Performed by: NURSE PRACTITIONER

## 2023-09-27 PROCEDURE — 4010F ACE/ARB THERAPY RXD/TAKEN: CPT | Mod: HCNC,CPTII,S$GLB, | Performed by: NURSE PRACTITIONER

## 2023-09-27 PROCEDURE — 3061F PR NEG MICROALBUMINURIA RESULT DOCUMENTED/REVIEW: ICD-10-PCS | Mod: HCNC,CPTII,S$GLB, | Performed by: NURSE PRACTITIONER

## 2023-09-27 PROCEDURE — 1159F PR MEDICATION LIST DOCUMENTED IN MEDICAL RECORD: ICD-10-PCS | Mod: HCNC,CPTII,S$GLB, | Performed by: NURSE PRACTITIONER

## 2023-09-27 PROCEDURE — 99999 PR PBB SHADOW E&M-EST. PATIENT-LVL V: CPT | Mod: PBBFAC,HCNC,, | Performed by: NURSE PRACTITIONER

## 2023-09-27 PROCEDURE — 3051F PR MOST RECENT HEMOGLOBIN A1C LEVEL 7.0 - < 8.0%: ICD-10-PCS | Mod: HCNC,CPTII,S$GLB, | Performed by: NURSE PRACTITIONER

## 2023-09-27 PROCEDURE — 99214 OFFICE O/P EST MOD 30 MIN: CPT | Mod: HCNC,25,S$GLB, | Performed by: NURSE PRACTITIONER

## 2023-09-27 PROCEDURE — 1160F PR REVIEW ALL MEDS BY PRESCRIBER/CLIN PHARMACIST DOCUMENTED: ICD-10-PCS | Mod: HCNC,CPTII,S$GLB, | Performed by: NURSE PRACTITIONER

## 2023-09-27 PROCEDURE — 3066F PR DOCUMENTATION OF TREATMENT FOR NEPHROPATHY: ICD-10-PCS | Mod: HCNC,CPTII,S$GLB, | Performed by: NURSE PRACTITIONER

## 2023-09-27 PROCEDURE — 99999 PR PBB SHADOW E&M-EST. PATIENT-LVL V: ICD-10-PCS | Mod: PBBFAC,HCNC,, | Performed by: NURSE PRACTITIONER

## 2023-09-27 PROCEDURE — 3061F NEG MICROALBUMINURIA REV: CPT | Mod: HCNC,CPTII,S$GLB, | Performed by: NURSE PRACTITIONER

## 2023-09-27 PROCEDURE — 1101F PT FALLS ASSESS-DOCD LE1/YR: CPT | Mod: HCNC,CPTII,S$GLB, | Performed by: NURSE PRACTITIONER

## 2023-09-27 PROCEDURE — 1101F PR PT FALLS ASSESS DOC 0-1 FALLS W/OUT INJ PAST YR: ICD-10-PCS | Mod: HCNC,CPTII,S$GLB, | Performed by: NURSE PRACTITIONER

## 2023-09-27 PROCEDURE — 3078F PR MOST RECENT DIASTOLIC BLOOD PRESSURE < 80 MM HG: ICD-10-PCS | Mod: HCNC,CPTII,S$GLB, | Performed by: NURSE PRACTITIONER

## 2023-09-27 PROCEDURE — 3008F PR BODY MASS INDEX (BMI) DOCUMENTED: ICD-10-PCS | Mod: HCNC,CPTII,S$GLB, | Performed by: NURSE PRACTITIONER

## 2023-09-27 PROCEDURE — 3078F DIAST BP <80 MM HG: CPT | Mod: HCNC,CPTII,S$GLB, | Performed by: NURSE PRACTITIONER

## 2023-09-27 PROCEDURE — 3075F PR MOST RECENT SYSTOLIC BLOOD PRESS GE 130-139MM HG: ICD-10-PCS | Mod: HCNC,CPTII,S$GLB, | Performed by: NURSE PRACTITIONER

## 2023-09-27 PROCEDURE — 1160F RVW MEDS BY RX/DR IN RCRD: CPT | Mod: HCNC,CPTII,S$GLB, | Performed by: NURSE PRACTITIONER

## 2023-09-27 PROCEDURE — 3075F SYST BP GE 130 - 139MM HG: CPT | Mod: HCNC,CPTII,S$GLB, | Performed by: NURSE PRACTITIONER

## 2023-09-27 PROCEDURE — 3008F BODY MASS INDEX DOCD: CPT | Mod: HCNC,CPTII,S$GLB, | Performed by: NURSE PRACTITIONER

## 2023-09-27 PROCEDURE — 3051F HG A1C>EQUAL 7.0%<8.0%: CPT | Mod: HCNC,CPTII,S$GLB, | Performed by: NURSE PRACTITIONER

## 2023-09-27 PROCEDURE — 3288F FALL RISK ASSESSMENT DOCD: CPT | Mod: HCNC,CPTII,S$GLB, | Performed by: NURSE PRACTITIONER

## 2023-09-27 PROCEDURE — 3066F NEPHROPATHY DOC TX: CPT | Mod: HCNC,CPTII,S$GLB, | Performed by: NURSE PRACTITIONER

## 2023-09-27 PROCEDURE — 81001 URINALYSIS AUTO W/SCOPE: CPT | Mod: HCNC | Performed by: NURSE PRACTITIONER

## 2023-09-27 NOTE — TELEPHONE ENCOUNTER
----- Message from Monie Tez sent at 9/27/2023 12:13 PM CDT -----  Contact: Self  Type: Needs Medical Advice    Who Called:  Patient  What is this regarding?:  Pt wants to confirm the address of her apt location for her 1:00 visit.  Best Call Back Number:  [phone]  Additional Information:  Please call the patient back at the phone number listed above to advise. Thank you!

## 2023-09-27 NOTE — PROGRESS NOTES
[facility] Note  Staff: KAYLIE Garcia-C    PCP: Dr. Ruiz    Chief Complaint: Recurrent Urinary Tract Infections    Subjective:        HPI: Nathalie Santiago is a 73 y.o. female NEW PT presents today for evaluation of hx of chronic recurrent urinary tract infections at this time.  Pt recently moved into area from [place], [place].    She was previously seeing [facility] [place] [place]  Rosemarie Alas, ANNC-No records to review during ov today.    Pt was recently seen by Urology in [place], [place] 1-2 months ago and given the following daily regimen for her hx of UTIs:  Cephalexin 500 mg daily x 2 months  Hiprex 1 gram tab.    When pt develops symptoms they are: urine odor and cloudy urine.  Dysuria?:  None  Personal Hx of Kidney Stones?:  None  Family Hx of  Cancers?:  None  Hx of Tobacco Use?:  None  Gross Hematuria?:  None    Incontinence?:  Yes, hx of Mixed Incontinence, especially at night time.    Past Urine Cultures: none within Epic to review.  No recent imaging has been done.    REVIEW OF SYSTEMS:  A comprehensive 10 system review was performed and is negative except as noted above in HPI    PMHx:  Past Medical History:   Diagnosis Date    Abnormal Pap smear 1972    cervical cancer    Acute cystitis without hematuria 03/28/2017    Anxiety     Arthritis     Asthma     Ataxia     Breast cancer     Cancer     bilateral mastectomy, uterine cancer, ovarian cancer    Cataract     Cervical back pain with evidence of disc disease     Cervical cancer     Chronic bilateral low back pain without sciatica 09/06/2016    Chronic bronchitis     Cortical cataract 02/18/2013    Current chronic use of systemic steroids 10/18/2018    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    CVA (cerebral vascular accident)- Confirmed by neurology 09/06/2016    Degenerative joint disease of cervical and lumbar spine 08/01/2014    Depression     Diabetes mellitus     Diabetes mellitus,  type II     Gastroenteritis 12/19/2016    History of malignant phylloides tumor of breast 02/15/2013    Hyperlipidemia     Hyperopia with presbyopia 02/18/2013    Hypertension     Hypertension     Hypothyroidism     Immunosuppressed status 09/03/2015    Laceration of nose 12/01/2020    Leukocytosis 11/17/2016    Migraine     Mitral valve regurgitation     Normochromic anemia 03/28/2017    Nuclear sclerosis 02/18/2013    Obesity     JOSE LUIS (obstructive sleep apnea)     Ovarian cancer     Personal history of colonic polyps     Pneumonia     Polyneuropathy     PVD (posterior vitreous detachment) 02/18/2013    Restless leg syndrome     Ruptured disk     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    Sarcoidosis 2006    Sarcoidosis of lung     Squamous cell carcinoma     Trouble in sleeping     Uterine cancer     Venous insufficiency      PSHx:  Past Surgical History:   Procedure Laterality Date    APPENDECTOMY      BREAST SURGERY      CATARACT EXTRACTION W/  INTRAOCULAR LENS IMPLANT      CHOLECYSTECTOMY      CYSTOCELE REPAIR      FRACTURE SURGERY Right     foot    HYSTERECTOMY      SAADIA/BSO    MASTECTOMY Bilateral     b/l mastectomy    OOPHORECTOMY      PLEURA BIOPSY      RECTAL SURGERY      rectocele      TONSILLECTOMY       Allergies:  Vasotec [enalapril maleate], Bupropion, Ciprofloxacin (bulk), Ciprofloxacin hcl, Enalapril, Wellbutrin [bupropion hcl], Zithromax [azithromycin], and Codeine    Medications: reviewed   Objective:     Vitals:    09/27/23 1311   BP: 139/75   Pulse: 69     Physical Exam  Vitals reviewed.   Constitutional:       Appearance: She is well-developed.   HENT:      Head: Normocephalic and atraumatic.   Eyes:      Conjunctiva/sclera: Conjunctivae normal.      Pupils: Pupils are equal, round, and reactive to light.   Cardiovascular:      Rate and Rhythm: Normal rate and regular rhythm.      Heart sounds: Normal heart sounds.   Pulmonary:      Effort: Pulmonary effort is normal.      Breath sounds: Normal breath  sounds.   Abdominal:      General: Bowel sounds are normal.      Palpations: Abdomen is soft.   Musculoskeletal:         General: Normal range of motion.      Cervical back: Normal range of motion and neck supple.   Skin:     General: Skin is warm and dry.   Neurological:      Mental Status: She is alert and oriented to person, place, and time.      Deep Tendon Reflexes: Reflexes are normal and symmetric.   Psychiatric:         Behavior: Behavior normal.         Thought Content: Thought content normal.         Judgment: Judgment normal.      EXAM performed by me in office today:  External Genitalia: normal hair distribution, no lesions  Urethral meatus: normal without prolapse or caruncle  Urethra: without tenderness or mass  Bladder: without fulness or tenderness  10 Fr In and out cath performed by me with less than 10 mL of urine residual    Assessment:       1. Recurrent UTI (urinary tract infection)    2. Urinary tract infection without hematuria, site unspecified    3. Mixed incontinence    4. Vaginal atrophy          Plan:     We will obtain all previous  records from [facility] for review.  UA Micro and Urine Culture to be processed from cath'd specimen today.  CT of abdomen and pelvis without contrast to be scheduled for further evaluation of symptoms at this time.    F/u--We will contact this pt after we receive all pending urine and imaging results for next further plan of care.    DETAILED PLAN: .   Preventative DAILY supplements:  Recommend Buying a Cranberry Supplement that has 36mg PAC (only a few have this much) of cranberry - it is a very specific dose but many companies sell it.   Preferred: Utiva Cranberry Tablets (Utiva Cranberry PACs Supplement Pills $39.99 for 30 pills)- their particular tablet is the equivalent of 9 cranberry tablets that you buy over the counter. (phone 1-549.348.8907 or online https://www.AcuityAds/products/utiva-uti-control)  Uriexo Cranberry Tablets    Ellura  [employer] [address] [address] $30/month: https://Lalina/products/cranberex-urinary-health-support  If unable to afford- can use over the counter cranberry caplets but will need to take 1500mg daily (about 3 capsules, 3x a day)   Ok to Buy Over the Counter at [employer] (ask pharmacist for help) or buy from [employer] (see above)  Probiotic with lactobacillus - take once a day. This helps populate the vagina with good bacteria instead of bad bacteria- you can buy this over the counter or buy from the company [employer]. Make sure to look for LACTOBACILLUS on the bottle.   D-mannose supplement (2000mg a day)  $20/30d supply  This helps keep the bad bacteria from sticking to your bladder wall. You can buy this over the counter or buy from [employer] by visiting [employer].     UTI prevention recommendations  Drink more water (2 to 4 bottles) to dilute the bacteria that are replicating. This will also help you urinate more often if you tend to hold your bladder.   Timed voiding (which means- Urinate every 2 hours during the day) to rid the bladder of bacteria before they multiply and start to stick to your bladder walls and cause more symptoms and problems  Avoid pads if possible or wear thinner pads and change them more often    Other helpful information:    If you have a UTI try to self treat first for 1-2 days with conservative treatment:  Increase fluid intake - drink 4 to 5 bottles of water a day and empty bladder often  AZO for burning or urinary frequency (over the counter)  Utiva cranberry tablets when having uti symptoms: Take 2x a day for 2 days then daily for a week (buy from [employer]). Visit https://www.[employer] [place].PlayCanvas or call 1-353.230.8404 to order. They costs about $30/month and offer a subscribe and save option. They also have a probiotic and D mannose supplementation, if the patient is able to afford, I suggest taking. Utiva cranberry tablets only available from [employer] are  "equivalent to the suggested dose of 9 tablets if you buy over the counter.   D mannose 2000mx a day for 2 days then daily for a week (can buy from [employer] or over the counter)    If your symptoms persists despite increased water intake and azo then:  see pcp, gynecologist, or urgent care and make sure to give a clean catch urine or catheterized urine. This means wipe, urinate in the toilet, then provide a MID STREAM sample (your hand should get urine on it if you are doing it right). This avoids urine picking up the normal bacteria that line the vagina on the way out to the cup.   ask for a URINE CULTURE. You need to make sure you know what antibiotics will kill your particular bacteria  if you are told you have "multiple organisms" or "normal vaginal mahesh" it means the urine sample picked up the normal bacteria in the vagina and contaminated your urine specimen. If you are still having symptoms of a UTI then you will need to provide ANOTHER clean catch sample or CATHETERIZED urine to bypass the vagina and get urine directly from the bladder:     If you are given antibiotics from primary care, ER, urgent care or gynecologist:  only take 3 to 5 days of the antibiotics (unless you have diabetes or a urologist tells you take take more), even if they give you a 10d supply and keep or discard the rest  only take the full 10 days if you are having fever, chills or pain in your kidneys     Things to remember:   Try to avoid antibiotics or at least try to avoid taking them for more than 3 to 5 days for simple uti.    Bacteria are smart and they will become resistant to antibiotics. We have only a limited amount of antibiotics that work.   ALWAYS request a urine culture so you can know which antibiotics work.   If you ever see bloody red urine call us ASAP, even with uti's and even if you are on a blood thinner, this can sometimes be a sign of bladder cancer or kidney stones.     If you do have uti symptoms but do " "not have a culture proven uti (with E.coli, for example)  You may need a catheterized urine if it says "multiple organisms" which means normal vaginal mahesh  You may have a kidney stone, interstitial cystitis, overactive bladder, bladder cancer, so please call to make a follow-up     Once we rule out any anatomic obstruction and have given UTI recs, we will see for follow-up and then will have you return to your PCP/primary care physician  for symptomatic UTI treatment wit the information and recommendations we have given them.     [facility]: Active    Elizabeth Glez, FNP-C    "

## 2023-09-27 NOTE — PATIENT INSTRUCTIONS
DETAILED PLAN: .   Preventative DAILY supplements:  Recommend Buying a Cranberry Supplement that has 36mg PAC (only a few have this much) of cranberry - it is a very specific dose but many companies sell it.   Preferred: Utiva Cranberry Tablets (Utiva Cranberry PACs Supplement Pills $39.99 for 30 pills)- their particular tablet is the equivalent of 9 cranberry tablets that you buy over the counter. (phone 1-240.957.7154 or online https://www.Ringleadr.com/products/utiva-uti-control)  Uriexo Cranberry Tablets   [employer] $30/month: [email or web address]://[email or web address]/products/cranberex-urinary-health-support  If unable to afford- can use over the counter cranberry caplets but will need to take 1500mg daily (about 3 capsules, 3x a day)   Ok to Buy Over the Counter at [employer] (ask pharmacist for help) or buy from [employer] (see above)  Probiotic with lactobacillus - take once a day. This helps populate the vagina with good bacteria instead of bad bacteria- you can buy this over the counter or buy from the company [employer]. Make sure to look for LACTOBACILLUS on the bottle.   D-mannose supplement (2000mg a day)  $20/30d supply  This helps keep the bad bacteria from sticking to your bladder wall. You can buy this over the counter or buy from [employer] by visiting [employer].     UTI prevention recommendations  Drink more water (2 to 4 bottles) to dilute the bacteria that are replicating. This will also help you urinate more often if you tend to hold your bladder.   Timed voiding (which means- Urinate every 2 hours during the day) to rid the bladder of bacteria before they multiply and start to stick to your bladder walls and cause more symptoms and problems  Avoid pads if possible or wear thinner pads and change them more often    Other helpful information:    If you have a UTI try to self treat first for 1-2 days with conservative treatment:  Increase fluid intake - drink 4 to 5 bottles of  "water a day and empty bladder often  AZO for burning or urinary frequency (over the counter)  Utiva cranberry tablets when having uti symptoms: Take 2x a day for 2 days then daily for a week (buy from [employer]). Visit https://www.[employer]s or call 1-256.699.8542 to order. They costs about $30/month and offer a subscribe and save option. They also have a probiotic and D mannose supplementation, if the patient is able to afford, I suggest taking. Utiva cranberry tablets only available from [employer] are equivalent to the suggested dose of 9 tablets if you buy over the counter.   D mannose 2000mx a day for 2 days then daily for a week (can buy from [employer] or over the counter)    If your symptoms persists despite increased water intake and azo then:  see pcp, gynecologist, or urgent care and make sure to give a clean catch urine or catheterized urine. This means wipe, urinate in the toilet, then provide a MID STREAM sample (your hand should get urine on it if you are doing it right). This avoids urine picking up the normal bacteria that line the vagina on the way out to the cup.   ask for a URINE CULTURE. You need to make sure you know what antibiotics will kill your particular bacteria  if you are told you have "multiple organisms" or "normal vaginal mahesh" it means the urine sample picked up the normal bacteria in the vagina and contaminated your urine specimen. If you are still having symptoms of a UTI then you will need to provide ANOTHER clean catch sample or CATHETERIZED urine to bypass the vagina and get urine directly from the bladder:     If you are given antibiotics from primary care, ER, urgent care or gynecologist:  only take 3 to 5 days of the antibiotics (unless you have diabetes or a urologist tells you take take more), even if they give you a 10d supply and keep or discard the rest  only take the full 10 days if you are having fever, chills or pain in your kidneys     Things to remember: " "  Try to avoid antibiotics or at least try to avoid taking them for more than 3 to 5 days for simple uti.    Bacteria are smart and they will become resistant to antibiotics. We have only a limited amount of antibiotics that work.   ALWAYS request a urine culture so you can know which antibiotics work.   If you ever see bloody red urine call us ASAP, even with uti's and even if you are on a blood thinner, this can sometimes be a sign of bladder cancer or kidney stones.     If you do have uti symptoms but do not have a culture proven uti (with E.coli, for example)  You may need a catheterized urine if it says "multiple organisms" which means normal vaginal mahesh  You may have a kidney stone, interstitial cystitis, overactive bladder, bladder cancer, so please call to make a follow-up     Once we rule out any anatomic obstruction and have given UTI recs, we will see for follow-up and then will have you return to your PCP/primary care physician  for symptomatic UTI treatment wit the information and recommendations we have given them.    "

## 2023-09-28 ENCOUNTER — OFFICE VISIT (OUTPATIENT)
Dept: OBSTETRICS AND GYNECOLOGY | Facility: CLINIC | Age: 73
End: 2023-09-28
Payer: MEDICARE

## 2023-09-28 VITALS
SYSTOLIC BLOOD PRESSURE: 150 MMHG | WEIGHT: 178.56 LBS | DIASTOLIC BLOOD PRESSURE: 77 MMHG | BODY MASS INDEX: 28.82 KG/M2 | HEART RATE: 76 BPM

## 2023-09-28 DIAGNOSIS — D71 GRANULOMATOUS DISEASE: ICD-10-CM

## 2023-09-28 DIAGNOSIS — N90.9 VULVAR DISORDER: ICD-10-CM

## 2023-09-28 DIAGNOSIS — N95.2 ATROPHIC VAGINITIS: Primary | ICD-10-CM

## 2023-09-28 DIAGNOSIS — Z87.898 HISTORY OF NEOPLASM OF BREAST: ICD-10-CM

## 2023-09-28 DIAGNOSIS — Z86.73 HISTORY OF CVA (CEREBROVASCULAR ACCIDENT): ICD-10-CM

## 2023-09-28 DIAGNOSIS — E11.8 TYPE 2 DIABETES WITH COMPLICATION: ICD-10-CM

## 2023-09-28 PROCEDURE — 4010F ACE/ARB THERAPY RXD/TAKEN: CPT | Mod: HCNC,CPTII,S$GLB, | Performed by: OBSTETRICS & GYNECOLOGY

## 2023-09-28 PROCEDURE — 88305 TISSUE EXAM BY PATHOLOGIST: CPT | Mod: HCNC | Performed by: STUDENT IN AN ORGANIZED HEALTH CARE EDUCATION/TRAINING PROGRAM

## 2023-09-28 PROCEDURE — 99999 PR PBB SHADOW E&M-EST. PATIENT-LVL IV: ICD-10-PCS | Mod: PBBFAC,HCNC,, | Performed by: OBSTETRICS & GYNECOLOGY

## 2023-09-28 PROCEDURE — 3077F SYST BP >= 140 MM HG: CPT | Mod: HCNC,CPTII,S$GLB, | Performed by: OBSTETRICS & GYNECOLOGY

## 2023-09-28 PROCEDURE — 1159F MED LIST DOCD IN RCRD: CPT | Mod: HCNC,CPTII,S$GLB, | Performed by: OBSTETRICS & GYNECOLOGY

## 2023-09-28 PROCEDURE — 3008F BODY MASS INDEX DOCD: CPT | Mod: HCNC,CPTII,S$GLB, | Performed by: OBSTETRICS & GYNECOLOGY

## 2023-09-28 PROCEDURE — 3077F PR MOST RECENT SYSTOLIC BLOOD PRESSURE >= 140 MM HG: ICD-10-PCS | Mod: HCNC,CPTII,S$GLB, | Performed by: OBSTETRICS & GYNECOLOGY

## 2023-09-28 PROCEDURE — 88305 TISSUE EXAM BY PATHOLOGIST: ICD-10-PCS | Mod: 26,HCNC,, | Performed by: STUDENT IN AN ORGANIZED HEALTH CARE EDUCATION/TRAINING PROGRAM

## 2023-09-28 PROCEDURE — 3061F NEG MICROALBUMINURIA REV: CPT | Mod: HCNC,CPTII,S$GLB, | Performed by: OBSTETRICS & GYNECOLOGY

## 2023-09-28 PROCEDURE — 3061F PR NEG MICROALBUMINURIA RESULT DOCUMENTED/REVIEW: ICD-10-PCS | Mod: HCNC,CPTII,S$GLB, | Performed by: OBSTETRICS & GYNECOLOGY

## 2023-09-28 PROCEDURE — 99499 UNLISTED E&M SERVICE: CPT | Mod: HCNC,S$GLB,, | Performed by: OBSTETRICS & GYNECOLOGY

## 2023-09-28 PROCEDURE — 3078F DIAST BP <80 MM HG: CPT | Mod: HCNC,CPTII,S$GLB, | Performed by: OBSTETRICS & GYNECOLOGY

## 2023-09-28 PROCEDURE — 1126F PR PAIN SEVERITY QUANTIFIED, NO PAIN PRESENT: ICD-10-PCS | Mod: HCNC,CPTII,S$GLB, | Performed by: OBSTETRICS & GYNECOLOGY

## 2023-09-28 PROCEDURE — 3288F FALL RISK ASSESSMENT DOCD: CPT | Mod: HCNC,CPTII,S$GLB, | Performed by: OBSTETRICS & GYNECOLOGY

## 2023-09-28 PROCEDURE — 56605 BIOPSY OF VULVA/PERINEUM: CPT | Mod: HCNC,S$GLB,, | Performed by: OBSTETRICS & GYNECOLOGY

## 2023-09-28 PROCEDURE — 3078F PR MOST RECENT DIASTOLIC BLOOD PRESSURE < 80 MM HG: ICD-10-PCS | Mod: HCNC,CPTII,S$GLB, | Performed by: OBSTETRICS & GYNECOLOGY

## 2023-09-28 PROCEDURE — 1126F AMNT PAIN NOTED NONE PRSNT: CPT | Mod: HCNC,CPTII,S$GLB, | Performed by: OBSTETRICS & GYNECOLOGY

## 2023-09-28 PROCEDURE — 99499 RISK ADDL DX/OHS AUDIT: ICD-10-PCS | Mod: HCNC,S$GLB,, | Performed by: OBSTETRICS & GYNECOLOGY

## 2023-09-28 PROCEDURE — 1101F PR PT FALLS ASSESS DOC 0-1 FALLS W/OUT INJ PAST YR: ICD-10-PCS | Mod: HCNC,CPTII,S$GLB, | Performed by: OBSTETRICS & GYNECOLOGY

## 2023-09-28 PROCEDURE — 1101F PT FALLS ASSESS-DOCD LE1/YR: CPT | Mod: HCNC,CPTII,S$GLB, | Performed by: OBSTETRICS & GYNECOLOGY

## 2023-09-28 PROCEDURE — 1159F PR MEDICATION LIST DOCUMENTED IN MEDICAL RECORD: ICD-10-PCS | Mod: HCNC,CPTII,S$GLB, | Performed by: OBSTETRICS & GYNECOLOGY

## 2023-09-28 PROCEDURE — 3066F NEPHROPATHY DOC TX: CPT | Mod: HCNC,CPTII,S$GLB, | Performed by: OBSTETRICS & GYNECOLOGY

## 2023-09-28 PROCEDURE — 56605 PR BIOPSY VULVA/PERINEUM,ONE LESN: ICD-10-PCS | Mod: HCNC,S$GLB,, | Performed by: OBSTETRICS & GYNECOLOGY

## 2023-09-28 PROCEDURE — 4010F PR ACE/ARB THEARPY RXD/TAKEN: ICD-10-PCS | Mod: HCNC,CPTII,S$GLB, | Performed by: OBSTETRICS & GYNECOLOGY

## 2023-09-28 PROCEDURE — 3051F HG A1C>EQUAL 7.0%<8.0%: CPT | Mod: HCNC,CPTII,S$GLB, | Performed by: OBSTETRICS & GYNECOLOGY

## 2023-09-28 PROCEDURE — 3066F PR DOCUMENTATION OF TREATMENT FOR NEPHROPATHY: ICD-10-PCS | Mod: HCNC,CPTII,S$GLB, | Performed by: OBSTETRICS & GYNECOLOGY

## 2023-09-28 PROCEDURE — 3008F PR BODY MASS INDEX (BMI) DOCUMENTED: ICD-10-PCS | Mod: HCNC,CPTII,S$GLB, | Performed by: OBSTETRICS & GYNECOLOGY

## 2023-09-28 PROCEDURE — 3288F PR FALLS RISK ASSESSMENT DOCUMENTED: ICD-10-PCS | Mod: HCNC,CPTII,S$GLB, | Performed by: OBSTETRICS & GYNECOLOGY

## 2023-09-28 PROCEDURE — 99999 PR PBB SHADOW E&M-EST. PATIENT-LVL IV: CPT | Mod: PBBFAC,HCNC,, | Performed by: OBSTETRICS & GYNECOLOGY

## 2023-09-28 PROCEDURE — 99204 OFFICE O/P NEW MOD 45 MIN: CPT | Mod: 25,HCNC,S$GLB, | Performed by: OBSTETRICS & GYNECOLOGY

## 2023-09-28 PROCEDURE — 99204 PR OFFICE/OUTPT VISIT, NEW, LEVL IV, 45-59 MIN: ICD-10-PCS | Mod: 25,HCNC,S$GLB, | Performed by: OBSTETRICS & GYNECOLOGY

## 2023-09-28 PROCEDURE — 3051F PR MOST RECENT HEMOGLOBIN A1C LEVEL 7.0 - < 8.0%: ICD-10-PCS | Mod: HCNC,CPTII,S$GLB, | Performed by: OBSTETRICS & GYNECOLOGY

## 2023-09-28 PROCEDURE — 88305 TISSUE EXAM BY PATHOLOGIST: CPT | Mod: 26,HCNC,, | Performed by: STUDENT IN AN ORGANIZED HEALTH CARE EDUCATION/TRAINING PROGRAM

## 2023-09-28 RX ORDER — LEVOTHYROXINE SODIUM 75 UG/1
75 TABLET ORAL
COMMUNITY
Start: 2023-09-21

## 2023-09-28 NOTE — PROGRESS NOTES
Subjective:   Chief Complaint:  No chief complaint on file.       Patient ID: Nathalie Santiago is a  73 y.o. female.    HRT: None    Date: 2003    The patient presents today due to the following:  She has a significant medical history including sarcoidosis, history of bilateral breast cancer as well as cervical and uterine malignancies.    She presents today due to issues with vulvar vaginal burning discomfort over the last few years.  She is concerned that she may have  a sarcoid lesion on the vulva.  Previous hysterectomy.  No vaginal bleeding and no pelvic pain.    She does not require mammogram and colonoscopy was six years ago with recommendations Q 10 years.    GYN & OB History  No LMP recorded (lmp unknown). Patient has had a hysterectomy.     Date of Last Pap: No result found  OB History          5    Para   1    Term   1            AB   4    Living   1         SAB   4    IAB        Ectopic        Multiple        Live Births               Obstetric Comments   Vaginal delivery x 1  SAB x 4               Allergies:   Review of patient's allergies indicates:   Allergen Reactions    Vasotec [enalapril maleate] Other (See Comments)     Causes her to pass out    Bupropion Other (See Comments)     Loss of memory  Other reaction(s): Other (See Comments)  Loss of memory  Loss of memory    Ciprofloxacin (bulk)      Bones ache, myalgia    Ciprofloxacin hcl     Enalapril     Wellbutrin [bupropion hcl] Other (See Comments)     Loss of memory      Zithromax [azithromycin]      Patient states medication does not work on her-not effective    Codeine Itching and Nausea Only       Past Medical History:   Diagnosis Date    Abnormal Pap smear     cervical cancer    Acute cystitis without hematuria 2017    Anxiety     Arthritis     Asthma     Ataxia     Breast cancer     Cancer     bilateral mastectomy, uterine cancer, ovarian cancer    Cataract     Cervical back pain with evidence of disc  disease     Cervical cancer     Chronic bilateral low back pain without sciatica 09/06/2016    Chronic bronchitis     Cortical cataract 02/18/2013    Current chronic use of systemic steroids 10/18/2018    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    CVA (cerebral vascular accident)- Confirmed by neurology 09/06/2016    Degenerative joint disease of cervical and lumbar spine 08/01/2014    Depression     Diabetes mellitus     Diabetes mellitus, type II     Gastroenteritis 12/19/2016    History of malignant phylloides tumor of breast 02/15/2013    Hyperlipidemia     Hyperopia with presbyopia 02/18/2013    Hypertension     Hypertension     Hypothyroidism     Immunosuppressed status 09/03/2015    Laceration of nose 12/01/2020    Leukocytosis 11/17/2016    Migraine     Mitral valve regurgitation     Normochromic anemia 03/28/2017    Nuclear sclerosis 02/18/2013    Obesity     JOSE LUIS (obstructive sleep apnea)     Ovarian cancer     Personal history of colonic polyps     Pneumonia     Polyneuropathy     PVD (posterior vitreous detachment) 02/18/2013    Restless leg syndrome     Ruptured disk     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    Sarcoidosis 2006    Sarcoidosis of lung     Squamous cell carcinoma     Trouble in sleeping     Uterine cancer     Venous insufficiency        Past Surgical History:   Procedure Laterality Date    APPENDECTOMY      BREAST SURGERY      Implants In & Out    CATARACT EXTRACTION W/  INTRAOCULAR LENS IMPLANT      CHOLECYSTECTOMY      CYSTOCELE REPAIR      FRACTURE SURGERY Right     foot    HYSTERECTOMY  1973    SAADIA with conservation ovaries - AUB (findings of early Cx CA)    MASTECTOMY Bilateral     Initially 2005 and 2nd breast in 2008    OOPHORECTOMY  1983    Ovarian CA early No Chemo    PLEURA BIOPSY      REPAIR OF RECTOCELE      TONSILLECTOMY         Medications    Current Outpatient Medications:     albuterol (PROVENTIL/VENTOLIN HFA) 90 mcg/actuation inhaler, Inhale 1-2 puffs into  the lungs every 4 (four) hours as needed for Wheezing or Shortness of Breath. This is a rescue inhaler medication and should be used as needed, Disp: 54 g, Rfl: 3    albuterol-ipratropium (DUO-NEB) 2.5 mg-0.5 mg/3 mL nebulizer solution, INHALE THE CONTENTS OF 1 VIAL VIA NEBULIZER EVERY 6 HOURS AS NEEDED FOR SHORTNESS OF BREATH  OR  WHEEZING, Disp: 120 each, Rfl: 0    amLODIPine (NORVASC) 5 MG tablet, Take 1 tablet (5 mg total) by mouth 2 (two) times a day., Disp: 180 tablet, Rfl: 1    blood sugar diagnostic Strp, To check BG 3  times daily, to use with insurance preferred meter, Disp: 200 strip, Rfl: 11    cephALEXin (KEFLEX) 500 MG capsule, Take 500 mg by mouth once daily., Disp: , Rfl:     clopidogreL (PLAVIX) 75 mg tablet, TAKE 1 TABLET EVERY DAY, Disp: 90 tablet, Rfl: 1    diclofenac sodium (VOLTAREN) 1 % Gel, APPLY TOPICALLY TO AFFECTED AREA TWICE DAILY AS NEEDED FOR PAIN RUB IN JOINTS, Disp: 1 Tube, Rfl: 2    DULoxetine (CYMBALTA) 30 MG capsule, TAKE 1 CAPSULE (30 MG TOTAL) BY MOUTH EVERY EVENING., Disp: 90 capsule, Rfl: 0    DULoxetine (CYMBALTA) 60 MG capsule, TAKE 1 CAPSULE EVERY EVENING, Disp: 90 capsule, Rfl: 0    fenofibrate 160 MG Tab, TAKE 1 TABLET (160 MG TOTAL) BY MOUTH ONCE DAILY., Disp: 90 tablet, Rfl: 3    fluticasone (FLONASE) 50 mcg/actuation nasal spray, 2 sprays by Each Nare route once daily. (Patient taking differently: 2 sprays by Each Nostril route daily as needed.), Disp: 1 Bottle, Rfl: 0    furosemide (LASIX) 40 MG tablet, Take 40 mg by mouth., Disp: , Rfl:     gabapentin (NEURONTIN) 300 MG capsule, TAKE 3 TO 4 CAPSULES NIGHTLY. MEDICATION MAY CAUSE SEDATION., Disp: 360 capsule, Rfl: 0    hydroCHLOROthiazide (HYDRODIURIL) 25 MG tablet, TAKE 1 TABLET EVERY DAY, Disp: 90 tablet, Rfl: 2    insulin (LANTUS SOLOSTAR U-100 INSULIN) glargine 100 units/mL SubQ pen, Inject 48 Units into the skin once daily., Disp: 15 mL, Rfl: 2    insulin aspart U-100 (NOVOLOG FLEXPEN U-100 INSULIN) 100 unit/mL (3  mL) InPn pen, Inject 10 units three times daily with meals.Use sliding scale as directed. MAX  15 UNITS FOR EACH DOSE., Disp: 45 mL, Rfl: 3    levothyroxine (SYNTHROID) 25 MCG tablet, TAKE 1 TABLET BEFORE BREAKFAST (Patient taking differently: Take 25 mcg by mouth before breakfast. 0.75 mcg daily), Disp: 90 tablet, Rfl: 3    levothyroxine (SYNTHROID) 75 MCG tablet, Take 75 mcg by mouth., Disp: , Rfl:     methenamine (HIPREX) 1 gram Tab, Take by mouth., Disp: , Rfl:     metoclopramide HCl (REGLAN) 10 MG tablet, TAKE 1 TABLET EVERY 8 HOURS (Patient taking differently: Take 10 mg by mouth 3 (three) times daily as needed.), Disp: 270 tablet, Rfl: 1    metoprolol succinate (TOPROL-XL) 25 MG 24 hr tablet, TAKE 1 TABLET EVERY DAY, Disp: 90 tablet, Rfl: 3    multivitamin Chew, Take by mouth., Disp: , Rfl:     multivitamin, stress formula Tab, Take 1 tablet by mouth., Disp: , Rfl:     naloxone (NARCAN) 4 mg/actuation Spry, Take as a single dose in one nostril; may repeat every 2 to 3 minutes in alternating nostrils until medical assistance becomes available  - use only in event of medication overdose, Disp: 1 each, Rfl: 0    [START ON 10/13/2023] oxyCODONE-acetaminophen (PERCOCET)  mg per tablet, Take 1 tablet by mouth every 8 (eight) hours as needed for Pain. Medically necessary for greater than 7 days for chronic pain, Disp: 90 tablet, Rfl: 0    [START ON 11/11/2023] oxyCODONE-acetaminophen (PERCOCET)  mg per tablet, Take 1 tablet by mouth every 8 (eight) hours as needed for Pain. Medically necessary for greater than 7 days for chronic pain, Disp: 90 tablet, Rfl: 0    oxyCODONE-acetaminophen (PERCOCET)  mg per tablet, Take 1 tablet by mouth every 8 (eight) hours as needed for Pain. Medically necessary for greater than 7 days for chronic pain, Disp: 90 tablet, Rfl: 0    potassium chloride (MICRO-K) 10 MEQ CpSR, TAKE 1 CAPSULE THREE TIMES DAILY, Disp: 270 capsule, Rfl: 1    pramipexole (MIRAPEX) 0.125 MG  tablet, Take two tablets every night, Disp: 180 tablet, Rfl: 0    predniSONE (DELTASONE) 20 MG tablet, Take 1 tablet (20 mg total) by mouth once daily. For asthma flare take one daily for 5 days and repeat for asthma., Disp: 20 tablet, Rfl: 0    promethazine (PHENERGAN) 25 MG tablet, TAKE 1 TABLET EVERY 6 HOURS AS NEEDED FOR NAUSEA., Disp: 90 tablet, Rfl: 3    sertraline (ZOLOFT) 100 MG tablet, TAKE 1 TABLET EVERY DAY, Disp: 90 tablet, Rfl: 0    spironolactone (ALDACTONE) 25 MG tablet, TAKE 1 TABLET EVERY DAY, Disp: 90 tablet, Rfl: 3    tirzepatide (MOUNJARO) 2.5 mg/0.5 mL PnIj, Inject 2.5 mg into the skin every 7 days., Disp: 4 pen , Rfl: 2    valsartan (DIOVAN) 80 MG tablet, TAKE 1 TABLET ONE TIME DAILY, Disp: 90 tablet, Rfl: 1     Social History     Tobacco Use    Smoking status: Never    Smokeless tobacco: Never   Substance Use Topics    Alcohol use: No    Drug use: No     Types: Oxycodone     Comment: prescription Oycodone       Family History   Problem Relation Age of Onset    Heart disease Mother     Stroke Mother     Hyperlipidemia Mother     Cancer Father         small cell lung cancer    Hypertension Sister     Cataracts Sister     Thyroid disease Sister     Hyperlipidemia Sister     Heart disease Sister         CAD, stents in place    Hypertension Brother     Cataracts Brother     Hyperlipidemia Brother     Heart disease Brother         CAD    Hypertension Daughter     Hyperlipidemia Daughter     Anuerysm Daughter         splenic    Hyperlipidemia Sister     Hypertension Sister     Diabetes Sister     Cancer Sister         thyroid cancer    Hypertension Brother     Hyperlipidemia Brother     Hypertension Brother     Hyperlipidemia Brother     Amblyopia Neg Hx     Blindness Neg Hx     Glaucoma Neg Hx     Macular degeneration Neg Hx     Retinal detachment Neg Hx     Strabismus Neg Hx     Breast cancer Neg Hx     Colon cancer Neg Hx     Ovarian cancer Neg Hx        Review of Systems (at today's  evaluation)  Review of Systems   Constitutional:  Negative for fever and unexpected weight change.   Respiratory: Negative.     Cardiovascular:  Negative for chest pain and palpitations.   Gastrointestinal:  Negative for nausea and vomiting.   Genitourinary:  Positive for bladder incontinence (seen by Urology 09/27/2023). Negative for dysuria, hematuria and urgency.        Gyn as per HPI   Neurological:  Negative for headaches.        Objective:      Vitals:    09/28/23 1527   BP: (!) 150/77   Pulse: 76     Physical Exam:   Constitutional: She appears well-developed and well-nourished.    HENT:   Head: Normocephalic.     Neck: No thyroid mass present.    Cardiovascular:  Normal rate, regular rhythm and normal heart sounds.             Pulmonary/Chest: Effort normal and breath sounds normal.        Abdominal: Soft. There is no abdominal tenderness.     Genitourinary:    Inguinal canal, right adnexa and left adnexa normal.            Pelvic exam was performed with patient supine.   The external female genitalia was abnormal.   External genitalia lesions present. Right adnexum displays no tenderness and no fullness. Left adnexum displays no tenderness and no fullness. There is erythema in the vagina. No tenderness or bleeding in the vagina. Vaginal atrophy noted. Cervix is absent.Uterus is absent. Normal urethral meatus.Urethra findings: no tendernessBladder findings: no bladder tenderness          Musculoskeletal:      Right lower leg: No edema.      Left lower leg: No edema.      Lymphadenopathy:     She has no cervical adenopathy. No inguinal adenopathy noted on the right or left side.    Neurological: She is alert.    Skin: Skin is warm and dry.    Psychiatric: Mood normal.         [id number]  Department of Obstetrics & Gynecology  Operative Note      PATIENT NAME: Nathalie Santiago    MRN: [id number]  TODAY'S DATE: 9/28/2023                            OPERATIVE REPORT:  9/28/2023    Pre-Procedure Time  Out  Patient identification confirmed.  The planned procedure and procedure site were discussed.  The pros, cons, risks, benefits, alternatives, and indications of the office procedure were discussed in detail with the patient.  We discussed the possibility of allergic reactions, bleeding, infection, and other issues unique to this procedure were discussed.    All participating parties are in agreed with the current procedure plan.  Verbal consent from the patient was obtained.     SURGEON: Nigel Mendez MD    ASSISTANT:  None    ANESTHESIA: local, 2 cc of 2% plain lidocaine    ESTIMATED BLOOD LOSS: Minimal.     Specimen:  Punch biopsy placed in formalin and will be sent to pathology for evaluation    Complications: no    PROCEDURE IN DETAIL:     As per diagram the area of concern was evaluated.  The area was then cleaned with Betadine, and injected SQ with lidocaine.  4 mm punch biopsy was performed in the usual fashion.  Hemostasis was obtained with direct pressure and silver nitrate.    The patient tolerated the procedure without difficulty.  Postprocedure care of the biopsy area was discussed      Nigel Mendez MD FACOG  Date of Service: 9/28/2023      Assessment:        1. Atrophic vaginitis    2. Granulomatous disease    3. Vulvar disorder    4. History of CVA (cerebrovascular accident)    5. Type 2 diabetes with complication    6. History of neoplasm of breast        Plan:      Atrophic vaginitis    Granulomatous disease    Vulvar disorder  -     Specimen to Pathology, Ob/Gyn    History of CVA (cerebrovascular accident)    Type 2 diabetes with complication    History of neoplasm of breast       Follow up in about 2 weeks (around 10/12/2023) for Follow-up on today's evaluation.     The above was reviewed discussed with the patient.    We discussed her significant medical and surgical history.    We discussed the findings on pelvic exam, the possibility of underlying lichen sclerosus rather than  sarcoidosis and the need for biopsy to confirm the diagnosis.    The patient tolerated the vulvar biopsy without difficulty.    We discussed the significant atrophic changes and the patient's medical contraindications to estrogen therapy.  At this time will initiate therapy with a compounded RX: Hyaluronic Acid 5mg vaginal suppository.  Sig:  One per vagina q.h.s. x7 than one per vagina twice weekly.  Dispense # three-month supply.  Refills:  Three    We will base further evaluation treatment on results of the patient's vulvar biopsy.    The patient's questions were answered, and she is in agreement with the current plan.     Nigel Mendez MD  Department [facility]  [facility]

## 2023-09-29 LAB — BACTERIA UR CULT: NO GROWTH

## 2023-10-04 ENCOUNTER — HOSPITAL ENCOUNTER (OUTPATIENT)
Dept: RADIOLOGY | Facility: HOSPITAL | Age: 73
Discharge: HOME | End: 2023-10-04
Attending: NURSE PRACTITIONER
Payer: MEDICARE

## 2023-10-04 DIAGNOSIS — N39.0 URINARY TRACT INFECTION WITHOUT HEMATURIA, SITE UNSPECIFIED: ICD-10-CM

## 2023-10-04 PROCEDURE — 74176 CT ABDOMEN PELVIS WITHOUT CONTRAST: ICD-10-PCS | Mod: 26,,, | Performed by: RADIOLOGY

## 2023-10-04 PROCEDURE — 74176 CT ABD & PELVIS W/O CONTRAST: CPT | Mod: TC

## 2023-10-04 PROCEDURE — 74176 CT ABD & PELVIS W/O CONTRAST: CPT | Mod: 26,,, | Performed by: RADIOLOGY

## 2023-10-05 LAB
FINAL PATHOLOGIC DIAGNOSIS: NORMAL
Lab: NORMAL

## 2023-10-06 ENCOUNTER — PATIENT OUTREACH (OUTPATIENT)
Dept: ADMINISTRATIVE | Facility: HOSPITAL | Age: 73
End: 2023-10-06
Payer: MEDICARE

## 2023-10-06 NOTE — PROGRESS NOTES
Population Health Chart Review & Patient Outreach Details:     Reason for Outreach Encounter:     []  Non-Compliant Report   [x]  Payor Report (Hum[employer], PHN, BCBS, MSSP, MCIP, UHC, etc.)   []  Pre-Visit Chart Review     Updates Requested / Reviewed:     [x]  Care Everywhere    []     []  External Sources (LabCorp, Quest, DIS, etc.)   []  Care Team Updated    Patient Outreach Method:    []  Telephone Outreach Completed   [] Successful   [] Left Voicemail   [] Unable to Contact (wrong number, no voicemail)  []  [facility] Out[facility] Sent  []  Letter Outreach Mailed  []  Fax Sent for External Records  []  External Records Upload    Health Maintenance Topics Addressed and Outreach Outcomes / Actions Taken:        []      Breast Cancer Screening []  Mammo Scheduled      []  External Records Requested     []  Added Reminder to Complete to Upcoming Primary Care Appt Notes     []  Patient Declined     []  Patient Will Call Back to Schedule     []  Patient Will Schedule with External Provider / Order Routed if Applicable             []       Cervical Cancer Screening []  Pap Scheduled      []  External Records Requested     []  Added Reminder to Complete to Upcoming Primary Care Appt Notes     []  Patient Declined     []  Patient Will Call Back to Schedule     []  Patient Will Schedule with External Provider               []          Colorectal Cancer Screening []  Colonoscopy Case Request or Referral Placed     []  External Records Requested     []  Added Reminder to Complete to Upcoming Primary Care Appt Notes     []  Patient Declined     []  Patient Will Call Back to Schedule     []  Patient Will Schedule with External Provider     []  Fit Kit Mailed (add the SmartPhrase under additional notes)     []  Reminded Patient to Complete Home Test             []      Diabetic Eye Exam []  Eye Camera Scheduled or Optometry Referral Placed     []  External Records Requested     []  Added Reminder to Complete to  Upcoming Primary Care Appt Notes     []  Patient Declined     []  Patient Will Call Back to Schedule     []  Patient Will Schedule with External Provider             []      Blood Pressure Control []  Primary Care Follow Up Visit Scheduled     []  Remote Blood Pressure Reading Captured     []  Added Reminder to Complete to Upcoming Primary Care Appt Notes     []  Patient Declined     []  Patient Will Call Back / Patient Will Send Portal Message with Reading     []  Patient Will Call Back to Schedule Provider Visit             []       HbA1c & Other Labs []  Lab Appt Scheduled for Due Labs     []  Primary Care Follow Up Visit Scheduled      []  Reminded Patient to Complete Home Test     []  Added Reminder to Complete to Upcoming Primary Care Appt Notes     []  Patient Declined     []  Patient Will Call Back to Schedule     []  Patient Will Schedule with External Provider / Order Routed if Applicable           []    Schedule Primary Care Appt []  Primary Care Appt Scheduled     []  Patient Declined     []  Patient Will Call Back to Schedule     []  Pt Established with External Provider & Updated Care Team             [x]      Medication Adherence []  Primary Care Appointment Scheduled     []  Added Reminder to Upcoming Primary Care Appt Notes     []  Patient Reminded to  Prescription     []  Patient Declined, Provider Notified if Needed     [x]  Sent Provider Message to Review and/or Add Exclusion to Problem List             []      Osteoporosis Screening []  DXA Appointment Scheduled     []  External Records Requested     []  Added Reminder to Complete to Upcoming Primary Care Appt Notes     []  Patient Declined     []  Patient Will Call Back to Schedule     []  Patient Will Schedule with External Provider / Order Routed if Applicable     Additional Care Coordinator Notes:     [employer] report.    Further Action Needed If Patient Returns Outreach:

## 2023-10-09 ENCOUNTER — PATIENT OUTREACH (OUTPATIENT)
Dept: ADMINISTRATIVE | Facility: HOSPITAL | Age: 73
End: 2023-10-09
Payer: MEDICARE

## 2023-10-09 ENCOUNTER — PATIENT MESSAGE (OUTPATIENT)
Dept: ADMINISTRATIVE | Facility: HOSPITAL | Age: 73
End: 2023-10-09
Payer: MEDICARE

## 2023-10-09 NOTE — PROGRESS NOTES
Population Health Chart Review & Patient Outreach Details:     Reason for Outreach Encounter:     [x]  Non-Compliant Report   []  Payor Report (Humana, PHN, BCBS, MSSP, MCIP, UHC, etc.)   []  Pre-Visit Chart Review     Updates Requested / Reviewed:     [x]  Care Everywhere    []     []  External Sources (LabCorp, Quest, DIS, etc.)   []  Care Team Updated    Patient Outreach Method:    []  Telephone Outreach Completed   [] Successful   [] Left Voicemail   [] Unable to Contact (wrong number, no voicemail)  [x]  [facility] Outreach Sent  []  Letter Outreach Mailed  []  Fax Sent for External Records  []  External Records Upload    Health Maintenance Topics Addressed and Outreach Outcomes / Actions Taken:        []      Breast Cancer Screening []  Mammo Scheduled      []  External Records Requested     []  Added Reminder to Complete to Upcoming Primary Care Appt Notes     []  Patient Declined     []  Patient Will Call Back to Schedule     []  Patient Will Schedule with External Provider / Order Routed if Applicable             []       Cervical Cancer Screening []  Pap Scheduled      []  External Records Requested     []  Added Reminder to Complete to Upcoming Primary Care Appt Notes     []  Patient Declined     []  Patient Will Call Back to Schedule     []  Patient Will Schedule with External Provider               []          Colorectal Cancer Screening []  Colonoscopy Case Request or Referral Placed     []  External Records Requested     []  Added Reminder to Complete to Upcoming Primary Care Appt Notes     []  Patient Declined     []  Patient Will Call Back to Schedule     []  Patient Will Schedule with External Provider     []  Fit Kit Mailed (add the SmartPhrase under additional notes)     []  Reminded Patient to Complete Home Test             []      Diabetic Eye Exam []  Eye Camera Scheduled or Optometry Referral Placed     []  External Records Requested     []  Added Reminder to Complete to  Upcoming Primary Care Appt Notes     []  Patient Declined     []  Patient Will Call Back to Schedule     []  Patient Will Schedule with External Provider             []      Blood Pressure Control []  Primary Care Follow Up Visit Scheduled     []  Remote Blood Pressure Reading Captured     []  Added Reminder to Complete to Upcoming Primary Care Appt Notes     []  Patient Declined     []  Patient Will Call Back / Patient Will Send Portal Message with Reading     []  Patient Will Call Back to Schedule Provider Visit             []       HbA1c & Other Labs []  Lab Appt Scheduled for Due Labs     []  Primary Care Follow Up Visit Scheduled      []  Reminded Patient to Complete Home Test     []  Added Reminder to Complete to Upcoming Primary Care Appt Notes     []  Patient Declined     []  Patient Will Call Back to Schedule     []  Patient Will Schedule with External Provider / Order Routed if Applicable           [x]    Schedule Primary Care Appt []  Primary Care Appt Scheduled     []  Patient Declined     []  Patient Will Call Back to Schedule     []  Pt Established with External Provider & Updated Care Team             []      Medication Adherence []  Primary Care Appointment Scheduled     []  Added Reminder to Upcoming Primary Care Appt Notes     []  Patient Reminded to  Prescription     []  Patient Declined, Provider Notified if Needed     []  Sent Provider Message to Review and/or Add Exclusion to Problem List             []      Osteoporosis Screening []  DXA Appointment Scheduled     []  External Records Requested     []  Added Reminder to Complete to Upcoming Primary Care Appt Notes     []  Patient Declined     []  Patient Will Call Back to Schedule     []  Patient Will Schedule with External Provider / Order Routed if Applicable     Additional Care Coordinator Notes:         Further Action Needed If Patient Returns Outreach:

## 2023-10-16 ENCOUNTER — OFFICE VISIT (OUTPATIENT)
Dept: OBSTETRICS AND GYNECOLOGY | Facility: CLINIC | Age: 73
End: 2023-10-16
Payer: MEDICARE

## 2023-10-16 ENCOUNTER — PATIENT MESSAGE (OUTPATIENT)
Dept: PSYCHIATRY | Facility: CLINIC | Age: 73
End: 2023-10-16
Payer: MEDICARE

## 2023-10-16 VITALS
SYSTOLIC BLOOD PRESSURE: 128 MMHG | DIASTOLIC BLOOD PRESSURE: 82 MMHG | BODY MASS INDEX: 26.68 KG/M2 | WEIGHT: 166 LBS | HEIGHT: 66 IN | RESPIRATION RATE: 18 BRPM

## 2023-10-16 DIAGNOSIS — N95.2 ATROPHIC VAGINITIS: Primary | ICD-10-CM

## 2023-10-16 DIAGNOSIS — G89.4 CHRONIC PAIN DISORDER: ICD-10-CM

## 2023-10-16 DIAGNOSIS — N89.8 VAGINAL DISCHARGE: ICD-10-CM

## 2023-10-16 DIAGNOSIS — L90.0 LICHEN SCLEROSUS ET ATROPHICUS: ICD-10-CM

## 2023-10-16 PROCEDURE — 3008F PR BODY MASS INDEX (BMI) DOCUMENTED: ICD-10-PCS | Mod: HCNC,CPTII,S$GLB, | Performed by: OBSTETRICS & GYNECOLOGY

## 2023-10-16 PROCEDURE — 3074F PR MOST RECENT SYSTOLIC BLOOD PRESSURE < 130 MM HG: ICD-10-PCS | Mod: HCNC,CPTII,S$GLB, | Performed by: OBSTETRICS & GYNECOLOGY

## 2023-10-16 PROCEDURE — 99214 OFFICE O/P EST MOD 30 MIN: CPT | Mod: HCNC,S$GLB,, | Performed by: OBSTETRICS & GYNECOLOGY

## 2023-10-16 PROCEDURE — 3288F PR FALLS RISK ASSESSMENT DOCUMENTED: ICD-10-PCS | Mod: HCNC,CPTII,S$GLB, | Performed by: OBSTETRICS & GYNECOLOGY

## 2023-10-16 PROCEDURE — 1126F AMNT PAIN NOTED NONE PRSNT: CPT | Mod: HCNC,CPTII,S$GLB, | Performed by: OBSTETRICS & GYNECOLOGY

## 2023-10-16 PROCEDURE — 81514 NFCT DS BV&VAGINITIS DNA ALG: CPT | Mod: HCNC | Performed by: OBSTETRICS & GYNECOLOGY

## 2023-10-16 PROCEDURE — 4010F ACE/ARB THERAPY RXD/TAKEN: CPT | Mod: HCNC,CPTII,S$GLB, | Performed by: OBSTETRICS & GYNECOLOGY

## 2023-10-16 PROCEDURE — 99999 PR PBB SHADOW E&M-EST. PATIENT-LVL V: ICD-10-PCS | Mod: PBBFAC,HCNC,, | Performed by: OBSTETRICS & GYNECOLOGY

## 2023-10-16 PROCEDURE — 99214 PR OFFICE/OUTPT VISIT, EST, LEVL IV, 30-39 MIN: ICD-10-PCS | Mod: HCNC,S$GLB,, | Performed by: OBSTETRICS & GYNECOLOGY

## 2023-10-16 PROCEDURE — 3061F NEG MICROALBUMINURIA REV: CPT | Mod: HCNC,CPTII,S$GLB, | Performed by: OBSTETRICS & GYNECOLOGY

## 2023-10-16 PROCEDURE — 3074F SYST BP LT 130 MM HG: CPT | Mod: HCNC,CPTII,S$GLB, | Performed by: OBSTETRICS & GYNECOLOGY

## 2023-10-16 PROCEDURE — 3051F PR MOST RECENT HEMOGLOBIN A1C LEVEL 7.0 - < 8.0%: ICD-10-PCS | Mod: HCNC,CPTII,S$GLB, | Performed by: OBSTETRICS & GYNECOLOGY

## 2023-10-16 PROCEDURE — 3066F PR DOCUMENTATION OF TREATMENT FOR NEPHROPATHY: ICD-10-PCS | Mod: HCNC,CPTII,S$GLB, | Performed by: OBSTETRICS & GYNECOLOGY

## 2023-10-16 PROCEDURE — 1101F PT FALLS ASSESS-DOCD LE1/YR: CPT | Mod: HCNC,CPTII,S$GLB, | Performed by: OBSTETRICS & GYNECOLOGY

## 2023-10-16 PROCEDURE — 3051F HG A1C>EQUAL 7.0%<8.0%: CPT | Mod: HCNC,CPTII,S$GLB, | Performed by: OBSTETRICS & GYNECOLOGY

## 2023-10-16 PROCEDURE — 1159F MED LIST DOCD IN RCRD: CPT | Mod: HCNC,CPTII,S$GLB, | Performed by: OBSTETRICS & GYNECOLOGY

## 2023-10-16 PROCEDURE — 4010F PR ACE/ARB THEARPY RXD/TAKEN: ICD-10-PCS | Mod: HCNC,CPTII,S$GLB, | Performed by: OBSTETRICS & GYNECOLOGY

## 2023-10-16 PROCEDURE — 1159F PR MEDICATION LIST DOCUMENTED IN MEDICAL RECORD: ICD-10-PCS | Mod: HCNC,CPTII,S$GLB, | Performed by: OBSTETRICS & GYNECOLOGY

## 2023-10-16 PROCEDURE — 3008F BODY MASS INDEX DOCD: CPT | Mod: HCNC,CPTII,S$GLB, | Performed by: OBSTETRICS & GYNECOLOGY

## 2023-10-16 PROCEDURE — 3066F NEPHROPATHY DOC TX: CPT | Mod: HCNC,CPTII,S$GLB, | Performed by: OBSTETRICS & GYNECOLOGY

## 2023-10-16 PROCEDURE — 1101F PR PT FALLS ASSESS DOC 0-1 FALLS W/OUT INJ PAST YR: ICD-10-PCS | Mod: HCNC,CPTII,S$GLB, | Performed by: OBSTETRICS & GYNECOLOGY

## 2023-10-16 PROCEDURE — 3288F FALL RISK ASSESSMENT DOCD: CPT | Mod: HCNC,CPTII,S$GLB, | Performed by: OBSTETRICS & GYNECOLOGY

## 2023-10-16 PROCEDURE — 3079F DIAST BP 80-89 MM HG: CPT | Mod: HCNC,CPTII,S$GLB, | Performed by: OBSTETRICS & GYNECOLOGY

## 2023-10-16 PROCEDURE — 3061F PR NEG MICROALBUMINURIA RESULT DOCUMENTED/REVIEW: ICD-10-PCS | Mod: HCNC,CPTII,S$GLB, | Performed by: OBSTETRICS & GYNECOLOGY

## 2023-10-16 PROCEDURE — 99999 PR PBB SHADOW E&M-EST. PATIENT-LVL V: CPT | Mod: PBBFAC,HCNC,, | Performed by: OBSTETRICS & GYNECOLOGY

## 2023-10-16 PROCEDURE — 1126F PR PAIN SEVERITY QUANTIFIED, NO PAIN PRESENT: ICD-10-PCS | Mod: HCNC,CPTII,S$GLB, | Performed by: OBSTETRICS & GYNECOLOGY

## 2023-10-16 PROCEDURE — 3079F PR MOST RECENT DIASTOLIC BLOOD PRESSURE 80-89 MM HG: ICD-10-PCS | Mod: HCNC,CPTII,S$GLB, | Performed by: OBSTETRICS & GYNECOLOGY

## 2023-10-16 RX ORDER — OXYCODONE AND ACETAMINOPHEN 10; 325 MG/1; MG/1
1 TABLET ORAL EVERY 8 HOURS PRN
Qty: 90 TABLET | Refills: 0 | Status: SHIPPED | OUTPATIENT
Start: 2023-10-16 | End: 2023-11-14

## 2023-10-16 RX ORDER — OXYCODONE AND ACETAMINOPHEN 10; 325 MG/1; MG/1
1 TABLET ORAL EVERY 8 HOURS PRN
Qty: 90 TABLET | Refills: 0 | Status: SHIPPED | OUTPATIENT
Start: 2023-11-11 | End: 2023-11-10 | Stop reason: SDUPTHER

## 2023-10-16 NOTE — PROGRESS NOTES
Subjective:   Chief Complaint:  Follow-up (Atrophic vaginitis f/u)       Patient ID: Nathalie Santiago is a  73 y.o. female.    HRT: None    Date: 2003    The patient presents today due to the following:  She has a significant medical history including sarcoidosis, history of bilateral breast cancer as well as cervical and uterine malignancies.    She presents today due to issues with vulvar vaginal burning discomfort over the last few years.  She is concerned that she may have  a sarcoid lesion on the vulva.  Previous hysterectomy.  No vaginal bleeding and no pelvic pain.    She does not require mammogram and colonoscopy was six years ago with recommendations Q 10 years.    Date: 10/16/2023    The patient presents today for follow-up.  She was last seen as above.  In light of her vulvar vaginal issues a biopsy was obtained.      The patient presents today to discuss the results of the biopsy as well as reported vaginal discharge with vaginal odor.    Final Pathologic Diagnosis RELIAPATH DIAGNOSIS:     VULVA, BIOPSY:   - Benign skin with lichen sclerosis et atrophicus.   - Negative for malignancy and dysplasia.   - Multiple levels examined.        GYN & OB History  No LMP recorded (lmp unknown). Patient has had a hysterectomy.     Date of Last Pap: No result found  OB History          5    Para   1    Term   1            AB   4    Living   1         SAB   4    IAB        Ectopic        Multiple        Live Births               Obstetric Comments   Vaginal delivery x 1  SAB x 4               Allergies:   Review of patient's allergies indicates:   Allergen Reactions    Vasotec [enalapril maleate] Other (See Comments)     Causes her to pass out    Bupropion Other (See Comments)     Loss of memory  Other reaction(s): Other (See Comments)  Loss of memory  Loss of memory    Ciprofloxacin (bulk)      Bones ache, myalgia    Ciprofloxacin hcl     Enalapril     Wellbutrin [bupropion hcl] Other  (See Comments)     Loss of memory      Zithromax [azithromycin]      Patient states medication does not work on her-not effective    Codeine Itching and Nausea Only       Past Medical History:   Diagnosis Date    Abnormal Pap smear 1972    cervical cancer    Acute cystitis without hematuria 03/28/2017    Anxiety     Arthritis     Asthma     Ataxia     Breast cancer     Cancer     bilateral mastectomy, uterine cancer, ovarian cancer    Cataract     Cervical back pain with evidence of disc disease     Cervical cancer     Chronic bilateral low back pain without sciatica 09/06/2016    Chronic bronchitis     Cortical cataract 02/18/2013    Current chronic use of systemic steroids 10/18/2018    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    CVA (cerebral vascular accident)- Confirmed by neurology 09/06/2016    Degenerative joint disease of cervical and lumbar spine 08/01/2014    Depression     Diabetes mellitus     Diabetes mellitus, type II     Gastroenteritis 12/19/2016    History of malignant phylloides tumor of breast 02/15/2013    Hyperlipidemia     Hyperopia with presbyopia 02/18/2013    Hypertension     Hypertension     Hypothyroidism     Immunosuppressed status 09/03/2015    Laceration of nose 12/01/2020    Leukocytosis 11/17/2016    Migraine     Mitral valve regurgitation     Normochromic anemia 03/28/2017    Nuclear sclerosis 02/18/2013    Obesity     JOSE LUIS (obstructive sleep apnea)     Ovarian cancer     Personal history of colonic polyps     Pneumonia     Polyneuropathy     PVD (posterior vitreous detachment) 02/18/2013    Restless leg syndrome     Ruptured disk     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    Sarcoidosis 2006    Sarcoidosis of lung     Squamous cell carcinoma     Trouble in sleeping     Uterine cancer     Venous insufficiency        Past Surgical History:   Procedure Laterality Date    APPENDECTOMY      BREAST SURGERY      Implants In & Out    CATARACT EXTRACTION W/  INTRAOCULAR LENS  IMPLANT      CHOLECYSTECTOMY      CYSTOCELE REPAIR      FRACTURE SURGERY Right     foot    HYSTERECTOMY  1973    SAADIA with conservation ovaries - AUB (findings of early Cx CA)    MASTECTOMY Bilateral     Initially 2005 and 2nd breast in 2008    OOPHORECTOMY  1983    Ovarian CA early No Chemo    PLEURA BIOPSY      REPAIR OF RECTOCELE      TONSILLECTOMY         Medications    Current Outpatient Medications:     albuterol (PROVENTIL/VENTOLIN HFA) 90 mcg/actuation inhaler, Inhale 1-2 puffs into the lungs every 4 (four) hours as needed for Wheezing or Shortness of Breath. This is a rescue inhaler medication and should be used as needed, Disp: 54 g, Rfl: 3    albuterol-ipratropium (DUO-NEB) 2.5 mg-0.5 mg/3 mL nebulizer solution, INHALE THE CONTENTS OF 1 VIAL VIA NEBULIZER EVERY 6 HOURS AS NEEDED FOR SHORTNESS OF BREATH  OR  WHEEZING, Disp: 120 each, Rfl: 0    amLODIPine (NORVASC) 5 MG tablet, Take 1 tablet (5 mg total) by mouth 2 (two) times a day., Disp: 180 tablet, Rfl: 1    blood sugar diagnostic Strp, To check BG 3  times daily, to use with insurance preferred meter, Disp: 200 strip, Rfl: 11    clopidogreL (PLAVIX) 75 mg tablet, TAKE 1 TABLET EVERY DAY, Disp: 90 tablet, Rfl: 1    diclofenac sodium (VOLTAREN) 1 % Gel, APPLY TOPICALLY TO AFFECTED AREA TWICE DAILY AS NEEDED FOR PAIN RUB IN JOINTS, Disp: 1 Tube, Rfl: 2    DULoxetine (CYMBALTA) 30 MG capsule, TAKE 1 CAPSULE (30 MG TOTAL) BY MOUTH EVERY EVENING., Disp: 90 capsule, Rfl: 0    DULoxetine (CYMBALTA) 60 MG capsule, TAKE 1 CAPSULE EVERY EVENING, Disp: 90 capsule, Rfl: 0    fenofibrate 160 MG Tab, TAKE 1 TABLET (160 MG TOTAL) BY MOUTH ONCE DAILY., Disp: 90 tablet, Rfl: 3    fluticasone (FLONASE) 50 mcg/actuation nasal spray, 2 sprays by Each Nare route once daily. (Patient taking differently: 2 sprays by Each Nostril route daily as needed.), Disp: 1 Bottle, Rfl: 0    furosemide (LASIX) 40 MG tablet, Take 40 mg by mouth., Disp: , Rfl:     hydroCHLOROthiazide  (HYDRODIURIL) 25 MG tablet, TAKE 1 TABLET EVERY DAY, Disp: 90 tablet, Rfl: 2    insulin (LANTUS SOLOSTAR U-100 INSULIN) glargine 100 units/mL SubQ pen, Use as directed for 28 days (up to 48 U daily), Disp: 15 mL, Rfl: 2    insulin aspart U-100 (NOVOLOG FLEXPEN U-100 INSULIN) 100 unit/mL (3 mL) InPn pen, Inject 10 units three times daily with meals.Use sliding scale as directed. MAX  15 UNITS FOR EACH DOSE., Disp: 45 mL, Rfl: 3    levothyroxine (SYNTHROID) 25 MCG tablet, TAKE 1 TABLET BEFORE BREAKFAST (Patient taking differently: Take 25 mcg by mouth before breakfast. 0.75 mcg daily), Disp: 90 tablet, Rfl: 3    levothyroxine (SYNTHROID) 75 MCG tablet, Take 75 mcg by mouth., Disp: , Rfl:     methenamine (HIPREX) 1 gram Tab, Take by mouth., Disp: , Rfl:     metoclopramide HCl (REGLAN) 10 MG tablet, TAKE 1 TABLET EVERY 8 HOURS (Patient taking differently: Take 10 mg by mouth 3 (three) times daily as needed.), Disp: 270 tablet, Rfl: 1    metoprolol succinate (TOPROL-XL) 25 MG 24 hr tablet, TAKE 1 TABLET EVERY DAY, Disp: 90 tablet, Rfl: 3    multivitamin Chew, Take by mouth., Disp: , Rfl:     multivitamin, stress formula Tab, Take 1 tablet by mouth., Disp: , Rfl:     naloxone (NARCAN) 4 mg/actuation Spry, Take as a single dose in one nostril; may repeat every 2 to 3 minutes in alternating nostrils until medical assistance becomes available  - use only in event of medication overdose, Disp: 1 each, Rfl: 0    oxyCODONE-acetaminophen (PERCOCET)  mg per tablet, Take 1 tablet by mouth every 8 (eight) hours as needed for Pain. Medically necessary for greater than 7 days for chronic pain, Disp: 90 tablet, Rfl: 0    potassium chloride (MICRO-K) 10 MEQ CpSR, TAKE 1 CAPSULE THREE TIMES DAILY, Disp: 270 capsule, Rfl: 1    pramipexole (MIRAPEX) 0.125 MG tablet, Take two tablets every night, Disp: 180 tablet, Rfl: 0    predniSONE (DELTASONE) 20 MG tablet, Take 1 tablet (20 mg total) by mouth once daily. For asthma flare take one  daily for 5 days and repeat for asthma., Disp: 20 tablet, Rfl: 0    promethazine (PHENERGAN) 25 MG tablet, TAKE 1 TABLET EVERY 6 HOURS AS NEEDED FOR NAUSEA., Disp: 90 tablet, Rfl: 3    spironolactone (ALDACTONE) 25 MG tablet, TAKE 1 TABLET EVERY DAY, Disp: 90 tablet, Rfl: 3    tirzepatide (MOUNJARO) 5 mg/0.5 mL PnIj, Inject 5 mg into the skin every 7 days., Disp: 4 pen , Rfl: 2    valsartan (DIOVAN) 80 MG tablet, TAKE 1 TABLET ONE TIME DAILY, Disp: 90 tablet, Rfl: 1    cephALEXin (KEFLEX) 500 MG capsule, Take 500 mg by mouth once daily., Disp: , Rfl:     clobetasoL (TEMOVATE) 0.05 % cream, Apply topically 2 (two) times daily., Disp: 60 g, Rfl: 1    gabapentin (NEURONTIN) 300 MG capsule, TAKE 3 TO 4 CAPSULES NIGHTLY. MEDICATION MAY CAUSE SEDATION., Disp: 360 capsule, Rfl: 0    [START ON 11/11/2023] oxyCODONE-acetaminophen (PERCOCET)  mg per tablet, Take 1 tablet by mouth every 8 (eight) hours as needed for Pain. Medically necessary for greater than 7 days for chronic pain, Disp: 90 tablet, Rfl: 0    sertraline (ZOLOFT) 100 MG tablet, TAKE 1 TABLET EVERY DAY, Disp: 90 tablet, Rfl: 0     Social History     Tobacco Use    Smoking status: Never    Smokeless tobacco: Never   Substance Use Topics    Alcohol use: No    Drug use: No     Types: Oxycodone     Comment: prescription Oycodone       Family History   Problem Relation Age of Onset    Heart disease Mother     Stroke Mother     Hyperlipidemia Mother     Cancer Father         small cell lung cancer    Hypertension Sister     Cataracts Sister     Thyroid disease Sister     Hyperlipidemia Sister     Heart disease Sister         CAD, stents in place    Hypertension Brother     Cataracts Brother     Hyperlipidemia Brother     Heart disease Brother         CAD    Hypertension Daughter     Hyperlipidemia Daughter     Anuerysm Daughter         splenic    Hyperlipidemia Sister     Hypertension Sister     Diabetes Sister     Cancer Sister         thyroid cancer     Hypertension Brother     Hyperlipidemia Brother     Hypertension Brother     Hyperlipidemia Brother     Amblyopia Neg Hx     Blindness Neg Hx     Glaucoma Neg Hx     Macular degeneration Neg Hx     Retinal detachment Neg Hx     Strabismus Neg Hx     Breast cancer Neg Hx     Colon cancer Neg Hx     Ovarian cancer Neg Hx        Review of Systems (at today's evaluation)  Review of Systems   Constitutional:  Negative for fever and unexpected weight change.   Respiratory: Negative.     Cardiovascular:  Negative for chest pain and palpitations.   Gastrointestinal:  Negative for nausea and vomiting.   Genitourinary:  Positive for bladder incontinence (seen by Urology 09/27/2023). Negative for dysuria, hematuria and urgency.        Gyn as per HPI   Neurological:  Negative for headaches.        Objective:      Vitals:    10/16/23 1306   BP: 128/82   Resp: 18     Physical Exam:   Constitutional: She appears well-developed and well-nourished.    HENT:   Head: Normocephalic.     Neck: No thyroid mass present.    Cardiovascular:  Normal rate, regular rhythm and normal heart sounds.             Pulmonary/Chest: Effort normal and breath sounds normal.        Abdominal: Soft. There is no abdominal tenderness.     Genitourinary:    Inguinal canal, right adnexa and left adnexa normal.            Pelvic exam was performed with patient supine.   The external female genitalia was abnormal.   External genitalia lesions present. Right adnexum displays no tenderness and no fullness. Left adnexum displays no tenderness and no fullness. There is erythema in the vagina. No tenderness or bleeding in the vagina. Vaginal atrophy noted. Cervix is absent.Uterus is absent. Normal urethral meatus.Urethra findings: no tendernessBladder findings: no bladder tenderness          Musculoskeletal:      Right lower leg: No edema.      Left lower leg: No edema.      Lymphadenopathy:     She has no cervical adenopathy. No inguinal adenopathy noted on the  right or left side.    Neurological: She is alert.    Skin: Skin is warm and dry.    Psychiatric: Mood normal.         Recent Laboratory Results:    Final Pathologic Diagnosis RELIAPATH DIAGNOSIS:   VULVA, BIOPSY:   - Benign skin with lichen sclerosis et atrophicus.   - Negative for malignancy and dysplasia.   - Multiple levels examined.      Assessment:        1. Atrophic vaginitis    2. Vaginal discharge    3. Lichen sclerosus et atrophicus        Plan:      Atrophic vaginitis    Vaginal discharge  -     Vaginosis Screen by DNA Probe    Lichen sclerosus et atrophicus  -     clobetasoL (TEMOVATE) 0.05 % cream; Apply topically 2 (two) times daily.  Dispense: 60 g; Refill: 1       Follow up in about 3 months (around 1/16/2024) for Follow-up on today's evaluation, as needed / for any GYN related issues.     The above was reviewed discussed with the patient, including the pathological diagnosis of lichen sclerosis.  Lichen sclerosis in general was discussed and information on lichen sclerosis was provided.  We discussed the plan to move from twice daily treatment to twice weekly treatment based on the patient's response to the super potent steroid prescribed.  The pros, cons, risks, benefits, alternatives and indications of the medication(s) prescribed, as well as appropriate use and potential side effects were discussed.    In addition to the above a vaginitis panel was obtained to evaluate for underlying vaginal issues with may be present in addition to the atrophic changes in lichen sclerosis.    The patient's questions were answered, and she is in agreement with the current plan.     Nigel Mendez MD  Department OBGYN [facility]

## 2023-10-16 NOTE — TELEPHONE ENCOUNTER
----- Message from Deejay Sanchez sent at 10/16/2023 10:50 AM CDT -----  Regarding: refill  Type:  RX Refill Request    Who Called: pt    Refill or New Rx:refill    RX Name and Strength:oxyCODONE-acetaminophen (PERCOCET)  mg per tablet 90 tablet 0 10/13/2023 11/11/2023   Sig - Route: Take 1 tablet by mouth every 8 (eight) hours as needed for Pain. Medically necessary for greater than 7 days for chronic pain - Oral   Sent to pharmacy as: oxyCODONE-acetaminophen (PERCOCET)  mg per tablet         How is the patient currently taking it? (ex. 1XDay):see above    Is this a 30 day or 90 day RX:see above        [facility] - [place], [place] - [address]  [address]  [place] [address]  Phone: [phone] Fax: [phone]        Local or Mail Order:Local    Ordering Provider:Maverick Sandoval Call Back Number:see above    Additional Information: pt wants script sent in for oct & nov.  Please call to discuss.

## 2023-10-17 LAB
BACTERIAL VAGINOSIS DNA: NEGATIVE
CANDIDA GLABRATA DNA: NEGATIVE
CANDIDA KRUSEI DNA: NEGATIVE
CANDIDA RRNA VAG QL PROBE: NEGATIVE
T VAGINALIS RRNA GENITAL QL PROBE: NEGATIVE

## 2023-10-19 ENCOUNTER — PATIENT MESSAGE (OUTPATIENT)
Dept: OBSTETRICS AND GYNECOLOGY | Facility: CLINIC | Age: 73
End: 2023-10-19
Payer: MEDICARE

## 2023-10-20 ENCOUNTER — PATIENT MESSAGE (OUTPATIENT)
Dept: OBSTETRICS AND GYNECOLOGY | Facility: CLINIC | Age: 73
End: 2023-10-20
Payer: MEDICARE

## 2023-10-20 RX ORDER — CLOBETASOL PROPIONATE 0.5 MG/G
CREAM TOPICAL 2 TIMES DAILY
Qty: 60 G | Refills: 1 | Status: SHIPPED | OUTPATIENT
Start: 2023-10-20

## 2023-10-20 NOTE — PATIENT INSTRUCTIONS
What is lichen sclerosus?  Lichen sclerosus is a disorder of the skin surrounding the vagina and anus that affects approximately 1.5% of women. The word lichen means thick and the word sclerosus means scarring. It is considered a chronic condition, meaning once it is diagnosed, it does not go away. It is usually diagnosed in a woman's early 50s, but can sometimes be found in young girls who have not yet started getting periods. A small number of women with lichen sclerosus may have it in other areas of their skin. While the cause of lichen sclerosus has not been completely proven, it is most likely that lichen sclerosus is an auto-immune disorder. Women with lichen sclerosus have a 4 - 6% risk of developing vulvar cancer--though the risk of cancer is decreased with proper treatment and following closely with your healthcare provider.    What are the symptoms of lichen sclerosus?  While some patients do not have symptoms, most women have itching, vulvar pain, and/or pain with intercourse. On examination there can be white, thin areas of skin that are sometimes described as looking like cigarette paper or wax paper. There can be areas of bruising under the skin and there are frequently tears or fissures (cracks in the skin). Inflammation from this condition can lead to scarring such as narrowing of the vaginal opening, loss of the labia minora, (the thin skin folds nearest the vaginal opening) and the scarring over the clitoris (phimosis).  How is lichen sclerosus treated?    The most important treatment is ultra-potent topical corticosteroid ointment, such as clobetasol propionate ointment. This is usually used daily until things are improved (less itching, more normal appearance of skin, or both). Patients will need to be seen in two to three months to check for improvement. Once improvement has been demonstrated, the ointment may be used less often. It is important to remember this is a chronic  condition and regular follow up with your healthcare provider is very important, as s/he may find signs that the condition is worsening even when you do not have symptoms. Keeping the disease in remission (very well controlled) lowers the risk of developing cancer    What will my life be like?    Lichen sclerosus is a lifelong condition. However, with consistent treatment, most symptoms can be very well controlled. In addition, consistent treatment reduces further scarring and lowers the chance of developing cancer.    The following organizations also provide reliable health information.    ?National Institutes of Health         (https://rarediseases.info.nih.gov/diseases/6905/lichen-sclerosus)    ?National Vulvodynia Association         (www.nva.org)    ?The International Society for the Study of Vulvovaginal Disease         (www.issvd.org)    ?[employer]         (www.caredownthere.com.au)

## 2023-10-23 ENCOUNTER — TELEPHONE (OUTPATIENT)
Dept: PSYCHIATRY | Facility: CLINIC | Age: 73
End: 2023-10-23
Payer: MEDICARE

## 2023-10-23 NOTE — TELEPHONE ENCOUNTER
Patient called to change appointment today to virtual visit as she is not feeling well.  I called patient and explained cannot do virtual visit if not going to be in [place]. She stated she would need to late cancel and reschedule. I let her know we will late cancel appointment and call her when can get her rescheduled

## 2023-11-10 ENCOUNTER — PATIENT MESSAGE (OUTPATIENT)
Dept: PAIN MEDICINE | Facility: CLINIC | Age: 73
End: 2023-11-10
Payer: MEDICARE

## 2023-11-10 ENCOUNTER — PATIENT MESSAGE (OUTPATIENT)
Dept: OTHER | Facility: OTHER | Age: 73
End: 2023-11-10
Payer: MEDICARE

## 2023-11-10 ENCOUNTER — PATIENT MESSAGE (OUTPATIENT)
Dept: PSYCHIATRY | Facility: CLINIC | Age: 73
End: 2023-11-10
Payer: MEDICARE

## 2023-11-10 DIAGNOSIS — G89.4 CHRONIC PAIN DISORDER: ICD-10-CM

## 2023-11-10 RX ORDER — OXYCODONE AND ACETAMINOPHEN 10; 325 MG/1; MG/1
1 TABLET ORAL EVERY 8 HOURS PRN
Qty: 90 TABLET | Refills: 0 | Status: SHIPPED | OUTPATIENT
Start: 2023-11-11 | End: 2023-12-28 | Stop reason: SDUPTHER

## 2023-11-10 NOTE — TELEPHONE ENCOUNTER
See request for virtual visit. Will be in [place]. Last in clinic visit was on 4/12/22. Please advise.

## 2023-12-27 ENCOUNTER — TELEPHONE (OUTPATIENT)
Dept: PAIN MEDICINE | Facility: CLINIC | Age: 73
End: 2023-12-27
Payer: MEDICARE

## 2023-12-27 NOTE — TELEPHONE ENCOUNTER
Called patient rescheduled appt to tomorrow        ----- Message from Joanna Perez sent at 12/27/2023  8:04 AM CST -----  Contact: self  Type: Needs Medical Advice  Who Called:  pt  Best Call Back Number: [phone]   Additional Information: please call pt has an appt today at 1.30pm and would like to see if she can reschedule for tomorrow if possible.pt has no transportation

## 2023-12-28 ENCOUNTER — TELEPHONE (OUTPATIENT)
Dept: PAIN MEDICINE | Facility: CLINIC | Age: 73
End: 2023-12-28
Payer: MEDICARE

## 2023-12-28 ENCOUNTER — OFFICE VISIT (OUTPATIENT)
Dept: PAIN MEDICINE | Facility: CLINIC | Age: 73
End: 2023-12-28
Payer: MEDICARE

## 2023-12-28 VITALS
WEIGHT: 166 LBS | BODY MASS INDEX: 26.68 KG/M2 | HEIGHT: 66 IN | HEART RATE: 89 BPM | SYSTOLIC BLOOD PRESSURE: 106 MMHG | DIASTOLIC BLOOD PRESSURE: 67 MMHG

## 2023-12-28 DIAGNOSIS — G89.4 CHRONIC PAIN DISORDER: ICD-10-CM

## 2023-12-28 DIAGNOSIS — M51.36 DDD (DEGENERATIVE DISC DISEASE), LUMBAR: Primary | ICD-10-CM

## 2023-12-28 DIAGNOSIS — G89.29 CHRONIC PAIN OF RIGHT KNEE: ICD-10-CM

## 2023-12-28 DIAGNOSIS — M79.10 MYALGIA: ICD-10-CM

## 2023-12-28 DIAGNOSIS — M25.561 CHRONIC PAIN OF RIGHT KNEE: ICD-10-CM

## 2023-12-28 DIAGNOSIS — M47.896 OTHER SPONDYLOSIS, LUMBAR REGION: ICD-10-CM

## 2023-12-28 PROCEDURE — 99214 OFFICE O/P EST MOD 30 MIN: CPT | Mod: S$GLB,,, | Performed by: PHYSICIAN ASSISTANT

## 2023-12-28 PROCEDURE — 3051F HG A1C>EQUAL 7.0%<8.0%: CPT | Mod: CPTII,S$GLB,, | Performed by: PHYSICIAN ASSISTANT

## 2023-12-28 PROCEDURE — 3074F PR MOST RECENT SYSTOLIC BLOOD PRESSURE < 130 MM HG: ICD-10-PCS | Mod: CPTII,S$GLB,, | Performed by: PHYSICIAN ASSISTANT

## 2023-12-28 PROCEDURE — 3061F NEG MICROALBUMINURIA REV: CPT | Mod: CPTII,S$GLB,, | Performed by: PHYSICIAN ASSISTANT

## 2023-12-28 PROCEDURE — 3008F BODY MASS INDEX DOCD: CPT | Mod: CPTII,S$GLB,, | Performed by: PHYSICIAN ASSISTANT

## 2023-12-28 PROCEDURE — 1125F AMNT PAIN NOTED PAIN PRSNT: CPT | Mod: CPTII,S$GLB,, | Performed by: PHYSICIAN ASSISTANT

## 2023-12-28 PROCEDURE — 3061F PR NEG MICROALBUMINURIA RESULT DOCUMENTED/REVIEW: ICD-10-PCS | Mod: CPTII,S$GLB,, | Performed by: PHYSICIAN ASSISTANT

## 2023-12-28 PROCEDURE — 3074F SYST BP LT 130 MM HG: CPT | Mod: CPTII,S$GLB,, | Performed by: PHYSICIAN ASSISTANT

## 2023-12-28 PROCEDURE — 3288F FALL RISK ASSESSMENT DOCD: CPT | Mod: CPTII,S$GLB,, | Performed by: PHYSICIAN ASSISTANT

## 2023-12-28 PROCEDURE — 1160F PR REVIEW ALL MEDS BY PRESCRIBER/CLIN PHARMACIST DOCUMENTED: ICD-10-PCS | Mod: CPTII,S$GLB,, | Performed by: PHYSICIAN ASSISTANT

## 2023-12-28 PROCEDURE — 99214 PR OFFICE/OUTPT VISIT, EST, LEVL IV, 30-39 MIN: ICD-10-PCS | Mod: S$GLB,,, | Performed by: PHYSICIAN ASSISTANT

## 2023-12-28 PROCEDURE — 4010F PR ACE/ARB THEARPY RXD/TAKEN: ICD-10-PCS | Mod: CPTII,S$GLB,, | Performed by: PHYSICIAN ASSISTANT

## 2023-12-28 PROCEDURE — 1101F PT FALLS ASSESS-DOCD LE1/YR: CPT | Mod: CPTII,S$GLB,, | Performed by: PHYSICIAN ASSISTANT

## 2023-12-28 PROCEDURE — 3066F PR DOCUMENTATION OF TREATMENT FOR NEPHROPATHY: ICD-10-PCS | Mod: CPTII,S$GLB,, | Performed by: PHYSICIAN ASSISTANT

## 2023-12-28 PROCEDURE — 1160F RVW MEDS BY RX/DR IN RCRD: CPT | Mod: CPTII,S$GLB,, | Performed by: PHYSICIAN ASSISTANT

## 2023-12-28 PROCEDURE — 3078F PR MOST RECENT DIASTOLIC BLOOD PRESSURE < 80 MM HG: ICD-10-PCS | Mod: CPTII,S$GLB,, | Performed by: PHYSICIAN ASSISTANT

## 2023-12-28 PROCEDURE — 1159F PR MEDICATION LIST DOCUMENTED IN MEDICAL RECORD: ICD-10-PCS | Mod: CPTII,S$GLB,, | Performed by: PHYSICIAN ASSISTANT

## 2023-12-28 PROCEDURE — 99999 PR PBB SHADOW E&M-EST. PATIENT-LVL III: CPT | Mod: PBBFAC,,, | Performed by: PHYSICIAN ASSISTANT

## 2023-12-28 PROCEDURE — 99999 PR PBB SHADOW E&M-EST. PATIENT-LVL III: ICD-10-PCS | Mod: PBBFAC,,, | Performed by: PHYSICIAN ASSISTANT

## 2023-12-28 PROCEDURE — 3078F DIAST BP <80 MM HG: CPT | Mod: CPTII,S$GLB,, | Performed by: PHYSICIAN ASSISTANT

## 2023-12-28 PROCEDURE — 1101F PR PT FALLS ASSESS DOC 0-1 FALLS W/OUT INJ PAST YR: ICD-10-PCS | Mod: CPTII,S$GLB,, | Performed by: PHYSICIAN ASSISTANT

## 2023-12-28 PROCEDURE — 4010F ACE/ARB THERAPY RXD/TAKEN: CPT | Mod: CPTII,S$GLB,, | Performed by: PHYSICIAN ASSISTANT

## 2023-12-28 PROCEDURE — 3051F PR MOST RECENT HEMOGLOBIN A1C LEVEL 7.0 - < 8.0%: ICD-10-PCS | Mod: CPTII,S$GLB,, | Performed by: PHYSICIAN ASSISTANT

## 2023-12-28 PROCEDURE — 1125F PR PAIN SEVERITY QUANTIFIED, PAIN PRESENT: ICD-10-PCS | Mod: CPTII,S$GLB,, | Performed by: PHYSICIAN ASSISTANT

## 2023-12-28 PROCEDURE — 3066F NEPHROPATHY DOC TX: CPT | Mod: CPTII,S$GLB,, | Performed by: PHYSICIAN ASSISTANT

## 2023-12-28 PROCEDURE — 1159F MED LIST DOCD IN RCRD: CPT | Mod: CPTII,S$GLB,, | Performed by: PHYSICIAN ASSISTANT

## 2023-12-28 PROCEDURE — 3008F PR BODY MASS INDEX (BMI) DOCUMENTED: ICD-10-PCS | Mod: CPTII,S$GLB,, | Performed by: PHYSICIAN ASSISTANT

## 2023-12-28 PROCEDURE — 3288F PR FALLS RISK ASSESSMENT DOCUMENTED: ICD-10-PCS | Mod: CPTII,S$GLB,, | Performed by: PHYSICIAN ASSISTANT

## 2023-12-28 RX ORDER — OXYCODONE AND ACETAMINOPHEN 10; 325 MG/1; MG/1
1 TABLET ORAL EVERY 8 HOURS PRN
Qty: 90 TABLET | Refills: 0 | Status: SHIPPED | OUTPATIENT
Start: 2024-01-26 | End: 2024-01-02 | Stop reason: SDUPTHER

## 2023-12-28 RX ORDER — OXYCODONE AND ACETAMINOPHEN 10; 325 MG/1; MG/1
1 TABLET ORAL EVERY 8 HOURS PRN
Qty: 90 TABLET | Refills: 0 | Status: SHIPPED | OUTPATIENT
Start: 2024-02-24 | End: 2024-01-03 | Stop reason: SDUPTHER

## 2023-12-28 RX ORDER — OXYCODONE AND ACETAMINOPHEN 10; 325 MG/1; MG/1
1 TABLET ORAL EVERY 8 HOURS PRN
Qty: 90 TABLET | Refills: 0 | Status: SHIPPED | OUTPATIENT
Start: 2023-12-28 | End: 2024-01-03 | Stop reason: SDUPTHER

## 2023-12-28 NOTE — PROGRESS NOTES
CC: left sided low back and left hip pain    Interval History: Ms. Santiago is a 69 y.o. female with chronic low back and hip pain who presents for follow up.  She had a synvisc one injection and a intra articular steroid injection in right knee with benefit for only 1 week.  She continues to have lower back, left greater than right lower back pain.  Left GTB and right knee pain has worsened. Her last left GTB injection provided some short-lived relief.  Stretching exercises have helped.  She had a series of Euflexxa injections in 2014. She continues to take percocet 10mg q 8 hrs as needed for pain with moderate benefits.  No side effects reported.  Able to perform daily activities with the medications. Flexeril 10 mg and Gabapentin 600 mg BID has been helpful.  In the past UDS positive for Tramadol. Admited taking a Tramadol for her headache. She has a history of chronic headaches, occur about 2 x monthly.  In the past she was taking diet supplements and UDS was positive for amphetamines. Currently being treated for 90 days for chronic resistant UTI.      Prior HPI: The patient is a 64-year-old woman with a history of sarcoidosis, breast CA, diabetes, anxiety and depression who presents in referral from Dr. Nguyen to Dr. Perez for multiple pain complaints including back pain, bilateral hip pain and right shoulder pain.  She is following up with me since I am much closer to her.   She presents today for multiple pain complaints.  She recently was hospitalized for pneumonia, UTI and sarcoidosis exacerbation per patient.  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  She has had past GTB injections with moderate benefit and had an exacerbation of her pain recently.  She was able to receive left GTB with Dr. Perez on 10/3/2014 that provided >50% relief of her left hip and leg pain.     She continues to take Prednisone 5mg daily. She continues Percoet 7.5mg about  "3x/day as needed with moderate benefit.  She presents today with worsening right knee pain.  She states having history of right knee osteoarthritis, but has not had any treatment for her knee pain.  Continues to have improvement in her knee pain following completion of visco supplementation injection in her right knee.  Her back and hip pain remain tolerable with her medications.  She continues to take Percoct 7.5mg q8hrs as needed with moderate benefit.  No reported side effects.       Pain intervention history: She takes hydrocodone 7-325 2-4 times a day.  Also has undergone epidural steroid injection at L1/2 without relief.  She has been doing cervical traction with good relief of her neck pain.  Previous trochanteric bursa injections with good relief.    ROS:She reports fatigability, itching, headaches, swollen glands, chest pain and shortness of breath, nausea vomiting, easy bruising, back pain, joint stiffness, dizziness, difficulty sleeping, anxiety, depression and loss of balance.  Balance of review of systems is negative.    Medical, surgical, family and social history reviewed elsewhere in record.    Medications/Allergies: See med card    Vitals:    12/28/23 1357   BP: 106/67   Pulse: 89   Weight: 75.3 kg (166 lb)   Height: 5' 6" (1.676 m)   PainSc: 10-Worst pain ever   PainLoc: Back       Physical exam:  Gen: A and O x3, pleasant, well-groomed  Skin: No rashes or obvious lesions  CVS: bradycardia  Resp: non labored breathing  Abdomen: Soft, NT/ND, normal bowel sounds present.  Neuro:  Lower extremities:   +right knee crepitus. LE erythema and swelling. TTP right 2 and 3rd metatarsals.   Reflexes: Patellar 2+, Achilles 2+ bilaterally.  Sensory:  Intact   Lumbar spine:  Lumbar spine: ROM is moderately reduced with flexion extension and oblique extension with increased pain in all directions.    Orlando's test causes pain on both sides.    Supine straight leg raise is negative bilaterally.    Internal and " external rotation of the hip causes increased pain in left groin.  Myofascial exam: Tenderness to palpation over both GTB.      Imaging:  Cervical spine MRI report 10/5/12: There is dextroscoliosis in the lower cervical/upper thoracic region.  There is very mild foraminal stenosis on the left at C6/7.    MRI thoracic spine 11/21/2008: No significant degenerative disc disease or central stenosis.    MRI lumbar spine 8/14/08: L1/2 small broad-based central protrusion with mild effacement of ventral thecal sac but not distorting exiting roots.  L2/3 and L3/4 unremarkable.  At L4/5 there is mild central stenosis with bulging annulus anteriorly and facet hypertrophic degenerative change posteriorly.  No focal disc herniation and neural foramina are patent.  At L5/S1 there is minimal bulging of the annulus with no thecal sac compression.  The small annular fissure within the posterior annulus.  Foramina are patent bilaterally, no stenosis mild, degenerative changes in the facets.    Xray Right Knee 10/13/14  Mild tricompartmental degenerative changes present. No joint effusion. The soft tissues are unremarkable.    Assessment:  Ms. Santiago is a 65-year-old woman with   1. DDD (degenerative disc disease), lumbar    2. Other spondylosis, lumbar region    3. Chronic pain of right knee    4. Myalgia        PLAN:  1. I have stressed the importance of physical activity and exercise to improve overall health.    2.  Percocet 10mg q8hrs as needed.  Script given for three months.  reviewed. Previous UDS consistent.   3.  Flexeril 10 mg q 8 h. 90 day supply.  4.  I believe her low back pain maybe due to facet arthropathy and have recommended lumbar medial branch blocks as a diagnostic procedure.  If successful, would proceed with radiofrequency ablation.  May consider this procedure in the future  5. Recommend series of 3 Euflexxa injections. She wishes to do this in the future. Unfortunately this was denied by insurance We will  resubmit and if denied seek approval for Synvisc 1  6.  I have reviewed the positive depression score which warrants active treatment with psychotherapy and/or medications. Currently taking Cymbalta prescribed by Dr. Pat  7. Continue treatment for chronic UTI and kidney disease  8.   Follow up in three months   All medication management was performed by Dr. Temple

## 2023-12-28 NOTE — TELEPHONE ENCOUNTER
----- Message from Marina Barrera sent at 12/28/2023 11:33 AM CST -----  Regarding: reschedule?  Contact: pt  Type:  Needs Medical Advice    Who Called: pt    Would the patient rather a call back or a response via [facility]? Call back    Best Call Back Number: [phone]    Additional Information: pt has an appt at 1:00 but says she will be one hour late.    Please advise.  Thank you.

## 2023-12-29 ENCOUNTER — TELEPHONE (OUTPATIENT)
Dept: PHARMACY | Facility: CLINIC | Age: 73
End: 2023-12-29
Payer: MEDICARE

## 2023-12-29 ENCOUNTER — PATIENT MESSAGE (OUTPATIENT)
Dept: PAIN MEDICINE | Facility: CLINIC | Age: 73
End: 2023-12-29
Payer: MEDICARE

## 2023-12-29 NOTE — TELEPHONE ENCOUNTER
We have reviewed the patient current medication list and insurance status. Unfortunately, The Pharmacy Patient Assistance Team is unable to assist Ms. Santiago at this time due to the following reasons      There are no Manufacture or Co-pay Savings Programs available for requested medication.           Pharmacy Patient Assistance Team

## 2024-01-02 ENCOUNTER — TELEPHONE (OUTPATIENT)
Dept: PAIN MEDICINE | Facility: CLINIC | Age: 74
End: 2024-01-02

## 2024-01-02 DIAGNOSIS — G89.4 CHRONIC PAIN DISORDER: ICD-10-CM

## 2024-01-02 RX ORDER — OXYCODONE AND ACETAMINOPHEN 10; 325 MG/1; MG/1
1 TABLET ORAL EVERY 8 HOURS PRN
Qty: 90 TABLET | Refills: 0 | Status: SHIPPED | OUTPATIENT
Start: 2024-01-26 | End: 2024-01-03 | Stop reason: SDUPTHER

## 2024-01-02 NOTE — TELEPHONE ENCOUNTER
----- Message from Sabina Samuel MA sent at 1/2/2024 10:14 AM CST -----  Regarding: FW: advice  Dr. Temple patient rx was sent to a different pharmacy   I have changed it the pharmacy that she wanted [facility] in [place]   ----- Message -----  From: Deejay Sanchez  Sent: 1/2/2024  10:09 AM CST  To: Andreea Carlisle Staff  Subject: advice                                           Type:  Needs Medical Advice    Who Called: pt    Best Call Back Number: [phone]    Additional Information: pt st that her prescription was sent to wrong pharm. Wants it resent to right pharm.  please call to discuss.         [facility] [address] [place], [place] [id number] [place] [id number] [address] [address]  Phone: [phone] Fax: [phone]

## 2024-01-02 NOTE — TELEPHONE ENCOUNTER
Sent message to Dr. Temple   ----- Message from Deejay Sanchez sent at 1/2/2024 10:07 AM CST -----  Regarding: advice  Type:  Needs Medical Advice    Who Called: pt    Best Call Back Number: [phone]    Additional Information: pt st that her prescription was sent to wrong pharm. Wants it resent to right pharm.  please call to discuss.         [facility] [address] [place] [id number]  Phone: [phone] Fax: [phone]

## 2024-01-03 RX ORDER — OXYCODONE AND ACETAMINOPHEN 10; 325 MG/1; MG/1
1 TABLET ORAL EVERY 8 HOURS PRN
Qty: 90 TABLET | Refills: 0 | Status: SHIPPED | OUTPATIENT
Start: 2024-01-03 | End: 2024-02-01

## 2024-01-03 RX ORDER — OXYCODONE AND ACETAMINOPHEN 10; 325 MG/1; MG/1
1 TABLET ORAL EVERY 8 HOURS PRN
Qty: 90 TABLET | Refills: 0 | Status: SHIPPED | OUTPATIENT
Start: 2024-02-24 | End: 2024-03-24

## 2024-01-03 RX ORDER — OXYCODONE AND ACETAMINOPHEN 10; 325 MG/1; MG/1
1 TABLET ORAL EVERY 8 HOURS PRN
Qty: 90 TABLET | Refills: 0 | Status: SHIPPED | OUTPATIENT
Start: 2024-01-26 | End: 2024-02-24

## 2024-03-11 NOTE — HIM RECORD RETIREMENT NOTE
long term of Incomplete Medical Record    3/11/24    Patient Name: Jessica Santiago  Contact Serial # (CSN): [id number]  Patient Medical Record # (MRN): [id number]  Date of Service: Refill on 4/14/2022  Physician Name: Petra Broussard MD [[id number]     This record has been reviewed and is being retired as incomplete by the approval of the  Medical Staff Operating Committee (MSOC)     On 5/3/2023., due to:  Unavailability of Provider     Missing Information/Comments:  []    Discharge Summary   []    DC Note/Short Stay Summary   []    ED Provider Note   []    Delivery Note   []    History & Physical   []   Operative Note   []     Procedure Note   []     Physician Order   [x]     Verbal Order   []       Other, specify:

## 2024-03-20 ENCOUNTER — PATIENT MESSAGE (OUTPATIENT)
Dept: OTHER | Facility: OTHER | Age: 74
End: 2024-03-20
Payer: MEDICARE

## 2024-03-21 ENCOUNTER — PATIENT MESSAGE (OUTPATIENT)
Dept: ADMINISTRATIVE | Facility: OTHER | Age: 74
End: 2024-03-21
Payer: MEDICARE

## 2024-06-20 ENCOUNTER — PATIENT MESSAGE (OUTPATIENT)
Dept: OTHER | Facility: OTHER | Age: 74
End: 2024-06-20
Payer: MEDICARE

## 2024-08-29 PROBLEM — E03.9 HYPOTHYROIDISM: Status: RESOLVED | Noted: 2017-03-28 | Resolved: 2024-08-29

## 2024-10-18 ENCOUNTER — PATIENT MESSAGE (OUTPATIENT)
Dept: OPHTHALMOLOGY | Facility: CLINIC | Age: 74
End: 2024-10-18
Payer: MEDICARE

## 2024-10-30 NOTE — TELEPHONE ENCOUNTER
Daksha Smith is calling to request a refill on the following medication(s):    Medication Request:  Requested Prescriptions     Pending Prescriptions Disp Refills    metFORMIN (GLUCOPHAGE) 1000 MG tablet [Pharmacy Med Name: metFORMIN HCl 1000 MG Oral Tablet] 180 tablet 0     Sig: Take 1 tablet by mouth twice daily       Last Visit Date (If Applicable):  6/24/2024    Next Visit Date:    12/19/2024                 I left a message that patient's appt for 8/30/17 will be cancelled due to the provider being out on maternity leave. If the patient has any questions or concerns, they can contact the office and the message will be addressed by the on call provider.